# Patient Record
Sex: MALE | Race: WHITE | NOT HISPANIC OR LATINO | Employment: FULL TIME | URBAN - METROPOLITAN AREA
[De-identification: names, ages, dates, MRNs, and addresses within clinical notes are randomized per-mention and may not be internally consistent; named-entity substitution may affect disease eponyms.]

---

## 2018-07-09 ENCOUNTER — HOSPITAL ENCOUNTER (INPATIENT)
Facility: MEDICAL CENTER | Age: 28
LOS: 10 days | DRG: 871 | End: 2018-07-20
Attending: EMERGENCY MEDICINE | Admitting: HOSPITALIST
Payer: MEDICAID

## 2018-07-09 ENCOUNTER — APPOINTMENT (OUTPATIENT)
Dept: RADIOLOGY | Facility: MEDICAL CENTER | Age: 28
DRG: 871 | End: 2018-07-09
Attending: EMERGENCY MEDICINE
Payer: MEDICAID

## 2018-07-09 DIAGNOSIS — I33.0 ACUTE BACTERIAL ENDOCARDITIS: ICD-10-CM

## 2018-07-09 DIAGNOSIS — G06.2 EPIDURAL ABSCESS: ICD-10-CM

## 2018-07-09 LAB
BASOPHILS # BLD AUTO: 0.1 % (ref 0–1.8)
BASOPHILS # BLD: 0.01 K/UL (ref 0–0.12)
EOSINOPHIL # BLD AUTO: 0.08 K/UL (ref 0–0.51)
EOSINOPHIL NFR BLD: 1.1 % (ref 0–6.9)
ERYTHROCYTE [DISTWIDTH] IN BLOOD BY AUTOMATED COUNT: 41.4 FL (ref 35.9–50)
HCT VFR BLD AUTO: 34.5 % (ref 42–52)
HGB BLD-MCNC: 11.2 G/DL (ref 14–18)
IMM GRANULOCYTES # BLD AUTO: 0.02 K/UL (ref 0–0.11)
IMM GRANULOCYTES NFR BLD AUTO: 0.3 % (ref 0–0.9)
LYMPHOCYTES # BLD AUTO: 1.5 K/UL (ref 1–4.8)
LYMPHOCYTES NFR BLD: 19.7 % (ref 22–41)
MCH RBC QN AUTO: 26.5 PG (ref 27–33)
MCHC RBC AUTO-ENTMCNC: 32.5 G/DL (ref 33.7–35.3)
MCV RBC AUTO: 81.6 FL (ref 81.4–97.8)
MONOCYTES # BLD AUTO: 0.42 K/UL (ref 0–0.85)
MONOCYTES NFR BLD AUTO: 5.5 % (ref 0–13.4)
NEUTROPHILS # BLD AUTO: 5.57 K/UL (ref 1.82–7.42)
NEUTROPHILS NFR BLD: 73.3 % (ref 44–72)
NRBC # BLD AUTO: 0 K/UL
NRBC BLD-RTO: 0 /100 WBC
PLATELET # BLD AUTO: 283 K/UL (ref 164–446)
PMV BLD AUTO: 10.7 FL (ref 9–12.9)
RBC # BLD AUTO: 4.23 M/UL (ref 4.7–6.1)
TROPONIN I SERPL-MCNC: 0.01 NG/ML (ref 0–0.04)
WBC # BLD AUTO: 7.6 K/UL (ref 4.8–10.8)

## 2018-07-09 PROCEDURE — 700105 HCHG RX REV CODE 258: Performed by: EMERGENCY MEDICINE

## 2018-07-09 PROCEDURE — 87077 CULTURE AEROBIC IDENTIFY: CPT

## 2018-07-09 PROCEDURE — 83540 ASSAY OF IRON: CPT

## 2018-07-09 PROCEDURE — 87040 BLOOD CULTURE FOR BACTERIA: CPT

## 2018-07-09 PROCEDURE — 83036 HEMOGLOBIN GLYCOSYLATED A1C: CPT

## 2018-07-09 PROCEDURE — 84443 ASSAY THYROID STIM HORMONE: CPT

## 2018-07-09 PROCEDURE — 93005 ELECTROCARDIOGRAM TRACING: CPT | Performed by: EMERGENCY MEDICINE

## 2018-07-09 PROCEDURE — 80053 COMPREHEN METABOLIC PANEL: CPT

## 2018-07-09 PROCEDURE — 83550 IRON BINDING TEST: CPT

## 2018-07-09 PROCEDURE — 71046 X-RAY EXAM CHEST 2 VIEWS: CPT

## 2018-07-09 PROCEDURE — 83690 ASSAY OF LIPASE: CPT

## 2018-07-09 PROCEDURE — 99285 EMERGENCY DEPT VISIT HI MDM: CPT

## 2018-07-09 PROCEDURE — 87181 SC STD AGAR DILUTION PER AGT: CPT

## 2018-07-09 PROCEDURE — 85025 COMPLETE CBC W/AUTO DIFF WBC: CPT

## 2018-07-09 PROCEDURE — 84484 ASSAY OF TROPONIN QUANT: CPT

## 2018-07-09 RX ORDER — SODIUM CHLORIDE 9 MG/ML
1000 INJECTION, SOLUTION INTRAVENOUS ONCE
Status: COMPLETED | OUTPATIENT
Start: 2018-07-09 | End: 2018-07-09

## 2018-07-09 RX ADMIN — SODIUM CHLORIDE 1000 ML: 9 INJECTION, SOLUTION INTRAVENOUS at 22:16

## 2018-07-09 ASSESSMENT — LIFESTYLE VARIABLES: DO YOU DRINK ALCOHOL: NO

## 2018-07-10 ENCOUNTER — APPOINTMENT (OUTPATIENT)
Dept: RADIOLOGY | Facility: MEDICAL CENTER | Age: 28
DRG: 871 | End: 2018-07-10
Attending: INTERNAL MEDICINE
Payer: MEDICAID

## 2018-07-10 ENCOUNTER — APPOINTMENT (OUTPATIENT)
Dept: RADIOLOGY | Facility: MEDICAL CENTER | Age: 28
DRG: 871 | End: 2018-07-10
Attending: HOSPITALIST
Payer: MEDICAID

## 2018-07-10 PROBLEM — R11.0 NAUSEA: Status: ACTIVE | Noted: 2018-07-10

## 2018-07-10 PROBLEM — G03.9 MENINGITIS: Status: RESOLVED | Noted: 2018-07-10 | Resolved: 2018-07-10

## 2018-07-10 PROBLEM — R11.0 NAUSEA: Status: RESOLVED | Noted: 2018-07-10 | Resolved: 2018-07-10

## 2018-07-10 PROBLEM — R01.1 MURMUR: Status: ACTIVE | Noted: 2018-07-10

## 2018-07-10 PROBLEM — G03.9 MENINGITIS: Status: ACTIVE | Noted: 2018-07-10

## 2018-07-10 PROBLEM — E87.6 HYPOKALEMIA: Status: ACTIVE | Noted: 2018-07-10

## 2018-07-10 PROBLEM — G45.9 TIA (TRANSIENT ISCHEMIC ATTACK): Status: ACTIVE | Noted: 2018-07-10

## 2018-07-10 PROBLEM — G00.2 STREPTOCOCCAL MENINGITIS: Status: ACTIVE | Noted: 2018-07-10

## 2018-07-10 PROBLEM — B95.5 STREPTOCOCCAL BACTEREMIA: Status: ACTIVE | Noted: 2018-07-10

## 2018-07-10 PROBLEM — R78.81 BACTEREMIA: Status: ACTIVE | Noted: 2018-07-10

## 2018-07-10 PROBLEM — D64.9 ANEMIA: Status: ACTIVE | Noted: 2018-07-10

## 2018-07-10 LAB
ALBUMIN SERPL BCP-MCNC: 3.4 G/DL (ref 3.2–4.9)
ALBUMIN/GLOB SERPL: 1.1 G/DL
ALP SERPL-CCNC: 65 U/L (ref 30–99)
ALT SERPL-CCNC: 11 U/L (ref 2–50)
ANION GAP SERPL CALC-SCNC: 8 MMOL/L (ref 0–11.9)
AST SERPL-CCNC: 13 U/L (ref 12–45)
BILIRUB SERPL-MCNC: 0.4 MG/DL (ref 0.1–1.5)
BUN SERPL-MCNC: 17 MG/DL (ref 8–22)
BURR CELLS/RBC NFR CSF MANUAL: 0 %
CALCIUM SERPL-MCNC: 8.6 MG/DL (ref 8.5–10.5)
CHLORIDE SERPL-SCNC: 108 MMOL/L (ref 96–112)
CLARITY CSF: CLEAR
CO2 SERPL-SCNC: 24 MMOL/L (ref 20–33)
COLOR CSF: COLORLESS
COLOR SPUN CSF: COLORLESS
CREAT SERPL-MCNC: 0.78 MG/DL (ref 0.5–1.4)
EST. AVERAGE GLUCOSE BLD GHB EST-MCNC: 105 MG/DL
FERRITIN SERPL-MCNC: 407.7 NG/ML (ref 22–322)
FOLATE SERPL-MCNC: 8.5 NG/ML
GLOBULIN SER CALC-MCNC: 3.1 G/DL (ref 1.9–3.5)
GLUCOSE CSF-MCNC: 53 MG/DL (ref 40–80)
GLUCOSE SERPL-MCNC: 108 MG/DL (ref 65–99)
GRAM STN SPEC: NORMAL
HBA1C MFR BLD: 5.3 % (ref 0–5.6)
HGB RETIC QN AUTO: 30 PG/CELL (ref 29–35)
IMM RETICS NFR: 24.2 % (ref 9.3–17.4)
IRON SATN MFR SERPL: 10 % (ref 15–55)
IRON SERPL-MCNC: 21 UG/DL (ref 50–180)
LIPASE SERPL-CCNC: 52 U/L (ref 11–82)
LYMPHOCYTES NFR CSF: 77 %
MONONUC CELLS NFR CSF: 9 %
NEUTROPHILS NFR CSF: 14 %
POTASSIUM SERPL-SCNC: 3.3 MMOL/L (ref 3.6–5.5)
PROT CSF-MCNC: 32 MG/DL (ref 15–45)
PROT SERPL-MCNC: 6.5 G/DL (ref 6–8.2)
RBC # CSF: 14 CELLS/UL
RETICS # AUTO: 0.07 M/UL (ref 0.04–0.06)
RETICS/RBC NFR: 1.6 % (ref 0.8–2.1)
SIGNIFICANT IND 70042: NORMAL
SITE SITE: NORMAL
SODIUM SERPL-SCNC: 140 MMOL/L (ref 135–145)
SOURCE SOURCE: NORMAL
SPECIMEN VOL CSF: 8 ML
TIBC SERPL-MCNC: 209 UG/DL (ref 250–450)
TROPONIN I SERPL-MCNC: 0.02 NG/ML (ref 0–0.04)
TSH SERPL DL<=0.005 MIU/L-ACNC: 2.13 UIU/ML (ref 0.38–5.33)
TUBE # CSF: 2
TUBE # CSF: 3
VIT B12 SERPL-MCNC: 699 PG/ML (ref 211–911)
WBC # CSF: 54 CELLS/UL (ref 0–10)

## 2018-07-10 PROCEDURE — 70498 CT ANGIOGRAPHY NECK: CPT

## 2018-07-10 PROCEDURE — 700101 HCHG RX REV CODE 250: Performed by: INTERNAL MEDICINE

## 2018-07-10 PROCEDURE — 86592 SYPHILIS TEST NON-TREP QUAL: CPT

## 2018-07-10 PROCEDURE — 87070 CULTURE OTHR SPECIMN AEROBIC: CPT

## 2018-07-10 PROCEDURE — 70482 CT ORBIT/EAR/FOSSA W/O&W/DYE: CPT

## 2018-07-10 PROCEDURE — G8997 SWALLOW GOAL STATUS: HCPCS | Mod: CH

## 2018-07-10 PROCEDURE — 700105 HCHG RX REV CODE 258: Performed by: INTERNAL MEDICINE

## 2018-07-10 PROCEDURE — 82728 ASSAY OF FERRITIN: CPT

## 2018-07-10 PROCEDURE — 97535 SELF CARE MNGMENT TRAINING: CPT

## 2018-07-10 PROCEDURE — 36415 COLL VENOUS BLD VENIPUNCTURE: CPT

## 2018-07-10 PROCEDURE — 82945 GLUCOSE OTHER FLUID: CPT

## 2018-07-10 PROCEDURE — A9270 NON-COVERED ITEM OR SERVICE: HCPCS | Performed by: INTERNAL MEDICINE

## 2018-07-10 PROCEDURE — A9270 NON-COVERED ITEM OR SERVICE: HCPCS | Performed by: HOSPITALIST

## 2018-07-10 PROCEDURE — 700102 HCHG RX REV CODE 250 W/ 637 OVERRIDE(OP): Performed by: HOSPITALIST

## 2018-07-10 PROCEDURE — 84157 ASSAY OF PROTEIN OTHER: CPT

## 2018-07-10 PROCEDURE — G8998 SWALLOW D/C STATUS: HCPCS | Mod: CH

## 2018-07-10 PROCEDURE — 700117 HCHG RX CONTRAST REV CODE 255: Performed by: INTERNAL MEDICINE

## 2018-07-10 PROCEDURE — 700102 HCHG RX REV CODE 250 W/ 637 OVERRIDE(OP): Performed by: INTERNAL MEDICINE

## 2018-07-10 PROCEDURE — 70496 CT ANGIOGRAPHY HEAD: CPT

## 2018-07-10 PROCEDURE — 770020 HCHG ROOM/CARE - TELE (206)

## 2018-07-10 PROCEDURE — 82607 VITAMIN B-12: CPT

## 2018-07-10 PROCEDURE — G8996 SWALLOW CURRENT STATUS: HCPCS | Mod: CH

## 2018-07-10 PROCEDURE — 70488 CT MAXILLOFACIAL W/O & W/DYE: CPT

## 2018-07-10 PROCEDURE — 87205 SMEAR GRAM STAIN: CPT

## 2018-07-10 PROCEDURE — 82746 ASSAY OF FOLIC ACID SERUM: CPT

## 2018-07-10 PROCEDURE — 700117 HCHG RX CONTRAST REV CODE 255: Performed by: EMERGENCY MEDICINE

## 2018-07-10 PROCEDURE — 86618 LYME DISEASE ANTIBODY: CPT

## 2018-07-10 PROCEDURE — 700111 HCHG RX REV CODE 636 W/ 250 OVERRIDE (IP): Performed by: INTERNAL MEDICINE

## 2018-07-10 PROCEDURE — 85046 RETICYTE/HGB CONCENTRATE: CPT

## 2018-07-10 PROCEDURE — 62270 DX LMBR SPI PNXR: CPT | Performed by: INTERNAL MEDICINE

## 2018-07-10 PROCEDURE — 89051 BODY FLUID CELL COUNT: CPT

## 2018-07-10 PROCEDURE — 87040 BLOOD CULTURE FOR BACTERIA: CPT

## 2018-07-10 PROCEDURE — 009U3ZX DRAINAGE OF SPINAL CANAL, PERCUTANEOUS APPROACH, DIAGNOSTIC: ICD-10-PCS | Performed by: INTERNAL MEDICINE

## 2018-07-10 PROCEDURE — 92610 EVALUATE SWALLOWING FUNCTION: CPT

## 2018-07-10 PROCEDURE — 99220 PR INITIAL OBSERVATION CARE,LEVL III: CPT | Performed by: HOSPITALIST

## 2018-07-10 RX ORDER — ASPIRIN 81 MG/1
324 TABLET, CHEWABLE ORAL DAILY
Status: DISCONTINUED | OUTPATIENT
Start: 2018-07-10 | End: 2018-07-10

## 2018-07-10 RX ORDER — TRAMADOL HYDROCHLORIDE 50 MG/1
50 TABLET ORAL EVERY 6 HOURS PRN
Status: DISCONTINUED | OUTPATIENT
Start: 2018-07-10 | End: 2018-07-12

## 2018-07-10 RX ORDER — ASPIRIN 325 MG
325 TABLET ORAL DAILY
Status: DISCONTINUED | OUTPATIENT
Start: 2018-07-10 | End: 2018-07-10

## 2018-07-10 RX ORDER — SODIUM CHLORIDE 9 MG/ML
INJECTION, SOLUTION INTRAVENOUS CONTINUOUS
Status: DISCONTINUED | OUTPATIENT
Start: 2018-07-10 | End: 2018-07-14

## 2018-07-10 RX ORDER — PROMETHAZINE HYDROCHLORIDE 25 MG/1
12.5-25 TABLET ORAL EVERY 4 HOURS PRN
Status: DISCONTINUED | OUTPATIENT
Start: 2018-07-10 | End: 2018-07-10

## 2018-07-10 RX ORDER — PROMETHAZINE HYDROCHLORIDE 25 MG/1
12.5-25 SUPPOSITORY RECTAL EVERY 4 HOURS PRN
Status: DISCONTINUED | OUTPATIENT
Start: 2018-07-10 | End: 2018-07-10

## 2018-07-10 RX ORDER — POTASSIUM CHLORIDE 20 MEQ/1
40 TABLET, EXTENDED RELEASE ORAL ONCE
Status: COMPLETED | OUTPATIENT
Start: 2018-07-10 | End: 2018-07-10

## 2018-07-10 RX ORDER — IBUPROFEN 200 MG
800 TABLET ORAL EVERY 6 HOURS PRN
Status: ON HOLD | COMMUNITY
End: 2018-07-19

## 2018-07-10 RX ORDER — DEXAMETHASONE SODIUM PHOSPHATE 4 MG/ML
4 INJECTION, SOLUTION INTRA-ARTICULAR; INTRALESIONAL; INTRAMUSCULAR; INTRAVENOUS; SOFT TISSUE EVERY 6 HOURS
Status: DISCONTINUED | OUTPATIENT
Start: 2018-07-10 | End: 2018-07-11

## 2018-07-10 RX ORDER — AMOXICILLIN 250 MG
2 CAPSULE ORAL 2 TIMES DAILY
Status: DISCONTINUED | OUTPATIENT
Start: 2018-07-10 | End: 2018-07-10

## 2018-07-10 RX ORDER — POTASSIUM CHLORIDE 20 MEQ/1
40 TABLET, EXTENDED RELEASE ORAL EVERY 4 HOURS
Status: COMPLETED | OUTPATIENT
Start: 2018-07-10 | End: 2018-07-11

## 2018-07-10 RX ORDER — LABETALOL HYDROCHLORIDE 5 MG/ML
10 INJECTION, SOLUTION INTRAVENOUS EVERY 4 HOURS PRN
Status: DISCONTINUED | OUTPATIENT
Start: 2018-07-10 | End: 2018-07-20 | Stop reason: HOSPADM

## 2018-07-10 RX ORDER — ASPIRIN 300 MG/1
300 SUPPOSITORY RECTAL DAILY
Status: DISCONTINUED | OUTPATIENT
Start: 2018-07-10 | End: 2018-07-10

## 2018-07-10 RX ORDER — ONDANSETRON 2 MG/ML
4 INJECTION INTRAMUSCULAR; INTRAVENOUS EVERY 4 HOURS PRN
Status: DISCONTINUED | OUTPATIENT
Start: 2018-07-10 | End: 2018-07-20 | Stop reason: HOSPADM

## 2018-07-10 RX ORDER — SODIUM CHLORIDE 9 MG/ML
INJECTION, SOLUTION INTRAVENOUS CONTINUOUS
Status: DISCONTINUED | OUTPATIENT
Start: 2018-07-10 | End: 2018-07-10

## 2018-07-10 RX ORDER — HYDRALAZINE HYDROCHLORIDE 20 MG/ML
10 INJECTION INTRAMUSCULAR; INTRAVENOUS
Status: DISCONTINUED | OUTPATIENT
Start: 2018-07-10 | End: 2018-07-10

## 2018-07-10 RX ORDER — BISACODYL 10 MG
10 SUPPOSITORY, RECTAL RECTAL
Status: DISCONTINUED | OUTPATIENT
Start: 2018-07-10 | End: 2018-07-10

## 2018-07-10 RX ORDER — ATORVASTATIN CALCIUM 80 MG/1
80 TABLET, FILM COATED ORAL EVERY EVENING
Status: DISCONTINUED | OUTPATIENT
Start: 2018-07-10 | End: 2018-07-10

## 2018-07-10 RX ORDER — ONDANSETRON 4 MG/1
4 TABLET, ORALLY DISINTEGRATING ORAL EVERY 4 HOURS PRN
Status: DISCONTINUED | OUTPATIENT
Start: 2018-07-10 | End: 2018-07-20 | Stop reason: HOSPADM

## 2018-07-10 RX ORDER — FERROUS SULFATE 325(65) MG
325 TABLET ORAL
Status: DISCONTINUED | OUTPATIENT
Start: 2018-07-11 | End: 2018-07-10

## 2018-07-10 RX ORDER — LIDOCAINE HYDROCHLORIDE 10 MG/ML
20 INJECTION, SOLUTION INFILTRATION; PERINEURAL ONCE
Status: COMPLETED | OUTPATIENT
Start: 2018-07-10 | End: 2018-07-10

## 2018-07-10 RX ORDER — POLYETHYLENE GLYCOL 3350 17 G/17G
1 POWDER, FOR SOLUTION ORAL
Status: DISCONTINUED | OUTPATIENT
Start: 2018-07-10 | End: 2018-07-10

## 2018-07-10 RX ORDER — IBUPROFEN 600 MG/1
600 TABLET ORAL EVERY 6 HOURS PRN
Status: ON HOLD | COMMUNITY
End: 2018-07-10

## 2018-07-10 RX ORDER — DEXAMETHASONE SODIUM PHOSPHATE 4 MG/ML
10 INJECTION, SOLUTION INTRA-ARTICULAR; INTRALESIONAL; INTRAMUSCULAR; INTRAVENOUS; SOFT TISSUE ONCE
Status: COMPLETED | OUTPATIENT
Start: 2018-07-10 | End: 2018-07-10

## 2018-07-10 RX ADMIN — CEFTRIAXONE 2 G: 2 INJECTION, POWDER, FOR SOLUTION INTRAMUSCULAR; INTRAVENOUS at 15:25

## 2018-07-10 RX ADMIN — TRAMADOL HYDROCHLORIDE 50 MG: 50 TABLET, COATED ORAL at 17:18

## 2018-07-10 RX ADMIN — DEXAMETHASONE SODIUM PHOSPHATE 4 MG: 4 INJECTION, SOLUTION INTRAMUSCULAR; INTRAVENOUS at 20:46

## 2018-07-10 RX ADMIN — ASPIRIN 325 MG: 325 TABLET ORAL at 05:30

## 2018-07-10 RX ADMIN — POTASSIUM CHLORIDE 40 MEQ: 1500 TABLET, EXTENDED RELEASE ORAL at 05:30

## 2018-07-10 RX ADMIN — IOHEXOL 100 ML: 350 INJECTION, SOLUTION INTRAVENOUS at 00:12

## 2018-07-10 RX ADMIN — TRAMADOL HYDROCHLORIDE 50 MG: 50 TABLET, COATED ORAL at 22:33

## 2018-07-10 RX ADMIN — VANCOMYCIN HYDROCHLORIDE 2600 MG: 100 INJECTION, POWDER, LYOPHILIZED, FOR SOLUTION INTRAVENOUS at 20:43

## 2018-07-10 RX ADMIN — LIDOCAINE HYDROCHLORIDE 20 ML: 10 INJECTION, SOLUTION INFILTRATION; PERINEURAL at 14:30

## 2018-07-10 RX ADMIN — SODIUM CHLORIDE: 9 INJECTION, SOLUTION INTRAVENOUS at 20:43

## 2018-07-10 RX ADMIN — IOHEXOL 100 ML: 350 INJECTION, SOLUTION INTRAVENOUS at 16:44

## 2018-07-10 RX ADMIN — DEXAMETHASONE SODIUM PHOSPHATE 10 MG: 4 INJECTION, SOLUTION INTRAMUSCULAR; INTRAVENOUS at 15:15

## 2018-07-10 RX ADMIN — POTASSIUM CHLORIDE 40 MEQ: 1500 TABLET, EXTENDED RELEASE ORAL at 22:00

## 2018-07-10 ASSESSMENT — ENCOUNTER SYMPTOMS
ORTHOPNEA: 0
NECK PAIN: 1
INSOMNIA: 0
EYE DISCHARGE: 0
VOMITING: 0
WEAKNESS: 0
BRUISES/BLEEDS EASILY: 0
HEMOPTYSIS: 0
EYE PAIN: 0
NERVOUS/ANXIOUS: 0
STRIDOR: 0
SORE THROAT: 0
DIARRHEA: 0
DIZZINESS: 0
PALPITATIONS: 0
SINUS PAIN: 0
MYALGIAS: 1
DOUBLE VISION: 0
MYALGIAS: 0
EYE REDNESS: 0
LOSS OF CONSCIOUSNESS: 0
CHILLS: 0
SHORTNESS OF BREATH: 0
NAUSEA: 1
WHEEZING: 0
SEIZURES: 0
TREMORS: 0
SPEECH CHANGE: 1
ABDOMINAL PAIN: 0
CLAUDICATION: 0
COUGH: 0
FOCAL WEAKNESS: 0
SENSORY CHANGE: 1
SENSORY CHANGE: 0
FLANK PAIN: 0
SPEECH CHANGE: 0
SPUTUM PRODUCTION: 0
PND: 0
FEVER: 1
BACK PAIN: 1
FALLS: 0
BLOOD IN STOOL: 0
HEADACHES: 1
MEMORY LOSS: 0
TINGLING: 0
DEPRESSION: 0
WEAKNESS: 1
HALLUCINATIONS: 0
CHILLS: 1
PHOTOPHOBIA: 1
CONSTIPATION: 0
FOCAL WEAKNESS: 1
HEARTBURN: 0
BLURRED VISION: 0

## 2018-07-10 ASSESSMENT — PATIENT HEALTH QUESTIONNAIRE - PHQ9
1. LITTLE INTEREST OR PLEASURE IN DOING THINGS: NOT AT ALL
2. FEELING DOWN, DEPRESSED, IRRITABLE, OR HOPELESS: NOT AT ALL
SUM OF ALL RESPONSES TO PHQ9 QUESTIONS 1 AND 2: 0

## 2018-07-10 ASSESSMENT — COGNITIVE AND FUNCTIONAL STATUS - GENERAL
SUGGESTED CMS G CODE MODIFIER DAILY ACTIVITY: CH
DAILY ACTIVITIY SCORE: 24
SUGGESTED CMS G CODE MODIFIER MOBILITY: CH
MOBILITY SCORE: 24

## 2018-07-10 ASSESSMENT — PAIN SCALES - GENERAL
PAINLEVEL_OUTOF10: 0
PAINLEVEL_OUTOF10: 0
PAINLEVEL_OUTOF10: 6
PAINLEVEL_OUTOF10: 0

## 2018-07-10 ASSESSMENT — LIFESTYLE VARIABLES
SUBSTANCE_ABUSE: 0
EVER_SMOKED: YES

## 2018-07-10 NOTE — ED TRIAGE NOTES
See triage notes  Pt has multiple complaints, high fevers for 1 month placed on antibiotics never followed up with pcp, pt also states losing 32 lbs in less than 2months.  Pt is negative stroke exam

## 2018-07-10 NOTE — H&P
Hospital Medicine History and Physical    Date of Service  7/10/2018    Chief Complaint  Chief Complaint   Patient presents with   • Numbness       History of Presenting Illness  27 y.o. male who presented 7/9/2018 with numbness.  Patient has a history of Bell's palsy came for evaluation of left-sided facial numbness and difficulty speaking.  This started around 8 PM.  He states he was standing there with his daughter, then went to bed for a nap.  When he woke up he had left-sided facial numbness and drooping.  He also had difficulty with speak and tongue numbness.  This was new for him.  He has very slurred speech and slowed speech.  Is also had nausea which has been going on for several months now.  Is also been complaining of fevers.  No alleviating or exacerbating factors to his symptoms.  He has never had symptoms like this previously    Primary Care Physician  YENY Velez.    Consultants  none    Code Status  full    Review of Systems  Review of Systems   Constitutional: Positive for fever. Negative for chills.   HENT: Negative for congestion, hearing loss and tinnitus.    Eyes: Negative for blurred vision, double vision and discharge.   Respiratory: Negative for cough, hemoptysis and shortness of breath.    Cardiovascular: Negative for chest pain, palpitations and leg swelling.   Gastrointestinal: Positive for nausea. Negative for abdominal pain, heartburn and vomiting.   Genitourinary: Negative for dysuria and flank pain.   Musculoskeletal: Negative for joint pain and myalgias.   Skin: Negative for rash.   Neurological: Positive for sensory change, speech change and focal weakness. Negative for dizziness and weakness.   Endo/Heme/Allergies: Negative for environmental allergies. Does not bruise/bleed easily.   Psychiatric/Behavioral: Negative for depression, hallucinations and substance abuse.        Past Medical History  Past Medical History:   Diagnosis Date   • Pain        Surgical  History  Past Surgical History:   Procedure Laterality Date   • KNEE ARTHROSCOPY  10/2/08    Performed by JOY NOLEN at [facility] ORS   • KNEE ARTHROSCOPY  08    Performed by JOY NOLEN at [facility] ORS   • TIBIAL OSTEOTOMY  08    Performed by JOY NOLEN at [facility] ORS   • LOOSE BODY REMOVAL  08    Performed by JOY NOLEN at [facility] ORS   • LIGAMENT RECONSTRUCTION  08    Performed by JOY NOLEN at [facility] ORS   • KNEE ARTHROSCOPY  08    Performed by JOY NOLEN at [facility]   • KNEE ARTHROSCOPY      Left   • TONSILLECTOMY         Medications  No current facility-administered medications on file prior to encounter.      No current outpatient prescriptions on file prior to encounter.       Family History  Family History   Problem Relation Age of Onset   • Hyperlipidemia Paternal Grandmother    • Diabetes Maternal Grandfather    • Heart Disease Maternal Grandmother    • Heart Disease Father    • Hypertension Father    • Heart Disease Sister        Social History  Social History   Substance Use Topics   • Smoking status: Current Every Day Smoker     Packs/day: 0.50   • Smokeless tobacco: Never Used   • Alcohol use No       Allergies  Allergies   Allergen Reactions   • Nkda [No Known Drug Allergy]         Physical Exam  Laboratory   Hemodynamics  Temp (24hrs), Av.3 °C (99.2 °F), Min:37.3 °C (99.2 °F), Max:37.3 °C (99.2 °F)   Temperature: 37.3 °C (99.2 °F)  Pulse  Av  Min: 100  Max: 114    Blood Pressure: 135/75      Respiratory      Respiration: 16, Pulse Oximetry: 95 %             Physical Exam   Constitutional: He is oriented to person, place, and time. He appears well-developed and well-nourished.   HENT:   Head: Normocephalic and atraumatic.   Eyes: Conjunctivae and EOM are normal. Pupils are equal, round, and reactive to light.   Neck: Normal range of motion. Neck  supple. No JVD present.   Cardiovascular: Normal rate, regular rhythm and intact distal pulses.    Murmur heard.  Pulmonary/Chest: Effort normal and breath sounds normal. No respiratory distress. He exhibits no tenderness.   Abdominal: Soft. Bowel sounds are normal. He exhibits no distension. There is no tenderness.   Musculoskeletal: Normal range of motion. He exhibits no edema.   Neurological: He is alert and oriented to person, place, and time. No cranial nerve deficit. He exhibits normal muscle tone.   Skin: Skin is warm and dry. No erythema.   Psychiatric: He has a normal mood and affect. His behavior is normal. Judgment and thought content normal.   Nursing note and vitals reviewed.      Recent Labs      07/09/18 2059   WBC  7.6   RBC  4.23*   HEMOGLOBIN  11.2*   HEMATOCRIT  34.5*   MCV  81.6   MCH  26.5*   MCHC  32.5*   RDW  41.4   PLATELETCT  283   MPV  10.7     Recent Labs      07/09/18   2333   SODIUM  140   POTASSIUM  3.3*   CHLORIDE  108   CO2  24   GLUCOSE  108*   BUN  17   CREATININE  0.78   CALCIUM  8.6     Recent Labs      07/09/18   2333   ALTSGPT  11   ASTSGOT  13   ALKPHOSPHAT  65   TBILIRUBIN  0.4   LIPASE  52   GLUCOSE  108*                 Lab Results   Component Value Date    TROPONINI 0.02 07/09/2018     Urinalysis:  No results found for: SPECGRAVITY, GLUCOSEUR, KETONES, NITRITE, WBCURINE, RBCURINE, BACTERIA, EPITHELCELL     Imaging  CT-CTA NECK WITH & W/O-POST PROCESSING [[id number]] Resulted: 07/10/18 0030   Order Status: Completed Updated: 07/10/18 0033   Narrative:       7/9/2018 11:49 PM    HISTORY/REASON FOR EXAM: Left-sided facial numbness.    TECHNIQUE/EXAM DESCRIPTION:  CT angiogram of the neck with contrast.    Postcontrast images were obtained of the neck from the great vessels through the skull base following the power injection of nonionic contrast at 5.0 mL/sec. Thin-section helical images were obtained with overlapping reconstruction interval. Coronal and   oblique multiplanar  volume reformats were generated.    Cervical internal carotid artery percent stenosis is calculated using the standard method according to the NASCET criteria wherein a segment of uniform caliber mid or distal cervical internal carotid is used as the reference denominator.    3D angiographic images were reviewed on PACS.  Maximum intensity projection (MIP) images were generated and reviewed    100 mL of Omnipaque 350 nonionic contrast was injected intravenously.    Low dose optimization technique was utilized for this CT exam including automated exposure control and adjustment of the mA and/or kV according to patient size.    COMPARISON:  None.    FINDINGS:  The arch origins of the great vessels appear intact. The aortic arch shows no abnormality.    The right common carotid artery, cervical carotid bifurcation, and cervical internal carotid artery are within normal limits. There is no evidence of significant stenosis, thrombus, or other filling defect or ulceration. There is no evidence of   dissection or aneurysm.    The left common carotid artery, cervical carotid bifurcation, and cervical internal carotid artery are within normal limits. There is no evidence of significant stenosis, thrombus, or other filling defect or ulceration. There is no evidence of dissection   or aneurysm.    The cervical vertebral arteries are normal bilaterally.    The neck soft tissues and lung apices in the field of view are unremarkable.       Impression:       CT angiogram of the neck within normal limits.   CT-CTA HEAD WITH & W/O-POST PROCESS [[id number]] Resulted: 07/10/18 0028   Order Status: Completed Updated: 07/10/18 0030   Narrative:       7/9/2018 11:49 PM    HISTORY/REASON FOR EXAM:  Neurological Deficit    TECHNIQUE/EXAM DESCRIPTION:  CT angiogram of the [place] without and with contrast.  Initial precontrast images were obtained of the head from the skull base through the vertex.  Postcontrast images were obtained  of the [place] following the power injection of   nonionic contrast at 5.0 mL/sec. Thin-section helical images were obtained with overlapping reconstruction interval. Coronal and sagittal multiplanar volume reformats were generated.  3D angiographic images were reviewed on PACS.  Maximum intensity   projection (MIP) images were generated and reviewed.    100 mL of Omnipaque 350 nonionic contrast was injected intravenously.    Low dose optimization technique was utilized for this CT exam including automated exposure control and adjustment of the mA and/or kV according to patient size.    COMPARISON:  None.    FINDINGS:    The posterior circulation shows the distal vertebral arteries to be patent. The vertebrobasilar confluence is intact. The basilar artery is patent. No aneurysm or occlusive lesion is evident.    The anterior circulation shows no stenotic or occlusive lesion. No aneurysm is evident about the [place].    The brain is unremarkable.   Impression:         1.  CT angiogram of the [place] within normal limits.   DX-CHEST-2 VIEWS [[id number]] Resulted: 07/10/18 0008   Order Status: Completed Updated: 07/10/18 0010   Narrative:       7/9/2018 10:30 PM    HISTORY/REASON FOR EXAM: Shortness of Breath    TECHNIQUE/EXAM DESCRIPTION:  PA and lateral views of the chest.    COMPARISON: None    FINDINGS:    The cardiac silhouette appears within normal limits.    The mediastinal contour appears within normal limits.  The central pulmonary vasculature appears normal.    The lungs appear well expanded bilaterally.  Bilateral lungs are clear.    No significant pleural effusions are identified.    The bony structures appear age-appropriate.   Impression:         1.  No acute cardiopulmonary disease.        Assessment/Plan     I anticipate this patient is appropriate for observation status at this time.    * TIA (transient ischemic attack)   Assessment & Plan    Patient presents with signs and  symptoms very concerning for transient ischemic attack  Has had a negative CT head, negative CT angiogram  We will need MRI echocardiogram  Started on aspirin and statin  Allow for permissive hypertension  Lipid panel A1c  PT OT          Murmur   Assessment & Plan    Patient's sister also has pacemaker placement father with cardiac disease both of which are unknown by the patient  Will check echocardiogram        Nausea   Assessment & Plan    Antiemetics        Anemia   Assessment & Plan    Lab workup        Hypokalemia   Assessment & Plan    Replace and recheck            VTE prophylaxis: scd

## 2018-07-10 NOTE — ED NOTES
Pt has no complaints at present.  Pt laying on bed, in low fowlers position.  Call bell within reach.

## 2018-07-10 NOTE — PROGRESS NOTES
"Admission skin assessment performed with two RN's. Pt had numerous scarring on left knee and \"mosquito bites\" per patient on left arm and left ankle. All bony prominences observed, no other skin issues found.  "

## 2018-07-10 NOTE — THERAPY
"Speech Language Therapy Clinical Swallow Evaluation completed.  Functional Status: Clinical swallow evaluation completed on this date.  Patient pleasant and agreeable.  No dysarthria or slurred speech noted.  He followed directives to the oral [facility] exam with no gross deficits noted.  Presentation of PO included ice chips, purees, mixed consistencies, dry solids, and thin liquids.  The patient presented with functional mastication and bolus manipulation, timely initiation of swallow trigger and adequate laryngeal elevation upon palpation.  The patient maintained clear vocal quality throughout the session and had no overt s/sx of aspiration with any consistency consumed.  At this time, recommend initiation of a Regular/thins diet as tolerated.  No further acute SLP needs at this time.  Please reconsult with a change in status.  Thank you for this referral.   Recommendations - Diet: Diet / Liquid Recommendation: Thin Liquid, Regular                          Strategies: Head of Bed at 90 Degrees                          Medication Administration: Medication Administration : Whole with Liquid Wash  Plan of Care: Patient with no further skilled SLP needs in the acute care setting at this time  Post-Acute Therapy: Currently anticipate no further skilled therapy needs once patient is discharged from the inpatient setting.    See \"Rehab Therapy-Acute\" Patient Summary Report for complete documentation.   "

## 2018-07-10 NOTE — PROGRESS NOTES
"Patient has been sleeping/resting all shift in room. Alert and oriented and is able to make needs known, no fall precautions needed, patient has steady gait as witnessed by RN and CNA. NIH score was 1 this morning, will continue to monitor. Only complaint at this time is the slurred speech and numb tongue, all other symptoms resolved. \"I tend to mumble and speak softly anyway's.\" Called MRI but do not have a time of when the MRI may happen today, awaiting for echocardiogram. Denies pain or discomfort. PIV still in place at this time. Did not have appetite for breakfast, will continue to encourage other options and fluids at bedside. Oral temp of 100.9 F, patient states he has been running fevers recently. Given ice water and ice pack to help cool down, no PRN Tylenol available. Covering NP notified and will continue to monitor.  "

## 2018-07-10 NOTE — PROGRESS NOTES
Med rec completed per pt at bedside  Allergies reviewed - NKDA  No ABX in last 30 days   Pt denies taking any prescription medications

## 2018-07-10 NOTE — PROGRESS NOTES
Lab called with notification of two positive blood cultures for strep, Nurse Practitioner provider has been notified at 1220.

## 2018-07-10 NOTE — ED TRIAGE NOTES
"Patient to ED triage with complaints of onset of left side facial and numbness and difficulty speaking at approximately 2000 tonight. He states he felt like he \"blacked out\" and then he could not feel his left face and neck and he could not speak. After a new minutes symptoms resolved but he continues to have left tongue numbness. No slurred speech or facial droop, no arm drift.     Pt educated on ED process and asked to wait in lobby. Patient educated on importance of alerting staff to new or worsening symptoms or concerns.  "

## 2018-07-10 NOTE — PROGRESS NOTES
Patient had lumbar puncture at bedside at 1420, RN to assist at bedside, tolerated well, sent samples down to lab by RN. Patient lay flat for 30 minutes after LP, complaint of slight headache but declined intervention. Placed new IV to left AC, IV antibiotics started to IV in right AC, IV decadron administered prior. Patient sent to CT scan at 1558, consent signed at bedside. Fever has decreased, will continue to monitor.

## 2018-07-10 NOTE — DIETARY
Nutrition Services: Day 0 of admit.  Nicholas Neumann is a 27 y.o. male with admitting DX of TIA (transient ischemic attack)  Consult received for Wt Loss on Nutrition Admit Screen     Pt states he hasn't had an appetite for about 2 months. Pt reports 25 lb wt loss in 2 1/2 months and his UBW was 250 lbs (113.6 kg). Pt states he is happy about the wt loss, however not thrilled about how it happened. Added snacks BID per pt preference.     Assessment:  Ht: 177.8 cm, Wt 7/10: 103.9 kg via bed scale, BMI: 32.87  Diet/Intake: Regular - No PO documented in chart yet     Evaluation:   1. Wt loss percent is 8.5% in 2 1/2 months, which is severe.   2. Labs: K 3.3, Glucose 108  3. Meds: lipitor, pericolace  4. Last BM: 7/9    Recommendations/Plan:  1. Encourage intake of meals  2. Document intake of all meals as % taken in ADL's to provide interdisciplinary communication across all shifts.   3. Monitor weight.  4. Nutrition rep will continue to see patient for ongoing meal and snack preferences.  5. Obtain supplement order per RD as needed.    RD following

## 2018-07-10 NOTE — PROGRESS NOTES
Received report from ED RN and assumed patient care at 0415. Admission and medication reconciliation completed. Pt is alert and oriented x 4. Pt denies pain at this time. NIH assessment performed, pt received a score of 1 for slurred speech. Telemetry monitor in place. Bed locked in lowest position, hourly rounding in place.

## 2018-07-10 NOTE — ED PROVIDER NOTES
"ED Provider Note    Scribed for Kamla Richards M.D. by Travis Umanzor. 7/9/2018  9:53 PM    Means of arrival: Walk in  History obtained from: Patient  History limited by: None      CHIEF COMPLAINT  Chief Complaint   Patient presents with   • Numbness       HPI  Nicholas CLIFFORD Thien is a 27 y.o. male with chronic neck pain and back pain and history of Bell's Palsy who presents to the Emergency Department for evaluation of left sided facial numbness with associated difficulty speaking that occurred around 8 PM tonight. He states he felt like he \"blacked out\" at the time, and the patient's wife reports she was awaken from her nap at that time to find the patient's left side of his face drooping. Symptoms shortly resolved after a few minutes. The patient is concerned because he currently has left tongue numbness following this episode tonight. He denies associated extremity numbness or weakness.    The patient additionally complains of fevers and nausea that have been ongoing for over a month. He was evaluated in Urgent Care upon initial onset and prescribed course of cefdinir at that time. The patient states this helped resolve his fevers for a short amount of time. He reports he was set to follow up with the nurse practitioner at the Urgent care secondary to a heart murmur, but never attended the appointment. The patient became concern due to his fever returning this week, with a  t-max of 102 °F measured 5 days ago. The patient additionally reports his nausea has persisted with associated weight loss. He additionally endorses associated chest and abdominal pain that is alleviated with Pepcid.     The patient has significant family cardiovascular history on both maternal and paternal sides. He currently denies associated cough, rhinorrhea, nasal congestion, vomiting, diarrhea, dysuria, or hematuria.     REVIEW OF SYSTEMS  Pertinent positive include left sided facial numbness, difficulty speaking, left tongue numbness, " "fever, nausea, and chest pain. Pertinent negative include cough, rhinorrhea, nasal congestion, dysuria, or hematuria. . All other systems reviewed and are negative.  C.    PAST MEDICAL HISTORY   has a past medical history of Pain.    SOCIAL HISTORY  Social History     Social History Main Topics   • Smoking status: Current Every Day Smoker     Packs/day: 0.50   • Smokeless tobacco: Never Used   • Alcohol use No   • Drug use: No   • Sexual activity: None noted       SURGICAL HISTORY   has a past surgical history that includes tonsillectomy (1998); knee arthroscopy (2004); knee arthroscopy (5/21/08); tibial osteotomy (5/21/08); loose body removal (5/21/08); ligament reconstruction (5/21/08); knee arthroscopy (5/21/08); and knee arthroscopy (10/2/08).    CURRENT MEDICATIONS  Home Medications     Reviewed by Sammie Mendoza R.N. (Registered Nurse) on 07/09/18 at 2043  Med List Status: Complete   Medication Last Dose Status        Patient Prince Taking any Medications                       ALLERGIES  Allergies   Allergen Reactions   • Nkda [No Known Drug Allergy]        PHYSICAL EXAM   VITAL SIGNS: /79   Pulse (!) 114   Temp 37.3 °C (99.2 °F)   Resp 18   Ht 1.778 m (5' 10\")   Wt 101.5 kg (223 lb 12.3 oz)   SpO2 95%   BMI 32.11 kg/m²    Constitutional: Non toxic appearance. Alert in no apparent distress.  HENT: Normocephalic, Atraumatic. Bilateral external ears normal. Nose normal.  Moist mucous membranes.  Oropharynx clear.  Eyes: Pupils are equal and reactive. Conjunctiva normal. Extra occular movements intact.   Neck: Supple, full range of motion  Heart: Tachycardic rate and regular rhythm. 4/6 systolic murmur.    Lungs: No respiratory distress, normal work of breathing. Lungs clear to auscultation bilaterally.  Abdomen Soft, no distention.  No tenderness to palpation.  Musculoskeletal: Atraumatic. No obvious deformities noted.  No lower extremity edema.  Skin: Warm, Dry.  No erythema, No rash. " "  Neurologic: Alert and oriented x3. Moving all extremities spontaneously without focal deficits. Sensation and strength intact throughout all 4 extremities. Cranial nerves 2 through 12 intact. No dysmetria.  Psychiatric: Affect normal, Mood normal, Appears appropriate and not intoxicated.    DIAGNOSTIC STUDIES    EKG  EKG personally reviewed by myself in the absence of a cardiologist showed:  Sinus Tachycardia with rate of 102  Normal axis and intervals  No ectopy or hypertrophy  No ST or T wave change  Impression: Sinus tach, no ischemia      LABS  Personally reviewed by me  Labs Reviewed   CBC WITH DIFFERENTIAL - Abnormal; Notable for the following:        Result Value    RBC 4.23 (*)     Hemoglobin 11.2 (*)     Hematocrit 34.5 (*)     MCH 26.5 (*)     MCHC 32.5 (*)     Neutrophils-Polys 73.30 (*)     Lymphocytes 19.70 (*)     All other components within normal limits   COMP METABOLIC PANEL - Abnormal; Notable for the following:     Potassium 3.3 (*)     Glucose 108 (*)     All other components within normal limits   TROPONIN   BLOOD CULTURE    Narrative:     Per Hospital Policy: Only change Specimen Src: to \"Line\" if  specified by physician order.   BLOOD CULTURE    Narrative:     Per Hospital Policy: Only change Specimen Src: to \"Line\" if  specified by physician order.   TROPONIN   LIPASE   ESTIMATED GFR   URINALYSIS,CULTURE IF INDICATED           RADIOLOGY  Personally reviewed by me  CT-CTA NECK WITH & W/O-POST PROCESSING   Final Result      CT angiogram of the neck within normal limits.      CT-CTA HEAD WITH & W/O-POST PROCESS   Final Result         1.  CT angiogram of the [place] within normal limits.      DX-CHEST-2 VIEWS   Final Result         1.  No acute cardiopulmonary disease.          ED COURSE  Vitals:    07/09/18 2033 07/09/18 2039   BP: 139/79    Pulse: (!) 114    Resp: 18    Temp: 37.3 °C (99.2 °F)    SpO2: 95%    Weight:  101.5 kg (223 lb 12.3 oz)   Height: 1.778 m (5' 10\")          9:53 " PM Patient seen and examined at bedside. The patient presents with left facial numbness. Ordered for Blood Culture, Urinalysis, Troponin, CBC, Estimated GFR, Lipase, Troponin, CMP, DX-Chest, CT-CTA Head, CT-CTA Neck, and EKG to evaluate. Patient will be treated with 1 L NS secondary to tachycardia.    MEDICAL DECISION MAKING  Otherwise healthy patient presents with episode of a facial and left-sided numbness which occurred a few hours prior to arrival in addition to ongoing fevers and possible new onset of heart murmur over the last month. He is afebrile, tachycardic on arrival with otherwise normal vital signs. Neurologic assessment was completed without evidence of focal neurologic deficits concerning for CVA. CT head was negative for intracranial hemorrhage. Vascular imaging was unremarkable. EKG without evidence of ischemia or arrhythmia, troponin is negative, doubt ACS. Labs are otherwise unremarkable other than mild anemia and hypokalemia. Unclear etiology of patient's reported fevers. He does not have evidence of fever here in the department. Chest x-ray and urine are negative for evidence of infection. Blood cultures are pending at this time. Patient does have evidence of murmur on exam without known history of the same. He has no history of IV drug use or other risk factors for endocarditis.    2:07 AM - Patient was reevaluated at bedside. He is resting comfortably in bed and tachycardia has resolved after IV fluids. Discussed lab and radiology results with the patient and informed him an MRI and echocardiogram are further required for evaluation of possible TIA. Patient made aware he will be admitted and is agreeable.     2:11 AM - Consult with Dr. Mary, Hospitalist, who agrees to admit the patient.     2:59 AM - Patient was reevaluated at bedside. He remains resting comfortably in bed.         DISPOSITION:  Patient will be admitted to Dr. [facility], in guarded condition.    IMPRESSION  Facial  numbness  Heart murmur  Hypokalemia    Results, diagnoses, and treatment options were discussed with the patient and/or family. Patient verbalized understanding of plan of care.    New Prescriptions    No medications on file            Travis GOFF (Scribgisela), am scribing for, and in the presence of, Kamla Richards M.D..    Electronically signed by: Travis Umanzor (Scribe), 7/9/2018    IKamla M.D. personally performed the services described in this documentation, as scribed by Travis Umanzor in my presence, and it is both accurate and complete.    The note accurately reflects work and decisions made by me.  Kamla Richards  7/10/2018  6:19 AM

## 2018-07-10 NOTE — ASSESSMENT & PLAN NOTE
With bacteremia and concerns for infective endocarditis    -Echocardiogram performed with EF 65%, Mildly dilated left atrium with Moderate mitral regurgitation  -Echo negative  -Cards consulted (Dr. Rielly) and CHAR in am

## 2018-07-10 NOTE — PROGRESS NOTES
"Pharmacy Kinetics 27 y.o. male on vancomycin day # 1   7/10/2018    Currently on Vancomycin 2600 mg IV x1 followed by 2000 mg IV q8h    Indication for Treatment: rule out CNS infection    Pertinent history per medical record: Admitted on 2018 for left sided numbness. Patient had sudden onset left facial numbness and difficulty speaking at about 2000 on . He said he felt like he \"blacked out\" then could not feel his face or speak. Symptoms quickly resolved. Patient brought to ED for TIA workup. Next day both blood cx came back positive for possible strep. LP done and cx sent..    Other antibiotics: Ceftriaxone 2 g IV 12h    Allergies: Nkda [no known drug allergy]     List concerns for renal function: BMI      Pertinent cultures to date:   18: Blood, peripheral x2 = possible strep  7/10/18: Fluid, CSF = in process (no organism seen on gram stain)    Recent Labs      18   2059   WBC  7.6   NEUTSPOLYS  73.30*     Recent Labs      18   2333   BUN  17   CREATININE  0.78   ALBUMIN  3.4     Blood pressure 106/72, pulse 98, temperature (!) 38.1 °C (100.5 °F), resp. rate 20, height 1.778 m (5' 10\"), weight 103.9 kg (229 lb 0.9 oz), SpO2 96 %. Temp (24hrs), Av.6 °C (99.6 °F), Min:37.3 °C (99.1 °F), Max:38.1 °C (100.5 °F)      A/P   1. Vancomycin dose change: new start  2. Next vancomycin level: tomorrow at 0930 (prior to 3rd total dose)  3. Goal trough: 18-22 mcg/mL  4. Comments: LP done, not impressive for bacterial meningitis. ECHO ordered. MRI spine ordered. ? ID consult. Low grade fever. No c/o accumulation will start aggressive dosing and follow up level tomorrow.    Perfecto Kenny, PharmD, BCPS      "

## 2018-07-10 NOTE — THERAPY
OT consult received. Per RN, pt has been up self and has no OT needs. Please re-order should functional status change.

## 2018-07-11 LAB
ALBUMIN SERPL BCP-MCNC: 3.6 G/DL (ref 3.2–4.9)
ALBUMIN/GLOB SERPL: 1 G/DL
ALP SERPL-CCNC: 69 U/L (ref 30–99)
ALT SERPL-CCNC: 11 U/L (ref 2–50)
ANION GAP SERPL CALC-SCNC: 7 MMOL/L (ref 0–11.9)
APTT PPP: 33.7 SEC (ref 24.7–36)
AST SERPL-CCNC: 10 U/L (ref 12–45)
BASOPHILS # BLD AUTO: 0 % (ref 0–1.8)
BASOPHILS # BLD: 0 K/UL (ref 0–0.12)
BILIRUB SERPL-MCNC: 0.3 MG/DL (ref 0.1–1.5)
BUN SERPL-MCNC: 8 MG/DL (ref 8–22)
CALCIUM SERPL-MCNC: 9.4 MG/DL (ref 8.5–10.5)
CHLORIDE SERPL-SCNC: 105 MMOL/L (ref 96–112)
CO2 SERPL-SCNC: 24 MMOL/L (ref 20–33)
CREAT SERPL-MCNC: 0.62 MG/DL (ref 0.5–1.4)
EOSINOPHIL # BLD AUTO: 0 K/UL (ref 0–0.51)
EOSINOPHIL NFR BLD: 0 % (ref 0–6.9)
ERYTHROCYTE [DISTWIDTH] IN BLOOD BY AUTOMATED COUNT: 41.8 FL (ref 35.9–50)
GLOBULIN SER CALC-MCNC: 3.5 G/DL (ref 1.9–3.5)
GLUCOSE SERPL-MCNC: 193 MG/DL (ref 65–99)
HAV IGM SERPL QL IA: NEGATIVE
HBV CORE IGM SER QL: NEGATIVE
HBV SURFACE AG SER QL: NEGATIVE
HCT VFR BLD AUTO: 35 % (ref 42–52)
HCV AB SER QL: NEGATIVE
HGB BLD-MCNC: 11.1 G/DL (ref 14–18)
HIV 1+2 AB+HIV1 P24 AG SERPL QL IA: NON REACTIVE
IMM GRANULOCYTES # BLD AUTO: 0.03 K/UL (ref 0–0.11)
IMM GRANULOCYTES NFR BLD AUTO: 0.5 % (ref 0–0.9)
INR PPP: 1.16 (ref 0.87–1.13)
LV EJECT FRACT  99904: 65
LV EJECT FRACT MOD 2C 99903: 64.1
LV EJECT FRACT MOD 4C 99902: 61.74
LV EJECT FRACT MOD BP 99901: 62.93
LYMPHOCYTES # BLD AUTO: 0.64 K/UL (ref 1–4.8)
LYMPHOCYTES NFR BLD: 11.4 % (ref 22–41)
MAGNESIUM SERPL-MCNC: 2.3 MG/DL (ref 1.5–2.5)
MCH RBC QN AUTO: 26.4 PG (ref 27–33)
MCHC RBC AUTO-ENTMCNC: 31.7 G/DL (ref 33.7–35.3)
MCV RBC AUTO: 83.1 FL (ref 81.4–97.8)
MONOCYTES # BLD AUTO: 0.1 K/UL (ref 0–0.85)
MONOCYTES NFR BLD AUTO: 1.8 % (ref 0–13.4)
NEUTROPHILS # BLD AUTO: 4.86 K/UL (ref 1.82–7.42)
NEUTROPHILS NFR BLD: 86.3 % (ref 44–72)
NRBC # BLD AUTO: 0 K/UL
NRBC BLD-RTO: 0 /100 WBC
PHOSPHATE SERPL-MCNC: 2.8 MG/DL (ref 2.5–4.5)
PLATELET # BLD AUTO: 270 K/UL (ref 164–446)
PMV BLD AUTO: 10.6 FL (ref 9–12.9)
POTASSIUM SERPL-SCNC: 4.6 MMOL/L (ref 3.6–5.5)
PROT SERPL-MCNC: 7.1 G/DL (ref 6–8.2)
PROTHROMBIN TIME: 14.5 SEC (ref 12–14.6)
RBC # BLD AUTO: 4.21 M/UL (ref 4.7–6.1)
SODIUM SERPL-SCNC: 136 MMOL/L (ref 135–145)
TREPONEMA PALLIDUM IGG+IGM AB [PRESENCE] IN SERUM OR PLASMA BY IMMUNOASSAY: NON REACTIVE
VANCOMYCIN TROUGH SERPL-MCNC: 22.5 UG/ML (ref 10–20)
VANCOMYCIN TROUGH SERPL-MCNC: 27 UG/ML (ref 10–20)
WBC # BLD AUTO: 5.6 K/UL (ref 4.8–10.8)

## 2018-07-11 PROCEDURE — 700117 HCHG RX CONTRAST REV CODE 255: Performed by: INTERNAL MEDICINE

## 2018-07-11 PROCEDURE — 86618 LYME DISEASE ANTIBODY: CPT

## 2018-07-11 PROCEDURE — 93306 TTE W/DOPPLER COMPLETE: CPT

## 2018-07-11 PROCEDURE — 87389 HIV-1 AG W/HIV-1&-2 AB AG IA: CPT

## 2018-07-11 PROCEDURE — 770020 HCHG ROOM/CARE - TELE (206)

## 2018-07-11 PROCEDURE — 86225 DNA ANTIBODY NATIVE: CPT

## 2018-07-11 PROCEDURE — 85730 THROMBOPLASTIN TIME PARTIAL: CPT

## 2018-07-11 PROCEDURE — 72157 MRI CHEST SPINE W/O & W/DYE: CPT

## 2018-07-11 PROCEDURE — 80202 ASSAY OF VANCOMYCIN: CPT

## 2018-07-11 PROCEDURE — 80053 COMPREHEN METABOLIC PANEL: CPT

## 2018-07-11 PROCEDURE — 85025 COMPLETE CBC W/AUTO DIFF WBC: CPT

## 2018-07-11 PROCEDURE — 70553 MRI BRAIN STEM W/O & W/DYE: CPT

## 2018-07-11 PROCEDURE — 85610 PROTHROMBIN TIME: CPT

## 2018-07-11 PROCEDURE — 72158 MRI LUMBAR SPINE W/O & W/DYE: CPT

## 2018-07-11 PROCEDURE — 84100 ASSAY OF PHOSPHORUS: CPT

## 2018-07-11 PROCEDURE — 700102 HCHG RX REV CODE 250 W/ 637 OVERRIDE(OP): Performed by: INTERNAL MEDICINE

## 2018-07-11 PROCEDURE — 700105 HCHG RX REV CODE 258: Performed by: INTERNAL MEDICINE

## 2018-07-11 PROCEDURE — 93306 TTE W/DOPPLER COMPLETE: CPT | Mod: 26 | Performed by: INTERNAL MEDICINE

## 2018-07-11 PROCEDURE — A9579 GAD-BASE MR CONTRAST NOS,1ML: HCPCS | Performed by: INTERNAL MEDICINE

## 2018-07-11 PROCEDURE — 86235 NUCLEAR ANTIGEN ANTIBODY: CPT | Mod: 91

## 2018-07-11 PROCEDURE — 86780 TREPONEMA PALLIDUM: CPT

## 2018-07-11 PROCEDURE — 36415 COLL VENOUS BLD VENIPUNCTURE: CPT

## 2018-07-11 PROCEDURE — 87040 BLOOD CULTURE FOR BACTERIA: CPT | Mod: 91

## 2018-07-11 PROCEDURE — 700111 HCHG RX REV CODE 636 W/ 250 OVERRIDE (IP)

## 2018-07-11 PROCEDURE — A9270 NON-COVERED ITEM OR SERVICE: HCPCS | Performed by: INTERNAL MEDICINE

## 2018-07-11 PROCEDURE — 86038 ANTINUCLEAR ANTIBODIES: CPT

## 2018-07-11 PROCEDURE — 83735 ASSAY OF MAGNESIUM: CPT

## 2018-07-11 PROCEDURE — 700111 HCHG RX REV CODE 636 W/ 250 OVERRIDE (IP): Performed by: INTERNAL MEDICINE

## 2018-07-11 PROCEDURE — 80074 ACUTE HEPATITIS PANEL: CPT

## 2018-07-11 PROCEDURE — 99255 IP/OBS CONSLTJ NEW/EST HI 80: CPT | Performed by: INTERNAL MEDICINE

## 2018-07-11 PROCEDURE — 72156 MRI NECK SPINE W/O & W/DYE: CPT

## 2018-07-11 PROCEDURE — 87476 LYME DIS DNA AMP PROBE: CPT

## 2018-07-11 PROCEDURE — 99233 SBSQ HOSP IP/OBS HIGH 50: CPT | Performed by: INTERNAL MEDICINE

## 2018-07-11 RX ORDER — DEXAMETHASONE SODIUM PHOSPHATE 4 MG/ML
INJECTION, SOLUTION INTRA-ARTICULAR; INTRALESIONAL; INTRAMUSCULAR; INTRAVENOUS; SOFT TISSUE
Status: COMPLETED
Start: 2018-07-11 | End: 2018-07-11

## 2018-07-11 RX ORDER — BUTALBITAL, ACETAMINOPHEN AND CAFFEINE 50; 325; 40 MG/1; MG/1; MG/1
1 TABLET ORAL EVERY 6 HOURS PRN
Status: DISCONTINUED | OUTPATIENT
Start: 2018-07-11 | End: 2018-07-20 | Stop reason: HOSPADM

## 2018-07-11 RX ORDER — LEVETIRACETAM 250 MG/1
750 TABLET ORAL 2 TIMES DAILY
Status: DISCONTINUED | OUTPATIENT
Start: 2018-07-11 | End: 2018-07-15

## 2018-07-11 RX ORDER — BUTALBITAL, ACETAMINOPHEN AND CAFFEINE 50; 325; 40 MG/1; MG/1; MG/1
1 TABLET ORAL ONCE
Status: COMPLETED | OUTPATIENT
Start: 2018-07-11 | End: 2018-07-11

## 2018-07-11 RX ADMIN — VANCOMYCIN HYDROCHLORIDE 2000 MG: 100 INJECTION, POWDER, LYOPHILIZED, FOR SOLUTION INTRAVENOUS at 18:53

## 2018-07-11 RX ADMIN — GADODIAMIDE 20 ML: 287 INJECTION INTRAVENOUS at 00:07

## 2018-07-11 RX ADMIN — VANCOMYCIN HYDROCHLORIDE 2000 MG: 100 INJECTION, POWDER, LYOPHILIZED, FOR SOLUTION INTRAVENOUS at 10:00

## 2018-07-11 RX ADMIN — TRAMADOL HYDROCHLORIDE 50 MG: 50 TABLET, COATED ORAL at 09:33

## 2018-07-11 RX ADMIN — CEFTRIAXONE 2 G: 2 INJECTION, POWDER, FOR SOLUTION INTRAMUSCULAR; INTRAVENOUS at 06:19

## 2018-07-11 RX ADMIN — SODIUM CHLORIDE: 9 INJECTION, SOLUTION INTRAVENOUS at 18:46

## 2018-07-11 RX ADMIN — POTASSIUM CHLORIDE 40 MEQ: 1500 TABLET, EXTENDED RELEASE ORAL at 02:32

## 2018-07-11 RX ADMIN — DEXAMETHASONE SODIUM PHOSPHATE 4 MG: 4 INJECTION, SOLUTION INTRAMUSCULAR; INTRAVENOUS at 02:30

## 2018-07-11 RX ADMIN — DEXAMETHASONE SODIUM PHOSPHATE 4 MG: 4 INJECTION, SOLUTION INTRAMUSCULAR; INTRAVENOUS at 14:56

## 2018-07-11 RX ADMIN — VANCOMYCIN HYDROCHLORIDE 2000 MG: 100 INJECTION, POWDER, LYOPHILIZED, FOR SOLUTION INTRAVENOUS at 02:37

## 2018-07-11 RX ADMIN — TRAMADOL HYDROCHLORIDE 50 MG: 50 TABLET, COATED ORAL at 18:42

## 2018-07-11 RX ADMIN — DEXAMETHASONE SODIUM PHOSPHATE 4 MG: 4 INJECTION, SOLUTION INTRAMUSCULAR; INTRAVENOUS at 09:00

## 2018-07-11 RX ADMIN — BUTALBITAL, ACETAMINOPHEN, AND CAFFEINE 1 TABLET: 50; 325; 40 TABLET ORAL at 13:23

## 2018-07-11 RX ADMIN — CEFTRIAXONE 2 G: 2 INJECTION, POWDER, FOR SOLUTION INTRAMUSCULAR; INTRAVENOUS at 18:15

## 2018-07-11 RX ADMIN — BUTALBITAL, ACETAMINOPHEN, AND CAFFEINE 1 TABLET: 50; 325; 40 TABLET ORAL at 22:57

## 2018-07-11 RX ADMIN — LEVETIRACETAM 750 MG: 500 TABLET, FILM COATED ORAL at 21:24

## 2018-07-11 ASSESSMENT — ENCOUNTER SYMPTOMS
FEVER: 1
MYALGIAS: 1
COUGH: 1
INSOMNIA: 0
TINGLING: 1
SENSORY CHANGE: 0
PALPITATIONS: 0
FALLS: 0
FLANK PAIN: 0
SPUTUM PRODUCTION: 0
NAUSEA: 1
NECK PAIN: 1
TREMORS: 0
CHILLS: 0
MEMORY LOSS: 0
SPEECH CHANGE: 0
COUGH: 0
FEVER: 0
PND: 0
EYE REDNESS: 0
BLOOD IN STOOL: 0
DIARRHEA: 0
NAUSEA: 0
SORE THROAT: 0
TINGLING: 0
ORTHOPNEA: 0
WEAKNESS: 0
SHORTNESS OF BREATH: 0
HALLUCINATIONS: 0
SEIZURES: 0
VOMITING: 1
CHILLS: 1
BRUISES/BLEEDS EASILY: 0
DEPRESSION: 0
EYE PAIN: 0
VOMITING: 0
CONSTIPATION: 0
WEAKNESS: 1
BLURRED VISION: 1
BACK PAIN: 1
HEADACHES: 1
WEIGHT LOSS: 1
SENSORY CHANGE: 1
ABDOMINAL PAIN: 0
DIAPHORESIS: 1
NERVOUS/ANXIOUS: 0
FOCAL WEAKNESS: 0
HEARTBURN: 0
WHEEZING: 0
EYE DISCHARGE: 0
LOSS OF CONSCIOUSNESS: 0
BLURRED VISION: 0
CLAUDICATION: 0
PHOTOPHOBIA: 1
DIZZINESS: 0
HEMOPTYSIS: 0
SPEECH CHANGE: 1
SINUS PAIN: 0
DOUBLE VISION: 0
STRIDOR: 0

## 2018-07-11 ASSESSMENT — PATIENT HEALTH QUESTIONNAIRE - PHQ9
1. LITTLE INTEREST OR PLEASURE IN DOING THINGS: NOT AT ALL
SUM OF ALL RESPONSES TO PHQ9 QUESTIONS 1 AND 2: 0

## 2018-07-11 ASSESSMENT — LIFESTYLE VARIABLES: SUBSTANCE_ABUSE: 0

## 2018-07-11 ASSESSMENT — PAIN SCALES - GENERAL
PAINLEVEL_OUTOF10: 0
PAINLEVEL_OUTOF10: 8
PAINLEVEL_OUTOF10: 3
PAINLEVEL_OUTOF10: 3

## 2018-07-11 NOTE — PROGRESS NOTES
Provider told RN to hang the antibiotic STAT at 1630 but medication not made yet, called pharmacist who told RN that medication was being mixed and verified. Still have not received vancomycin at this time at 1753, awaiting for medication to be tubed or walked to nurses station. Gave PRN tramadol for headache pain and lumbar puncture site pain. Sitting up in bed and eating at this time.

## 2018-07-11 NOTE — ASSESSMENT & PLAN NOTE
Complicated by infective endocarditis / epidural abscess. 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 Symptoms have improved.  - management as above

## 2018-07-11 NOTE — CONSULTS
Neurology consult    Note Author: Bryant Blackwell M.D.       Name Nicholas Neumann     1990   Age/Sex 27 y.o. male   MRN [id number]   Code Status Full code     After 5PM or if no immediate response to page, please call for cross-coverage  Attending/Team: Dr. Brown/ neurology See Patient List for primary contact information  Call [phone] to page            Chief Complaint:  Numbness at left side of face      HPI: Mr. Neumann is 27 Y old gentleman presented with hx of fever for about 2 months associated with chills, night sweating and fatigue. The patient reports that he had sick contact with his children who were having streptococcus pharyngitis. He visited urgent care in [facility] and received oral antibiotics one month ago, but he did not respond and continued to have low grade fever. He had some nausea and vomiting, blurring of vision and some light flashes seen, but no typical photophobia. He had also headache, which is diffuse and was continuous through last few weeks, associated with neck pain and some back pain that is radiated to right leg, but no alarming signs.    On last , the patient developed sudden attack of left sided facial numbness and  facial drooping for less than one minute, and he had slurred speech and could not even move his body. All these symptoms resolved   He denies any seizure, any syncope or loss of consciousness. Admitted here, his LP showed high wbc, 54. His MRI imaging showed  prominent posterior epidural fluid collection extending from C6-7 into the thoracic spine.  Blood culture showed positive gram-positive cocci, possible Streptococcus species.  Managed as case of bacterial meningitis with ceftriaxone and vancomycin dexamethasone.    He has history of chronic sinusitis or sinus congestion, but did not receive any antibiotics for that.  No history of tooth abscess.,  No history of ear infection . the patient denies any history of IV drug  use.    Review of Systems   Constitutional: Positive for chills, diaphoresis, fever, malaise/fatigue and weight loss.   Eyes: Positive for blurred vision.   Respiratory: Positive for cough. Negative for hemoptysis, sputum production, shortness of breath and wheezing.    Cardiovascular: Negative for chest pain, palpitations, orthopnea and PND.   Gastrointestinal: Positive for nausea and vomiting. Negative for abdominal pain, blood in stool, constipation, diarrhea, heartburn and melena.   Genitourinary: Negative for dysuria and urgency.   Neurological: Positive for tingling, sensory change, speech change, weakness and headaches. Negative for tremors and loss of consciousness.   Endo/Heme/Allergies: Does not bruise/bleed easily.             Past Medical History (Chronic medical problem, known complications and current treatment)           Past Surgical History:  Past Surgical History:   Procedure Laterality Date   • KNEE ARTHROSCOPY  10/2/08    Performed by JOY NOLEN at [facility]   • KNEE ARTHROSCOPY  5/21/08    Performed by JOY NOLEN at [facility]   • TIBIAL OSTEOTOMY  5/21/08    Performed by JOY NOLEN at [facility]   • LOOSE BODY REMOVAL  5/21/08    Performed by JOY NOLEN at [facility]   • LIGAMENT RECONSTRUCTION  5/21/08    Performed by JOY NOLEN at [facility]   • KNEE ARTHROSCOPY  5/21/08    Performed by JOY NOLEN at [facility]   • KNEE ARTHROSCOPY  2004    Left   • TONSILLECTOMY  1998       Current Outpatient Medications:  Home Medications     Reviewed by Rosita Cunningham (Pharmacy Tech) on 07/10/18 at 1124  Med List Status: Complete   Medication Last Dose Status   ibuprofen (MOTRIN) 200 MG Tab 7/9/2018 Active                Medication Allergy/Sensitivities:  Allergies   Allergen Reactions   • Nkda [No Known Drug Allergy]          Family History (mandatory)   Family History   Problem  "Relation Age of Onset   • Hyperlipidemia Paternal Grandmother    • Diabetes Maternal Grandfather    • Heart Disease Maternal Grandmother    • Heart Disease Father    • Hypertension Father    • Heart Disease Sister        Social History (mandatory)   Social History     Social History   • Marital status: Single     Spouse name: N/A   • Number of children: N/A   • Years of education: N/A     Occupational History   • Not on file.     Social History Main Topics   • Smoking status: Current Every Day Smoker     Packs/day: 0.50   • Smokeless tobacco: Never Used   • Alcohol use No   • Drug use: No   • Sexual activity: Not on file     Other Topics Concern   • Not on file     Social History Narrative   • No narrative on file     He smokes cigarettes about 10 cigarettes per day for the past 10 years, no  no alcohol drinking, no illicit drug.    Physical Exam     Vitals:    07/11/18 0000 07/11/18 0400 07/11/18 0800 07/11/18 1200   BP: 117/62 107/58 109/59 120/73   Pulse: 96 75 60 76   Resp: 18 18 15 17   Temp: 37.7 °C (99.8 °F) 36.4 °C (97.6 °F) 36.1 °C (96.9 °F) 36.1 °C (96.9 °F)   SpO2: 93% 100% 93% 92%   Weight:       Height:         Body mass index is 32.87 kg/m².  /73   Pulse 76   Temp 36.1 °C (96.9 °F)   Resp 17   Ht 1.778 m (5' 10\")   Wt 103.9 kg (229 lb 0.9 oz)   SpO2 92%   BMI 32.87 kg/m²   O2 therapy: Pulse Oximetry: 92 %, O2 (LPM): 0, O2 Delivery: None (Room Air)    Physical Exam   Constitutional: He is oriented to person, place, and time and well-developed, well-nourished, and in no distress. No distress.   HENT:   Head: Normocephalic and atraumatic.   Eyes: Conjunctivae are normal. Pupils are equal, round, and reactive to light.   Neck: Neck supple. No JVD present. No thyromegaly present.   Cardiovascular: Normal rate, regular rhythm and intact distal pulses.    Murmur heard.  Ejection systolic murmur with maximal density at tricuspid area   Pulmonary/Chest: Effort normal and breath sounds normal. No " respiratory distress. He has no wheezes. He has no rales.   Abdominal: Soft. Bowel sounds are normal. He exhibits no distension. There is no tenderness.   Musculoskeletal: Normal range of motion. He exhibits no edema.   Neurological: He is alert and oriented to person, place, and time. He has normal sensation, normal reflexes and intact cranial nerves. He is not agitated and not disoriented. He displays no weakness, no tremor, facial symmetry, normal speech and normal reflexes. No cranial nerve deficit or sensory deficit. He exhibits normal muscle tone. He has a normal Cerebellar Exam. He shows no pronator drift. GCS score is 15. He displays no Babinski's sign on the right side. He displays no Babinski's sign on the left side.   Reflex Scores:       Tricep reflexes are 2+ on the right side and 2+ on the left side.       Bicep reflexes are 2+ on the right side and 2+ on the left side.       Brachioradialis reflexes are 2+ on the right side and 2+ on the left side.       Patellar reflexes are 2+ on the right side and 2+ on the left side.       Achilles reflexes are 2+ on the right side and 2+ on the left side.  Meningeal signs are negative         Data Review       Old Records Request:   Completed  Current Records review/summary: Completed    Lab Data Review:  Recent Results (from the past 24 hour(s))   CBC WITH DIFFERENTIAL    Collection Time: 07/11/18  5:03 AM   Result Value Ref Range    WBC 5.6 4.8 - 10.8 K/uL    RBC 4.21 (L) 4.70 - 6.10 M/uL    Hemoglobin 11.1 (L) 14.0 - 18.0 g/dL    Hematocrit 35.0 (L) 42.0 - 52.0 %    MCV 83.1 81.4 - 97.8 fL    MCH 26.4 (L) 27.0 - 33.0 pg    MCHC 31.7 (L) 33.7 - 35.3 g/dL    RDW 41.8 35.9 - 50.0 fL    Platelet Count 270 164 - 446 K/uL    MPV 10.6 9.0 - 12.9 fL    Neutrophils-Polys 86.30 (H) 44.00 - 72.00 %    Lymphocytes 11.40 (L) 22.00 - 41.00 %    Monocytes 1.80 0.00 - 13.40 %    Eosinophils 0.00 0.00 - 6.90 %    Basophils 0.00 0.00 - 1.80 %    Immature Granulocytes 0.50 0.00  - 0.90 %    Nucleated RBC 0.00 /100 WBC    Neutrophils (Absolute) 4.86 1.82 - 7.42 K/uL    Lymphs (Absolute) 0.64 (L) 1.00 - 4.80 K/uL    Monos (Absolute) 0.10 0.00 - 0.85 K/uL    Eos (Absolute) 0.00 0.00 - 0.51 K/uL    Baso (Absolute) 0.00 0.00 - 0.12 K/uL    Immature Granulocytes (abs) 0.03 0.00 - 0.11 K/uL    NRBC (Absolute) 0.00 K/uL   COMP METABOLIC PANEL    Collection Time: 07/11/18  5:03 AM   Result Value Ref Range    Sodium 136 135 - 145 mmol/L    Potassium 4.6 3.6 - 5.5 mmol/L    Chloride 105 96 - 112 mmol/L    Co2 24 20 - 33 mmol/L    Anion Gap 7.0 0.0 - 11.9    Glucose 193 (H) 65 - 99 mg/dL    Bun 8 8 - 22 mg/dL    Creatinine 0.62 0.50 - 1.40 mg/dL    Calcium 9.4 8.5 - 10.5 mg/dL    AST(SGOT) 10 (L) 12 - 45 U/L    ALT(SGPT) 11 2 - 50 U/L    Alkaline Phosphatase 69 30 - 99 U/L    Total Bilirubin 0.3 0.1 - 1.5 mg/dL    Albumin 3.6 3.2 - 4.9 g/dL    Total Protein 7.1 6.0 - 8.2 g/dL    Globulin 3.5 1.9 - 3.5 g/dL    A-G Ratio 1.0 g/dL   MAGNESIUM    Collection Time: 07/11/18  5:03 AM   Result Value Ref Range    Magnesium 2.3 1.5 - 2.5 mg/dL   PHOSPHORUS    Collection Time: 07/11/18  5:03 AM   Result Value Ref Range    Phosphorus 2.8 2.5 - 4.5 mg/dL   PROTHROMBIN TIME    Collection Time: 07/11/18  5:03 AM   Result Value Ref Range    PT 14.5 12.0 - 14.6 sec    INR 1.16 (H) 0.87 - 1.13   APTT    Collection Time: 07/11/18  5:03 AM   Result Value Ref Range    APTT 33.7 24.7 - 36.0 sec   ESTIMATED GFR    Collection Time: 07/11/18  5:03 AM   Result Value Ref Range    GFR If African American >60 >60 mL/min/1.73 m 2    GFR If Non African American >60 >60 mL/min/1.73 m 2   Echocardiogram Comp W/O cont    Collection Time: 07/11/18  9:47 AM   Result Value Ref Range    Eject.Frac. MOD BP 62.93     Eject.Frac. MOD 4C 61.74     Eject.Frac. MOD 2C 64.1     Left Ventrical Ejection Fraction 65    VANCOMYCIN TROUGH    Collection Time: 07/11/18 12:19 PM   Result Value Ref Range    Vancomycin Trough 27.0 (H) 10.0 - 20.0 ug/mL        Imaging/Procedures Review:    Independant Imaging Review: Completed  Echocardiogram Comp W/O cont   Final Result      MR-LUMBAR SPINE-WITH & W/O   Final Result      1.  There is abnormal posterior epidural fluid collection seen extending from the lower cervical spine to the level of L3-4. Multifocal abnormal epidural contrast enhancement is seen. These findings are concerning for thoracic and lumbar epidural    abscess.   2.  There is no evidence of lumbar osteomyelitis.   3.  Findings were discussed with DAVON FERNANDO on 7/11/2018 1:08 PM.      MR-THORACIC SPINE-WITH & W/O   Final Result      There is abnormal posterior epidural fluid collection extending from C6-7 to the lumbar spine. Abnormal peripheral contrast enhancement noted surrounding the epidural fluid collection. The thoracic spinal cord is anteriorly displaced from the levels of    T3-4 to  T9-10. Moderate central canal stenosis is seen. These findings are highly concerning for posterior epidural abscess with moderate canal compromise.      MR-CERVICAL SPINE-WITH & W/O   Final Result      1.  There is prominent posterior epidural fluid collection extending from C6-7 into the thoracic spine. Mild posterior epidural contrast enhancement is noted at this level. Please see thoracic spine report for further details.   2.  There is no evidence of osteomyelitis in the cervical spine.      MR-BRAIN-WITH & W/O   Final Result      1.  There is tiny area of restricted diffusion in the right frontal gray matter. Minimal contrast enhancement is seen. The coronal FLAIR images demonstrates abnormal T2 hyperintensity in the adjacent right frontal subarachnoid spaces. The axial gradient    echo images demonstrates linear hypointensity within the sulci. These findings likely represents a small cortical venous thrombosis with adjacent tiny infarct. Cortical venous thrombosis may cause focal subarachnoid hemorrhage. Please note that cortical    venous thrombosis can be  complication of meningitis.   2.  Low-lying cerebellar tonsils. This is concerning for Chiari I malformation. In view of history of lumbar puncture, this finding can be caused by the post lumbar puncture intracranial hypotension.      CT-MAXILLOFACIAL WITH & W/O PLUS RECONS   Final Result      1.  Rounded mucosal thickening or polyp formation in the inferior aspect of the right maxillary sinus which could reflect chronic right maxillary sinusitis.      2.  No evidence of acute sinusitis.      3.  Otherwise clear paranasal sinuses.      CT-POST-FOSSA-EAR WITH & W/O   Final Result      CT OF THE TEMPORAL BONES WITHOUT CONTRAST WITHIN NORMAL LIMITS.      CT-CTA NECK WITH & W/O-POST PROCESSING   Final Result      CT angiogram of the neck within normal limits.      CT-CTA HEAD WITH & W/O-POST PROCESS   Final Result         1.  CT angiogram of the [place] within normal limits.      DX-CHEST-2 VIEWS   Final Result         1.  No acute cardiopulmonary disease.                    Assessment/Plan     Meningitis  Epidural abscess  small cortical venous thrombosis     The patient presented with history of fever for 2 months.  his imaging showed  Epidural abscess. There is abnormal posterior epidural fluid collection extending from C6-7 to the lumbar spine and showed also small venous cortical thrombosis. Blood cultures showed possible Streptococcus species bacteremia. LP showed mildly elevated wbc, 56. And lymphocyte 77 %  With the given picture of CSF, less likely acute bacterial meningitis. Epidural abscess is questionable   The cortical venous thrombosis is questionable also      Plan:    - continue antibiotics with ceftriaxone and vancomycin.  Infectious disease is on board  - start keppra 750 mg every 12 hours PO for epilepsy prophylaxis  - continue steroid at current time  - will need further imaging in the next 4 weeks, MRV or MRI brain   - neurosurgery opinion  - with the given picture of CSF and chronic  symptoms, we think it is logical to send for lyme disease, VDRL and exclude autoimmune disease also, send or BRIAN, and HIV also.      Heart murmur  The patient has ejection systolic murmur at tricuspid area and mitral areas. Old or new ?   TTE showed mitral regurgitation and tricuspid regurgitation. No vegetations  Suspicion for endocarditis  Infective endocarditis needs to be excluded            Quality Measures  Quality-Core Measures  PCP: MIYA Velez        Patient seen and examined, agree with plan above.    nonfocal exam, no sig nuchal rigid, no papilledema.    Long discussion with Neurorad/FRANCIS[facility] chief Kita MORROW on imaging; uncertain if this lesion in right convexity is cerebritis/abscess or CVT/venous infarct(no CVT on CTA images good venous opacification) or arterial thrombosis/occlusion/ischemic stroke; either way we agree that anticoagulation could be catastrophic with risk of ICH high in setting of septic/bacterial menigoencephalitis. Also no way to be certain radiographically that epidural collection is pus/blood or both. NSG opined no surgical intervention. Continue ABX.  Repeat neuroimaging MRB/MRV/MRA in coming weeks to assure improvement. Start AED keppra for seizure prophylaxis with cortical injury and spell transient could have been seizure.      No further neuro workup as inpatient if aforementioned studies WNL & maintains neuro baseline.  Neuro sign off, reconsult PRN.  F/u otpt Neuro ASAP.

## 2018-07-11 NOTE — PROCEDURES
BEDSIDE LUMBAR PUNCTURE NOTE    PROCEDURE DATE: 07/10/18   PROCEDURE START TIME: 2 pm    PRIMARY PROCEDURALIST: Carrington Rascon  ASSISTANT(S): None     INFORMED CONSENT: Informed Consent obtained and on the chart    UNIVERSAL PROTOCOL / SAFETY CHECKLIST  Sign in Communication: Completed    Time Out:  Team Confirms the Correct Patient, Correct Procedure, Correct Site and Site Marking, Correct Position (if applicable), Prep and Dry Time (if applicable).  Time:  1.57 pm    Affirmation of Time Out: YES      Sign Out Discussion: Completed    PROCEDURE: LUMBAR PUNCTURE  Indication: Bacteremia, high suspicion for bacterial meningitis   Anesthesia: Anesthesia was obtained with local infiltration of 1% lidocaine.   Patient Position: Lateral Decubitus  Site: L4-5 space   Site Prep: Povidone iodine, allowed to dry for 30 seconds    The lumbar space was located using the iliac crests as landmarks. A 22 gauge spinal needle was introduced into the arachnoid space. The stylet was removed with 9mL fluid return. The needle was removed after adequate fluid was collected.    Opening Pressure: 15    Closing Pressure: 11    Fluid Appearance: Clear    Patient tolerated procedure well.    Complications: None    Specimens: Cell Count, Culture, Gram Stain, Glucose, Protein and Cytology    Estimated Blood Loss if > Minimal Noted Here    Carrington Rascon  07/10/18  7:48 PM

## 2018-07-11 NOTE — CONSULTS
DATE OF SERVICE:  07/11/2018    INPATIENT CONSULTATION    CHIEF COMPLAINT:  Spinal epidural abscess, enhancing epidural lesion.    HISTORY OF PRESENT ILLNESS:  This is a 27-year-old  with no IV   drug use, no recent infection exposure or environmental exposure who reports   weight loss and intermittent pain in the back, in the body in general, fatigue   for about 2 months.  He has no history of cancer.  He denied any significant   fevers.  He came with a normal white count.  A lumbar puncture was concerning   for meningitis as he had a left facial numbness episode.  An MRI of the   neuraxis shows a small focus in the right frontal region, diffusion weighted   change and then a dorsal moderately compressive C7 down to L3 enhancing area   consistent with spinal epidural abscess versus epidural tumor.  He was started   antibiotics less than 24 hours ago.  He feels well.  He denies any weakness   or numbness.    PAST MEDICAL, SURGICAL AND SOCIAL HISTORY:  As mentioned in the HPI.  His wife   is at his bedside.    PHYSICAL EXAMINATION:  NEUROLOGIC:  He is awake.  He is alert.  He is oriented x3.  Pupils are   symmetric.  Extraocular motion intact.  Face is full.  His tongue is midline.    He has no pronator drift.  He moves his arms and legs symmetrically with good   sensation in the face, arms and legs.  He has no clonus.  His strength is   full in the iliopsoas, adductors, abductors, quadriceps, dorsiflexion, EHL and   plantar flexion, deltoid, biceps, triceps,  and interossei.    ASSESSMENT AND PLAN:  The patient has enhancing epidural lesion and   bacteremia, streptococcal, just started antibiotics.  It is consistent with a   spinal epidural abscess, very long, neurologically intact.  Other options   would be an epidural tumor not as likely, but the constitutional symptoms   warrant this at least getting a repeat MRI of the neuraxis in 3-4 weeks.  He   is admitted to the hospitalist.  He will  continue antibiotics per ID.  Call me   with any neurologic changes that would prompt us to do a laminectomy to   decompress.    Thank you for allowing me to participate in his care.       ____________________________________     MD BONI Em III / KIMBERLYN    DD:  07/11/2018 13:33:34  DT:  07/11/2018 16:31:12    D#:  [id number]  Job#:  [id number]

## 2018-07-11 NOTE — PROGRESS NOTES
Notified regarding concerns of Epidural abscess per Dr Strickland.   Emergent consult to neurosurgery  Case discussed with Dr Torres, he will evaluate the patient  Case discussed with ID, continue current abx    Carrington Rascon M.D.  07/11/18  1:13 PM    Addendum to above:     No surgical intervention per Dr Torres.   Continue Neuro checks.   Repeat MRI in 3-4 weeks.   Continue medical management.       MRI brain reviewed.     Consult to Dr Brown from neurology  ? Need for AC / MRV ?       Carrington Rascon M.D.  07/11/18  1:21 PM

## 2018-07-11 NOTE — PROGRESS NOTES
[facility]ist Progress Note    Date of Service: 7/10/2018    Chief Complaint  27 y.o. male admitted 7/9/2018 with numbness, left-sided.  Findings consistent with bacteremia and meningitis.    Interval Problem Update  Notified regarding positive blood cultures  Emergency at bedside, history obtained  High suspicion for meningitis  Consent for lumbar puncture obtained by me  Lumbar puncture performed by me  Antibiotic started  Steroid started  Imaging requested  Still having headaches, nausea, photophobia intermittently and weakness  Still with left-sided facial numbness  Consult ID tomorrow  Obtain echo  Repeat cultures tomorrow    Consultants/Specialty  None    Disposition  Anticipate home once acute medical issues stable        Review of Systems   Constitutional: Positive for chills, fever and malaise/fatigue.   HENT: Negative for congestion, ear discharge, ear pain, hearing loss, nosebleeds, sinus pain, sore throat and tinnitus.    Eyes: Positive for photophobia. Negative for blurred vision, double vision, pain, discharge and redness.   Respiratory: Negative for cough, hemoptysis, sputum production, shortness of breath, wheezing and stridor.    Cardiovascular: Negative for chest pain, palpitations, orthopnea, claudication, leg swelling and PND.   Gastrointestinal: Positive for nausea. Negative for abdominal pain, blood in stool, constipation, diarrhea, heartburn, melena and vomiting.   Genitourinary: Negative for dysuria, flank pain, frequency, hematuria and urgency.   Musculoskeletal: Positive for back pain, joint pain, myalgias and neck pain. Negative for falls.   Skin: Negative for itching and rash.   Neurological: Positive for weakness and headaches. Negative for dizziness, tingling, tremors, sensory change, speech change, focal weakness, seizures and loss of consciousness.   Psychiatric/Behavioral: Negative for depression, hallucinations, memory loss, substance abuse and suicidal ideas. The patient is  not nervous/anxious and does not have insomnia.       Physical Exam  Laboratory/Imaging   Hemodynamics  Temp (24hrs), Av.5 °C (99.5 °F), Min:37.3 °C (99.1 °F), Max:38.1 °C (100.5 °F)   Temperature: 37.3 °C (99.2 °F)  Pulse  Av.3  Min: 85  Max: 114    Blood Pressure: 110/64      Respiratory      Respiration: 18, Pulse Oximetry: 97 %             Fluids  No intake or output data in the 24 hours ending 07/10/18 1956    Nutrition  Orders Placed This Encounter   Procedures   • Diet Order Regular     Standing Status:   Standing     Number of Occurrences:   1     Order Specific Question:   Diet:     Answer:   Regular [1]     Physical Exam   Constitutional: He is oriented to person, place, and time. He appears well-developed and well-nourished. No distress.   HENT:   Head: Normocephalic.   Mouth/Throat: Oropharynx is clear and moist. No oropharyngeal exudate.   Eyes: Conjunctivae and EOM are normal. Pupils are equal, round, and reactive to light. No scleral icterus.   Neck: Normal range of motion. No JVD present.   Cardiovascular: Normal rate and regular rhythm.  Exam reveals no gallop and no friction rub.    Murmur heard.  Pulses:       Posterior tibial pulses are 2+ on the right side, and 2+ on the left side.   Cap refill < 3s   Pulmonary/Chest: Effort normal and breath sounds normal. No stridor. No respiratory distress. He has no wheezes. He has no rales.   Abdominal: Soft. Bowel sounds are normal. He exhibits no distension. There is no tenderness. There is no rebound and no guarding.   Musculoskeletal: He exhibits no edema, tenderness or deformity.   Neurological: He is alert and oriented to person, place, and time. He has normal reflexes. No cranial nerve deficit.   No neck stiffness, negative Kernig's and Brudzinski sign.  No temporal/mastoid tenderness.   Skin: Skin is warm and dry. He is not diaphoretic.   Psychiatric: He has a normal mood and affect. His behavior is normal. Judgment and thought content  normal.       Recent Labs      07/09/18   2059   WBC  7.6   RBC  4.23*   HEMOGLOBIN  11.2*   HEMATOCRIT  34.5*   MCV  81.6   MCH  26.5*   MCHC  32.5*   RDW  41.4   PLATELETCT  283   MPV  10.7     Recent Labs      07/09/18   2333   SODIUM  140   POTASSIUM  3.3*   CHLORIDE  108   CO2  24   GLUCOSE  108*   BUN  17   CREATININE  0.78   CALCIUM  8.6                      Assessment/Plan     Streptococcal meningitis- (present on admission)   Assessment & Plan    Emergent LP obtained once blood cultures confirmed positive, performed bedside by me  Increased white count on LP, recently on antibiotics in the outpatient setting, could be recurrent meningitis  Neck pain, photophobia, headaches in the outpatient setting for weeks now    Request infectious disease consultation tomorrow    IV Decadron 10 mg, followed by 4 mg every 6 hours  IV ceftriaxone 2 g every 12 hourly  Start IV vancomycin, monitor for vancomycin toxicity and therapeutics  Obtain MRI brain        Streptococcal bacteremia- (present on admission)   Assessment & Plan    Pattern highly concerning for meningitis, streptococcal  Underlying murmur, infective endocarditis cannot be ruled out  Back pain, osteomyelitis cannot be ruled out -clinically no suspicion for epidural abscess    Check CT maxillofacial/posterior ear fossa to rule out ENT infection/mastoiditis    Start antibiotics as discussed above  Obtain MRI cervical, thoracic and lumbar spine, stat orders placed  Request infectious disease consultation tomorrow  Repeat blood cultures tomorrow  Obtain echocardiogram, if negative will need CHAR and cardiology consultation        Murmur- (present on admission)   Assessment & Plan    With bacteremia  High concern for infective endocarditis  Obtain TTE  If negative will need cardiology consultation and CHAR        Normocytic anemia- (present on admission)   Assessment & Plan    Suspect consumptive from bacteremia  Monitor Hb / Restrictive transfusion strategy         Hypokalemia- (present on admission)   Assessment & Plan    Replaced  Check BMP in am tomorrow          Quality-Core Measures   Reviewed items::  Labs reviewed, Medications reviewed and Radiology images reviewed  Cruz catheter::  No Cruz  DVT: Post LP hold x 24 hours.  DVT prophylaxis - mechanical:  SCDs  Ulcer Prophylaxis::  Not indicated  Antibiotics:  Treating active infection/contamination beyond 24 hours perioperative coverage  Assessed for rehabilitation services:  Patient returned to prior level of function, rehabilitation not indicated at this time

## 2018-07-11 NOTE — PROGRESS NOTES
"Pharmacy Kinetics 27 y.o. male on vancomycin day # 2   2018    Currently on Vancomycin 2600 mg IV x1 followed by 2000 mg IV q8h    Indication for Treatment: rule out CNS infection    Pertinent history per medical record: Admitted on 2018 for left sided numbness. Patient had sudden onset left facial numbness and difficulty speaking at about 2000 on . He said he felt like he \"blacked out\" then could not feel his face or speak. Symptoms quickly resolved. Patient brought to ED for TIA workup. Next day both blood cx came back positive for possible strep. LP done and cx sent. ID consulted. NSGY consulted.    Other antibiotics: Ceftriaxone 2 g IV 12h    Allergies: Nkda [no known drug allergy]     List concerns for renal function: BMI    Pertinent cultures to date:   18: Blood, peripheral x2 = in process  7/10/18: Blood, peripheral x2 = possible strep  7/10/18: Fluid, CSF = in process (no organism seen on gram stain)  18: Blood, peripheral x2 = possible strep    Recent Labs      18   2059  18   0503   WBC  7.6  5.6   NEUTSPOLYS  73.30*  86.30*     Recent Labs      18   2333  18   0503   BUN  17  8   CREATININE  0.78  0.62   ALBUMIN  3.4  3.6     Recent Labs      18   1219   VANCOTROUGH  27.0*   No intake or output data in the 24 hours ending 18 1549   Blood pressure 120/73, pulse 76, temperature 36.1 °C (96.9 °F), resp. rate 17, height 1.778 m (5' 10\"), weight 103.9 kg (229 lb 0.9 oz), SpO2 92 %. Temp (24hrs), Av.9 °C (98.4 °F), Min:36.1 °C (96.9 °F), Max:37.7 °C (99.8 °F)    A/P   1. Vancomycin dose change: continue same  2. Next vancomycin level: tonight at 1730 (prior to 4th total dose)  3. Goal trough: 18-22 mcg/mL  4. Comments: ID consulted. ECHO with thickened mitral valve, no vegetations seen. MR spine with possible abscess from lower cervical to L3-4, NSGY consulted, no intervention per Dr. Torres. Waiting for CSF to finalize. Trough drawn this morning " after vanco running for a couple of hours. Will redraw trough tonight.    Perfecto Kenny, PharmD, BCPS

## 2018-07-11 NOTE — PROGRESS NOTES
Pharmacy called again about missing vanco due at 1730; pharm states vanco was sent to the floor, however, there is no vanco on the floor. Pharmacy states that the vanco will be to the floor in approx 15 min.

## 2018-07-11 NOTE — CONSULTS
ADULT INFECTIOUS DISEASE CONSULT     Date of Service: 7/11/2018    Consult Requested By: Carrington Rascon M.D.    Reason for Consultation: Sepsis    History of Present Illness:   Nicholas Neumann is a 27 y.o. male with history of chronic neck pain and back pain and history of Bell's palsy who presented to the emergency room on 7/9/2018 for evaluation of left-sided facial numbness with difficulty speaking.  Patient apparently had fevers and nausea for about a month.  He was evaluated at the urgent care initially and prescribed a course of cefdinir.  His fevers resolved for a short period of time.  But he again got fevers up to 102.  Apparently there was also concern for heart murmur.  In the ER his WBC count was 7.6.  His blood cultures have come back positive for strep species.  His CSF showed 54 WBCs with 77% lymphs.  CT, CTA of the head has been negative.  In view of all these issues infectious disease consult has been called.    Review Of Systems:  Gen.-Complains of fevers for about 2 months.  Complains of malaise and fatigue and weight loss of about 30 pounds  HEENT- denies any sore throat, headache or vision changes  Pulmonary-complains of slight cough  Cardiovascular- denies any chest pain, leg swelling.    GI-complains of nausea  Musculoskeletal- denies any joint pains or swelling.  Complains of back pain  Neuro- denies any weakness or sensory change  Psych- denies any depression or suicidal ideation  Genitourinary- denies any frequency or dysuria  Skin-had rash about 2 weeks ago      PMH:   Past Medical History:   Diagnosis Date   • Pain          PSH:  Past Surgical History:   Procedure Laterality Date   • KNEE ARTHROSCOPY  10/2/08    Performed by JOY NOLEN at [facility]   • KNEE ARTHROSCOPY  5/21/08    Performed by JOY NOLEN at [facility] ORS   • TIBIAL OSTEOTOMY  5/21/08    Performed by JOY NOLEN at [facility] OR[facility]   • LOOSE BODY REMOVAL  5/21/08    Performed  by JOY NOLEN at SURGERY [facility] ORS   • LIGAMENT RECONSTRUCTION  5/21/08    Performed by JOY NOLEN at SURGERY [facility] ORS   • KNEE ARTHROSCOPY  5/21/08    Performed by JOY NOLEN at [facility]   • KNEE ARTHROSCOPY  2004    Left   • TONSILLECTOMY  1998       FAMILY HX:  Family History   Problem Relation Age of Onset   • Hyperlipidemia Paternal Grandmother    • Diabetes Maternal Grandfather    • Heart Disease Maternal Grandmother    • Heart Disease Father    • Hypertension Father    • Heart Disease Sister        SOCIAL HX:  Social History     Social History   • Marital status: Single     Spouse name: N/A   • Number of children: N/A   • Years of education: N/A     Occupational History   • Not on file.     Social History Main Topics   • Smoking status: Current Every Day Smoker     Packs/day: 0.50   • Smokeless tobacco: Never Used   • Alcohol use No   • Drug use: No   • Sexual activity: Not on file     Other Topics Concern   • Not on file     Social History Narrative   • No narrative on file     History   Smoking Status   • Current Every Day Smoker   • Packs/day: 0.50   Smokeless Tobacco   • Never Used     History   Alcohol Use No       Allergies/Intolerances:  Allergies   Allergen Reactions   • Nkda [No Known Drug Allergy]        History reviewed with the patient    Other Current Medications:    Current Facility-Administered Medications:   •  labetalol (NORMODYNE,TRANDATE) injection 10 mg, 10 mg, Intravenous, Q4HRS PRN **OR** [DISCONTINUED] hydrALAZINE (APRESOLINE) injection 10 mg, 10 mg, Intravenous, Q2HRS PRN, Elana Mary M.D.  •  ondansetron (ZOFRAN) syringe/vial injection 4 mg, 4 mg, Intravenous, Q4HRS PRN, Elana Mary M.D.  •  ondansetron (ZOFRAN ODT) dispertab 4 mg, 4 mg, Oral, Q4HRS PRN, Elana Mary M.D.  •  cefTRIAXone (ROCEPHIN) 2 g in  mL IVPB, 2 g, Intravenous, Q12HRS, Carrington Rascon M.D., Stopped at 07/11/18 0649  •  MD ALERT... vancomycin per  "pharmacy protocol, , Other, pharmacy to dose, Carrington Rascon M.D.  •  tramadol (ULTRAM) 50 MG tablet 50 mg, 50 mg, Oral, Q6HRS PRN, Carrington Rascon M.D., 50 mg at 07/10/18 2233  •  vancomycin 2,000 mg in  mL IVPB, 2,000 mg, Intravenous, Q8HR, Carrington Rascon M.D., Stopped at 18 0437  •  dexamethasone (DECADRON) injection 4 mg, 4 mg, Intravenous, Q6HRS, Carrington Rascon M.D., 4 mg at 18 0230  •  NS infusion, , Intravenous, Continuous, Carrington Rascon M.D., Last Rate: 100 mL/hr at 07/10/18 2043  [unfilled]    Most Recent Vital Signs:  /59   Pulse 60   Temp 36.1 °C (96.9 °F)   Resp 15   Ht 1.778 m (5' 10\")   Wt 103.9 kg (229 lb 0.9 oz)   SpO2 93%   BMI 32.87 kg/m²   Temp  Av.3 °C (99.1 °F)  Min: 36.1 °C (96.9 °F)  Max: 38.1 °C (100.5 °F)    Physical Exam:  General: Nontoxic, no acute distress.  Looks tired  HEENT: sclera anicteric, PERRL, EOMI, MMM, no oral lesions  Neck: supple, no lymphadenopathy  Chest: CTAB, no r/r/w, normal work of breathing.  Cardiac: Regular, no murmurs no gallops heard  Abdomen: + bowel sounds, soft, non-tender, non-distended, no HSM  Extremities: No edema. No joint swelling.  Skin: no rashes or erythema  Neuro: Alert and oriented times 3, non-focal exam    Pertinent Lab Results:  Recent Labs      18   0503   WBC  7.6  5.6      Recent Labs      18   0503   HEMOGLOBIN  11.2*  11.1*   HEMATOCRIT  34.5*  35.0*   MCV  81.6  83.1   MCH  26.5*  26.4*   PLATELETCT  283  270         Recent Labs      07/093  18   0503   SODIUM  140  136   POTASSIUM  3.3*  4.6   CHLORIDE  108  105   CO2  24  24   CREATININE  0.78  0.62        Recent Labs      183  18   0503   ALBUMIN  3.4  3.6        Pertinent Micro:  Results     Procedure Component Value Units Date/Time    BLOOD CULTURE [[id number]]  (Abnormal) Collected:  07/10/18 1419    Order Status:  Completed Specimen:  Blood from Peripheral Updated:  " "07/11/18 0213     Significant Indicator POS (POS)     Source BLD     Site PERIPHERAL     Blood Culture Growth detected by Bactec instrument. 07/11/2018  02:12  Gram Stain: Gram positive cocci: Possible Streptococcus sp.   (A)    Narrative:       CALL  Feng  TERRY tel. [phone],  CALLED  TERRY tel. [phone] 07/11/2018, 02:12, RB PERF. RESULTS CALLED TO: RN  [phone]  Per Hospital Policy: Only change Specimen Src: to \"Line\" if  specified by physician order.    BLOOD CULTURE [[id number]]  (Abnormal) Collected:  07/10/18 1419    Order Status:  Completed Specimen:  Blood from Peripheral Updated:  07/11/18 0150     Significant Indicator POS (POS)     Source BLD     Site PERIPHERAL     Blood Culture Growth detected by Bactec instrument. 07/11/2018  01:49  Gram Stain: Gram positive cocci: Possible Streptococcus sp.   (A)    Narrative:       CALL  Feng  TERRY tel. [phone],  CALLED  TERRY tel. [phone] 07/11/2018, 01:50, RB PERF. RESULTS CALLED TO: RN  [phone]  Per Hospital Policy: Only change Specimen Src: to \"Line\" if  specified by physician order.    BLOOD CULTURE [[id number]]     Order Status:  Sent Specimen:  Blood from Peripheral     BLOOD CULTURE [[id number]]     Order Status:  Sent Specimen:  Blood from Peripheral     GRAM STAIN [[id number]] Collected:  07/10/18 1440    Order Status:  Completed Specimen:  CSF Updated:  07/10/18 1620     Significant Indicator .     Source CSF     Site TAP     Gram Stain Result No organisms seen.    CSF CULTURE [[id number]] Collected:  07/10/18 1440    Order Status:  Completed Updated:  07/10/18 1517    CSF CULTURE [[id number]]     Order Status:  No result Specimen:  CSF from Tap     BLOOD CULTURE [[id number]]  (Abnormal) Collected:  07/09/18 2333    Order Status:  Completed Specimen:  Blood from Peripheral Updated:  07/10/18 1218     Significant Indicator POS (POS)     Source BLD     Site PERIPHERAL     Blood Culture Growth detected by Bactec instrument. 07/10/2018  12:18  Gram Stain: Gram " "positive cocci: Possible Streptococcus sp.   (A)    Narrative:       Per Hospital Policy: Only change Specimen Src: to \"Line\" if  specified by physician order.    BLOOD CULTURE [[id number]]  (Abnormal) Collected:  07/09/18 2333    Order Status:  Completed Specimen:  Blood from Peripheral Updated:  07/10/18 1218     Significant Indicator POS (POS)     Source BLD     Site PERIPHERAL     Blood Culture Growth detected by Bactec instrument. 07/10/2018  12:17  Gram Stain: Gram positive cocci: Possible Streptococcus sp.   (A)    Narrative:       Per Hospital Policy: Only change Specimen Src: to \"Line\" if  specified by physician order.    URINALYSIS CULTURE, IF INDICATED [[id number]]     Order Status:  Canceled Specimen:  Urine         Blood Culture   Date Value Ref Range Status   07/10/2018 (A)  Preliminary    Growth detected by Bactec instrument. 07/11/2018  01:49  Gram Stain: Gram positive cocci: Possible Streptococcus sp.     07/10/2018 (A)  Preliminary    Growth detected by Bactec instrument. 07/11/2018  02:12  Gram Stain: Gram positive cocci: Possible Streptococcus sp.          Studies:  Ct-cta Head With & W/o-post Process    Result Date: 7/10/2018  7/9/2018 11:49 PM HISTORY/REASON FOR EXAM:  Neurological Deficit TECHNIQUE/EXAM DESCRIPTION: CT angiogram of the [place] without and with contrast.  Initial precontrast images were obtained of the head from the skull base through the vertex.  Postcontrast images were obtained of the [place] following the power injection of nonionic contrast at 5.0 mL/sec. Thin-section helical images were obtained with overlapping reconstruction interval. Coronal and sagittal multiplanar volume reformats were generated.  3D angiographic images were reviewed on PACS.  Maximum intensity projection (MIP) images were generated and reviewed. 100 mL of Omnipaque 350 nonionic contrast was injected intravenously. Low dose optimization technique was utilized for this CT exam " including automated exposure control and adjustment of the mA and/or kV according to patient size. COMPARISON:  None. FINDINGS: The posterior circulation shows the distal vertebral arteries to be patent. The vertebrobasilar confluence is intact. The basilar artery is patent. No aneurysm or occlusive lesion is evident. The anterior circulation shows no stenotic or occlusive lesion. No aneurysm is evident about the [place]. The brain is unremarkable.     1.  CT angiogram of the [place] within normal limits.    Ct-cta Neck With & W/o-post Processing    Result Date: 7/10/2018  7/9/2018 11:49 PM HISTORY/REASON FOR EXAM: Left-sided facial numbness. TECHNIQUE/EXAM DESCRIPTION: CT angiogram of the neck with contrast. Postcontrast images were obtained of the neck from the great vessels through the skull base following the power injection of nonionic contrast at 5.0 mL/sec. Thin-section helical images were obtained with overlapping reconstruction interval. Coronal and oblique multiplanar volume reformats were generated. Cervical internal carotid artery percent stenosis is calculated using the standard method according to the NASCET criteria wherein a segment of uniform caliber mid or distal cervical internal carotid is used as the reference denominator. 3D angiographic images were reviewed on PACS.  Maximum intensity projection (MIP) images were generated and reviewed 100 mL of Omnipaque 350 nonionic contrast was injected intravenously. Low dose optimization technique was utilized for this CT exam including automated exposure control and adjustment of the mA and/or kV according to patient size. COMPARISON:  None. FINDINGS: The arch origins of the great vessels appear intact. The aortic arch shows no abnormality. The right common carotid artery, cervical carotid bifurcation, and cervical internal carotid artery are within normal limits. There is no evidence of significant stenosis, thrombus, or other filling  defect or ulceration. There is no evidence of dissection or aneurysm. The left common carotid artery, cervical carotid bifurcation, and cervical internal carotid artery are within normal limits. There is no evidence of significant stenosis, thrombus, or other filling defect or ulceration. There is no evidence of dissection  or aneurysm. The cervical vertebral arteries are normal bilaterally. The neck soft tissues and lung apices in the field of view are unremarkable.     CT angiogram of the neck within normal limits.    Ct-maxillofacial With & W/o Plus Recons    Result Date: 7/10/2018  7/10/2018 4:00 PM HISTORY/REASON FOR EXAM:  Streptococcal bacteremia. Evaluate for sinusitis or abscess. TECHNIQUE/EXAM DESCRIPTION AND NUMBER OF VIEWS:   CT scan maxillofacial with and without contrast, with reconstructions. Thin-section helical imaging was obtained of the maxillofacial structures from the orbital roofs through the mandible before and after injection of nonionic contrast. Coronal and sagittal multiplanar volume reformat images were generated from the axial data.. 100 mL of Omnipaque 350 nonionic contrast was injected intravenously. Low dose optimization technique was utilized for this CT exam including automated exposure control and adjustment of the mA and/or kV according to patient size. COMPARISON:  None. FINDINGS: There is rounded mucosal thickening or polyp formation in the inferior medial aspect of the right maxillary sinus. The remainder of the visualized paranasal sinuses are clear. No air-fluid level is present to suggest acute sinusitis. No bony expansion is identified. No lytic or blastic bony change is identified. The soft tissues of the neck are within normal limits. No soft tissue abscess or inflammatory change is identified. Cervical lymph nodes are normal in size. No submandibular or parotid gland abnormality is noted. No posterior pharyngeal or laryngeal mucosal abnormality or mass is identified.      1.  Rounded mucosal thickening or polyp formation in the inferior aspect of the right maxillary sinus which could reflect chronic right maxillary sinusitis. 2.  No evidence of acute sinusitis. 3.  Otherwise clear paranasal sinuses.    Ct-post-fossa-ear With & W/o    Result Date: 7/10/2018  7/10/2018 4:00 PM HISTORY/REASON FOR EXAM:  Streptococcal bacteremia, facial numbness rule out facial nerve compression / mastoiditis. TECHNIQUE/EXAM DESCRIPTION AND NUMBER OF VIEWS: CT scan of the temporal bones without and with contrast. The study was performed on a [employer] CT scanner. Contiguous thin section 1.0 mm or 1.25 mm axial and direct coronal images were obtained of the temporal bones. Images are reviewed at magnified bone and soft tissue windows and non-magnified axial images are also displayed at soft tissue windows. Scans are obtained pre and post contrast. 100 mL of Omnipaque 300 nonionic contrast was injected intravenously. Low dose optimization technique was utilized for this CT exam including automated exposure control and adjustment of the mA and/or kV according to patient size. COMPARISON: CTA of the neck without and with contrast 7/9/2018 FINDINGS: On the right, the external auditory canal is normal except for some debris most consistent with cerumen.  The mastoid is normally pneumatized and aerated.  The middle ear cavity and mastoid antrum are clear.  The ossicles are normal in position  and configuration.  The scutum is sharp.  The bony labyrinth is normal, and the internal auditory canal shows normal caliber and is symmetric with the left side.  The facial nerve canal is unremarkable. On the left, the external auditory canal is normal except for some debris consistent with cerumen.  The mastoid is normally pneumatized and aerated.  The middle ear cavity and mastoid antrum are clear.  The ossicles are normal in position and configuration.  The scutum is sharp.  The bony labyrinth is normal, and the internal  auditory canal shows normal caliber and is symmetric with the right side.  The facial nerve canal is unremarkable. The right transverse -- sigmoid sinus and right internal jugular vein are dominant. The paranasal sinuses show clustered retention cyst or polypoid mucosal thickening in the alveolar recess of the right maxillary sinus. There are no air-fluid levels. The posterior fossa structures are unremarkable.  The fourth ventricle is in the midline. There is no abnormal parenchymal or extra-axial/meningeal enhancement in the posterior fossa or supratentorial compartment within the field of view.     CT OF THE TEMPORAL BONES WITHOUT CONTRAST WITHIN NORMAL LIMITS.    Dx-chest-2 Views    Result Date: 7/10/2018    7/9/2018 10:30 PM HISTORY/REASON FOR EXAM: Shortness of Breath TECHNIQUE/EXAM DESCRIPTION:  PA and lateral views of the chest. COMPARISON: None FINDINGS: The cardiac silhouette appears within normal limits. The mediastinal contour appears within normal limits.  The central pulmonary vasculature appears normal. The lungs appear well expanded bilaterally.  Bilateral lungs are clear. No significant pleural effusions are identified. The bony structures appear age-appropriate.     1.  No acute cardiopulmonary disease.  IMPRESSION:     Strep sepsis  Possibility of endocarditis  Tobacco use  Abnormal CSF    PLAN:   Nicholas Neumann is a 27 y.o. male who is complaining of fevers and weight loss for about 2 months.  Even though he has abnormal CSF clinically and there is no evidence of meningitis.  Currently on Vanco and Rocephin.  I would recommend to taper and discontinue the dexamethasone.  Check echocardiogram and if needed CHAR.  Check a sed rate and CRP.  Follow the MRI results.  I have reviewed all the records  Discussed with IM. Will continue to follow    Chantal Tellez M.D.

## 2018-07-12 ENCOUNTER — APPOINTMENT (OUTPATIENT)
Dept: RADIOLOGY | Facility: MEDICAL CENTER | Age: 28
DRG: 871 | End: 2018-07-12
Attending: INTERNAL MEDICINE
Payer: MEDICAID

## 2018-07-12 LAB
ALBUMIN SERPL BCP-MCNC: 3.8 G/DL (ref 3.2–4.9)
ALBUMIN/GLOB SERPL: 1.4 G/DL
ALP SERPL-CCNC: 61 U/L (ref 30–99)
ALT SERPL-CCNC: 15 U/L (ref 2–50)
ANION GAP SERPL CALC-SCNC: 8 MMOL/L (ref 0–11.9)
AST SERPL-CCNC: 14 U/L (ref 12–45)
BASOPHILS # BLD AUTO: 0.1 % (ref 0–1.8)
BASOPHILS # BLD: 0.01 K/UL (ref 0–0.12)
BILIRUB SERPL-MCNC: 0.2 MG/DL (ref 0.1–1.5)
BUN SERPL-MCNC: 12 MG/DL (ref 8–22)
CALCIUM SERPL-MCNC: 9.1 MG/DL (ref 8.5–10.5)
CHLORIDE SERPL-SCNC: 107 MMOL/L (ref 96–112)
CO2 SERPL-SCNC: 24 MMOL/L (ref 20–33)
CREAT SERPL-MCNC: 0.63 MG/DL (ref 0.5–1.4)
CRP SERPL HS-MCNC: 2.99 MG/DL (ref 0–0.75)
EOSINOPHIL # BLD AUTO: 0 K/UL (ref 0–0.51)
EOSINOPHIL NFR BLD: 0 % (ref 0–6.9)
ERYTHROCYTE [DISTWIDTH] IN BLOOD BY AUTOMATED COUNT: 40.9 FL (ref 35.9–50)
ERYTHROCYTE [SEDIMENTATION RATE] IN BLOOD BY WESTERGREN METHOD: 49 MM/HOUR (ref 0–15)
GLOBULIN SER CALC-MCNC: 2.8 G/DL (ref 1.9–3.5)
GLUCOSE SERPL-MCNC: 131 MG/DL (ref 65–99)
HCT VFR BLD AUTO: 34.4 % (ref 42–52)
HGB BLD-MCNC: 11.2 G/DL (ref 14–18)
IMM GRANULOCYTES # BLD AUTO: 0.12 K/UL (ref 0–0.11)
IMM GRANULOCYTES NFR BLD AUTO: 0.9 % (ref 0–0.9)
LYMPHOCYTES # BLD AUTO: 1.41 K/UL (ref 1–4.8)
LYMPHOCYTES NFR BLD: 10.7 % (ref 22–41)
MAGNESIUM SERPL-MCNC: 2.3 MG/DL (ref 1.5–2.5)
MCH RBC QN AUTO: 26.5 PG (ref 27–33)
MCHC RBC AUTO-ENTMCNC: 32.6 G/DL (ref 33.7–35.3)
MCV RBC AUTO: 81.5 FL (ref 81.4–97.8)
MONOCYTES # BLD AUTO: 0.54 K/UL (ref 0–0.85)
MONOCYTES NFR BLD AUTO: 4.1 % (ref 0–13.4)
NEUTROPHILS # BLD AUTO: 11.04 K/UL (ref 1.82–7.42)
NEUTROPHILS NFR BLD: 84.2 % (ref 44–72)
NRBC # BLD AUTO: 0 K/UL
NRBC BLD-RTO: 0 /100 WBC
PHOSPHATE SERPL-MCNC: 3.7 MG/DL (ref 2.5–4.5)
PLATELET # BLD AUTO: 301 K/UL (ref 164–446)
PMV BLD AUTO: 10.5 FL (ref 9–12.9)
POTASSIUM SERPL-SCNC: 4.5 MMOL/L (ref 3.6–5.5)
PROT SERPL-MCNC: 6.6 G/DL (ref 6–8.2)
RBC # BLD AUTO: 4.22 M/UL (ref 4.7–6.1)
SODIUM SERPL-SCNC: 139 MMOL/L (ref 135–145)
WBC # BLD AUTO: 13.1 K/UL (ref 4.8–10.8)

## 2018-07-12 PROCEDURE — 70544 MR ANGIOGRAPHY HEAD W/O DYE: CPT

## 2018-07-12 PROCEDURE — 99233 SBSQ HOSP IP/OBS HIGH 50: CPT | Performed by: INTERNAL MEDICINE

## 2018-07-12 PROCEDURE — 700102 HCHG RX REV CODE 250 W/ 637 OVERRIDE(OP): Performed by: NURSE PRACTITIONER

## 2018-07-12 PROCEDURE — 770020 HCHG ROOM/CARE - TELE (206)

## 2018-07-12 PROCEDURE — 85025 COMPLETE CBC W/AUTO DIFF WBC: CPT

## 2018-07-12 PROCEDURE — 83735 ASSAY OF MAGNESIUM: CPT

## 2018-07-12 PROCEDURE — A9270 NON-COVERED ITEM OR SERVICE: HCPCS | Performed by: NURSE PRACTITIONER

## 2018-07-12 PROCEDURE — 36415 COLL VENOUS BLD VENIPUNCTURE: CPT

## 2018-07-12 PROCEDURE — 85652 RBC SED RATE AUTOMATED: CPT

## 2018-07-12 PROCEDURE — 80053 COMPREHEN METABOLIC PANEL: CPT

## 2018-07-12 PROCEDURE — A9270 NON-COVERED ITEM OR SERVICE: HCPCS | Performed by: INTERNAL MEDICINE

## 2018-07-12 PROCEDURE — 86140 C-REACTIVE PROTEIN: CPT

## 2018-07-12 PROCEDURE — 700111 HCHG RX REV CODE 636 W/ 250 OVERRIDE (IP): Performed by: INTERNAL MEDICINE

## 2018-07-12 PROCEDURE — 700105 HCHG RX REV CODE 258: Performed by: INTERNAL MEDICINE

## 2018-07-12 PROCEDURE — 84100 ASSAY OF PHOSPHORUS: CPT

## 2018-07-12 PROCEDURE — 700102 HCHG RX REV CODE 250 W/ 637 OVERRIDE(OP): Performed by: INTERNAL MEDICINE

## 2018-07-12 RX ORDER — NICOTINE 21 MG/24HR
1 PATCH, TRANSDERMAL 24 HOURS TRANSDERMAL
Status: DISCONTINUED | OUTPATIENT
Start: 2018-07-12 | End: 2018-07-13

## 2018-07-12 RX ORDER — OXYCODONE HYDROCHLORIDE 5 MG/1
5-10 TABLET ORAL EVERY 6 HOURS PRN
Status: DISCONTINUED | OUTPATIENT
Start: 2018-07-12 | End: 2018-07-15

## 2018-07-12 RX ADMIN — CEFTRIAXONE 2 G: 2 INJECTION, POWDER, FOR SOLUTION INTRAMUSCULAR; INTRAVENOUS at 05:40

## 2018-07-12 RX ADMIN — BUTALBITAL, ACETAMINOPHEN, AND CAFFEINE 1 TABLET: 50; 325; 40 TABLET ORAL at 17:31

## 2018-07-12 RX ADMIN — TRAMADOL HYDROCHLORIDE 50 MG: 50 TABLET, COATED ORAL at 08:19

## 2018-07-12 RX ADMIN — VANCOMYCIN HYDROCHLORIDE 1800 MG: 100 INJECTION, POWDER, LYOPHILIZED, FOR SOLUTION INTRAVENOUS at 12:11

## 2018-07-12 RX ADMIN — VANCOMYCIN HYDROCHLORIDE 1800 MG: 100 INJECTION, POWDER, LYOPHILIZED, FOR SOLUTION INTRAVENOUS at 18:53

## 2018-07-12 RX ADMIN — BUTALBITAL, ACETAMINOPHEN, AND CAFFEINE 1 TABLET: 50; 325; 40 TABLET ORAL at 09:10

## 2018-07-12 RX ADMIN — LEVETIRACETAM 750 MG: 500 TABLET, FILM COATED ORAL at 17:31

## 2018-07-12 RX ADMIN — OXYCODONE HYDROCHLORIDE 10 MG: 5 TABLET ORAL at 13:20

## 2018-07-12 RX ADMIN — LEVETIRACETAM 750 MG: 500 TABLET, FILM COATED ORAL at 05:39

## 2018-07-12 RX ADMIN — NICOTINE 1 PATCH: 21 PATCH, EXTENDED RELEASE TRANSDERMAL at 17:31

## 2018-07-12 RX ADMIN — VANCOMYCIN HYDROCHLORIDE 1800 MG: 100 INJECTION, POWDER, LYOPHILIZED, FOR SOLUTION INTRAVENOUS at 03:25

## 2018-07-12 RX ADMIN — CEFTRIAXONE 2 G: 2 INJECTION, POWDER, FOR SOLUTION INTRAMUSCULAR; INTRAVENOUS at 17:31

## 2018-07-12 RX ADMIN — OXYCODONE HYDROCHLORIDE 10 MG: 5 TABLET ORAL at 22:59

## 2018-07-12 ASSESSMENT — PATIENT HEALTH QUESTIONNAIRE - PHQ9
SUM OF ALL RESPONSES TO PHQ9 QUESTIONS 1 AND 2: 0
1. LITTLE INTEREST OR PLEASURE IN DOING THINGS: NOT AT ALL

## 2018-07-12 ASSESSMENT — ENCOUNTER SYMPTOMS
TINGLING: 0
FOCAL WEAKNESS: 0
SPUTUM PRODUCTION: 0
HALLUCINATIONS: 0
BACK PAIN: 1
DEPRESSION: 0
CONSTIPATION: 0
SORE THROAT: 0
CLAUDICATION: 0
NAUSEA: 0
WEAKNESS: 0
HEARTBURN: 0
FEVER: 0
NECK PAIN: 1
DIARRHEA: 0
VOMITING: 0
DIZZINESS: 0
SINUS PAIN: 0
HEMOPTYSIS: 0
NERVOUS/ANXIOUS: 0
WHEEZING: 0
FEVER: 1
ORTHOPNEA: 0
LOSS OF CONSCIOUSNESS: 0
COUGH: 0
DOUBLE VISION: 0
SHORTNESS OF BREATH: 0
MEMORY LOSS: 0
SENSORY CHANGE: 0
INSOMNIA: 0
SPEECH CHANGE: 0
CHILLS: 1
FLANK PAIN: 0
STRIDOR: 0
HEADACHES: 1
PHOTOPHOBIA: 1
BLURRED VISION: 0
FALLS: 0
PALPITATIONS: 0
CHILLS: 0
TREMORS: 0
ABDOMINAL PAIN: 0
MYALGIAS: 1
SEIZURES: 0

## 2018-07-12 ASSESSMENT — PAIN SCALES - GENERAL
PAINLEVEL_OUTOF10: 4
PAINLEVEL_OUTOF10: 2

## 2018-07-12 ASSESSMENT — LIFESTYLE VARIABLES: SUBSTANCE_ABUSE: 0

## 2018-07-12 NOTE — PROGRESS NOTES
[facility] Hospitalist Progress Note    Date of Service: 7/11/2018    Chief Complaint  27 y.o. male admitted 7/9/2018 with numbness, left-sided, fevers on/off for 2 months with associated neck pain, photophobia, headaches in the outpatient setting. Findings consistent with bacteremia and meningitis.    Interval Problem Update  - No fevers/chills and only complaints overnight was headache  - Blood cultures remain + for Strep  - ID consulted with recommendations to continue antibiotics and taper off steroids   - Echo done and WNL will need CHAR and cards  - CT maxillofacial/posterior ear fossa negative for ENT infection/mastoiditis  - MRI revealing epidural abscess, neurology and neurosurgery consulted and will follow recommendations as stated below       Consultants/Specialty  Neurology   Neurosurgery  ID    Disposition  Anticipate home once acute medical issues stable    Review of Systems   Constitutional: Negative for chills, fever and malaise/fatigue.   HENT: Negative for congestion, ear discharge, ear pain, hearing loss, nosebleeds, sinus pain, sore throat and tinnitus.    Eyes: Positive for photophobia. Negative for blurred vision, double vision, pain, discharge and redness.   Respiratory: Negative for cough, hemoptysis, sputum production, shortness of breath, wheezing and stridor.    Cardiovascular: Negative for chest pain, palpitations, orthopnea, claudication, leg swelling and PND.   Gastrointestinal: Negative for abdominal pain, blood in stool, constipation, diarrhea, heartburn, melena, nausea and vomiting.   Genitourinary: Negative for dysuria, flank pain, frequency, hematuria and urgency.   Musculoskeletal: Positive for back pain, joint pain, myalgias and neck pain. Negative for falls.   Skin: Negative for itching and rash.   Neurological: Positive for headaches. Negative for dizziness, tingling, tremors, sensory change, speech change, focal weakness, seizures, loss of consciousness and weakness.    Psychiatric/Behavioral: Negative for depression, hallucinations, memory loss, substance abuse and suicidal ideas. The patient is not nervous/anxious and does not have insomnia.       Physical Exam  Laboratory/Imaging   Hemodynamics  Temp (24hrs), Av.7 °C (98 °F), Min:36.1 °C (96.9 °F), Max:37.7 °C (99.8 °F)   Temperature: 36.7 °C (98 °F)  Pulse  Av.5  Min: 60  Max: 114    Blood Pressure: 129/68      Respiratory      Respiration: 17, Pulse Oximetry: 92 %             Fluids  No intake or output data in the 24 hours ending 18 1821    Nutrition  Orders Placed This Encounter   Procedures   • Diet Order Regular     Standing Status:   Standing     Number of Occurrences:   1     Order Specific Question:   Diet:     Answer:   Regular [1]     Physical Exam   Constitutional: He is oriented to person, place, and time. He appears well-developed and well-nourished. No distress.   HENT:   Head: Normocephalic.   Mouth/Throat: Oropharynx is clear and moist. No oropharyngeal exudate.   Eyes: Conjunctivae and EOM are normal. Pupils are equal, round, and reactive to light. No scleral icterus.   Neck: Normal range of motion. No JVD present.   Cardiovascular: Normal rate and regular rhythm.  Exam reveals no gallop and no friction rub.    Murmur heard.  Pulmonary/Chest: Effort normal and breath sounds normal. No stridor. No respiratory distress. He has no wheezes. He has no rales. He exhibits no tenderness.   Abdominal: Soft. Bowel sounds are normal. He exhibits no distension. There is no tenderness. There is no rebound.   Musculoskeletal: Normal range of motion. He exhibits no edema or tenderness.   Neurological: He is alert and oriented to person, place, and time. No cranial nerve deficit. Coordination normal.   No neck stiffness, negative Kernig's and Brudzinski sign.  No temporal/mastoid tenderness.   Skin: Skin is warm and dry. He is not diaphoretic.   Psychiatric: He has a normal mood and affect. His behavior is  normal. Judgment and thought content normal.       Recent Labs      07/09/18 2059 07/11/18   0503   WBC  7.6  5.6   RBC  4.23*  4.21*   HEMOGLOBIN  11.2*  11.1*   HEMATOCRIT  34.5*  35.0*   MCV  81.6  83.1   MCH  26.5*  26.4*   MCHC  32.5*  31.7*   RDW  41.4  41.8   PLATELETCT  283  270   MPV  10.7  10.6     Recent Labs      07/09/18   2333  07/11/18   0503   SODIUM  140  136   POTASSIUM  3.3*  4.6   CHLORIDE  108  105   CO2  24  24   GLUCOSE  108*  193*   BUN  17  8   CREATININE  0.78  0.62   CALCIUM  8.6  9.4     Recent Labs      07/11/18   0503   APTT  33.7   INR  1.16*                  Assessment/Plan     Streptococcal meningitis- (present on admission)   Assessment & Plan    * Fevers on/off for 2 months with associated neck pain, photophobia, headaches in the outpatient setting     LP obtained once blood cultures confirmed positive for strep  CSF with increased WBC recently on antibiotics in the outpatient setting, could be recurrent meningitis  Plan as stated above           Streptococcal bacteremia- (present on admission)   Assessment & Plan    Results concerning for step meningitis given the patients clinical s/s and elevated WBC in CSF    -Patient does have underlying murmur, which is new and being w/u for infective endocarditis   -ID was consulted and recommendations to continue on Vanco and Rocephin, and taper then discontinue the dexamethasone.  -  -Echocardiogram performed with EF 65%, Mildly dilated left atrium with Moderate mitral regurgitation - Will consider CHAR and cards consult  -CT maxillofacial/posterior ear fossa negative for ENT infection/mastoiditis    -Neurology consulted 2/2 MRI and imaging showing Epidural abscess, per neurology given the picture of CSF, less likely acute bacterial meningitis and recommendations to continue antibiotics, start keppra 750 mg every 12 hours PO for epilepsy prophylaxis, continue steroids, check for lyme disease, VDRL, autoimmune/ BRIAN, and HIV.  Also will  need to be f/u in 4 weeks, MRV or MRI brain     -Neurosurgery also consulted and concerns for spinal epidural abscess are highly likely 2/2 MRI, clinical, and lab findings.  Recommendations to continue with IV antibiotics per ID and repeat MRI of the neuraxis in 3-4 weeks to r/o epidural tumor       -Will follow BC  -CBC in am                       Murmur- (present on admission)   Assessment & Plan    With bacteremia and concerns for infective endocarditis    -Echocardiogram performed with EF 65%, Mildly dilated left atrium with Moderate mitral regurgitation  -Echo negative will need CHAR and cards consult             Normocytic anemia- (present on admission)   Assessment & Plan    Suspect consumptive from bacteremia  Monitor Hb / Restrictive transfusion strategy        Hypokalemia- (present on admission)   Assessment & Plan    Potassium WNL    - Will cont to monitor daily BMP          Quality-Core Measures   Reviewed items::  Labs reviewed, Medications reviewed, Radiology images reviewed and EKG reviewed  Cruz catheter::  No Cruz  DVT: Post LP hold x 24 hours.  DVT prophylaxis - mechanical:  SCDs  Ulcer Prophylaxis::  Not indicated  Antibiotics:  Treating active infection/contamination beyond 24 hours perioperative coverage  Assessed for rehabilitation services:  Patient returned to prior level of function, rehabilitation not indicated at this time        CRUZ Sutton.

## 2018-07-12 NOTE — PROGRESS NOTES
Monitor Summary: SB-SR 55-91, VA .16, QRS .10, QT .40 with HR as low as 40 per strip from monitor room

## 2018-07-12 NOTE — PROGRESS NOTES
Pharmacy Kinetics Follow-up Note  7/11/2018  Current Vancomycin dose: 2,000 mg IV q8 hours (03:00, 11:00, 19:00)  Indication: r/o CNS infection    Goal trough: 18-22 mcg/mL    Recent Labs      07/09/18   2333  07/11/18   0503   SODIUM  140  136   POTASSIUM  3.3*  4.6   CHLORIDE  108  105   CO2  24  24   GLUCOSE  108*  193*   BUN  17  8   CREATININE  0.78  0.62       Renal concerns: BMI 32.87  32.87BBMI:     Recent Labs      07/11/18   1219  07/11/18   1750   VANCOTROUGH  27.0*  22.5*         A/P:  1. Level: Trough drawn at 17:50 PM (last vancomycin 2,000 mg IV maintenance dose administered at 10:00 AM; next 2,000 mg dose given at 18:53 PM). Level is slightly supratherapeutic and was drawn ~7h 50 min post dose.       2. Plan: Change vancomycin maintenance dose to 1,800 mg IV Q8h. Will obtain trough level at steady state. Day time clinical pharmacist to follow.     April Gustafson, PharmD, BCPS

## 2018-07-12 NOTE — PROGRESS NOTES
Neurosurgery Progress Note    Subjective:  *Hospital day #1 Cervical6 to L3 Spinal,Epidural abscess  Pt sick for about 6 weeks with fevers and malaise.    Seen at urgent care but didn't follow up.    Presented to ER with TIA sx and Bacteremia   MRI Spine shows Abscess extending from C7 to L3with Anterior displacement of cored but Pt currently neurologically intact   Sore in interscapular region but MAEx4   Infectious dx on board and Pt on Vanco and Rocephin.  Dr. Torres recommending conservartive watching of progress with advace to Decompression only if deficit occurs or ABX fail to improve Pt.      Exam:  Currently PERRLA, EOMI, Awake alert and conversive.  A&O x 3  Speech and memory clear and intact.  Mild Residual Left facial weakness but Tongue Mid line.  No drift.  Muscle strength 5/5 in upper and lower extremities with good symmetry  DTRs =2 witout Clonus or Hoffmans.  Snesory intact.    BP  Min: 100/62  Max: 130/78  Pulse  Av.2  Min: 60  Max: 87  Resp  Av.3  Min: 16  Max: 18  Temp  Av.6 °C (97.9 °F)  Min: 36.1 °C (96.9 °F)  Max: 37.2 °C (98.9 °F)  SpO2  Av.3 %  Min: 90 %  Max: 96 %    No Data Recorded    Recent Labs      18   0503  18   0342   WBC  7.6  5.6  13.1*   RBC  4.23*  4.21*  4.22*   HEMOGLOBIN  11.2*  11.1*  11.2*   HEMATOCRIT  34.5*  35.0*  34.4*   MCV  81.6  83.1  81.5   MCH  26.5*  26.4*  26.5*   MCHC  32.5*  31.7*  32.6*   RDW  41.4  41.8  40.9   PLATELETCT  283  270  301   MPV  10.7  10.6  10.5     Recent Labs      18   2333  18   0503  18   0342   SODIUM  140  136  139   POTASSIUM  3.3*  4.6  4.5   CHLORIDE  108  105  107   CO2  24  24  24   GLUCOSE  108*  193*  131*   BUN  17  8  12   CREATININE  0.78  0.62  0.63   CALCIUM  8.6  9.4  9.1     Recent Labs      18   0503   APTT  33.7   INR  1.16*           Intake/Output     None          No intake or output data in the 24 hours ending 18 0821         • vancomycin   1,800 mg Q8HR   • acetaminophen/caffeine/butalbital 325-40-50 mg  1 Tab Q6HRS PRN   • levETIRAcetam  750 mg BID   • labetalol  10 mg Q4HRS PRN   • ondansetron  4 mg Q4HRS PRN   • ondansetron  4 mg Q4HRS PRN   • cefTRIAXone (ROCEPHIN) IV  2 g Q12HRS   • MD ALERT... vancomycin   pharmacy to dose   • tramadol  50 mg Q6HRS PRN   • NS   Continuous       Assessment and Plan:  Hospital day #1  Menigitis wit Epidural Abscess  Will follow closely.  Advised pt to keep us informed if he feels incresed numbness, pain or weakness in lower extremities.  Would consider decompression if so.  Would prefer to watch conservatively and await response to ABx.    ID managing ABX.

## 2018-07-12 NOTE — PROGRESS NOTES
Infectious Disease Progress Note    Author: Chantal Tellez M.D. Date & Time of service: 2018  9:01 AM    Chief Complaint:  Fevers     Interval History:  2018 T-max 98.9 WBC 13.1 platelets 301 creatinine 0.63 feels better but still has headache and back pain  Labs Reviewed, Medications Reviewed and Radiology Reviewed.    Review of Systems:  Review of Systems   Constitutional: Positive for chills and fever.   Respiratory: Negative for sputum production.    Cardiovascular: Negative for chest pain and leg swelling.   Gastrointestinal: Negative for abdominal pain, nausea and vomiting.   Genitourinary: Negative for dysuria.   Musculoskeletal: Positive for back pain and myalgias.   Neurological: Positive for headaches. Negative for sensory change and speech change.       Hemodynamics:  Temp (24hrs), Av.6 °C (97.9 °F), Min:36.1 °C (96.9 °F), Max:37.2 °C (98.9 °F)  Temperature: 36.9 °C (98.4 °F)  Pulse  Av.1  Min: 60  Max: 114   Blood Pressure: 110/71       Physical Exam:  Physical Exam   Constitutional: He is oriented to person, place, and time. No distress.   Ambulating the halls   HENT:   Mouth/Throat: No oropharyngeal exudate.   Eyes: No scleral icterus.   Neck: Neck supple.   Cardiovascular: Regular rhythm.    No murmur heard.  Pulmonary/Chest: He has no wheezes. He has no rales.   Abdominal: Soft. There is no tenderness. There is no rebound.   Musculoskeletal: He exhibits no edema.   Neurological: He is alert and oriented to person, place, and time. He displays normal reflexes. No cranial nerve deficit. Coordination normal.   Skin: No rash noted. No erythema.   Vitals reviewed.      Meds:    Current Facility-Administered Medications:   •  vancomycin  •  acetaminophen/caffeine/butalbital 325-40-50 mg  •  levETIRAcetam  •  labetalol **OR** [DISCONTINUED] hydrALAZINE  •  ondansetron  •  ondansetron  •  cefTRIAXone (ROCEPHIN) IV  •  MD ALERT... vancomycin  •  tramadol  •  NS    Labs:  Recent Labs       07/09/18 2059 07/11/18   0503  07/12/18   0342   WBC  7.6  5.6  13.1*   RBC  4.23*  4.21*  4.22*   HEMOGLOBIN  11.2*  11.1*  11.2*   HEMATOCRIT  34.5*  35.0*  34.4*   MCV  81.6  83.1  81.5   MCH  26.5*  26.4*  26.5*   RDW  41.4  41.8  40.9   PLATELETCT  283  270  301   MPV  10.7  10.6  10.5   NEUTSPOLYS  73.30*  86.30*  84.20*   LYMPHOCYTES  19.70*  11.40*  10.70*   MONOCYTES  5.50  1.80  4.10   EOSINOPHILS  1.10  0.00  0.00   BASOPHILS  0.10  0.00  0.10     Recent Labs      07/09/18 2333 07/11/18   0503  07/12/18   0342   SODIUM  140  136  139   POTASSIUM  3.3*  4.6  4.5   CHLORIDE  108  105  107   CO2  24  24  24   GLUCOSE  108*  193*  131*   BUN  17  8  12     Recent Labs      07/09/18 2333 07/11/18   0503  07/12/18   0342   ALBUMIN  3.4  3.6  3.8   TBILIRUBIN  0.4  0.3  0.2   ALKPHOSPHAT  65  69  61   TOTPROTEIN  6.5  7.1  6.6   ALTSGPT  11  11  15   ASTSGOT  13  10*  14   CREATININE  0.78  0.62  0.63       Imaging:  Ct-cta Head With & W/o-post Process    Result Date: 7/10/2018  7/9/2018 11:49 PM HISTORY/REASON FOR EXAM:  Neurological Deficit TECHNIQUE/EXAM DESCRIPTION: CT angiogram of the [place] without and with contrast.  Initial precontrast images were obtained of the head from the skull base through the vertex.  Postcontrast images were obtained of the [place] following the power injection of nonionic contrast at 5.0 mL/sec. Thin-section helical images were obtained with overlapping reconstruction interval. Coronal and sagittal multiplanar volume reformats were generated.  3D angiographic images were reviewed on PACS.  Maximum intensity projection (MIP) images were generated and reviewed. 100 mL of Omnipaque 350 nonionic contrast was injected intravenously. Low dose optimization technique was utilized for this CT exam including automated exposure control and adjustment of the mA and/or kV according to patient size. COMPARISON:  None. FINDINGS: The posterior circulation shows the  distal vertebral arteries to be patent. The vertebrobasilar confluence is intact. The basilar artery is patent. No aneurysm or occlusive lesion is evident. The anterior circulation shows no stenotic or occlusive lesion. No aneurysm is evident about the [place]. The brain is unremarkable.     1.  CT angiogram of the [place] within normal limits.    Ct-cta Neck With & W/o-post Processing    Result Date: 7/10/2018  7/9/2018 11:49 PM HISTORY/REASON FOR EXAM: Left-sided facial numbness. TECHNIQUE/EXAM DESCRIPTION: CT angiogram of the neck with contrast. Postcontrast images were obtained of the neck from the great vessels through the skull base following the power injection of nonionic contrast at 5.0 mL/sec. Thin-section helical images were obtained with overlapping reconstruction interval. Coronal and oblique multiplanar volume reformats were generated. Cervical internal carotid artery percent stenosis is calculated using the standard method according to the NASCET criteria wherein a segment of uniform caliber mid or distal cervical internal carotid is used as the reference denominator. 3D angiographic images were reviewed on PACS.  Maximum intensity projection (MIP) images were generated and reviewed 100 mL of Omnipaque 350 nonionic contrast was injected intravenously. Low dose optimization technique was utilized for this CT exam including automated exposure control and adjustment of the mA and/or kV according to patient size. COMPARISON:  None. FINDINGS: The arch origins of the great vessels appear intact. The aortic arch shows no abnormality. The right common carotid artery, cervical carotid bifurcation, and cervical internal carotid artery are within normal limits. There is no evidence of significant stenosis, thrombus, or other filling defect or ulceration. There is no evidence of dissection or aneurysm. The left common carotid artery, cervical carotid bifurcation, and cervical internal carotid  08-Mar-2017 19:46 artery are within normal limits. There is no evidence of significant stenosis, thrombus, or other filling defect or ulceration. There is no evidence of dissection  or aneurysm. The cervical vertebral arteries are normal bilaterally. The neck soft tissues and lung apices in the field of view are unremarkable.     CT angiogram of the neck within normal limits.    Ct-maxillofacial With & W/o Plus Recons    Result Date: 7/10/2018  7/10/2018 4:00 PM HISTORY/REASON FOR EXAM:  Streptococcal bacteremia. Evaluate for sinusitis or abscess. TECHNIQUE/EXAM DESCRIPTION AND NUMBER OF VIEWS:   CT scan maxillofacial with and without contrast, with reconstructions. Thin-section helical imaging was obtained of the maxillofacial structures from the orbital roofs through the mandible before and after injection of nonionic contrast. Coronal and sagittal multiplanar volume reformat images were generated from the axial data.. 100 mL of Omnipaque 350 nonionic contrast was injected intravenously. Low dose optimization technique was utilized for this CT exam including automated exposure control and adjustment of the mA and/or kV according to patient size. COMPARISON:  None. FINDINGS: There is rounded mucosal thickening or polyp formation in the inferior medial aspect of the right maxillary sinus. The remainder of the visualized paranasal sinuses are clear. No air-fluid level is present to suggest acute sinusitis. No bony expansion is identified. No lytic or blastic bony change is identified. The soft tissues of the neck are within normal limits. No soft tissue abscess or inflammatory change is identified. Cervical lymph nodes are normal in size. No submandibular or parotid gland abnormality is noted. No posterior pharyngeal or laryngeal mucosal abnormality or mass is identified.     1.  Rounded mucosal thickening or polyp formation in the inferior aspect of the right maxillary sinus which could reflect chronic right maxillary sinusitis. 2.   No evidence of acute sinusitis. 3.  Otherwise clear paranasal sinuses.    Ct-post-fossa-ear With & W/o    Result Date: 7/10/2018  7/10/2018 4:00 PM HISTORY/REASON FOR EXAM:  Streptococcal bacteremia, facial numbness rule out facial nerve compression / mastoiditis. TECHNIQUE/EXAM DESCRIPTION AND NUMBER OF VIEWS: CT scan of the temporal bones without and with contrast. The study was performed on a GE CT scanner. Contiguous thin section 1.0 mm or 1.25 mm axial and direct coronal images were obtained of the temporal bones. Images are reviewed at magnified bone and soft tissue windows and non-magnified axial images are also displayed at soft tissue windows. Scans are obtained pre and post contrast. 100 mL of Omnipaque 300 nonionic contrast was injected intravenously. Low dose optimization technique was utilized for this CT exam including automated exposure control and adjustment of the mA and/or kV according to patient size. COMPARISON: CTA of the neck without and with contrast 7/9/2018 FINDINGS: On the right, the external auditory canal is normal except for some debris most consistent with cerumen.  The mastoid is normally pneumatized and aerated.  The middle ear cavity and mastoid antrum are clear.  The ossicles are normal in position  and configuration.  The scutum is sharp.  The bony labyrinth is normal, and the internal auditory canal shows normal caliber and is symmetric with the left side.  The facial nerve canal is unremarkable. On the left, the external auditory canal is normal except for some debris consistent with cerumen.  The mastoid is normally pneumatized and aerated.  The middle ear cavity and mastoid antrum are clear.  The ossicles are normal in position and configuration.  The scutum is sharp.  The bony labyrinth is normal, and the internal auditory canal shows normal caliber and is symmetric with the right side.  The facial nerve canal is unremarkable. The right transverse -- sigmoid sinus and right  internal jugular vein are dominant. The paranasal sinuses show clustered retention cyst or polypoid mucosal thickening in the alveolar recess of the right maxillary sinus. There are no air-fluid levels. The posterior fossa structures are unremarkable.  The fourth ventricle is in the midline. There is no abnormal parenchymal or extra-axial/meningeal enhancement in the posterior fossa or supratentorial compartment within the field of view.     CT OF THE TEMPORAL BONES WITHOUT CONTRAST WITHIN NORMAL LIMITS.    Dx-chest-2 Views    Result Date: 7/10/2018    7/9/2018 10:30 PM HISTORY/REASON FOR EXAM: Shortness of Breath TECHNIQUE/EXAM DESCRIPTION:  PA and lateral views of the chest. COMPARISON: None FINDINGS: The cardiac silhouette appears within normal limits. The mediastinal contour appears within normal limits.  The central pulmonary vasculature appears normal. The lungs appear well expanded bilaterally.  Bilateral lungs are clear. No significant pleural effusions are identified. The bony structures appear age-appropriate.     1.  No acute cardiopulmonary disease.    Mr-brain-with & W/o    Result Date: 7/11/2018  7/10/2018 10:21 PM HISTORY/REASON FOR EXAM:  Possible meningitis. TECHNIQUE/EXAM DESCRIPTION:   MRI of the brain without and with contrast. T1 sagittal, T2 fast spin-echo axial, T1 coronal, FLAIR coronal, diffusion-weighted and apparent diffusion coefficient (ADC map) axial images were obtained of the whole brain. T1 postcontrast axial and T1 postcontrast coronal images were obtained. The study was performed on a [employer] 1.5 Kerri MRI scanner. 20 mL Gadavist contrast was administered intravenously. COMPARISON:  None. FINDINGS:  There is tiny area of restricted diffusion in the right frontal gray matter. Minimal contrast enhancement is seen. The coronal FLAIR images demonstrates abnormal T2 hyperintensity in the adjacent right frontal subarachnoid spaces. The axial gradient echo images demonstrates linear  hypointensity within the sulci. There is low-lying cerebellar tonsils. There is no hydrocephalus.  There is no large lesion identified in the expected course of the intracranial portions of the cranial nerves. The visualized flow voids of cerebral vasculature appear normal within limits.  The skull bones appear normal. The paranasal sinuses are clear. The extracranial soft tissue including orbits appear grossly normal.     1.  There is tiny area of restricted diffusion in the right frontal gray matter. Minimal contrast enhancement is seen. The coronal FLAIR images demonstrates abnormal T2 hyperintensity in the adjacent right frontal subarachnoid spaces. The axial gradient echo images demonstrates linear hypointensity within the sulci. These findings likely represents a small cortical venous thrombosis with adjacent tiny infarct. Cortical venous thrombosis may cause focal subarachnoid hemorrhage. Please note that cortical venous thrombosis can be complication of meningitis. 2.  Low-lying cerebellar tonsils. This is concerning for Chiari I malformation. In view of history of lumbar puncture, this finding can be caused by the post lumbar puncture intracranial hypotension.    Mr-cervical Spine-with & W/o    Result Date: 7/11/2018  7/10/2018 10:21 PM HISTORY/REASON FOR EXAM:  Possible bacterial meningitis. TECHNIQUE/EXAM DESCRIPTION: MRI of the cervical spine without and with contrast. The study was performed on a [employer] Signa 1.5 Kerri MRI scanner. T1 sagittal, T2 fast spin-echo sagittal, and gradient echo axial images were obtained of the cervical spine. T1 post-contrast fat suppressed sagittal images were obtained of the cervical spine. Optional T1 post-contrast axial images may be obtained. 20 mL Omniscan contrast was administered intravenously. COMPARISON: None. FINDINGS: There is prominent posterior epidural fluid collection extending from C6-7 into the thoracic spine. Mild posterior epidural contrast enhancement is  seen. Please see thoracic spine report for further details. The cervical spine maintains normal height and alignment. There is no fracture or dislocation. There is no pathologic marrow infiltration. There is no abnormal perivertebral fluid collection. There is no intradural extramedullary fluid collection. There is no abnormal intramedullary T2 signal intensity in the cervical spinal cord. At the level of C2-3, there is no spinal or neural foraminal stenosis. At the level of C3-4, there is no spinal or neural foraminal stenosis. At the level of C4-5, there is no spinal or neural foraminal stenosis. At the level of C5-6, there is no spinal or neural foraminal stenosis. At the level of C6-7, there is no spinal or neural foraminal stenosis. At the level of C7-T1, there is no spinal or neural foraminal stenosis. The visualized posterior fossa structures appear normal within limits.  The cervical spinal cord does not demonstrate any mass or abnormal T2 signal intensity. The visualized pre-and paraspinal soft tissues appear normal within limits.     1.  There is prominent posterior epidural fluid collection extending from C6-7 into the thoracic spine. Mild posterior epidural contrast enhancement is noted at this level. Please see thoracic spine report for further details. 2.  There is no evidence of osteomyelitis in the cervical spine.    Mr-lumbar Spine-with & W/o    Result Date: 7/11/2018  7/10/2018 10:21 PM HISTORY/REASON FOR EXAM:  Possible bacterial meningitis. TECHNIQUE/EXAM DESCRIPTION: MRI of the lumbar spine without and with contrast. The study was performed on a [employer] Signa 1.5 Kerri MRI scanner. T1 sagittal, T2 fast spin-echo sagittal, and T2 axial images were obtained of the lumbar spine. T1 post-contrast fat-suppressed sagittal images were obtained. 20 mL Omniscan contrast was administered intravenously. COMPARISON:  None. FINDINGS: There is abnormal posterior epidural fluid collection seen extending from the  lower cervical spine to the level of L3-4. Multifocal abnormal epidural contrast enhancement is seen. The lumbar spine maintains normal height and alignment. There is no evidence of fracture or dislocation. There is no pathologic marrow infiltration. There is no abnormal disc fluid. At the level of L5-S1, there is diffuse disc bulge. Mild facet joint arthropathy is seen. There is no spinal or neural foraminal stenosis. At the level of L4-5, there is no spinal or neural foraminal stenosis. At the level of L3-4, there is no spinal or neural foraminal stenosis. At the level of L2-3, there is no spinal or neural foraminal stenosis. At the level of L1-2, there is no spinal or neural foraminal stenosis. The conus terminates at the level of L1.     1.  There is abnormal posterior epidural fluid collection seen extending from the lower cervical spine to the level of L3-4. Multifocal abnormal epidural contrast enhancement is seen. These findings are concerning for thoracic and lumbar epidural abscess. 2.  There is no evidence of lumbar osteomyelitis. 3.  Findings were discussed with DAVON FERNANDO on 7/11/2018 1:08 PM.    Mr-thoracic Spine-with & W/o    Result Date: 7/11/2018  7/10/2018 10:21 PM HISTORY/REASON FOR EXAM:  Possible bacterial meningitis TECHNIQUE/EXAM DESCRIPTION: MRI of the thoracic spine without and with contrast. The study was performed on a [employer] Signa 1.5 Kerri MRI scanner. T1 sagittal, T2 fast spin-echo sagittal, and T2 axial images were obtained of the thoracic spine. T1 post-contrast fat suppressed sagittal images were obtained. Optional T1 post-contrast axial images may be obtained. 20 mL Gadavist contrast was administered intravenously. COMPARISON:  None. FINDINGS: There is abnormal posterior epidural fluid collection extending from C6-7 to the lumbar spine. Abnormal peripheral contrast enhancement noted surrounding the epidural fluid collection. The thoracic spinal cord is anteriorly displaced from the  levels of T3-4 to  T9-10. Moderate central canal stenosis is seen. There is a hemangioma in the body of T11. There is no abnormal intramedullary T2 signal intensity in the thoracic spinal cord.     There is abnormal posterior epidural fluid collection extending from C6-7 to the lumbar spine. Abnormal peripheral contrast enhancement noted surrounding the epidural fluid collection. The thoracic spinal cord is anteriorly displaced from the levels of T3-4 to  T9-10. Moderate central canal stenosis is seen. These findings are highly concerning for posterior epidural abscess with moderate canal compromise.    Echocardiogram Comp W/o Cont    Result Date: 2018  Transthoracic Echo Report Echocardiography Laboratory CONCLUSIONS No prior study is available for comparison. Normal left ventricular systolic function. Left ventricular ejection fraction is visually estimated to be 65%. Normal diastolic function. Normal inferior vena cava size and inspiratory collapse. Mildly dilated left atrium. Moderate mitral regurgitation. JAYDON POLLARD Exam Date:         2018                    09:47 Exam Location:     Inpatient Priority:          Routine Ordering Physician:        DAVON FERNANDO Referring Physician:       KASSIE Lockett Sonographer:               Sunshine Huang Age:    27     Gender:    M MRN:    [id number] :    1990 BSA:    2.21   Ht (in):    70     Wt (lb):    229 Exam Type:     Complete Indications:     Murmur ICD Codes:       R01.1 CPT Codes:       26947 BP:   120    /   73     HR: Technical Quality:       Fair MEASUREMENTS  (Male / Female) Normal Values 2D ECHO LV Diastolic Diameter PLAX        5.1 cm                4.2 - 5.9 / 3.9 - 5.3 cm LV Systolic Diameter PLAX         3.9 cm                2.1 - 4.0 cm IVS Diastolic Thickness           0.87 cm               LVPW Diastolic Thickness          1.3 cm                LVOT Diameter                     2 cm                  RA Diameter                        3.6 cm                Estimated LV Ejection Fraction    65 %                  LV Ejection Fraction MOD BP       62.9 %                >= 55  % LV Ejection Fraction MOD 4C       61.7 %                LV Ejection Fraction MOD 2C       64.1 %                LA Volume Index                   32.2 cm³/m²           16 - 28 cm³/m² DOPPLER AV Peak Velocity                  1.4 m/s               AV Peak Gradient                  8 mmHg                AV Mean Gradient                  4.7 mmHg              LVOT Peak Velocity                0.99 m/s              AV Area Cont Eq vti               2.3 cm²               Mitral E Point Velocity           0.93 m/s              Mitral E to A Ratio               1.8                   Mitral A Duration                 114 ms                MV Pressure Half Time             67.4 ms               MV Area PHT                       3.3 cm²               MV Deceleration Time              232 ms                MR Flow Convergence Radius        0.94 cm               MR ERO PISA                       0.33 cm²              MR Regurgitant Volume PISA        48.7 cm³              PV Peak Velocity                  0.97 m/s              PV Peak Gradient                  3.8 mmHg              RVOT Peak Velocity                0.94 m/s              * Indicates values subject to auto-interpretation LV EF:  65    % FINDINGS Left Ventricle Normal left ventricular chamber size. Normal left ventricular wall thickness. Normal left ventricular systolic function. Left ventricular ejection fraction is visually estimated to be 65%. Normal regional wall motion. Normal diastolic function. Right Ventricle The right ventricle was normal in size and function. Right Atrium The right atrium is normal in size.  Normal inferior vena cava size and inspiratory collapse. Left Atrium Mildly dilated left atrium. Mitral Valve Thickened mitral valve leaflets. No mitral stenosis. Moderate mitral regurgitation. ERO by PISA  "method is  0.33 sq cm. Aortic Valve Structurally normal aortic valve without significant stenosis or regurgitation. Tricuspid Valve Unable to estimate pulmonary artery pressure due to an inadequate tricuspid regurgitant jet. No tricuspid stenosis. Trace tricuspid regurgitation. Unable to estimate pulmonary artery pressure due to an inadequate tricuspid regurgitant jet. Pulmonic Valve The pulmonic valve is not well visualized. No stenosis or regurgitation seen. Pericardium Normal pericardium without effusion. Aorta The aortic root is normal. Hui Modi MD (Electronically Signed) Final Date:     11 July 2018                 13:51      Micro:  Results     Procedure Component Value Units Date/Time    BLOOD CULTURE [[id number]] Collected:  07/11/18 1219    Order Status:  Completed Specimen:  Blood from Peripheral Updated:  07/12/18 0751     Significant Indicator NEG     Source BLD     Site PERIPHERAL     Blood Culture No Growth    Note: Blood cultures are incubated for 5 days and  are monitored continuously.Positive blood cultures  are called to the RN and reported as soon as  they are identified.      Narrative:       Per Hospital Policy: Only change Specimen Src: to \"Line\" if  specified by physician order.    BLOOD CULTURE [[id number]] Collected:  07/11/18 1219    Order Status:  Completed Specimen:  Blood from Peripheral Updated:  07/12/18 0751     Significant Indicator NEG     Source BLD     Site PERIPHERAL     Blood Culture No Growth    Note: Blood cultures are incubated for 5 days and  are monitored continuously.Positive blood cultures  are called to the RN and reported as soon as  they are identified.      Narrative:       Per Hospital Policy: Only change Specimen Src: to \"Line\" if  specified by physician order.    CSF CULTURE [[id number]] Collected:  07/10/18 1440    Order Status:  Completed Specimen:  CSF Updated:  07/11/18 1505     Significant Indicator NEG     Source CSF     Site TAP     CSF Culture No growth at " "24 hours.     Gram Stain Result No organisms seen.    BLOOD CULTURE [[id number]]  (Abnormal) Collected:  07/10/18 1419    Order Status:  Completed Specimen:  Blood from Peripheral Updated:  07/11/18 0213     Significant Indicator POS (POS)     Source BLD     Site PERIPHERAL     Blood Culture Growth detected by Bactec instrument. 07/11/2018  02:12  Gram Stain: Gram positive cocci: Possible Streptococcus sp.   (A)    Narrative:       CALL  Feng  TERRY tel. [phone],  CALLED  TERRY tel. [phone] 07/11/2018, 02:12, RB PERF. RESULTS CALLED TO: RN  [id number]  Per Hospital Policy: Only change Specimen Src: to \"Line\" if  specified by physician order.    BLOOD CULTURE [[id number]]  (Abnormal) Collected:  07/10/18 1419    Order Status:  Completed Specimen:  Blood from Peripheral Updated:  07/11/18 0150     Significant Indicator POS (POS)     Source BLD     Site PERIPHERAL     Blood Culture Growth detected by Bactec instrument. 07/11/2018  01:49  Gram Stain: Gram positive cocci: Possible Streptococcus sp.   (A)    Narrative:       CALL  Feng  TERRY tel. [phone],  CALLED  TERRY tel. [phone] 07/11/2018, 01:50, RB PERF. RESULTS CALLED TO: RN  [phone]  Per Hospital Policy: Only change Specimen Src: to \"Line\" if  specified by physician order.    GRAM STAIN [[id number]] Collected:  07/10/18 1440    Order Status:  Completed Specimen:  CSF Updated:  07/10/18 1620     Significant Indicator .     Source CSF     Site TAP     Gram Stain Result No organisms seen.    CSF CULTURE [[id number]]     Order Status:  Canceled Specimen:  CSF from Tap     BLOOD CULTURE [[id number]]  (Abnormal) Collected:  07/09/18 2333    Order Status:  Completed Specimen:  Blood from Peripheral Updated:  07/10/18 1218     Significant Indicator POS (POS)     Source BLD     Site PERIPHERAL     Blood Culture Growth detected by Bactec instrument. 07/10/2018  12:18  Gram Stain: Gram positive cocci: Possible Streptococcus sp.   (A)    Narrative:       Per Hospital Policy: Only " "change Specimen Src: to \"Line\" if  specified by physician order.    BLOOD CULTURE [[id number]]  (Abnormal) Collected:  07/09/18 2333    Order Status:  Completed Specimen:  Blood from Peripheral Updated:  07/10/18 1218     Significant Indicator POS (POS)     Source BLD     Site PERIPHERAL     Blood Culture Growth detected by Bactec instrument. 07/10/2018  12:17  Gram Stain: Gram positive cocci: Possible Streptococcus sp.   (A)    Narrative:       Per Hospital Policy: Only change Specimen Src: to \"Line\" if  specified by physician order.    URINALYSIS CULTURE, IF INDICATED [[id number]]     Order Status:  Canceled Specimen:  Urine           Assessment:  Active Hospital Problems    Diagnosis   •    • Streptococcal bacteremia [R78.81, B95.5]   • Hypokalemia [E87.6]   • Normocytic anemia [D64.9]   • Murmur [R01.1]       Plan:  Streptococcal bacteremia-ongoing workup  Spoke to micro lab and it is likely strep viridans  For now continue with the Rocephin and vancomycin  Blood cultures are positive on 7/9/2018 and 7/10/2018  The are negative from 7/11/2018  Sed rate is 49 and CRP is 2.99    Multifocal epidural abscess-unstable  Epidural abscesses extending from his cervical spine to L3-4  Evaluated by neurosurgery  Currently recommended only medical treatment    Possible endocarditis-ongoing workup  TTE on 7/11/2018 showed mitral regurgitation  CHAR is pending    Discussed with internal medicine.  "

## 2018-07-13 PROBLEM — E87.6 HYPOKALEMIA: Status: RESOLVED | Noted: 2018-07-10 | Resolved: 2018-07-13

## 2018-07-13 PROBLEM — I34.0 MITRAL REGURGITATION: Status: ACTIVE | Noted: 2018-07-13

## 2018-07-13 PROBLEM — I33.0 INFECTIVE ENDOCARDITIS: Status: ACTIVE | Noted: 2018-07-13

## 2018-07-13 PROBLEM — R01.1 MURMUR: Status: RESOLVED | Noted: 2018-07-10 | Resolved: 2018-07-13

## 2018-07-13 PROBLEM — G06.2 EPIDURAL ABSCESS: Status: ACTIVE | Noted: 2018-07-13

## 2018-07-13 PROBLEM — I63.6: Status: ACTIVE | Noted: 2018-07-13

## 2018-07-13 LAB
ANION GAP SERPL CALC-SCNC: 6 MMOL/L (ref 0–11.9)
B BURGDOR AB SER IA-ACNC: 1.07 LIV (ref 0–1.2)
BACTERIA BLD CULT: ABNORMAL
BACTERIA CSF CULT: NORMAL
BASOPHILS # BLD AUTO: 0.3 % (ref 0–1.8)
BASOPHILS # BLD: 0.03 K/UL (ref 0–0.12)
BUN SERPL-MCNC: 19 MG/DL (ref 8–22)
CALCIUM SERPL-MCNC: 8.4 MG/DL (ref 8.5–10.5)
CHLORIDE SERPL-SCNC: 109 MMOL/L (ref 96–112)
CO2 SERPL-SCNC: 25 MMOL/L (ref 20–33)
CREAT SERPL-MCNC: 0.72 MG/DL (ref 0.5–1.4)
EOSINOPHIL # BLD AUTO: 0.07 K/UL (ref 0–0.51)
EOSINOPHIL NFR BLD: 0.8 % (ref 0–6.9)
ERYTHROCYTE [DISTWIDTH] IN BLOOD BY AUTOMATED COUNT: 42.1 FL (ref 35.9–50)
ETEST SENSITIVITY ETEST: NORMAL
GLUCOSE SERPL-MCNC: 100 MG/DL (ref 65–99)
GRAM STN SPEC: NORMAL
HCT VFR BLD AUTO: 34.5 % (ref 42–52)
HGB BLD-MCNC: 11 G/DL (ref 14–18)
IMM GRANULOCYTES # BLD AUTO: 0.11 K/UL (ref 0–0.11)
IMM GRANULOCYTES NFR BLD AUTO: 1.2 % (ref 0–0.9)
LV EJECT FRACT  99904: 65
LYMPHOCYTES # BLD AUTO: 2.65 K/UL (ref 1–4.8)
LYMPHOCYTES NFR BLD: 28.6 % (ref 22–41)
MCH RBC QN AUTO: 26.4 PG (ref 27–33)
MCHC RBC AUTO-ENTMCNC: 31.9 G/DL (ref 33.7–35.3)
MCV RBC AUTO: 82.9 FL (ref 81.4–97.8)
MONOCYTES # BLD AUTO: 0.5 K/UL (ref 0–0.85)
MONOCYTES NFR BLD AUTO: 5.4 % (ref 0–13.4)
NEUTROPHILS # BLD AUTO: 5.89 K/UL (ref 1.82–7.42)
NEUTROPHILS NFR BLD: 63.7 % (ref 44–72)
NRBC # BLD AUTO: 0 K/UL
NRBC BLD-RTO: 0 /100 WBC
NUCLEAR IGG SER QL IA: NORMAL
PLATELET # BLD AUTO: 288 K/UL (ref 164–446)
PMV BLD AUTO: 10.1 FL (ref 9–12.9)
POTASSIUM SERPL-SCNC: 4 MMOL/L (ref 3.6–5.5)
RBC # BLD AUTO: 4.16 M/UL (ref 4.7–6.1)
SIGNIFICANT IND 70042: ABNORMAL
SIGNIFICANT IND 70042: NORMAL
SITE SITE: ABNORMAL
SITE SITE: NORMAL
SODIUM SERPL-SCNC: 140 MMOL/L (ref 135–145)
SOURCE SOURCE: ABNORMAL
SOURCE SOURCE: NORMAL
VANCOMYCIN TROUGH SERPL-MCNC: 27.5 UG/ML (ref 10–20)
WBC # BLD AUTO: 9.3 K/UL (ref 4.8–10.8)

## 2018-07-13 PROCEDURE — 80048 BASIC METABOLIC PNL TOTAL CA: CPT

## 2018-07-13 PROCEDURE — 700102 HCHG RX REV CODE 250 W/ 637 OVERRIDE(OP): Performed by: INTERNAL MEDICINE

## 2018-07-13 PROCEDURE — 93312 ECHO TRANSESOPHAGEAL: CPT

## 2018-07-13 PROCEDURE — 85025 COMPLETE CBC W/AUTO DIFF WBC: CPT

## 2018-07-13 PROCEDURE — 700102 HCHG RX REV CODE 250 W/ 637 OVERRIDE(OP): Performed by: NURSE PRACTITIONER

## 2018-07-13 PROCEDURE — 770006 HCHG ROOM/CARE - MED/SURG/GYN SEMI*

## 2018-07-13 PROCEDURE — 93325 DOPPLER ECHO COLOR FLOW MAPG: CPT

## 2018-07-13 PROCEDURE — 99233 SBSQ HOSP IP/OBS HIGH 50: CPT | Performed by: INTERNAL MEDICINE

## 2018-07-13 PROCEDURE — A9270 NON-COVERED ITEM OR SERVICE: HCPCS | Performed by: INTERNAL MEDICINE

## 2018-07-13 PROCEDURE — 700105 HCHG RX REV CODE 258: Performed by: INTERNAL MEDICINE

## 2018-07-13 PROCEDURE — 700111 HCHG RX REV CODE 636 W/ 250 OVERRIDE (IP)

## 2018-07-13 PROCEDURE — 99152 MOD SED SAME PHYS/QHP 5/>YRS: CPT

## 2018-07-13 PROCEDURE — 700111 HCHG RX REV CODE 636 W/ 250 OVERRIDE (IP): Performed by: HOSPITALIST

## 2018-07-13 PROCEDURE — 700111 HCHG RX REV CODE 636 W/ 250 OVERRIDE (IP): Performed by: INTERNAL MEDICINE

## 2018-07-13 PROCEDURE — B24BZZ4 ULTRASONOGRAPHY OF HEART WITH AORTA, TRANSESOPHAGEAL: ICD-10-PCS | Performed by: INTERNAL MEDICINE

## 2018-07-13 PROCEDURE — 99153 MOD SED SAME PHYS/QHP EA: CPT

## 2018-07-13 PROCEDURE — 93321 DOPPLER ECHO F-UP/LMTD STD: CPT

## 2018-07-13 PROCEDURE — 36415 COLL VENOUS BLD VENIPUNCTURE: CPT

## 2018-07-13 PROCEDURE — 80202 ASSAY OF VANCOMYCIN: CPT

## 2018-07-13 PROCEDURE — A9270 NON-COVERED ITEM OR SERVICE: HCPCS | Performed by: NURSE PRACTITIONER

## 2018-07-13 PROCEDURE — 160002 HCHG RECOVERY MINUTES (STAT)

## 2018-07-13 RX ORDER — MIDAZOLAM HYDROCHLORIDE 1 MG/ML
INJECTION INTRAMUSCULAR; INTRAVENOUS
Status: COMPLETED
Start: 2018-07-13 | End: 2018-07-13

## 2018-07-13 RX ADMIN — NICOTINE 1 PATCH: 21 PATCH, EXTENDED RELEASE TRANSDERMAL at 06:51

## 2018-07-13 RX ADMIN — SODIUM CHLORIDE 3 MILLION UNITS: 9 INJECTION, SOLUTION INTRAVENOUS at 21:53

## 2018-07-13 RX ADMIN — MIDAZOLAM 4 MG: 1 INJECTION INTRAMUSCULAR; INTRAVENOUS at 09:12

## 2018-07-13 RX ADMIN — FENTANYL CITRATE 75 MCG: 50 INJECTION, SOLUTION INTRAMUSCULAR; INTRAVENOUS at 09:12

## 2018-07-13 RX ADMIN — OXYCODONE HYDROCHLORIDE 10 MG: 5 TABLET ORAL at 07:58

## 2018-07-13 RX ADMIN — VANCOMYCIN HYDROCHLORIDE 1800 MG: 100 INJECTION, POWDER, LYOPHILIZED, FOR SOLUTION INTRAVENOUS at 03:24

## 2018-07-13 RX ADMIN — LEVETIRACETAM 750 MG: 500 TABLET, FILM COATED ORAL at 17:10

## 2018-07-13 RX ADMIN — CEFTRIAXONE 2 G: 2 INJECTION, POWDER, FOR SOLUTION INTRAMUSCULAR; INTRAVENOUS at 06:48

## 2018-07-13 RX ADMIN — LEVETIRACETAM 750 MG: 500 TABLET, FILM COATED ORAL at 06:50

## 2018-07-13 RX ADMIN — BUTALBITAL, ACETAMINOPHEN, AND CAFFEINE 1 TABLET: 50; 325; 40 TABLET ORAL at 13:10

## 2018-07-13 RX ADMIN — OXYCODONE HYDROCHLORIDE 5 MG: 5 TABLET ORAL at 17:11

## 2018-07-13 RX ADMIN — SODIUM CHLORIDE 3 MILLION UNITS: 9 INJECTION, SOLUTION INTRAVENOUS at 18:01

## 2018-07-13 ASSESSMENT — PAIN SCALES - GENERAL
PAINLEVEL_OUTOF10: 0
PAINLEVEL_OUTOF10: 8
PAINLEVEL_OUTOF10: 0

## 2018-07-13 ASSESSMENT — ENCOUNTER SYMPTOMS
BACK PAIN: 1
CLAUDICATION: 0
SINUS PAIN: 0
SHORTNESS OF BREATH: 0
COUGH: 0
MYALGIAS: 0
INSOMNIA: 0
WEIGHT LOSS: 0
STRIDOR: 0
ORTHOPNEA: 0
ABDOMINAL PAIN: 0
SEIZURES: 0
WHEEZING: 0
NERVOUS/ANXIOUS: 0
SENSORY CHANGE: 0
DEPRESSION: 0
DIARRHEA: 0
SORE THROAT: 0
BLURRED VISION: 0
MEMORY LOSS: 0
TINGLING: 0
HEADACHES: 1
PND: 0
HEMOPTYSIS: 0
SPEECH CHANGE: 0
FOCAL WEAKNESS: 0
FALLS: 0
NAUSEA: 0
SPUTUM PRODUCTION: 0
CHILLS: 0
MYALGIAS: 1
HEARTBURN: 0
NECK PAIN: 1
FLANK PAIN: 0
HALLUCINATIONS: 0
FEVER: 0
VOMITING: 0
LOSS OF CONSCIOUSNESS: 0
TREMORS: 0
DIAPHORESIS: 0
WEAKNESS: 0
PHOTOPHOBIA: 0
DIZZINESS: 0
CONSTIPATION: 0
PALPITATIONS: 0
DOUBLE VISION: 0

## 2018-07-13 ASSESSMENT — LIFESTYLE VARIABLES: SUBSTANCE_ABUSE: 0

## 2018-07-13 NOTE — ASSESSMENT & PLAN NOTE
Streptococcal (Viridans) involving mitral valve with severe MR. Confirmed with CHAR 07/13/18. Evaluated by Dr Alvarado from CTS 07/14/2018, no surgical interventions planned during this hospitalization.  - interval CHAR in 6 weeks, outpatient cardiology / CTS follow up  - repeat MRI spine in 3 weeks per neurosurg  - continue with PCN, added gentamycin on 7/17  - monitor clinically

## 2018-07-13 NOTE — PROGRESS NOTES
[facility] Hospitalist Progress Note    Date of Service: 7/13/2018    Chief Complaint  27 y.o. male admitted 7/9/2018 with numbness, left-sided, fevers on/off for 2 months with associated neck pain, photophobia, headaches in the outpatient setting. Diagnosed with streptococcal viridans bacteremia c/b infective endocarditis involving the mitral valve with severe MR, seeding of dura c/b extensive epidural abscess and likely embolic stroke c/b meningitis, ? Venous thrombosis.     Interval Problem Update  Patient seen and evaluated on rounds  Completed CHAR today, this is concerning for severe MR / IE of MV  Case discussed with Dr Pereyra from cardiology  Dr Pereyra will have CTS see this patient   Case discussed with Dr Tellez  Abx per ID  Per Dr Brown no SC Lovenox / Heparin at this time   Continue SCD, ambulating well  Keppra per Neurology team   Still intermittent headaches  Ongoing neck / Back pain   No other complaints    POC / Diagnosis discussed in detail with the patient / family. All questions / concerns answered. Counseled and educated.     Consultants/Specialty  Neurology   Neurosurgery  Infectious disease  Cardiology   Cardiothoracic surgery     Disposition  Ongoing hospitalization for ongoing acute issues  Anticipate home with outpatient infusions for abx once cleared by ID / CTS / Neurosurgery      Review of Systems   Constitutional: Negative for chills, diaphoresis, fever, malaise/fatigue and weight loss.   HENT: Negative for congestion, ear discharge, ear pain, hearing loss, nosebleeds, sinus pain, sore throat and tinnitus.    Eyes: Negative for blurred vision, double vision and photophobia.   Respiratory: Negative for cough, hemoptysis, sputum production, shortness of breath, wheezing and stridor.    Cardiovascular: Negative for chest pain, palpitations, orthopnea, claudication, leg swelling and PND.   Gastrointestinal: Negative for abdominal pain, constipation, diarrhea, heartburn, nausea and vomiting.    Genitourinary: Negative for dysuria, flank pain, frequency, hematuria and urgency.   Musculoskeletal: Positive for back pain and neck pain. Negative for falls, joint pain and myalgias.   Skin: Negative for itching and rash.   Neurological: Positive for headaches (Still intermittently). Negative for dizziness, tingling, tremors, sensory change, speech change, focal weakness, seizures, loss of consciousness and weakness.   Psychiatric/Behavioral: Negative for depression, hallucinations, memory loss, substance abuse and suicidal ideas. The patient is not nervous/anxious and does not have insomnia.       Physical Exam  Laboratory/Imaging   Hemodynamics  Temp (24hrs), Av.6 °C (97.8 °F), Min:36.2 °C (97.1 °F), Max:37 °C (98.6 °F)   Temperature: 36.2 °C (97.1 °F)  Pulse  Av.5  Min: 54  Max: 114 Heart Rate (Monitored): 74  Blood Pressure: 112/74, NIBP: 105/71      Respiratory      Respiration: 16, Pulse Oximetry: 94 %             Fluids  No intake or output data in the 24 hours ending 18 1255    Nutrition  Orders Placed This Encounter   Procedures   • Diet Order Regular     Standing Status:   Standing     Number of Occurrences:   1     Order Specific Question:   Diet:     Answer:   Regular [1]     Physical Exam   Constitutional: He is oriented to person, place, and time. He appears well-developed and well-nourished. No distress.   HENT:   Head: Normocephalic.   Mouth/Throat: Oropharynx is clear and moist. No oropharyngeal exudate.   Eyes: Conjunctivae and EOM are normal. Pupils are equal, round, and reactive to light. No scleral icterus.   Neck: Normal range of motion. No JVD present.   Cardiovascular: Normal rate and regular rhythm.  Exam reveals no gallop and no friction rub.    Murmur heard.  Pulmonary/Chest: Effort normal and breath sounds normal. No stridor. No respiratory distress. He has no wheezes. He has no rales. He exhibits no tenderness.   Abdominal: Soft. Bowel sounds are normal. He exhibits no  distension. There is no tenderness. There is no rebound and no guarding.   Musculoskeletal: Normal range of motion. He exhibits no edema, tenderness or deformity.   Neurological: He is alert and oriented to person, place, and time. No cranial nerve deficit. Coordination normal.   No neck stiffness, negative Kernig's and Brudzinski sign.  No temporal/mastoid tenderness. No saddle anesthesia. No urinary or bowel incontinence. Motor strength is 5/5 in b/l LE and UE. No sensory deficits. DTR are normal in all extremities.    Skin: Skin is warm, dry and intact. He is not diaphoretic.   Psychiatric: He has a normal mood and affect. His behavior is normal. Judgment and thought content normal.       Recent Labs      07/11/18   0503  07/12/18   0342  07/13/18   0235   WBC  5.6  13.1*  9.3   RBC  4.21*  4.22*  4.16*   HEMOGLOBIN  11.1*  11.2*  11.0*   HEMATOCRIT  35.0*  34.4*  34.5*   MCV  83.1  81.5  82.9   MCH  26.4*  26.5*  26.4*   MCHC  31.7*  32.6*  31.9*   RDW  41.8  40.9  42.1   PLATELETCT  270  301  288   MPV  10.6  10.5  10.1     Recent Labs      07/11/18   0503  07/12/18   0342  07/13/18   0235   SODIUM  136  139  140   POTASSIUM  4.6  4.5  4.0   CHLORIDE  105  107  109   CO2  24  24  25   GLUCOSE  193*  131*  100*   BUN  8  12  19   CREATININE  0.62  0.63  0.72   CALCIUM  9.4  9.1  8.4*     Recent Labs      07/11/18   0503   APTT  33.7   INR  1.16*                  Assessment/Plan     Acute bacterial endocarditis- (present on admission)   Assessment & Plan    Streptococcal  Involving mitral valve with severe MR  CHAR 07/13/18 confirms this  Discussed with Dr Annie Pereyra will be facilitating cardiothoracic surgery consultation  No further role for cardiology at this point  ID team on board  Defer abx therapy to ID team         Epidural abscess- (present on admission)   Assessment & Plan    Extensive from cervical to lumbar spine without compromise   No neurological deficits  Seen by Dr Torres from  neurosurgery   No surgical interventions at this time per Dr Torres  ID team on board   Abx per ID team   Will need interval MRI in 3 weeks from prior MRI per neurosurgery team   Continue close clinical monitoring of neurological status  If any worsening notify neurosurgical team         Streptococcal meningitis- (present on admission)   Assessment & Plan    Likely embolic from bacteremia, Infective endocarditis  Continue Abx per ID recommendations        Streptococcal bacteremia- (present on admission)   Assessment & Plan    Complicated by infective endocarditis / epidural abscess  Embolic phenomenon likely leading to CNS infarct, ? 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)- (present on admission)   Assessment & Plan    On MRI brain negative MRV  Suspect related to embolic phenomenon from infective endocarditis  Evaluated by neurology  Neurology team did not recommend anticoagulation  On Keppra 750 mg BID per neurology team recommendations   Will need close outpatient follow up with neurology team         Non-rheumatic mitral regurgitation- (present on admission)   Assessment & Plan    Related to infective endocarditis  Severe  Dr Pereyra to facilitate cardiothoracic surgery consultation         Normocytic anemia- (present on admission)   Assessment & Plan    Suspect consumptive from bacteremia  Monitor Hb / Restrictive transfusion strategy          Quality-Core Measures   Reviewed items::  Labs reviewed, Medications reviewed and Radiology images reviewed  Cruz catheter::  No Cruz  DVT: Holding until okay to use per neurology   DVT prophylaxis - mechanical:  SCDs  Ulcer Prophylaxis::  Not indicated  Antibiotics:  Treating active  infection/contamination beyond 24 hours perioperative coverage  Assessed for rehabilitation services:  Patient returned to prior level of function, rehabilitation not indicated at this time

## 2018-07-13 NOTE — CARE PLAN
Problem: Venous Thromboembolism (VTW)/Deep Vein Thrombosis (DVT) Prevention:  Goal: Patient will participate in Venous Thrombosis (VTE)/Deep Vein Thrombosis (DVT)Prevention Measures  Outcome: PROGRESSING AS EXPECTED  Pt ambulated in butler a couple times. Doesn't want to wear SCD's    Problem: Pain Management  Goal: Pain level will decrease to patient's comfort goal  Outcome: PROGRESSING AS EXPECTED  Medicated with oxycodone per mar with relief noted.

## 2018-07-13 NOTE — PROGRESS NOTES
Monitor summary: SR 71-95, FL .14, QRS .08, QT .34 with rare BPAC per strip from monitor room.

## 2018-07-13 NOTE — PROGRESS NOTES
Neurosurgery Progress Note    Subjective:  Hospital day #2  C6 to L3 Spinal,Epidural abscess  Pt still doing well and moving all extremities with full strength.  On Rocephin and Vanco per hospitalists.  Scheduled for Transesophageal ECHO today  WBC 9.3 (down from 13.1)  Chemistries stable.  Initial Blood Cx with Strep+ Cocci  MRI Spine shows Abscess extending from C7 to L3with Anterior displacement of cord   Pt currently neurologically intact   Soreness in interscapular region but MAEx4   Infectious dx on board and Pt on Vanco and Rocephin.  Feeling better    Exam:  Currently PERRLA, EOMI, Awake alert and conversive.  A&O x 3  Speech and memory clear and intact.  Mild Residual Left facial weakness but Tongue Mid line.  No drift.  Muscle strength 5/5 in upper and lower extremities with good symmetry  DTRs =2 witout Clonus or Hoffmans.  Snesory intact.    BP  Min: 96/59  Max: 111/74  Pulse  Av.3  Min: 54  Max: 90  Resp  Av.8  Min: 16  Max: 18  Temp  Av.7 °C (98.1 °F)  Min: 36.2 °C (97.1 °F)  Max: 37 °C (98.6 °F)  SpO2  Av.3 %  Min: 95 %  Max: 98 %    No Data Recorded    Recent Labs      18   0503  18   0342  18   0235   WBC  5.6  13.1*  9.3   RBC  4.21*  4.22*  4.16*   HEMOGLOBIN  11.1*  11.2*  11.0*   HEMATOCRIT  35.0*  34.4*  34.5*   MCV  83.1  81.5  82.9   MCH  26.4*  26.5*  26.4*   MCHC  31.7*  32.6*  31.9*   RDW  41.8  40.9  42.1   PLATELETCT  270  301  288   MPV  10.6  10.5  10.1     Recent Labs      18   0503  18   0342  18   0235   SODIUM  136  139  140   POTASSIUM  4.6  4.5  4.0   CHLORIDE  105  107  109   CO2  24  24  25   GLUCOSE  193*  131*  100*   BUN  8  12  19   CREATININE  0.62  0.63  0.72   CALCIUM  9.4  9.1  8.4*     Recent Labs      18   0503   APTT  33.7   INR  1.16*           Intake/Output     None          No intake or output data in the 24 hours ending 18 0748         • vancomycin  1,800 mg Q8HR   • oxyCODONE immediate-release   5-10 mg Q6HRS PRN   • nicotine  1 Patch Daily-0600   • acetaminophen/caffeine/butalbital 325-40-50 mg  1 Tab Q6HRS PRN   • levETIRAcetam  750 mg BID   • labetalol  10 mg Q4HRS PRN   • ondansetron  4 mg Q4HRS PRN   • ondansetron  4 mg Q4HRS PRN   • cefTRIAXone (ROCEPHIN) IV  2 g Q12HRS   • MD ALERT... vancomycin   pharmacy to dose   • NS   Continuous       Assessment and Plan:  Hospital day #2  Menigitis with Extensive C6-L3 Epidural Abscess  Improving and feeling better on Vanco/Rocephin.  WBC-Better.  Advised pt to keep us informed if he feels incresed numbness, pain or weakness in lower extremities.    Dr. Torres recommending conservartive watching of progress with advace to Decompression only if deficit occurs or ABX fail to improve Pt.  Hospitalists and ID managing ABX.    Pain control adequate.

## 2018-07-13 NOTE — PROGRESS NOTES
AOX4 and easy respirations on RA; Saturations in 90s. Call bell reinforced and within reach. Complains of back pain

## 2018-07-13 NOTE — OR NURSING
0943 Patient arrived to unit, arouses to voice.  Patient updated on plan of care, call light in reach.  1008 Patient more awake, answering questions appropriately.  1018 Report to Rogelio TURNER.  1025 Patient transferred to S 199-1.  1040 Handoff report to Rogelio TURNER.

## 2018-07-13 NOTE — PROGRESS NOTES
Infectious Disease Progress Note    Author: Chantal Tellez M.D. Date & Time of service: 2018  3:19 PM    Chief Complaint:  Fevers     Interval History:  2018 T-max 98.9 WBC 13.1 platelets 301 creatinine 0.63 feels better but still has headache and back pain  - Tmax 98.6 wbc 9.3 ongoing back pain. WBC 9  Labs Reviewed, Medications Reviewed and Radiology Reviewed.    Review of Systems:  Review of Systems   Constitutional: Negative for chills and fever.   Respiratory: Negative for sputum production.    Cardiovascular: Negative for chest pain and leg swelling.   Gastrointestinal: Negative for abdominal pain, nausea and vomiting.   Genitourinary: Negative for dysuria.   Musculoskeletal: Positive for back pain and myalgias.   Neurological: Positive for headaches. Negative for sensory change and speech change.       Hemodynamics:  Temp (24hrs), Av.6 °C (97.8 °F), Min:36.2 °C (97.1 °F), Max:37 °C (98.6 °F)  Temperature: 36.2 °C (97.1 °F)  Pulse  Av.5  Min: 54  Max: 114Heart Rate (Monitored): 74  Blood Pressure: 112/74, NIBP: 105/71       Physical Exam:  Physical Exam   Constitutional: He is oriented to person, place, and time. No distress.   Ambulating the halls   HENT:   Mouth/Throat: No oropharyngeal exudate.   Eyes: No scleral icterus.   Neck: Neck supple.   Cardiovascular: Regular rhythm.    Murmur heard.  Pulmonary/Chest: He has no wheezes. He has no rales.   Abdominal: Soft. There is no tenderness. There is no rebound.   Musculoskeletal: He exhibits no edema.   Neurological: He is alert and oriented to person, place, and time. He displays normal reflexes. No cranial nerve deficit. Coordination normal.   Skin: No rash noted. No erythema.   Vitals reviewed.      Meds:    Current Facility-Administered Medications:   •  nicotine  •  oxyCODONE immediate-release  •  acetaminophen/caffeine/butalbital 325-40-50 mg  •  levETIRAcetam  •  labetalol **OR** [DISCONTINUED] hydrALAZINE  •  ondansetron  •   ondansetron  •  cefTRIAXone (ROCEPHIN) IV  •  NS    Labs:  Recent Labs      07/11/18   0503  07/12/18   0342  07/13/18   0235   WBC  5.6  13.1*  9.3   RBC  4.21*  4.22*  4.16*   HEMOGLOBIN  11.1*  11.2*  11.0*   HEMATOCRIT  35.0*  34.4*  34.5*   MCV  83.1  81.5  82.9   MCH  26.4*  26.5*  26.4*   RDW  41.8  40.9  42.1   PLATELETCT  270  301  288   MPV  10.6  10.5  10.1   NEUTSPOLYS  86.30*  84.20*  63.70   LYMPHOCYTES  11.40*  10.70*  28.60   MONOCYTES  1.80  4.10  5.40   EOSINOPHILS  0.00  0.00  0.80   BASOPHILS  0.00  0.10  0.30     Recent Labs      07/11/18   0503  07/12/18   0342  07/13/18   0235   SODIUM  136  139  140   POTASSIUM  4.6  4.5  4.0   CHLORIDE  105  107  109   CO2  24  24  25   GLUCOSE  193*  131*  100*   BUN  8  12  19     Recent Labs      07/11/18   0503  07/12/18   0342  07/13/18   0235   ALBUMIN  3.6  3.8   --    TBILIRUBIN  0.3  0.2   --    ALKPHOSPHAT  69  61   --    TOTPROTEIN  7.1  6.6   --    ALTSGPT  11  15   --    ASTSGOT  10*  14   --    CREATININE  0.62  0.63  0.72       Imaging:  Ct-cta Head With & W/o-post Process    Result Date: 7/10/2018  7/9/2018 11:49 PM HISTORY/REASON FOR EXAM:  Neurological Deficit TECHNIQUE/EXAM DESCRIPTION: CT angiogram of the [place] without and with contrast.  Initial precontrast images were obtained of the head from the skull base through the vertex.  Postcontrast images were obtained of the [place] following the power injection of nonionic contrast at 5.0 mL/sec. Thin-section helical images were obtained with overlapping reconstruction interval. Coronal and sagittal multiplanar volume reformats were generated.  3D angiographic images were reviewed on PACS.  Maximum intensity projection (MIP) images were generated and reviewed. 100 mL of Omnipaque 350 nonionic contrast was injected intravenously. Low dose optimization technique was utilized for this CT exam including automated exposure control and adjustment of the mA and/or kV according  to patient size. COMPARISON:  None. FINDINGS: The posterior circulation shows the distal vertebral arteries to be patent. The vertebrobasilar confluence is intact. The basilar artery is patent. No aneurysm or occlusive lesion is evident. The anterior circulation shows no stenotic or occlusive lesion. No aneurysm is evident about the [place]. The brain is unremarkable.     1.  CT angiogram of the [place] within normal limits.    Ct-cta Neck With & W/o-post Processing    Result Date: 7/10/2018  7/9/2018 11:49 PM HISTORY/REASON FOR EXAM: Left-sided facial numbness. TECHNIQUE/EXAM DESCRIPTION: CT angiogram of the neck with contrast. Postcontrast images were obtained of the neck from the great vessels through the skull base following the power injection of nonionic contrast at 5.0 mL/sec. Thin-section helical images were obtained with overlapping reconstruction interval. Coronal and oblique multiplanar volume reformats were generated. Cervical internal carotid artery percent stenosis is calculated using the standard method according to the NASCET criteria wherein a segment of uniform caliber mid or distal cervical internal carotid is used as the reference denominator. 3D angiographic images were reviewed on PACS.  Maximum intensity projection (MIP) images were generated and reviewed 100 mL of Omnipaque 350 nonionic contrast was injected intravenously. Low dose optimization technique was utilized for this CT exam including automated exposure control and adjustment of the mA and/or kV according to patient size. COMPARISON:  None. FINDINGS: The arch origins of the great vessels appear intact. The aortic arch shows no abnormality. The right common carotid artery, cervical carotid bifurcation, and cervical internal carotid artery are within normal limits. There is no evidence of significant stenosis, thrombus, or other filling defect or ulceration. There is no evidence of dissection or aneurysm. The left  common carotid artery, cervical carotid bifurcation, and cervical internal carotid artery are within normal limits. There is no evidence of significant stenosis, thrombus, or other filling defect or ulceration. There is no evidence of dissection  or aneurysm. The cervical vertebral arteries are normal bilaterally. The neck soft tissues and lung apices in the field of view are unremarkable.     CT angiogram of the neck within normal limits.    Ct-maxillofacial With & W/o Plus Recons    Result Date: 7/10/2018  7/10/2018 4:00 PM HISTORY/REASON FOR EXAM:  Streptococcal bacteremia. Evaluate for sinusitis or abscess. TECHNIQUE/EXAM DESCRIPTION AND NUMBER OF VIEWS:   CT scan maxillofacial with and without contrast, with reconstructions. Thin-section helical imaging was obtained of the maxillofacial structures from the orbital roofs through the mandible before and after injection of nonionic contrast. Coronal and sagittal multiplanar volume reformat images were generated from the axial data.. 100 mL of Omnipaque 350 nonionic contrast was injected intravenously. Low dose optimization technique was utilized for this CT exam including automated exposure control and adjustment of the mA and/or kV according to patient size. COMPARISON:  None. FINDINGS: There is rounded mucosal thickening or polyp formation in the inferior medial aspect of the right maxillary sinus. The remainder of the visualized paranasal sinuses are clear. No air-fluid level is present to suggest acute sinusitis. No bony expansion is identified. No lytic or blastic bony change is identified. The soft tissues of the neck are within normal limits. No soft tissue abscess or inflammatory change is identified. Cervical lymph nodes are normal in size. No submandibular or parotid gland abnormality is noted. No posterior pharyngeal or laryngeal mucosal abnormality or mass is identified.     1.  Rounded mucosal thickening or polyp formation in the inferior aspect of  the right maxillary sinus which could reflect chronic right maxillary sinusitis. 2.  No evidence of acute sinusitis. 3.  Otherwise clear paranasal sinuses.    Ct-post-fossa-ear With & W/o    Result Date: 7/10/2018  7/10/2018 4:00 PM HISTORY/REASON FOR EXAM:  Streptococcal bacteremia, facial numbness rule out facial nerve compression / mastoiditis. TECHNIQUE/EXAM DESCRIPTION AND NUMBER OF VIEWS: CT scan of the temporal bones without and with contrast. The study was performed on a Photo Rankr CT scanner. Contiguous thin section 1.0 mm or 1.25 mm axial and direct coronal images were obtained of the temporal bones. Images are reviewed at magnified bone and soft tissue windows and non-magnified axial images are also displayed at soft tissue windows. Scans are obtained pre and post contrast. 100 mL of Omnipaque 300 nonionic contrast was injected intravenously. Low dose optimization technique was utilized for this CT exam including automated exposure control and adjustment of the mA and/or kV according to patient size. COMPARISON: CTA of the neck without and with contrast 7/9/2018 FINDINGS: On the right, the external auditory canal is normal except for some debris most consistent with cerumen.  The mastoid is normally pneumatized and aerated.  The middle ear cavity and mastoid antrum are clear.  The ossicles are normal in position  and configuration.  The scutum is sharp.  The bony labyrinth is normal, and the internal auditory canal shows normal caliber and is symmetric with the left side.  The facial nerve canal is unremarkable. On the left, the external auditory canal is normal except for some debris consistent with cerumen.  The mastoid is normally pneumatized and aerated.  The middle ear cavity and mastoid antrum are clear.  The ossicles are normal in position and configuration.  The scutum is sharp.  The bony labyrinth is normal, and the internal auditory canal shows normal caliber and is symmetric with the right side.  The  facial nerve canal is unremarkable. The right transverse -- sigmoid sinus and right internal jugular vein are dominant. The paranasal sinuses show clustered retention cyst or polypoid mucosal thickening in the alveolar recess of the right maxillary sinus. There are no air-fluid levels. The posterior fossa structures are unremarkable.  The fourth ventricle is in the midline. There is no abnormal parenchymal or extra-axial/meningeal enhancement in the posterior fossa or supratentorial compartment within the field of view.     CT OF THE TEMPORAL BONES WITHOUT CONTRAST WITHIN NORMAL LIMITS.    Dx-chest-2 Views    Result Date: 7/10/2018    7/9/2018 10:30 PM HISTORY/REASON FOR EXAM: Shortness of Breath TECHNIQUE/EXAM DESCRIPTION:  PA and lateral views of the chest. COMPARISON: None FINDINGS: The cardiac silhouette appears within normal limits. The mediastinal contour appears within normal limits.  The central pulmonary vasculature appears normal. The lungs appear well expanded bilaterally.  Bilateral lungs are clear. No significant pleural effusions are identified. The bony structures appear age-appropriate.     1.  No acute cardiopulmonary disease.    Mr-brain-with & W/o    Result Date: 7/11/2018  7/10/2018 10:21 PM HISTORY/REASON FOR EXAM:  Possible meningitis. TECHNIQUE/EXAM DESCRIPTION:   MRI of the brain without and with contrast. T1 sagittal, T2 fast spin-echo axial, T1 coronal, FLAIR coronal, diffusion-weighted and apparent diffusion coefficient (ADC map) axial images were obtained of the whole brain. T1 postcontrast axial and T1 postcontrast coronal images were obtained. The study was performed on a [employer] 1.5 Kerri MRI scanner. 20 mL Gadavist contrast was administered intravenously. COMPARISON:  None. FINDINGS:  There is tiny area of restricted diffusion in the right frontal gray matter. Minimal contrast enhancement is seen. The coronal FLAIR images demonstrates abnormal T2 hyperintensity in the adjacent  right frontal subarachnoid spaces. The axial gradient echo images demonstrates linear hypointensity within the sulci. There is low-lying cerebellar tonsils. There is no hydrocephalus.  There is no large lesion identified in the expected course of the intracranial portions of the cranial nerves. The visualized flow voids of cerebral vasculature appear normal within limits.  The skull bones appear normal. The paranasal sinuses are clear. The extracranial soft tissue including orbits appear grossly normal.     1.  There is tiny area of restricted diffusion in the right frontal gray matter. Minimal contrast enhancement is seen. The coronal FLAIR images demonstrates abnormal T2 hyperintensity in the adjacent right frontal subarachnoid spaces. The axial gradient echo images demonstrates linear hypointensity within the sulci. These findings likely represents a small cortical venous thrombosis with adjacent tiny infarct. Cortical venous thrombosis may cause focal subarachnoid hemorrhage. Please note that cortical venous thrombosis can be complication of meningitis. 2.  Low-lying cerebellar tonsils. This is concerning for Chiari I malformation. In view of history of lumbar puncture, this finding can be caused by the post lumbar puncture intracranial hypotension.    Mr-cervical Spine-with & W/o    Result Date: 7/11/2018  7/10/2018 10:21 PM HISTORY/REASON FOR EXAM:  Possible bacterial meningitis. TECHNIQUE/EXAM DESCRIPTION: MRI of the cervical spine without and with contrast. The study was performed on a [employer] Signa 1.5 Kerri MRI scanner. T1 sagittal, T2 fast spin-echo sagittal, and gradient echo axial images were obtained of the cervical spine. T1 post-contrast fat suppressed sagittal images were obtained of the cervical spine. Optional T1 post-contrast axial images may be obtained. 20 mL Omniscan contrast was administered intravenously. COMPARISON: None. FINDINGS: There is prominent posterior epidural fluid collection  extending from C6-7 into the thoracic spine. Mild posterior epidural contrast enhancement is seen. Please see thoracic spine report for further details. The cervical spine maintains normal height and alignment. There is no fracture or dislocation. There is no pathologic marrow infiltration. There is no abnormal perivertebral fluid collection. There is no intradural extramedullary fluid collection. There is no abnormal intramedullary T2 signal intensity in the cervical spinal cord. At the level of C2-3, there is no spinal or neural foraminal stenosis. At the level of C3-4, there is no spinal or neural foraminal stenosis. At the level of C4-5, there is no spinal or neural foraminal stenosis. At the level of C5-6, there is no spinal or neural foraminal stenosis. At the level of C6-7, there is no spinal or neural foraminal stenosis. At the level of C7-T1, there is no spinal or neural foraminal stenosis. The visualized posterior fossa structures appear normal within limits.  The cervical spinal cord does not demonstrate any mass or abnormal T2 signal intensity. The visualized pre-and paraspinal soft tissues appear normal within limits.     1.  There is prominent posterior epidural fluid collection extending from C6-7 into the thoracic spine. Mild posterior epidural contrast enhancement is noted at this level. Please see thoracic spine report for further details. 2.  There is no evidence of osteomyelitis in the cervical spine.    Mr-lumbar Spine-with & W/o    Result Date: 7/11/2018  7/10/2018 10:21 PM HISTORY/REASON FOR EXAM:  Possible bacterial meningitis. TECHNIQUE/EXAM DESCRIPTION: MRI of the lumbar spine without and with contrast. The study was performed on a [employer] 1.5 Kerri MRI scanner. T1 sagittal, T2 fast spin-echo sagittal, and T2 axial images were obtained of the lumbar spine. T1 post-contrast fat-suppressed sagittal images were obtained. 20 mL Omniscan contrast was administered intravenously. COMPARISON:   None. FINDINGS: There is abnormal posterior epidural fluid collection seen extending from the lower cervical spine to the level of L3-4. Multifocal abnormal epidural contrast enhancement is seen. The lumbar spine maintains normal height and alignment. There is no evidence of fracture or dislocation. There is no pathologic marrow infiltration. There is no abnormal disc fluid. At the level of L5-S1, there is diffuse disc bulge. Mild facet joint arthropathy is seen. There is no spinal or neural foraminal stenosis. At the level of L4-5, there is no spinal or neural foraminal stenosis. At the level of L3-4, there is no spinal or neural foraminal stenosis. At the level of L2-3, there is no spinal or neural foraminal stenosis. At the level of L1-2, there is no spinal or neural foraminal stenosis. The conus terminates at the level of L1.     1.  There is abnormal posterior epidural fluid collection seen extending from the lower cervical spine to the level of L3-4. Multifocal abnormal epidural contrast enhancement is seen. These findings are concerning for thoracic and lumbar epidural abscess. 2.  There is no evidence of lumbar osteomyelitis. 3.  Findings were discussed with DAVON FERNANDO on 7/11/2018 1:08 PM.    Mr-thoracic Spine-with & W/o    Result Date: 7/11/2018  7/10/2018 10:21 PM HISTORY/REASON FOR EXAM:  Possible bacterial meningitis TECHNIQUE/EXAM DESCRIPTION: MRI of the thoracic spine without and with contrast. The study was performed on a [employer] Signa 1.5 Kerri MRI scanner. T1 sagittal, T2 fast spin-echo sagittal, and T2 axial images were obtained of the thoracic spine. T1 post-contrast fat suppressed sagittal images were obtained. Optional T1 post-contrast axial images may be obtained. 20 mL Gadavist contrast was administered intravenously. COMPARISON:  None. FINDINGS: There is abnormal posterior epidural fluid collection extending from C6-7 to the lumbar spine. Abnormal peripheral contrast enhancement noted  surrounding the epidural fluid collection. The thoracic spinal cord is anteriorly displaced from the levels of T3-4 to  T9-10. Moderate central canal stenosis is seen. There is a hemangioma in the body of T11. There is no abnormal intramedullary T2 signal intensity in the thoracic spinal cord.     There is abnormal posterior epidural fluid collection extending from C6-7 to the lumbar spine. Abnormal peripheral contrast enhancement noted surrounding the epidural fluid collection. The thoracic spinal cord is anteriorly displaced from the levels of T3-4 to  T9-10. Moderate central canal stenosis is seen. These findings are highly concerning for posterior epidural abscess with moderate canal compromise.    Echocardiogram Comp W/o Cont    Result Date: 2018  Transthoracic Echo Report Echocardiography Laboratory CONCLUSIONS No prior study is available for comparison. Normal left ventricular systolic function. Left ventricular ejection fraction is visually estimated to be 65%. Normal diastolic function. Normal inferior vena cava size and inspiratory collapse. Mildly dilated left atrium. Moderate mitral regurgitation. JAYDON POLLARD Exam Date:         2018                    09:47 Exam Location:     Inpatient Priority:          Routine Ordering Physician:        DAVON FERNANDO Referring Physician:       [id number] Pagan Sonographer:               Sunshine Huang Age:    27     Gender:    M MRN:    [id number] :    1990 BSA:    2.21   Ht (in):    70     Wt (lb):    229 Exam Type:     Complete Indications:     Murmur ICD Codes:       R01.1 CPT Codes:       69257 BP:   120    /   73     HR: Technical Quality:       Fair MEASUREMENTS  (Male / Female) Normal Values 2D ECHO LV Diastolic Diameter PLAX        5.1 cm                4.2 - 5.9 / 3.9 - 5.3 cm LV Systolic Diameter PLAX         3.9 cm                2.1 - 4.0 cm IVS Diastolic Thickness           0.87 cm               LVPW Diastolic Thickness          1.3 cm                 LVOT Diameter                     2 cm                  RA Diameter                       3.6 cm                Estimated LV Ejection Fraction    65 %                  LV Ejection Fraction MOD BP       62.9 %                >= 55  % LV Ejection Fraction MOD 4C       61.7 %                LV Ejection Fraction MOD 2C       64.1 %                LA Volume Index                   32.2 cm³/m²           16 - 28 cm³/m² DOPPLER AV Peak Velocity                  1.4 m/s               AV Peak Gradient                  8 mmHg                AV Mean Gradient                  4.7 mmHg              LVOT Peak Velocity                0.99 m/s              AV Area Cont Eq vti               2.3 cm²               Mitral E Point Velocity           0.93 m/s              Mitral E to A Ratio               1.8                   Mitral A Duration                 114 ms                MV Pressure Half Time             67.4 ms               MV Area PHT                       3.3 cm²               MV Deceleration Time              232 ms                MR Flow Convergence Radius        0.94 cm               MR ERO PISA                       0.33 cm²              MR Regurgitant Volume PISA        48.7 cm³              PV Peak Velocity                  0.97 m/s              PV Peak Gradient                  3.8 mmHg              RVOT Peak Velocity                0.94 m/s              * Indicates values subject to auto-interpretation LV EF:  65    % FINDINGS Left Ventricle Normal left ventricular chamber size. Normal left ventricular wall thickness. Normal left ventricular systolic function. Left ventricular ejection fraction is visually estimated to be 65%. Normal regional wall motion. Normal diastolic function. Right Ventricle The right ventricle was normal in size and function. Right Atrium The right atrium is normal in size.  Normal inferior vena cava size and inspiratory collapse. Left Atrium Mildly dilated left atrium. Mitral  "Valve Thickened mitral valve leaflets. No mitral stenosis. Moderate mitral regurgitation. ERO by PISA method is  0.33 sq cm. Aortic Valve Structurally normal aortic valve without significant stenosis or regurgitation. Tricuspid Valve Unable to estimate pulmonary artery pressure due to an inadequate tricuspid regurgitant jet. No tricuspid stenosis. Trace tricuspid regurgitation. Unable to estimate pulmonary artery pressure due to an inadequate tricuspid regurgitant jet. Pulmonic Valve The pulmonic valve is not well visualized. No stenosis or regurgitation seen. Pericardium Normal pericardium without effusion. Aorta The aortic root is normal. Hui Modi MD (Electronically Signed) Final Date:     11 July 2018                 13:51      Micro:  Results     Procedure Component Value Units Date/Time    CSF CULTURE [[id number]] Collected:  07/10/18 1440    Order Status:  Completed Specimen:  CSF Updated:  07/13/18 0837     Gram Stain Result No organisms seen.     Significant Indicator NEG     Source CSF     Site TAP     CSF Culture No growth at 72 hours.    BLOOD CULTURE [[id number]]  (Abnormal) Collected:  07/10/18 1419    Order Status:  Completed Specimen:  Blood from Peripheral Updated:  07/13/18 0810     Significant Indicator POS (POS)     Source BLD     Site PERIPHERAL     Blood Culture Growth detected by Bactec instrument. 07/11/2018  02:12 (A)      Viridans Streptococcus  See previous culture for sensitivity report.   (A)    Narrative:       CALL  Feng  TERRY tel. [phone],  CALLED  TERRY tel. [phone] 07/11/2018, 02:12, RB PERF. RESULTS CALLED TO: RN  [id number]  Per Hospital Policy: Only change Specimen Src: to \"Line\" if  specified by physician order.    BLOOD CULTURE [[id number]]  (Abnormal) Collected:  07/10/18 1419    Order Status:  Completed Specimen:  Blood from Peripheral Updated:  07/13/18 0809     Significant Indicator POS (POS)     Source BLD     Site PERIPHERAL     Blood Culture Growth detected by Bactec " "instrument. 07/11/2018  01:49 (A)      Viridans Streptococcus  See previous culture for sensitivity report.   (A)    Narrative:       CALL  Feng  TERRY tel. [phone],  CALLED  TERRY tel. [phone] 07/11/2018, 01:50, RB PERF. RESULTS CALLED TO: RN  [phone]  Per Hospital Policy: Only change Specimen Src: to \"Line\" if  specified by physician order.    BLOOD CULTURE [[id number]]  (Abnormal) Collected:  07/09/18 2333    Order Status:  Completed Specimen:  Blood from Peripheral Updated:  07/13/18 0807     Significant Indicator POS (POS)     Source BLD     Site PERIPHERAL     Blood Culture Growth detected by Bactec instrument. 07/10/2018  12:18 (A)      Viridans Streptococcus  See previous culture for sensitivity report.   (A)    Narrative:       CALL  Feng  TERRY tel. [phone],  CALLED  TERRY tel. [phone] 07/10/2018, 12:22, RB PERF. RESULTS CALLED  TO:[phone] RN  Per Hospital Policy: Only change Specimen Src: to \"Line\" if  specified by physician order.    SENSITIVITY, E-TEST [[id number]] Collected:  07/09/18 2333    Order Status:  Completed Specimen:  Blood Updated:  07/13/18 0807     ETEST Sensitivity FINAL    Narrative:       TERRY tel. [phone] 07/10/2018, 12:22, RB PERF. RESULTS CALLED TO:[id number] RN  Per Hospital Policy: Only change Specimen Src: to \"Line\" if  specified by physician order.    BLOOD CULTURE [[id number]]  (Abnormal)  (Susceptibility) Collected:  07/09/18 2333    Order Status:  Completed Specimen:  Blood from Peripheral Updated:  07/13/18 0807     Significant Indicator POS (POS)     Source BLD     Site PERIPHERAL     Blood Culture Growth detected by Bactec instrument. 07/10/2018  12:17 (A)      Viridans Streptococcus (A)    Narrative:       CALL  Feng  TERRY tel. [phone],  CALLED  TERRY tel. [phone] 07/10/2018, 12:22, RB PERF. RESULTS CALLED  TO:[phone] RN  Per Hospital Policy: Only change Specimen Src: to \"Line\" if  specified by physician order.    Culture & Susceptibility     VIRIDANS STREPTOCOCCUS     " "Antibiotic Sensitivity Microscan Unit Status    Cefotaxime Sensitive <=1 mcg/mL Final    Method: SENSITIVITY, E-TEST    Penicillin Sensitive <=0.12 mcg/mL Final    Method: SENSITIVITY, E-TEST                       BLOOD CULTURE [[id number]] Collected:  07/11/18 1219    Order Status:  Completed Specimen:  Blood from Peripheral Updated:  07/12/18 0751     Significant Indicator NEG     Source BLD     Site PERIPHERAL     Blood Culture No Growth    Note: Blood cultures are incubated for 5 days and  are monitored continuously.Positive blood cultures  are called to the RN and reported as soon as  they are identified.      Narrative:       Per Hospital Policy: Only change Specimen Src: to \"Line\" if  specified by physician order.    BLOOD CULTURE [[id number]] Collected:  07/11/18 1219    Order Status:  Completed Specimen:  Blood from Peripheral Updated:  07/12/18 0751     Significant Indicator NEG     Source BLD     Site PERIPHERAL     Blood Culture No Growth    Note: Blood cultures are incubated for 5 days and  are monitored continuously.Positive blood cultures  are called to the RN and reported as soon as  they are identified.      Narrative:       Per Hospital Policy: Only change Specimen Src: to \"Line\" if  specified by physician order.    GRAM STAIN [[id number]] Collected:  07/10/18 1440    Order Status:  Completed Specimen:  CSF Updated:  07/10/18 1620     Significant Indicator .     Source CSF     Site TAP     Gram Stain Result No organisms seen.    CSF CULTURE [[id number]]     Order Status:  Canceled Specimen:  CSF from Tap     URINALYSIS CULTURE, IF INDICATED [[id number]]     Order Status:  Canceled Specimen:  Urine           Assessment:  Active Hospital Problems    Diagnosis   •    • Streptococcal bacteremia [R78.81, B95.5]   • Hypokalemia [E87.6]   • Normocytic anemia [D64.9]   • Murmur [R01.1]       Plan:  Streptococcus viridans bacteremia  Discontinue the Rocephin and vancomycin  Start high-dose penicillin  Blood " cultures are positive on 7/9/2018 and 7/10/2018  The are negative from 7/11/2018  Sed rate is 49 and CRP is 2.99    Multifocal epidural abscess-unstable  Epidural abscesses extending from his cervical spine to L3-4  Evaluated by neurosurgery  Currently recommended only medical treatment    Mitral valve endocarditis  TTE on 7/11/2018 showed mitral regurgitation  CHAR is showing small pedunculated mobile masses to tips of the valve causing severe regurgitation  MRI of the brain had 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  Consider MRI MRA to rule out mycotic aneurysm    Discussed with internal medicine.

## 2018-07-13 NOTE — ASSESSMENT & PLAN NOTE
Related to infective endocarditis. Severe. No surgical interventions planned at this time  - f/u with CTS in 6 weeks to discuss MV repair vs replacement  - Interval echo / CHAR in 6 weeks of hospital discharge

## 2018-07-13 NOTE — PROGRESS NOTES
"Pharmacy Kinetics 27 y.o. male on vancomycin day # 4  2018    Currently on Vancomycin 1800 mg iv q8hr    Indication for Treatment: epidural abscess, possible endocarditis     Pertinent history per medical record: Admitted on 2018 for left sided numbness. Patient had sudden onset left facial numbness and difficulty speaking at about 2000 on . He said he felt like he \"blacked out\" then could not feel his face or speak. Symptoms quickly resolved. Patient brought to ED for TIA workup. Next day both blood cx came back positive for possible strep. LP done and cx sent. ID consulted. NSGY consulted.     Other antibiotics: Ceftriaxone 2 g IV 12h     Allergies: Nkda [no known drug allergy]      List concerns for renal function: BMI     Pertinent cultures to date:   18: Blood, peripheral x2 = NGTD  7/10/18: Blood, peripheral x2 = Viridans strep  7/10/18: Fluid, CSF = NGTD  18: Blood, peripheral x2 = Viridans strep    Recent Labs      18   0503  18   0342  18   0235   WBC  5.6  13.1*  9.3   NEUTSPOLYS  86.30*  84.20*  63.70     Recent Labs      18   0503  18   0342  18   0235   BUN  8  12  19   CREATININE  0.62  0.63  0.72   ALBUMIN  3.6  3.8   --      Recent Labs      18   1219  18   1750  18   0235   VANCOTROUGH  27.0*  22.5*  27.5*   No intake or output data in the 24 hours ending 18 0923   Blood pressure 112/74, pulse 73, temperature 36.6 °C (97.8 °F), resp. rate 16, height 1.778 m (5' 10\"), weight 103.9 kg (229 lb 0.9 oz), SpO2 96 %. Temp (24hrs), Av.7 °C (98 °F), Min:36.2 °C (97.1 °F), Max:37 °C (98.6 °F)      A/P   1. Vancomycin dose change: decrease to 1300 mg q8hrs  2. Next vancomycin level:  @1530  3. Goal trough: 18-22 mcg/mL  4. Comments: Blood cultures from  and 7/10 growing Viridans strep, cultures from  are negative so far. Slight increase in creatinine today, vanco trough resulted above goal range. Allow time for " level to decrease, then restart q8hr dosing with reduced dose. Repeat trough scheduled prior to 4th dose of new regimen. Pharmacy will follow.    Mayur LoD

## 2018-07-13 NOTE — CARE PLAN
Problem: Nutritional:  Goal: Achieve adequate nutritional intake  Patient will consume >50% of meals   Outcome: MET Date Met: 07/13/18  PO >50% for the last three meals.  Pt is currently NPO for CHAR today.

## 2018-07-13 NOTE — ASSESSMENT & PLAN NOTE
Extensive from cervical to lumbar spine without compromise. No new neurological deficits and prior deficits resolved.  Worsening imaging findings now on 07/15/2018  Seen by Dr Torres from neurosurgery, no surgical interventions at this time   - management as above  - Outpatient follow up with Dr Torres   - interval MRI in 3 weeks from prior C spine MRI of 7/15, per neurosurgery  - Continue close clinical monitoring of neurological status, has stabilized.

## 2018-07-13 NOTE — PROGRESS NOTES
[facility] Hospitalist Progress Note    Date of Service: 7/12/2018    Chief Complaint  27 y.o. male admitted 7/9/2018 with numbness, left-sided, fevers on/off for 2 months with associated neck pain, photophobia, headaches in the outpatient setting. Findings consistent with bacteremia and meningitis.    Interval Problem Update  -Patient with no complaints of fevers/chills and only complaints overnight was back pain and headache and requesting increase in pain medication due to continued back and neck pain, continue Ultram and add oxycodone  -Echo done and WNL and cardiology consulted for CHAR in a.m.  -Patient n.p.o. after midnight for CHAR  -MRV of the head with no acute findings  -Will continue with IV antibiotics per ID     Consultants/Specialty  Neurology   Neurosurgery  ID  Cardiology     Disposition  Anticipate home once acute medical issues stable      Review of Systems   Constitutional: Negative for chills, fever and malaise/fatigue.   HENT: Negative for congestion, ear discharge, ear pain, sinus pain, sore throat and tinnitus.    Eyes: Positive for photophobia. Negative for blurred vision and double vision.   Respiratory: Negative for cough, hemoptysis, sputum production, shortness of breath, wheezing and stridor.    Cardiovascular: Negative for chest pain, palpitations, orthopnea, claudication and leg swelling.   Gastrointestinal: Negative for abdominal pain, constipation, diarrhea, heartburn, nausea and vomiting.   Genitourinary: Negative for dysuria, flank pain, frequency, hematuria and urgency.   Musculoskeletal: Positive for back pain, joint pain, myalgias and neck pain. Negative for falls.   Skin: Negative for itching and rash.   Neurological: Positive for headaches. Negative for dizziness, tingling, tremors, sensory change, speech change, focal weakness, seizures, loss of consciousness and weakness.   Psychiatric/Behavioral: Negative for depression, hallucinations, memory loss and substance abuse. The  patient is not nervous/anxious and does not have insomnia.       Physical Exam  Laboratory/Imaging   Hemodynamics  Temp (24hrs), Av.8 °C (98.2 °F), Min:36.2 °C (97.1 °F), Max:37.2 °C (98.9 °F)   Temperature: 36.8 °C (98.3 °F)  Pulse  Av.1  Min: 54  Max: 114    Blood Pressure: 111/74      Respiratory      Respiration: 18, Pulse Oximetry: 98 %             Fluids  No intake or output data in the 24 hours ending 18    Nutrition  Orders Placed This Encounter   Procedures   • DIET NPO     Standing Status:   Standing     Number of Occurrences:   1     Order Specific Question:   Restrict to:     Answer:   Sips with Medications [3]     Physical Exam   Constitutional: He is oriented to person, place, and time. He appears well-developed and well-nourished. No distress.   HENT:   Head: Normocephalic.   Mouth/Throat: Oropharynx is clear and moist. No oropharyngeal exudate.   Eyes: EOM are normal. Pupils are equal, round, and reactive to light. No scleral icterus.   Neck: Normal range of motion. No JVD present.   Cardiovascular: Normal rate and regular rhythm.  Exam reveals no gallop and no friction rub.    Murmur heard.  Pulmonary/Chest: Effort normal and breath sounds normal. No stridor. No respiratory distress. He has no wheezes. He has no rales. He exhibits no tenderness.   Abdominal: Soft. Bowel sounds are normal. He exhibits no distension. There is no tenderness. There is no rebound.   Musculoskeletal: Normal range of motion. He exhibits no edema or tenderness.   Neurological: He is alert and oriented to person, place, and time. No cranial nerve deficit. Coordination normal.   No neck stiffness, negative Kernig's and Brudzinski sign.  No temporal/mastoid tenderness.   Skin: Skin is warm, dry and intact. He is not diaphoretic.   Psychiatric: He has a normal mood and affect. His behavior is normal. Judgment and thought content normal.       Recent Labs      18   0503  18   3762    WBC  7.6  5.6  13.1*   RBC  4.23*  4.21*  4.22*   HEMOGLOBIN  11.2*  11.1*  11.2*   HEMATOCRIT  34.5*  35.0*  34.4*   MCV  81.6  83.1  81.5   MCH  26.5*  26.4*  26.5*   MCHC  32.5*  31.7*  32.6*   RDW  41.4  41.8  40.9   PLATELETCT  283  270  301   MPV  10.7  10.6  10.5     Recent Labs      07/09/18   2333  07/11/18   0503  07/12/18   0342   SODIUM  140  136  139   POTASSIUM  3.3*  4.6  4.5   CHLORIDE  108  105  107   CO2  24  24  24   GLUCOSE  108*  193*  131*   BUN  17  8  12   CREATININE  0.78  0.62  0.63   CALCIUM  8.6  9.4  9.1     Recent Labs      07/11/18   0503   APTT  33.7   INR  1.16*                  Assessment/Plan     Streptococcal meningitis- (present on admission)   Assessment & Plan    * Fevers on/off for 2 months with associated neck pain, photophobia, headaches in the outpatient setting     LP obtained once blood cultures confirmed positive for strep  CSF with increased WBC recently on antibiotics in the outpatient setting, could be recurrent meningitis  Pain control with Ultram/Oxycodone  Plan as stated above           Streptococcal bacteremia- (present on admission)   Assessment & Plan    Results concerning for step meningitis given the patients clinical s/s and elevated WBC in CSF    Patient does have underlying murmur, which is new and being w/u for infective endocarditis   ID was consulted and recommendations to continue on Vanco and Rocephin, steroids discontinued   Echocardiogram performed with EF 65%, Mildly dilated left atrium with Moderate mitral regurgitation   Cardiology consulted and CHAR in a.m. (NPO after MN)  Cont IVF  CT maxillofacial/posterior ear fossa negative for ENT infection/mastoiditis    Neurology consulted 2/2 MRI and imaging showing Epidural abscess, per neurology given the picture of CSF, less likely acute bacterial meningitis with recommendations:  -Continue antibiotics  -Continue Keppra 750 mg every 12 hours PO for epilepsy prophylaxis  -Steroids discontinued   -Lyme  disease negative  -VDRL negative  -Autoimmune/ BRIAN pending  -HIV negative  -MRV of head without any acute findings  -Will need to be f/u in 4 weeks, MRV or MRI brain     Neurosurgery consulted and concerns for spinal epidural abscess are highly likely 2/2 MRI, clinical, and lab findings.  Recommendations to continue with IV antibiotics per ID and repeat MRI of the neuraxis in 3-4 weeks to r/o epidural tumor       -Will follow BC  -CBC in am                       Murmur- (present on admission)   Assessment & Plan    With bacteremia and concerns for infective endocarditis    -Echocardiogram performed with EF 65%, Mildly dilated left atrium with Moderate mitral regurgitation  -Echo negative  -Cards consulted (Dr. Reilly) and CHAR in am            Normocytic anemia- (present on admission)   Assessment & Plan    Suspect consumptive from bacteremia  Monitor Hb / Restrictive transfusion strategy        Hypokalemia- (present on admission)   Assessment & Plan    Potassium WNL    - Will cont to monitor daily BMP          Quality-Core Measures   Reviewed items::  Labs reviewed, Medications reviewed and Radiology images reviewed  Cruz catheter::  No Cruz  DVT: Post LP hold x 24 hours.  DVT prophylaxis - mechanical:  SCDs  Ulcer Prophylaxis::  Not indicated  Antibiotics:  Treating active infection/contamination beyond 24 hours perioperative coverage  Assessed for rehabilitation services:  Patient returned to prior level of function, rehabilitation not indicated at this time        CRUZ Sutton.

## 2018-07-13 NOTE — ASSESSMENT & PLAN NOTE
On MRI brain negative MRV. Suspect related to embolic phenomenon from infective endocarditis. Worsening on interval MRI now  - Neurology team did not recommend anticoagulation, no antiplatelet therapy  - continue Keppra 1500 mg BID per neurology, can be weaned once infectious process has been controlled

## 2018-07-14 ENCOUNTER — APPOINTMENT (OUTPATIENT)
Dept: RADIOLOGY | Facility: MEDICAL CENTER | Age: 28
DRG: 871 | End: 2018-07-14
Attending: INTERNAL MEDICINE
Payer: MEDICAID

## 2018-07-14 LAB
ANION GAP SERPL CALC-SCNC: 8 MMOL/L (ref 0–11.9)
B BURGDOR AB CSF IA-ACNC: 0.4 LIV
B BURGDOR DNA SPEC QL NAA+PROBE: NOT DETECTED
BUN SERPL-MCNC: 14 MG/DL (ref 8–22)
CALCIUM SERPL-MCNC: 9.1 MG/DL (ref 8.5–10.5)
CHLORIDE SERPL-SCNC: 104 MMOL/L (ref 96–112)
CO2 SERPL-SCNC: 26 MMOL/L (ref 20–33)
CREAT SERPL-MCNC: 0.76 MG/DL (ref 0.5–1.4)
ERYTHROCYTE [DISTWIDTH] IN BLOOD BY AUTOMATED COUNT: 41.3 FL (ref 35.9–50)
GLUCOSE SERPL-MCNC: 92 MG/DL (ref 65–99)
HCT VFR BLD AUTO: 37.3 % (ref 42–52)
HGB BLD-MCNC: 12.4 G/DL (ref 14–18)
LYME SOURCE Q4169: NORMAL
MCH RBC QN AUTO: 27.1 PG (ref 27–33)
MCHC RBC AUTO-ENTMCNC: 33.2 G/DL (ref 33.7–35.3)
MCV RBC AUTO: 81.4 FL (ref 81.4–97.8)
PLATELET # BLD AUTO: 292 K/UL (ref 164–446)
PMV BLD AUTO: 9.9 FL (ref 9–12.9)
POTASSIUM SERPL-SCNC: 4 MMOL/L (ref 3.6–5.5)
RBC # BLD AUTO: 4.58 M/UL (ref 4.7–6.1)
SODIUM SERPL-SCNC: 138 MMOL/L (ref 135–145)
VDRL CSF QL: NON REACTIVE
WBC # BLD AUTO: 7.5 K/UL (ref 4.8–10.8)

## 2018-07-14 PROCEDURE — 02HV33Z INSERTION OF INFUSION DEVICE INTO SUPERIOR VENA CAVA, PERCUTANEOUS APPROACH: ICD-10-PCS | Performed by: HOSPITALIST

## 2018-07-14 PROCEDURE — 36415 COLL VENOUS BLD VENIPUNCTURE: CPT

## 2018-07-14 PROCEDURE — 700111 HCHG RX REV CODE 636 W/ 250 OVERRIDE (IP): Performed by: INTERNAL MEDICINE

## 2018-07-14 PROCEDURE — A9270 NON-COVERED ITEM OR SERVICE: HCPCS | Performed by: INTERNAL MEDICINE

## 2018-07-14 PROCEDURE — B548ZZA ULTRASONOGRAPHY OF SUPERIOR VENA CAVA, GUIDANCE: ICD-10-PCS | Performed by: HOSPITALIST

## 2018-07-14 PROCEDURE — 700105 HCHG RX REV CODE 258: Performed by: INTERNAL MEDICINE

## 2018-07-14 PROCEDURE — 99233 SBSQ HOSP IP/OBS HIGH 50: CPT | Performed by: INTERNAL MEDICINE

## 2018-07-14 PROCEDURE — 700102 HCHG RX REV CODE 250 W/ 637 OVERRIDE(OP): Performed by: NURSE PRACTITIONER

## 2018-07-14 PROCEDURE — 85027 COMPLETE CBC AUTOMATED: CPT

## 2018-07-14 PROCEDURE — 700102 HCHG RX REV CODE 250 W/ 637 OVERRIDE(OP): Performed by: INTERNAL MEDICINE

## 2018-07-14 PROCEDURE — 99232 SBSQ HOSP IP/OBS MODERATE 35: CPT | Performed by: INTERNAL MEDICINE

## 2018-07-14 PROCEDURE — 80048 BASIC METABOLIC PNL TOTAL CA: CPT

## 2018-07-14 PROCEDURE — A9270 NON-COVERED ITEM OR SERVICE: HCPCS | Performed by: NURSE PRACTITIONER

## 2018-07-14 PROCEDURE — 36569 INSJ PICC 5 YR+ W/O IMAGING: CPT

## 2018-07-14 PROCEDURE — 770006 HCHG ROOM/CARE - MED/SURG/GYN SEMI*

## 2018-07-14 RX ADMIN — SODIUM CHLORIDE 3 MILLION UNITS: 9 INJECTION, SOLUTION INTRAVENOUS at 19:38

## 2018-07-14 RX ADMIN — SODIUM CHLORIDE 3 MILLION UNITS: 9 INJECTION, SOLUTION INTRAVENOUS at 09:07

## 2018-07-14 RX ADMIN — SODIUM CHLORIDE 3 MILLION UNITS: 9 INJECTION, SOLUTION INTRAVENOUS at 22:33

## 2018-07-14 RX ADMIN — OXYCODONE HYDROCHLORIDE 10 MG: 5 TABLET ORAL at 02:45

## 2018-07-14 RX ADMIN — OXYCODONE HYDROCHLORIDE 5 MG: 5 TABLET ORAL at 20:58

## 2018-07-14 RX ADMIN — OXYCODONE HYDROCHLORIDE 5 MG: 5 TABLET ORAL at 09:50

## 2018-07-14 RX ADMIN — LEVETIRACETAM 750 MG: 500 TABLET, FILM COATED ORAL at 19:39

## 2018-07-14 RX ADMIN — SODIUM CHLORIDE 3 MILLION UNITS: 9 INJECTION, SOLUTION INTRAVENOUS at 15:01

## 2018-07-14 RX ADMIN — SODIUM CHLORIDE 3 MILLION UNITS: 9 INJECTION, SOLUTION INTRAVENOUS at 02:03

## 2018-07-14 RX ADMIN — OXYCODONE HYDROCHLORIDE 5 MG: 5 TABLET ORAL at 15:01

## 2018-07-14 RX ADMIN — BUTALBITAL, ACETAMINOPHEN, AND CAFFEINE 1 TABLET: 50; 325; 40 TABLET ORAL at 21:53

## 2018-07-14 RX ADMIN — NICOTINE 7 MG: 7 PATCH, EXTENDED RELEASE TRANSDERMAL at 06:32

## 2018-07-14 RX ADMIN — LEVETIRACETAM 750 MG: 500 TABLET, FILM COATED ORAL at 06:32

## 2018-07-14 RX ADMIN — SODIUM CHLORIDE 3 MILLION UNITS: 9 INJECTION, SOLUTION INTRAVENOUS at 05:40

## 2018-07-14 RX ADMIN — BUTALBITAL, ACETAMINOPHEN, AND CAFFEINE 1 TABLET: 50; 325; 40 TABLET ORAL at 09:11

## 2018-07-14 RX ADMIN — OXYCODONE HYDROCHLORIDE 5 MG: 5 TABLET ORAL at 09:11

## 2018-07-14 RX ADMIN — BUTALBITAL, ACETAMINOPHEN, AND CAFFEINE 1 TABLET: 50; 325; 40 TABLET ORAL at 15:01

## 2018-07-14 ASSESSMENT — ENCOUNTER SYMPTOMS
NAUSEA: 0
FOCAL WEAKNESS: 0
ABDOMINAL PAIN: 0
TINGLING: 0
SPEECH CHANGE: 0
BACK PAIN: 1
WEIGHT LOSS: 0
DIARRHEA: 0
NERVOUS/ANXIOUS: 0
LOSS OF CONSCIOUSNESS: 0
PHOTOPHOBIA: 0
NECK PAIN: 1
SHORTNESS OF BREATH: 0
SEIZURES: 0
HEARTBURN: 0
FLANK PAIN: 0
SENSORY CHANGE: 0
DIAPHORESIS: 0
DIZZINESS: 0
HEADACHES: 1
DEPRESSION: 0
SORE THROAT: 0
VOMITING: 0
HEADACHES: 0
SPUTUM PRODUCTION: 0
MEMORY LOSS: 0
TREMORS: 0
CONSTIPATION: 0
MYALGIAS: 1
PND: 0
HALLUCINATIONS: 0
ORTHOPNEA: 0
STRIDOR: 0
WHEEZING: 0
INSOMNIA: 0
PALPITATIONS: 0
WEAKNESS: 0
FALLS: 0
HEMOPTYSIS: 0
FEVER: 0
COUGH: 0
DOUBLE VISION: 0
BLURRED VISION: 0
CHILLS: 0
MYALGIAS: 0
SINUS PAIN: 0
CLAUDICATION: 0

## 2018-07-14 ASSESSMENT — PAIN SCALES - GENERAL
PAINLEVEL_OUTOF10: 6
PAINLEVEL_OUTOF10: 8
PAINLEVEL_OUTOF10: 3
PAINLEVEL_OUTOF10: 6
PAINLEVEL_OUTOF10: 0
PAINLEVEL_OUTOF10: 0

## 2018-07-14 ASSESSMENT — LIFESTYLE VARIABLES: SUBSTANCE_ABUSE: 0

## 2018-07-14 NOTE — PROGRESS NOTES
Vascular Access Team     Date of Insertion: 7/14/18  Arm Circumference: 42  Internal length: 37cm  External Length: 7cm  Vein Occupancy %: 42%  Reason for PICC: Antibiotic Therapy  Labs: WBC 7.5, , INR 1.16 on 7/11/18, BUN 14, Cr 0.76, GFR >60    Consents confirmed, vessel patency confirmed with ultrasound. Risks and benefits of procedure explained to patient and education regarding central line associated bloodstream infections provided. Questions answered.     PICC placed in RUE per MD order with ultrasound guidance, initial arm circumference 42cm. 4 Fr, single lumen PICC placed in brachial vein after 1 attempt(s). 1 cc's of 1% lidocaine injected intradermally, 21 gauge microintroducer needle and modified Seldinger technique used. 44 cm catheter inserted with good blood return. Secured at 7 cm marker. Each lumen flushed without resistance with 10 mL 0.9% normal saline. PICC line secured with Biopatch and Tegaderm.     PICC placement is confirmed by nurse using 3CG technology. PICC line is appropriate for use at this time. Pt tolerated procedure well.  Patient condition relayed to unit RN or ordering physician via this post procedure note in the EMR.      BARD Power PICC ref # [id number], Lot # [id number]

## 2018-07-14 NOTE — PROGRESS NOTES
Neurosurgery Progress Note    Subjective:  Patient with C6 to L3 epidural abscess  Mild intermittent headache  Tolerable and improving spine pain   Eating, voiding, has BM  No saddles anesthesia  Feels strong   Denies fascial symptoms   + culture, on ABX    Exam:  GCS 15, EOMI  A&O x 3  Appropriate   CN 2-12 grossly intact   Upper and lower motor grossly strong, sensory grossly intact   DTRs =2 without Clonus or Hoffmans.      BP  Min: 96/53  Max: 113/71  Pulse  Av.1  Min: 68  Max: 93  Resp  Avg: 15.8  Min: 14  Max: 17  Temp  Av.6 °C (97.8 °F)  Min: 36.1 °C (97 °F)  Max: 37.1 °C (98.7 °F)  SpO2  Av.6 %  Min: 90 %  Max: 96 %    No Data Recorded    Recent Labs      18   0235  18   0647   WBC  13.1*  9.3  7.5   RBC  4.22*  4.16*  4.58*   HEMOGLOBIN  11.2*  11.0*  12.4*   HEMATOCRIT  34.4*  34.5*  37.3*   MCV  81.5  82.9  81.4   MCH  26.5*  26.4*  27.1   MCHC  32.6*  31.9*  33.2*   RDW  40.9  42.1  41.3   PLATELETCT  301  288  292   MPV  10.5  10.1  9.9     Recent Labs      18   0235  18   0647   SODIUM  139  140  138   POTASSIUM  4.5  4.0  4.0   CHLORIDE  107  109  104   CO2  24  25  26   GLUCOSE  131*  100*  92   BUN  12  19  14   CREATININE  0.63  0.72  0.76   CALCIUM  9.1  8.4*  9.1               Intake/Output     None          No intake or output data in the 24 hours ending 18 0905         • nicotine  7 mg Daily-0600   • penicillin g  3 Million Units Q4HRS   • oxyCODONE immediate-release  5-10 mg Q6HRS PRN   • acetaminophen/caffeine/butalbital 325-40-50 mg  1 Tab Q6HRS PRN   • levETIRAcetam  750 mg BID   • labetalol  10 mg Q4HRS PRN   • ondansetron  4 mg Q4HRS PRN   • ondansetron  4 mg Q4HRS PRN   • NS   Continuous       Assessment and Plan:  Hospital day #3   Improving  Continue ID & IM care  No neurosurgery intervention planned unless he has neuro changes   Dr. Torres recommending repeat spine MRI in 3 weeks   We will sign off, have a  low threshold to call with question or concerns

## 2018-07-14 NOTE — PROGRESS NOTES
Infectious Disease Progress Note    Author: Genie Rousseau M.D. Date & Time of service: 2018  8:45 AM    Chief Complaint:  FU viridans strep sepsis/IE    Interval History:  2018 T-max 98.9 WBC 13.1 platelets 301 creatinine 0.63 feels better but still has headache and back pain  - Tmax 98.6 wbc 9.3 ongoing back pain. WBC 9   AF WBC 7.5 back pain slightly improved, did not take any pain meds all day yesterday, tolerating abx without issues, plan of care d/w pt in detail  Labs Reviewed, Medications Reviewed and Radiology Reviewed.    Review of Systems:  Review of Systems   Constitutional: Negative for chills and fever.   Respiratory: Negative for sputum production.    Cardiovascular: Negative for chest pain and leg swelling.   Gastrointestinal: Negative for abdominal pain, nausea and vomiting.   Genitourinary: Negative for dysuria.   Musculoskeletal: Positive for back pain and myalgias.   Neurological: Negative for sensory change, speech change and headaches.       Hemodynamics:  Temp (24hrs), Av.6 °C (97.8 °F), Min:36.1 °C (97 °F), Max:37.1 °C (98.7 °F)  Temperature: 37.1 °C (98.7 °F)  Pulse  Av.3  Min: 54  Max: 114Heart Rate (Monitored): 74  Blood Pressure: 100/57, NIBP: 105/71       Physical Exam:  Physical Exam   Constitutional: He is oriented to person, place, and time. He appears well-developed and well-nourished. No distress.   HENT:   Head: Normocephalic.   Mouth/Throat: No oropharyngeal exudate.   Eyes: EOM are normal. Pupils are equal, round, and reactive to light. No scleral icterus.   Neck: Neck supple.   Cardiovascular: Regular rhythm.    Murmur heard.  Pulmonary/Chest: Effort normal. He has no wheezes. He has no rales.   Abdominal: Soft. There is no tenderness. There is no rebound.   Musculoskeletal: He exhibits no edema.   Neurological: He is alert and oriented to person, place, and time. He displays normal reflexes. No cranial nerve deficit. Coordination normal.   Skin: No  rash noted. No erythema.   Vitals reviewed.      Meds:    Current Facility-Administered Medications:   •  nicotine  •  penicillin g  •  oxyCODONE immediate-release  •  acetaminophen/caffeine/butalbital 325-40-50 mg  •  levETIRAcetam  •  labetalol **OR** [DISCONTINUED] hydrALAZINE  •  ondansetron  •  ondansetron  •  NS    Labs:  Recent Labs      07/12/18 0342 07/13/18 0235 07/14/18   0647   WBC  13.1*  9.3  7.5   RBC  4.22*  4.16*  4.58*   HEMOGLOBIN  11.2*  11.0*  12.4*   HEMATOCRIT  34.4*  34.5*  37.3*   MCV  81.5  82.9  81.4   MCH  26.5*  26.4*  27.1   RDW  40.9  42.1  41.3   PLATELETCT  301  288  292   MPV  10.5  10.1  9.9   NEUTSPOLYS  84.20*  63.70   --    LYMPHOCYTES  10.70*  28.60   --    MONOCYTES  4.10  5.40   --    EOSINOPHILS  0.00  0.80   --    BASOPHILS  0.10  0.30   --      Recent Labs      07/12/18 0342 07/13/18 0235 07/14/18   0647   SODIUM  139  140  138   POTASSIUM  4.5  4.0  4.0   CHLORIDE  107  109  104   CO2  24  25  26   GLUCOSE  131*  100*  92   BUN  12  19  14     Recent Labs      07/12/18 0342 07/13/18 0235 07/14/18   0647   ALBUMIN  3.8   --    --    TBILIRUBIN  0.2   --    --    ALKPHOSPHAT  61   --    --    TOTPROTEIN  6.6   --    --    ALTSGPT  15   --    --    ASTSGOT  14   --    --    CREATININE  0.63  0.72  0.76       Imaging:  Ct-cta Head With & W/o-post Process    Result Date: 7/10/2018  7/9/2018 11:49 PM HISTORY/REASON FOR EXAM:  Neurological Deficit TECHNIQUE/EXAM DESCRIPTION: CT angiogram of the [place] without and with contrast.  Initial precontrast images were obtained of the head from the skull base through the vertex.  Postcontrast images were obtained of the [place] following the power injection of nonionic contrast at 5.0 mL/sec. Thin-section helical images were obtained with overlapping reconstruction interval. Coronal and sagittal multiplanar volume reformats were generated.  3D angiographic images were reviewed on PACS.  Maximum  intensity projection (MIP) images were generated and reviewed. 100 mL of Omnipaque 350 nonionic contrast was injected intravenously. Low dose optimization technique was utilized for this CT exam including automated exposure control and adjustment of the mA and/or kV according to patient size. COMPARISON:  None. FINDINGS: The posterior circulation shows the distal vertebral arteries to be patent. The vertebrobasilar confluence is intact. The basilar artery is patent. No aneurysm or occlusive lesion is evident. The anterior circulation shows no stenotic or occlusive lesion. No aneurysm is evident about the [place]. The brain is unremarkable.     1.  CT angiogram of the [place] within normal limits.    Ct-cta Neck With & W/o-post Processing    Result Date: 7/10/2018  7/9/2018 11:49 PM HISTORY/REASON FOR EXAM: Left-sided facial numbness. TECHNIQUE/EXAM DESCRIPTION: CT angiogram of the neck with contrast. Postcontrast images were obtained of the neck from the great vessels through the skull base following the power injection of nonionic contrast at 5.0 mL/sec. Thin-section helical images were obtained with overlapping reconstruction interval. Coronal and oblique multiplanar volume reformats were generated. Cervical internal carotid artery percent stenosis is calculated using the standard method according to the NASCET criteria wherein a segment of uniform caliber mid or distal cervical internal carotid is used as the reference denominator. 3D angiographic images were reviewed on PACS.  Maximum intensity projection (MIP) images were generated and reviewed 100 mL of Omnipaque 350 nonionic contrast was injected intravenously. Low dose optimization technique was utilized for this CT exam including automated exposure control and adjustment of the mA and/or kV according to patient size. COMPARISON:  None. FINDINGS: The arch origins of the great vessels appear intact. The aortic arch shows no abnormality. The  right common carotid artery, cervical carotid bifurcation, and cervical internal carotid artery are within normal limits. There is no evidence of significant stenosis, thrombus, or other filling defect or ulceration. There is no evidence of dissection or aneurysm. The left common carotid artery, cervical carotid bifurcation, and cervical internal carotid artery are within normal limits. There is no evidence of significant stenosis, thrombus, or other filling defect or ulceration. There is no evidence of dissection  or aneurysm. The cervical vertebral arteries are normal bilaterally. The neck soft tissues and lung apices in the field of view are unremarkable.     CT angiogram of the neck within normal limits.    Ct-maxillofacial With & W/o Plus Recons    Result Date: 7/10/2018  7/10/2018 4:00 PM HISTORY/REASON FOR EXAM:  Streptococcal bacteremia. Evaluate for sinusitis or abscess. TECHNIQUE/EXAM DESCRIPTION AND NUMBER OF VIEWS:   CT scan maxillofacial with and without contrast, with reconstructions. Thin-section helical imaging was obtained of the maxillofacial structures from the orbital roofs through the mandible before and after injection of nonionic contrast. Coronal and sagittal multiplanar volume reformat images were generated from the axial data.. 100 mL of Omnipaque 350 nonionic contrast was injected intravenously. Low dose optimization technique was utilized for this CT exam including automated exposure control and adjustment of the mA and/or kV according to patient size. COMPARISON:  None. FINDINGS: There is rounded mucosal thickening or polyp formation in the inferior medial aspect of the right maxillary sinus. The remainder of the visualized paranasal sinuses are clear. No air-fluid level is present to suggest acute sinusitis. No bony expansion is identified. No lytic or blastic bony change is identified. The soft tissues of the neck are within normal limits. No soft tissue abscess or inflammatory change  is identified. Cervical lymph nodes are normal in size. No submandibular or parotid gland abnormality is noted. No posterior pharyngeal or laryngeal mucosal abnormality or mass is identified.     1.  Rounded mucosal thickening or polyp formation in the inferior aspect of the right maxillary sinus which could reflect chronic right maxillary sinusitis. 2.  No evidence of acute sinusitis. 3.  Otherwise clear paranasal sinuses.    Ct-post-fossa-ear With & W/o    Result Date: 7/10/2018  7/10/2018 4:00 PM HISTORY/REASON FOR EXAM:  Streptococcal bacteremia, facial numbness rule out facial nerve compression / mastoiditis. TECHNIQUE/EXAM DESCRIPTION AND NUMBER OF VIEWS: CT scan of the temporal bones without and with contrast. The study was performed on a [employer] CT scanner. Contiguous thin section 1.0 mm or 1.25 mm axial and direct coronal images were obtained of the temporal bones. Images are reviewed at magnified bone and soft tissue windows and non-magnified axial images are also displayed at soft tissue windows. Scans are obtained pre and post contrast. 100 mL of Omnipaque 300 nonionic contrast was injected intravenously. Low dose optimization technique was utilized for this CT exam including automated exposure control and adjustment of the mA and/or kV according to patient size. COMPARISON: CTA of the neck without and with contrast 7/9/2018 FINDINGS: On the right, the external auditory canal is normal except for some debris most consistent with cerumen.  The mastoid is normally pneumatized and aerated.  The middle ear cavity and mastoid antrum are clear.  The ossicles are normal in position  and configuration.  The scutum is sharp.  The bony labyrinth is normal, and the internal auditory canal shows normal caliber and is symmetric with the left side.  The facial nerve canal is unremarkable. On the left, the external auditory canal is normal except for some debris consistent with cerumen.  The mastoid is normally pneumatized  and aerated.  The middle ear cavity and mastoid antrum are clear.  The ossicles are normal in position and configuration.  The scutum is sharp.  The bony labyrinth is normal, and the internal auditory canal shows normal caliber and is symmetric with the right side.  The facial nerve canal is unremarkable. The right transverse -- sigmoid sinus and right internal jugular vein are dominant. The paranasal sinuses show clustered retention cyst or polypoid mucosal thickening in the alveolar recess of the right maxillary sinus. There are no air-fluid levels. The posterior fossa structures are unremarkable.  The fourth ventricle is in the midline. There is no abnormal parenchymal or extra-axial/meningeal enhancement in the posterior fossa or supratentorial compartment within the field of view.     CT OF THE TEMPORAL BONES WITHOUT CONTRAST WITHIN NORMAL LIMITS.    Dx-chest-2 Views    Result Date: 7/10/2018    7/9/2018 10:30 PM HISTORY/REASON FOR EXAM: Shortness of Breath TECHNIQUE/EXAM DESCRIPTION:  PA and lateral views of the chest. COMPARISON: None FINDINGS: The cardiac silhouette appears within normal limits. The mediastinal contour appears within normal limits.  The central pulmonary vasculature appears normal. The lungs appear well expanded bilaterally.  Bilateral lungs are clear. No significant pleural effusions are identified. The bony structures appear age-appropriate.     1.  No acute cardiopulmonary disease.    Mr-brain-with & W/o    Result Date: 7/11/2018  7/10/2018 10:21 PM HISTORY/REASON FOR EXAM:  Possible meningitis. TECHNIQUE/EXAM DESCRIPTION:   MRI of the brain without and with contrast. T1 sagittal, T2 fast spin-echo axial, T1 coronal, FLAIR coronal, diffusion-weighted and apparent diffusion coefficient (ADC map) axial images were obtained of the whole brain. T1 postcontrast axial and T1 postcontrast coronal images were obtained. The study was performed on a [employer] Sign[employer] 1.5 Kerri MRI scanner. 20 mL Gadavist  contrast was administered intravenously. COMPARISON:  None. FINDINGS:  There is tiny area of restricted diffusion in the right frontal gray matter. Minimal contrast enhancement is seen. The coronal FLAIR images demonstrates abnormal T2 hyperintensity in the adjacent right frontal subarachnoid spaces. The axial gradient echo images demonstrates linear hypointensity within the sulci. There is low-lying cerebellar tonsils. There is no hydrocephalus.  There is no large lesion identified in the expected course of the intracranial portions of the cranial nerves. The visualized flow voids of cerebral vasculature appear normal within limits.  The skull bones appear normal. The paranasal sinuses are clear. The extracranial soft tissue including orbits appear grossly normal.     1.  There is tiny area of restricted diffusion in the right frontal gray matter. Minimal contrast enhancement is seen. The coronal FLAIR images demonstrates abnormal T2 hyperintensity in the adjacent right frontal subarachnoid spaces. The axial gradient echo images demonstrates linear hypointensity within the sulci. These findings likely represents a small cortical venous thrombosis with adjacent tiny infarct. Cortical venous thrombosis may cause focal subarachnoid hemorrhage. Please note that cortical venous thrombosis can be complication of meningitis. 2.  Low-lying cerebellar tonsils. This is concerning for Chiari I malformation. In view of history of lumbar puncture, this finding can be caused by the post lumbar puncture intracranial hypotension.    Mr-cervical Spine-with & W/o    Result Date: 7/11/2018  7/10/2018 10:21 PM HISTORY/REASON FOR EXAM:  Possible bacterial meningitis. TECHNIQUE/EXAM DESCRIPTION: MRI of the cervical spine without and with contrast. The study was performed on a [employer] Signa 1.5 Kerri MRI scanner. T1 sagittal, T2 fast spin-echo sagittal, and gradient echo axial images were obtained of the cervical spine. T1 post-contrast fat  suppressed sagittal images were obtained of the cervical spine. Optional T1 post-contrast axial images may be obtained. 20 mL Omniscan contrast was administered intravenously. COMPARISON: None. FINDINGS: There is prominent posterior epidural fluid collection extending from C6-7 into the thoracic spine. Mild posterior epidural contrast enhancement is seen. Please see thoracic spine report for further details. The cervical spine maintains normal height and alignment. There is no fracture or dislocation. There is no pathologic marrow infiltration. There is no abnormal perivertebral fluid collection. There is no intradural extramedullary fluid collection. There is no abnormal intramedullary T2 signal intensity in the cervical spinal cord. At the level of C2-3, there is no spinal or neural foraminal stenosis. At the level of C3-4, there is no spinal or neural foraminal stenosis. At the level of C4-5, there is no spinal or neural foraminal stenosis. At the level of C5-6, there is no spinal or neural foraminal stenosis. At the level of C6-7, there is no spinal or neural foraminal stenosis. At the level of C7-T1, there is no spinal or neural foraminal stenosis. The visualized posterior fossa structures appear normal within limits.  The cervical spinal cord does not demonstrate any mass or abnormal T2 signal intensity. The visualized pre-and paraspinal soft tissues appear normal within limits.     1.  There is prominent posterior epidural fluid collection extending from C6-7 into the thoracic spine. Mild posterior epidural contrast enhancement is noted at this level. Please see thoracic spine report for further details. 2.  There is no evidence of osteomyelitis in the cervical spine.    Mr-lumbar Spine-with & W/o    Result Date: 7/11/2018  7/10/2018 10:21 PM HISTORY/REASON FOR EXAM:  Possible bacterial meningitis. TECHNIQUE/EXAM DESCRIPTION: MRI of the lumbar spine without and with contrast. The study was performed on a [employer]  Signa 1.5 Kerri MRI scanner. T1 sagittal, T2 fast spin-echo sagittal, and T2 axial images were obtained of the lumbar spine. T1 post-contrast fat-suppressed sagittal images were obtained. 20 mL Omniscan contrast was administered intravenously. COMPARISON:  None. FINDINGS: There is abnormal posterior epidural fluid collection seen extending from the lower cervical spine to the level of L3-4. Multifocal abnormal epidural contrast enhancement is seen. The lumbar spine maintains normal height and alignment. There is no evidence of fracture or dislocation. There is no pathologic marrow infiltration. There is no abnormal disc fluid. At the level of L5-S1, there is diffuse disc bulge. Mild facet joint arthropathy is seen. There is no spinal or neural foraminal stenosis. At the level of L4-5, there is no spinal or neural foraminal stenosis. At the level of L3-4, there is no spinal or neural foraminal stenosis. At the level of L2-3, there is no spinal or neural foraminal stenosis. At the level of L1-2, there is no spinal or neural foraminal stenosis. The conus terminates at the level of L1.     1.  There is abnormal posterior epidural fluid collection seen extending from the lower cervical spine to the level of L3-4. Multifocal abnormal epidural contrast enhancement is seen. These findings are concerning for thoracic and lumbar epidural abscess. 2.  There is no evidence of lumbar osteomyelitis. 3.  Findings were discussed with DAVON FERNANDO on 7/11/2018 1:08 PM.    Mr-thoracic Spine-with & W/o    Result Date: 7/11/2018  7/10/2018 10:21 PM HISTORY/REASON FOR EXAM:  Possible bacterial meningitis TECHNIQUE/EXAM DESCRIPTION: MRI of the thoracic spine without and with contrast. The study was performed on a [employer] 1.5 Kerri MRI scanner. T1 sagittal, T2 fast spin-echo sagittal, and T2 axial images were obtained of the thoracic spine. T1 post-contrast fat suppressed sagittal images were obtained. Optional T1 post-contrast axial  images may be obtained. 20 mL Gadavist contrast was administered intravenously. COMPARISON:  None. FINDINGS: There is abnormal posterior epidural fluid collection extending from C6-7 to the lumbar spine. Abnormal peripheral contrast enhancement noted surrounding the epidural fluid collection. The thoracic spinal cord is anteriorly displaced from the levels of T3-4 to  T9-10. Moderate central canal stenosis is seen. There is a hemangioma in the body of T11. There is no abnormal intramedullary T2 signal intensity in the thoracic spinal cord.     There is abnormal posterior epidural fluid collection extending from C6-7 to the lumbar spine. Abnormal peripheral contrast enhancement noted surrounding the epidural fluid collection. The thoracic spinal cord is anteriorly displaced from the levels of T3-4 to  T9-10. Moderate central canal stenosis is seen. These findings are highly concerning for posterior epidural abscess with moderate canal compromise.    Echocardiogram Comp W/o Cont    Result Date: 2018  Transthoracic Echo Report Echocardiography Laboratory CONCLUSIONS No prior study is available for comparison. Normal left ventricular systolic function. Left ventricular ejection fraction is visually estimated to be 65%. Normal diastolic function. Normal inferior vena cava size and inspiratory collapse. Mildly dilated left atrium. Moderate mitral regurgitation. JAYDON POLLARD Exam Date:         2018                    09:47 Exam Location:     Inpatient Priority:          Routine Ordering Physician:        DAVON FERNANDO Referring Physician:       KASSIE Lockett Sonographer:               Sunshine Huang Age:    27     Gender:    M MRN:    [id number] :    1990 BSA:    2.21   Ht (in):    70     Wt (lb):    229 Exam Type:     Complete Indications:     Murmur ICD Codes:       R01.1 CPT Codes:       47653 BP:   120    /   73     HR: Technical Quality:       Fair MEASUREMENTS  (Male / Female) Normal Values 2D  ECHO LV Diastolic Diameter PLAX        5.1 cm                4.2 - 5.9 / 3.9 - 5.3 cm LV Systolic Diameter PLAX         3.9 cm                2.1 - 4.0 cm IVS Diastolic Thickness           0.87 cm               LVPW Diastolic Thickness          1.3 cm                LVOT Diameter                     2 cm                  RA Diameter                       3.6 cm                Estimated LV Ejection Fraction    65 %                  LV Ejection Fraction MOD BP       62.9 %                >= 55  % LV Ejection Fraction MOD 4C       61.7 %                LV Ejection Fraction MOD 2C       64.1 %                LA Volume Index                   32.2 cm³/m²           16 - 28 cm³/m² DOPPLER AV Peak Velocity                  1.4 m/s               AV Peak Gradient                  8 mmHg                AV Mean Gradient                  4.7 mmHg              LVOT Peak Velocity                0.99 m/s              AV Area Cont Eq vti               2.3 cm²               Mitral E Point Velocity           0.93 m/s              Mitral E to A Ratio               1.8                   Mitral A Duration                 114 ms                MV Pressure Half Time             67.4 ms               MV Area PHT                       3.3 cm²               MV Deceleration Time              232 ms                MR Flow Convergence Radius        0.94 cm               MR ERO PISA                       0.33 cm²              MR Regurgitant Volume PISA        48.7 cm³              PV Peak Velocity                  0.97 m/s              PV Peak Gradient                  3.8 mmHg              RVOT Peak Velocity                0.94 m/s              * Indicates values subject to auto-interpretation LV EF:  65    % FINDINGS Left Ventricle Normal left ventricular chamber size. Normal left ventricular wall thickness. Normal left ventricular systolic function. Left ventricular ejection fraction is visually estimated to be 65%. Normal regional wall  "motion. Normal diastolic function. Right Ventricle The right ventricle was normal in size and function. Right Atrium The right atrium is normal in size.  Normal inferior vena cava size and inspiratory collapse. Left Atrium Mildly dilated left atrium. Mitral Valve Thickened mitral valve leaflets. No mitral stenosis. Moderate mitral regurgitation. ERO by PISA method is  0.33 sq cm. Aortic Valve Structurally normal aortic valve without significant stenosis or regurgitation. Tricuspid Valve Unable to estimate pulmonary artery pressure due to an inadequate tricuspid regurgitant jet. No tricuspid stenosis. Trace tricuspid regurgitation. Unable to estimate pulmonary artery pressure due to an inadequate tricuspid regurgitant jet. Pulmonic Valve The pulmonic valve is not well visualized. No stenosis or regurgitation seen. Pericardium Normal pericardium without effusion. Aorta The aortic root is normal. Hui Modi MD (Electronically Signed) Final Date:     11 July 2018                 13:51      Micro:  Results     Procedure Component Value Units Date/Time    CSF CULTURE [[id number]] Collected:  07/10/18 1440    Order Status:  Completed Specimen:  CSF Updated:  07/13/18 0837     Gram Stain Result No organisms seen.     Significant Indicator NEG     Source CSF     Site TAP     CSF Culture No growth at 72 hours.    BLOOD CULTURE [[id number]]  (Abnormal) Collected:  07/10/18 1419    Order Status:  Completed Specimen:  Blood from Peripheral Updated:  07/13/18 0810     Significant Indicator POS (POS)     Source BLD     Site PERIPHERAL     Blood Culture Growth detected by Bactec instrument. 07/11/2018  02:12 (A)      Viridans Streptococcus  See previous culture for sensitivity report.   (A)    Narrative:       CALL  Feng  [facility] tel. [phone],  CALLED  TERRY tel. [phone] 07/11/2018, 02:12, RB PERF. RESULTS CALLED TO: RN  [id number]  Per Hospital Policy: Only change Specimen Src: to \"Line\" if  specified by physician order.    BLOOD " "CULTURE [[id number]]  (Abnormal) Collected:  07/10/18 1419    Order Status:  Completed Specimen:  Blood from Peripheral Updated:  07/13/18 0809     Significant Indicator POS (POS)     Source BLD     Site PERIPHERAL     Blood Culture Growth detected by Bactec instrument. 07/11/2018  01:49 (A)      Viridans Streptococcus  See previous culture for sensitivity report.   (A)    Narrative:       CALL  Feng  [facility] tel. [phone],  CALLED  TERRY tel. [phone] 07/11/2018, 01:50, RB PERF. RESULTS CALLED TO: RN  [phone]  Per Hospital Policy: Only change Specimen Src: to \"Line\" if  specified by physician order.    BLOOD CULTURE [[id number]]  (Abnormal) Collected:  07/09/18 2333    Order Status:  Completed Specimen:  Blood from Peripheral Updated:  07/13/18 0807     Significant Indicator POS (POS)     Source BLD     Site PERIPHERAL     Blood Culture Growth detected by Bactec instrument. 07/10/2018  12:18 (A)      Viridans Streptococcus  See previous culture for sensitivity report.   (A)    Narrative:       CALL  Feng  [facility] tel. [phone],  CALLED  [facility] tel. [phone] 07/10/2018, 12:22, RB PERF. RESULTS CALLED  TO:[phone] RN  Per Hospital Policy: Only change Specimen Src: to \"Line\" if  specified by physician order.    SENSITIVITY, E-TEST [[id number]] Collected:  07/09/18 2333    Order Status:  Completed Specimen:  Blood Updated:  07/13/18 0807     ETEST Sensitivity FINAL    Narrative:       [facility] tel. [phone] 07/10/2018, 12:22, RB PERF. RESULTS CALLED TO:[id number] RN  Per Hospital Policy: Only change Specimen Src: to \"Line\" if  specified by physician order.    BLOOD CULTURE [[id number]]  (Abnormal)  (Susceptibility) Collected:  07/09/18 2333    Order Status:  Completed Specimen:  Blood from Peripheral Updated:  07/13/18 0807     Significant Indicator POS (POS)     Source BLD     Site PERIPHERAL     Blood Culture Growth detected by Bactec instrument. 07/10/2018  12:17 (A)      Viridans Streptococcus (A)    Narrative:       CALL  Feng  [facility] " "tel. [phone],  CALLED  [facility] tel. [phone] 07/10/2018, 12:22, RB PERF. RESULTS CALLED  TO:[phone] RN  Per Hospital Policy: Only change Specimen Src: to \"Line\" if  specified by physician order.    Culture & Susceptibility     VIRIDANS STREPTOCOCCUS     Antibiotic Sensitivity Microscan Unit Status    Cefotaxime Sensitive <=1 mcg/mL Final    Method: SENSITIVITY, E-TEST    Penicillin Sensitive <=0.12 mcg/mL Final    Method: SENSITIVITY, E-TEST                       BLOOD CULTURE [[id number]] Collected:  07/11/18 1219    Order Status:  Completed Specimen:  Blood from Peripheral Updated:  07/12/18 0751     Significant Indicator NEG     Source BLD     Site PERIPHERAL     Blood Culture No Growth    Note: Blood cultures are incubated for 5 days and  are monitored continuously.Positive blood cultures  are called to the RN and reported as soon as  they are identified.      Narrative:       Per Hospital Policy: Only change Specimen Src: to \"Line\" if  specified by physician order.    BLOOD CULTURE [[id number]] Collected:  07/11/18 1219    Order Status:  Completed Specimen:  Blood from Peripheral Updated:  07/12/18 0751     Significant Indicator NEG     Source BLD     Site PERIPHERAL     Blood Culture No Growth    Note: Blood cultures are incubated for 5 days and  are monitored continuously.Positive blood cultures  are called to the RN and reported as soon as  they are identified.      Narrative:       Per Hospital Policy: Only change Specimen Src: to \"Line\" if  specified by physician order.    GRAM STAIN [[id number]] Collected:  07/10/18 1440    Order Status:  Completed Specimen:  CSF Updated:  07/10/18 1620     Significant Indicator .     Source CSF     Site TAP     Gram Stain Result No organisms seen.    CSF CULTURE [[id number]]     Order Status:  Canceled Specimen:  CSF from Tap     URINALYSIS CULTURE, IF INDICATED [[id number]]     Order Status:  Canceled Specimen:  Urine           Assessment:  Active Hospital Problems    " Diagnosis   •    • Streptococcal bacteremia [R78.81, B95.5]   • Hypokalemia [E87.6]   • Normocytic anemia [D64.9]   • Murmur [R01.1]       Plan:  Streptococcus viridans bacteremia  Blood cultures are positive on 7/9/2018, 7/10/2018  Bcx negative from 7/11/2018  Sed rate is 49 and CRP is 2.99  Continue high-dose penicillin  PICC ordered    Multifocal epidural abscess  Epidural abscesses extending from his cervical spine to L3-4  Evaluated by neurosurgery  Currently recommended only medical treatment  On abx above    MV Endocarditis   TTE on 7/11/2018 showed mitral regurgitation  CHAR + small pedunculated mobile masses to tips of the valve causing severe regurgitation  MRI of the brain had 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  Consider MRI MRA to rule out mycotic aneurysm  On abx above  Anticipate 6-8 weeks of IV abx  Can do high dose continuous PCN    Discussed with internal medicine/Dr Rascon.

## 2018-07-14 NOTE — CONSULTS
DATE OF CONSULTATION: 7/14/2018     REFERRING PHYSICIAN: BLESSING Pereyra MD.     CONSULTING PHYSICIAN: Avila Alvarado MD     CHIEF COMPLAINT: Stroke like symptoms.     HISTORY OF PRESENT ILLNESS:   A 27 year old who presented with stroke symptoms and found to have possible brain abscess and epidural space abcess.  Positive blood cultures indicated probable endocarditis as source.  CHAR indicated severe mitral regurgitation and small vegetations on mitral valve leaflet tips.  Cardiology request input regarding timing of mitral valve surgery.       PAST MEDICAL HISTORY:  has a past medical history of Pain.     PAST SURGICAL HISTORY:  has a past surgical history that includes tonsillectomy (1998); knee arthroscopy (2004); knee arthroscopy (5/21/08); tibial osteotomy (5/21/08); loose body removal (5/21/08); ligament reconstruction (5/21/08); knee arthroscopy (5/21/08); and knee arthroscopy (10/2/08).     ALLERGIES:   Allergies   Allergen Reactions   • Nkda [No Known Drug Allergy]         CURRENT MEDICATIONS:   Home Medications     Reviewed by Rosita Cunningham (Pharmacy Tech) on 07/10/18 at 1124  Med List Status: Complete   Medication Last Dose Status   ibuprofen (MOTRIN) 200 MG Tab 7/9/2018 Active                FAMILY HISTORY:   Family History   Problem Relation Age of Onset   • Hyperlipidemia Paternal Grandmother    • Diabetes Maternal Grandfather    • Heart Disease Maternal Grandmother    • Heart Disease Father    • Hypertension Father    • Heart Disease Sister         SOCIAL HISTORY:   Social History     Social History Main Topics   • Smoking status: Current Every Day Smoker     Packs/day: 0.50   • Smokeless tobacco: Never Used   • Alcohol use No   • Drug use: No   • Sexual activity: Not on file       REVIEW OF SYSTEMS:  Constitutional:  negative for chills, fever and malaise/fatigue now.  Respiratory:  Negative for cough.  Cardiovascular:  Negative for chest pain.  Negative for palpitations, orthopnea,  claudication, and PND.  Gastrointestinal:  negative for abdominal pain.  Musculoskeletal:  Negative for myalgias.  Neurological  Negative for dizziness.     All other systems were reviewed with the patient and are negative except what is mentioned in the aforementioned HPI, PMH and PSH.    PHYSICAL EXAMINATION:     CONSTITUTIONAL:   General appearance: No apparent distress, normal development  HEENT:  Anicteric sclerae, moist conjunctivae, moist mucous membranes, good dentition   NECK:  Supple without jugular venous distention, without lymphadenopathy    SKIN:   Normal skin turgor, no stasis dermatitis or ulcers noted.  RESPIRATORY:  Clear to auscultation bilaterally, respirations are regular, symmetrical and unlabored.   CARDIAC:  Regular rate and rhythm,  Murmur at the apex.  No carotid bruits. Peripheral pulses palpable.   GASTROINTESTINAL:  Soft, non-distended, non-tender with bowel sounds present, no hepatosplenomegaly  EXTREMITIES:  No clubbing, cyanosis, or changes consistent with peripheral vascular disease. No peripheral edema or varicosities   NEUROLOGIC/ PSYCHIATRIC: AAO x 3.  No neck stiffness or dizziness.    MUSCULOSKELETAL:  Normal gait and station    LABORATORY VALUES:   Recent Labs      07/12/18 0342 07/13/18 0235 07/14/18   0647   WBC  13.1*  9.3  7.5   RBC  4.22*  4.16*  4.58*   HEMOGLOBIN  11.2*  11.0*  12.4*   HEMATOCRIT  34.4*  34.5*  37.3*   MCV  81.5  82.9  81.4   MCH  26.5*  26.4*  27.1   MCHC  32.6*  31.9*  33.2*   RDW  40.9  42.1  41.3   PLATELETCT  301  288  292   MPV  10.5  10.1  9.9     Recent Labs      07/12/18 0342 07/13/18   0235  07/14/18   0647   SODIUM  139  140  138   POTASSIUM  4.5  4.0  4.0   CHLORIDE  107  109  104   CO2  24  25  26   GLUCOSE  131*  100*  92   BUN  12  19  14   CREATININE  0.63  0.72  0.76   CALCIUM  9.1  8.4*  9.1     Recent Labs      07/12/18   0342   ASTSGOT  14   ALTSGPT  15   TBILIRUBIN  0.2   ALKPHOSPHAT  61   GLOBULIN  2.8          IMPRESSION:  Bacterial endocarditis involving the mitral valve.  Currently on appropriate IV antibiotic therapy.     PLAN: Do not recommend mitral valve surgery at this time.  Patient is tolerating mitral regurgitaion and operative risk would be extremely high for extending cerebral infarct and or spinal cord infection.  Therefor recommend continued IV antibiotics with repeat echo in 6 weeks.          ____________________________________   Avila Alvarado MD

## 2018-07-14 NOTE — PROGRESS NOTES
Pt A+Ox4. CRAMER. Denies n/t or n/v. Has pain but not requiring pain meds at this time. Pt has very minimal facial droop to right lower. Pt is up stand by with a steady gait. Showered this evening. All questions answered regarding POC.

## 2018-07-14 NOTE — CARE PLAN
Problem: Communication  Goal: The ability to communicate needs accurately and effectively will improve  Pt calls appropriately when assistance is needed     Problem: Pain Management  Goal: Pain level will decrease to patient's comfort goal  Pt given pain medication with + results

## 2018-07-14 NOTE — PROGRESS NOTES
[facility] Hospitalist Progress Note    Date of Service: 7/14/2018    Chief Complaint  27 y.o. male admitted 7/9/2018 with numbness, left-sided, fevers on/off for 2 months with associated neck pain, photophobia, headaches in the outpatient setting. Diagnosed with streptococcal viridans bacteremia c/b infective endocarditis involving the mitral valve with severe MR, seeding of dura c/b extensive epidural abscess and likely embolic stroke c/b meningitis, ? Venous thrombosis.     Interval Problem Update  Patient seen and evaluated on rounds  Discussed with ID, place PICC - orders placed  Dr Anderson called later yesterday, CTS will not do surgery at this time  NS has signed off at this time   Ongoing intermittent headaches / back pain and neck pain      Consultants/Specialty  Neurology   Neurosurgery  Infectious disease  Cardiology   Cardiothoracic surgery     Disposition  Ongoing hospitalization for ongoing acute issues  Needs arrangement of outpatient infusion / abx therapy      Review of Systems   Constitutional: Negative for chills, diaphoresis, fever, malaise/fatigue and weight loss.   HENT: Negative for congestion, ear discharge, ear pain, hearing loss, nosebleeds, sinus pain, sore throat and tinnitus.    Eyes: Negative for blurred vision, double vision and photophobia.   Respiratory: Negative for cough, hemoptysis, sputum production, shortness of breath, wheezing and stridor.    Cardiovascular: Negative for chest pain, palpitations, orthopnea, claudication, leg swelling and PND.   Gastrointestinal: Negative for abdominal pain, constipation, diarrhea, heartburn, nausea and vomiting.   Genitourinary: Negative for dysuria, flank pain, frequency, hematuria and urgency.   Musculoskeletal: Positive for back pain and neck pain. Negative for falls, joint pain and myalgias.   Skin: Negative for itching and rash.   Neurological: Positive for headaches (Still intermittently). Negative for dizziness, tingling, tremors, sensory  change, speech change, focal weakness, seizures, loss of consciousness and weakness.   Psychiatric/Behavioral: Negative for depression, hallucinations, memory loss, substance abuse and suicidal ideas. The patient is not nervous/anxious and does not have insomnia.       Physical Exam  Laboratory/Imaging   Hemodynamics  Temp (24hrs), Av.7 °C (98 °F), Min:36.1 °C (97 °F), Max:37.1 °C (98.7 °F)   Temperature: 37.1 °C (98.7 °F)  Pulse  Av.3  Min: 54  Max: 114    Blood Pressure: 100/57      Respiratory      Respiration: 17, Pulse Oximetry: 94 %             Fluids  No intake or output data in the 24 hours ending 18 1018    Nutrition  Orders Placed This Encounter   Procedures   • Diet Order Regular     Standing Status:   Standing     Number of Occurrences:   1     Order Specific Question:   Diet:     Answer:   Regular [1]     Physical Exam   Constitutional: He is oriented to person, place, and time. He appears well-developed and well-nourished. No distress.   HENT:   Head: Normocephalic.   Mouth/Throat: Oropharynx is clear and moist. No oropharyngeal exudate.   Eyes: Conjunctivae and EOM are normal. Pupils are equal, round, and reactive to light. No scleral icterus.   Neck: Normal range of motion. No JVD present.   Cardiovascular: Normal rate and regular rhythm.  Exam reveals no gallop and no friction rub.    Murmur heard.  Pulmonary/Chest: Effort normal and breath sounds normal. No stridor. No respiratory distress. He has no wheezes. He has no rales. He exhibits no tenderness.   Abdominal: Soft. Bowel sounds are normal. He exhibits no distension. There is no tenderness. There is no rebound and no guarding.   Musculoskeletal: Normal range of motion. He exhibits no edema, tenderness or deformity.   Neurological: He is alert and oriented to person, place, and time. No cranial nerve deficit. Coordination normal.   No neck stiffness, negative Kernig's and Brudzinski sign.  No temporal/mastoid tenderness. No  saddle anesthesia. No urinary or bowel incontinence. Motor strength is 5/5 in b/l LE and UE. No sensory deficits. DTR are normal in all extremities.    Skin: Skin is warm, dry and intact. He is not diaphoretic.   Psychiatric: He has a normal mood and affect. His behavior is normal. Judgment and thought content normal.       Recent Labs      07/12/18 0342  07/13/18   0235  07/14/18   0647   WBC  13.1*  9.3  7.5   RBC  4.22*  4.16*  4.58*   HEMOGLOBIN  11.2*  11.0*  12.4*   HEMATOCRIT  34.4*  34.5*  37.3*   MCV  81.5  82.9  81.4   MCH  26.5*  26.4*  27.1   MCHC  32.6*  31.9*  33.2*   RDW  40.9  42.1  41.3   PLATELETCT  301  288  292   MPV  10.5  10.1  9.9     Recent Labs      07/12/18 0342 07/13/18   0235  07/14/18   0647   SODIUM  139  140  138   POTASSIUM  4.5  4.0  4.0   CHLORIDE  107  109  104   CO2  24  25  26   GLUCOSE  131*  100*  92   BUN  12  19  14   CREATININE  0.63  0.72  0.76   CALCIUM  9.1  8.4*  9.1                      Assessment/Plan     Acute bacterial endocarditis- (present on admission)   Assessment & Plan    Streptococcal  Involving mitral valve with severe MR  CHAR 07/13/18 confirms this  Discussed with Dr Annie Pereyra will be facilitating cardiothoracic surgery consultation (No surgical interventions, I was informed by Dr Pereyra)  No further role for cardiology at this point  ID team on board  Defer abx therapy to ID team         Epidural abscess- (present on admission)   Assessment & Plan    Extensive from cervical to lumbar spine without compromise   No neurological deficits  Seen by Dr Torres from neurosurgery   No surgical interventions at this time per Dr Torres  ID team on board   Abx per ID team   Will need interval MRI in 3 weeks from prior MRI per neurosurgery team   Continue close clinical monitoring of neurological status  If any worsening notify neurosurgical team         Streptococcal meningitis- (present on admission)   Assessment & Plan    Likely embolic from  bacteremia, Infective endocarditis  Continue Abx per ID recommendations        Streptococcal bacteremia- (present on admission)   Assessment & Plan    Complicated by infective endocarditis / epidural abscess  Embolic phenomenon likely leading to CNS infarct, ? 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)- (present on admission)   Assessment & Plan    On MRI brain negative MRV  Suspect related to embolic phenomenon from infective endocarditis  Evaluated by neurology  Neurology team did not recommend anticoagulation  On Keppra 750 mg BID per neurology team recommendations   Will need close outpatient follow up with neurology team         Non-rheumatic mitral regurgitation- (present on admission)   Assessment & Plan    Related to infective endocarditis  Severe  No surgical interventions planned at this time  Will need outpatient cardiology / CTS follow up        Normocytic anemia- (present on admission)   Assessment & Plan    Suspect consumptive from bacteremia  Monitor Hb / Restrictive transfusion strategy          Quality-Core Measures   Reviewed items::  Labs reviewed, Medications reviewed and Radiology images reviewed  Cruz catheter::  No Cruz  DVT prophylaxis pharmacological::  Not indicated at this time, ambulatory  DVT prophylaxis - mechanical:  SCDs  Antibiotics:  Treating active infection/contamination beyond 24 hours perioperative coverage  Assessed for rehabilitation services:  Patient returned to prior level of function, rehabilitation not indicated at this time

## 2018-07-15 ENCOUNTER — APPOINTMENT (OUTPATIENT)
Dept: RADIOLOGY | Facility: MEDICAL CENTER | Age: 28
DRG: 871 | End: 2018-07-15
Attending: INTERNAL MEDICINE
Payer: MEDICAID

## 2018-07-15 LAB
ANION GAP SERPL CALC-SCNC: 7 MMOL/L (ref 0–11.9)
BUN SERPL-MCNC: 17 MG/DL (ref 8–22)
CALCIUM SERPL-MCNC: 9.2 MG/DL (ref 8.5–10.5)
CHLORIDE SERPL-SCNC: 103 MMOL/L (ref 96–112)
CO2 SERPL-SCNC: 29 MMOL/L (ref 20–33)
CREAT SERPL-MCNC: 0.8 MG/DL (ref 0.5–1.4)
ERYTHROCYTE [DISTWIDTH] IN BLOOD BY AUTOMATED COUNT: 40.8 FL (ref 35.9–50)
GLUCOSE SERPL-MCNC: 89 MG/DL (ref 65–99)
HCT VFR BLD AUTO: 37.8 % (ref 42–52)
HGB BLD-MCNC: 12.4 G/DL (ref 14–18)
MCH RBC QN AUTO: 26.7 PG (ref 27–33)
MCHC RBC AUTO-ENTMCNC: 32.8 G/DL (ref 33.7–35.3)
MCV RBC AUTO: 81.3 FL (ref 81.4–97.8)
PLATELET # BLD AUTO: 303 K/UL (ref 164–446)
PMV BLD AUTO: 10.3 FL (ref 9–12.9)
POTASSIUM SERPL-SCNC: 4.3 MMOL/L (ref 3.6–5.5)
RBC # BLD AUTO: 4.65 M/UL (ref 4.7–6.1)
SODIUM SERPL-SCNC: 139 MMOL/L (ref 135–145)
WBC # BLD AUTO: 7.2 K/UL (ref 4.8–10.8)

## 2018-07-15 PROCEDURE — 85027 COMPLETE CBC AUTOMATED: CPT

## 2018-07-15 PROCEDURE — 700102 HCHG RX REV CODE 250 W/ 637 OVERRIDE(OP): Performed by: INTERNAL MEDICINE

## 2018-07-15 PROCEDURE — A9579 GAD-BASE MR CONTRAST NOS,1ML: HCPCS | Performed by: INTERNAL MEDICINE

## 2018-07-15 PROCEDURE — 36415 COLL VENOUS BLD VENIPUNCTURE: CPT

## 2018-07-15 PROCEDURE — A9270 NON-COVERED ITEM OR SERVICE: HCPCS | Performed by: INTERNAL MEDICINE

## 2018-07-15 PROCEDURE — 99232 SBSQ HOSP IP/OBS MODERATE 35: CPT | Performed by: INTERNAL MEDICINE

## 2018-07-15 PROCEDURE — 70470 CT HEAD/BRAIN W/O & W/DYE: CPT

## 2018-07-15 PROCEDURE — 700111 HCHG RX REV CODE 636 W/ 250 OVERRIDE (IP): Performed by: INTERNAL MEDICINE

## 2018-07-15 PROCEDURE — 80048 BASIC METABOLIC PNL TOTAL CA: CPT

## 2018-07-15 PROCEDURE — 70553 MRI BRAIN STEM W/O & W/DYE: CPT

## 2018-07-15 PROCEDURE — 700117 HCHG RX CONTRAST REV CODE 255: Performed by: INTERNAL MEDICINE

## 2018-07-15 PROCEDURE — 770006 HCHG ROOM/CARE - MED/SURG/GYN SEMI*

## 2018-07-15 PROCEDURE — 87040 BLOOD CULTURE FOR BACTERIA: CPT | Mod: 91

## 2018-07-15 PROCEDURE — 700102 HCHG RX REV CODE 250 W/ 637 OVERRIDE(OP): Performed by: NURSE PRACTITIONER

## 2018-07-15 PROCEDURE — 700105 HCHG RX REV CODE 258: Performed by: INTERNAL MEDICINE

## 2018-07-15 PROCEDURE — A9270 NON-COVERED ITEM OR SERVICE: HCPCS | Performed by: NURSE PRACTITIONER

## 2018-07-15 PROCEDURE — 72156 MRI NECK SPINE W/O & W/DYE: CPT

## 2018-07-15 PROCEDURE — 72126 CT NECK SPINE W/DYE: CPT

## 2018-07-15 RX ORDER — TRAMADOL HYDROCHLORIDE 50 MG/1
50 TABLET ORAL EVERY 6 HOURS PRN
Status: DISCONTINUED | OUTPATIENT
Start: 2018-07-15 | End: 2018-07-15

## 2018-07-15 RX ORDER — LEVETIRACETAM 500 MG/1
1500 TABLET ORAL 2 TIMES DAILY
Status: DISCONTINUED | OUTPATIENT
Start: 2018-07-15 | End: 2018-07-20 | Stop reason: HOSPADM

## 2018-07-15 RX ORDER — LORAZEPAM 2 MG/ML
1 INJECTION INTRAMUSCULAR ONCE
Status: COMPLETED | OUTPATIENT
Start: 2018-07-15 | End: 2018-07-15

## 2018-07-15 RX ORDER — LEVETIRACETAM 250 MG/1
1250 TABLET ORAL 2 TIMES DAILY
Status: DISCONTINUED | OUTPATIENT
Start: 2018-07-15 | End: 2018-07-15

## 2018-07-15 RX ORDER — OXYCODONE HYDROCHLORIDE 5 MG/1
5 TABLET ORAL EVERY 4 HOURS PRN
Status: DISCONTINUED | OUTPATIENT
Start: 2018-07-15 | End: 2018-07-17

## 2018-07-15 RX ADMIN — OXYCODONE HYDROCHLORIDE 5 MG: 5 TABLET ORAL at 13:51

## 2018-07-15 RX ADMIN — OXYCODONE HYDROCHLORIDE 5 MG: 5 TABLET ORAL at 21:54

## 2018-07-15 RX ADMIN — GADODIAMIDE 20 ML: 287 INJECTION INTRAVENOUS at 16:08

## 2018-07-15 RX ADMIN — SODIUM CHLORIDE 3 MILLION UNITS: 9 INJECTION, SOLUTION INTRAVENOUS at 02:09

## 2018-07-15 RX ADMIN — LEVETIRACETAM 1500 MG: 500 TABLET, FILM COATED ORAL at 16:44

## 2018-07-15 RX ADMIN — TRAMADOL HYDROCHLORIDE 50 MG: 50 TABLET, COATED ORAL at 10:49

## 2018-07-15 RX ADMIN — NICOTINE 7 MG: 7 PATCH, EXTENDED RELEASE TRANSDERMAL at 06:03

## 2018-07-15 RX ADMIN — SODIUM CHLORIDE 3 MILLION UNITS: 9 INJECTION, SOLUTION INTRAVENOUS at 09:01

## 2018-07-15 RX ADMIN — BUTALBITAL, ACETAMINOPHEN, AND CAFFEINE 1 TABLET: 50; 325; 40 TABLET ORAL at 10:49

## 2018-07-15 RX ADMIN — SODIUM CHLORIDE 3 MILLION UNITS: 9 INJECTION, SOLUTION INTRAVENOUS at 21:50

## 2018-07-15 RX ADMIN — IOHEXOL 100 ML: 350 INJECTION, SOLUTION INTRAVENOUS at 17:10

## 2018-07-15 RX ADMIN — SODIUM CHLORIDE 3 MILLION UNITS: 9 INJECTION, SOLUTION INTRAVENOUS at 16:44

## 2018-07-15 RX ADMIN — OXYCODONE HYDROCHLORIDE 10 MG: 5 TABLET ORAL at 06:03

## 2018-07-15 RX ADMIN — SODIUM CHLORIDE 3 MILLION UNITS: 9 INJECTION, SOLUTION INTRAVENOUS at 12:28

## 2018-07-15 RX ADMIN — LORAZEPAM 1 MG: 2 INJECTION INTRAMUSCULAR; INTRAVENOUS at 14:02

## 2018-07-15 RX ADMIN — LEVETIRACETAM 750 MG: 500 TABLET, FILM COATED ORAL at 06:03

## 2018-07-15 ASSESSMENT — ENCOUNTER SYMPTOMS
HEADACHES: 0
FEVER: 0
NAUSEA: 0
SORE THROAT: 0
SINUS PAIN: 0
PALPITATIONS: 0
PHOTOPHOBIA: 0
DIARRHEA: 0
BACK PAIN: 1
SENSORY CHANGE: 0
SPUTUM PRODUCTION: 0
WEAKNESS: 0
DIAPHORESIS: 0
TINGLING: 0
NECK PAIN: 1
CLAUDICATION: 0
ABDOMINAL PAIN: 0
SPEECH CHANGE: 0
NERVOUS/ANXIOUS: 0
COUGH: 0
LOSS OF CONSCIOUSNESS: 0
ORTHOPNEA: 0
HALLUCINATIONS: 0
WHEEZING: 0
DEPRESSION: 0
DOUBLE VISION: 0
VOMITING: 0
FLANK PAIN: 0
SEIZURES: 0
MYALGIAS: 1
MEMORY LOSS: 0
BLURRED VISION: 0
CHILLS: 0
FOCAL WEAKNESS: 0
SHORTNESS OF BREATH: 0
INSOMNIA: 0
HEADACHES: 1
DIZZINESS: 0
FALLS: 0
TREMORS: 0
HEARTBURN: 0
STRIDOR: 0
PND: 0
CONSTIPATION: 0
MYALGIAS: 0
WEIGHT LOSS: 0
HEMOPTYSIS: 0

## 2018-07-15 ASSESSMENT — PAIN SCALES - GENERAL
PAINLEVEL_OUTOF10: 0
PAINLEVEL_OUTOF10: 6
PAINLEVEL_OUTOF10: 4
PAINLEVEL_OUTOF10: 7

## 2018-07-15 ASSESSMENT — LIFESTYLE VARIABLES: SUBSTANCE_ABUSE: 0

## 2018-07-15 NOTE — PROGRESS NOTES
[facility] Hospitalist Progress Note    Date of Service: 7/15/2018    Chief Complaint  27 y.o. male admitted 7/9/2018 with numbness, left-sided, fevers on/off for 2 months with associated neck pain, photophobia, headaches in the outpatient setting. Diagnosed with streptococcal viridans bacteremia c/b infective endocarditis involving the mitral valve with severe MR, seeding of dura c/b extensive epidural abscess and likely embolic stroke c/b meningitis, ? Venous thrombosis.     Interval Problem Update  Patient seen and evaluated on rounds  PICC placed yesterday  HA are improving  Back pain has improved  Appreciate CTS input   No other complaints at this time  Start working on arrangements for outpatient antibiotics    Consultants/Specialty  Neurology   Neurosurgery  Infectious disease  Cardiology   Cardiothoracic surgery     Disposition  Ongoing hospitalization for ongoing acute issues  Needs arrangement of outpatient infusion / abx therapy      Review of Systems   Constitutional: Negative for chills, diaphoresis, fever, malaise/fatigue and weight loss.   HENT: Negative for congestion, ear discharge, ear pain, hearing loss, nosebleeds, sinus pain, sore throat and tinnitus.    Eyes: Negative for blurred vision, double vision and photophobia.   Respiratory: Negative for cough, hemoptysis, sputum production, shortness of breath, wheezing and stridor.    Cardiovascular: Negative for chest pain, palpitations, orthopnea, claudication, leg swelling and PND.   Gastrointestinal: Negative for abdominal pain, constipation, diarrhea, heartburn, nausea and vomiting.   Genitourinary: Negative for dysuria, flank pain, frequency, hematuria and urgency.   Musculoskeletal: Positive for back pain and neck pain. Negative for falls, joint pain and myalgias.   Skin: Negative for itching and rash.   Neurological: Positive for headaches (Still intermittently). Negative for dizziness, tingling, tremors, sensory change, speech change, focal  weakness, seizures, loss of consciousness and weakness.   Psychiatric/Behavioral: Negative for depression, hallucinations, memory loss, substance abuse and suicidal ideas. The patient is not nervous/anxious and does not have insomnia.       Physical Exam  Laboratory/Imaging   Hemodynamics  Temp (24hrs), Av.7 °C (98.1 °F), Min:36.7 °C (98 °F), Max:36.8 °C (98.3 °F)   Temperature: 36.7 °C (98 °F)  Pulse  Av.3  Min: 54  Max: 114    Blood Pressure: 108/50      Respiratory      Respiration: 16, Pulse Oximetry: 90 %             Fluids    Intake/Output Summary (Last 24 hours) at 07/15/18 0827  Last data filed at 07/15/18 0300   Gross per 24 hour   Intake             1340 ml   Output                0 ml   Net             1340 ml       Nutrition  Orders Placed This Encounter   Procedures   • Diet Order Regular     Standing Status:   Standing     Number of Occurrences:   1     Order Specific Question:   Diet:     Answer:   Regular [1]     Physical Exam   Constitutional: He is oriented to person, place, and time. He appears well-developed and well-nourished. No distress.   HENT:   Head: Normocephalic.   Mouth/Throat: Oropharynx is clear and moist. No oropharyngeal exudate.   Eyes: Conjunctivae and EOM are normal. Pupils are equal, round, and reactive to light. No scleral icterus.   Neck: Normal range of motion. No JVD present.   Cardiovascular: Normal rate and regular rhythm.  Exam reveals no gallop and no friction rub.    Murmur heard.  Pulmonary/Chest: Effort normal and breath sounds normal. No stridor. No respiratory distress. He has no wheezes. He has no rales. He exhibits no tenderness.   Abdominal: Soft. Bowel sounds are normal. He exhibits no distension. There is no tenderness. There is no rebound and no guarding.   Musculoskeletal: Normal range of motion. He exhibits no edema, tenderness or deformity.   Neurological: He is alert and oriented to person, place, and time. No cranial nerve deficit. Coordination  normal.   No neck stiffness, negative Kernig's and Brudzinski sign.  No temporal/mastoid tenderness. No saddle anesthesia. No urinary or bowel incontinence. Motor strength is 5/5 in b/l LE and UE. No sensory deficits. DTR are normal in all extremities.    Skin: Skin is warm, dry and intact. He is not diaphoretic.   Psychiatric: He has a normal mood and affect. His behavior is normal. Judgment and thought content normal.       Recent Labs      07/13/18   0235  07/14/18   0647   WBC  9.3  7.5   RBC  4.16*  4.58*   HEMOGLOBIN  11.0*  12.4*   HEMATOCRIT  34.5*  37.3*   MCV  82.9  81.4   MCH  26.4*  27.1   MCHC  31.9*  33.2*   RDW  42.1  41.3   PLATELETCT  288  292   MPV  10.1  9.9     Recent Labs      07/13/18   0235  07/14/18   0647   SODIUM  140  138   POTASSIUM  4.0  4.0   CHLORIDE  109  104   CO2  25  26   GLUCOSE  100*  92   BUN  19  14   CREATININE  0.72  0.76   CALCIUM  8.4*  9.1                      Assessment/Plan     Acute bacterial endocarditis- (present on admission)   Assessment & Plan    Streptococcal (Viridans)  Involving mitral valve with severe MR  CHAR 07/13/18 confirms this  Discussed with Dr Annie Pereyra  Evaluated by Dr Alvarado from CTS this stay, no surgical interventions planned at this time   Outpatient cardiology / CTS follow up with interval Echo / CHAR in 6 months   ID team on board  Defer abx therapy to ID team         Epidural abscess- (present on admission)   Assessment & Plan    Extensive from cervical to lumbar spine without compromise   No neurological deficits  Seen by Dr Torres from neurosurgery   No surgical interventions at this time per Dr Torres  ID team on board   Abx per ID team   Will need interval MRI in 3 weeks from prior MRI per neurosurgery team   Continue close clinical monitoring of neurological status  If any worsening notify neurosurgical team   Outpatient follow up with Dr Torres         Streptococcal meningitis- (present on admission)   Assessment & Plan    Likely  embolic from bacteremia, Infective endocarditis  Continue Abx per ID recommendations        Streptococcal bacteremia- (present on admission)   Assessment & Plan    Complicated by infective endocarditis / epidural abscess  Embolic phenomenon likely leading to CNS infarct, ? Meningitis and Venous thrombosis     Continue Abx per ID team   PICC placed  Duration of abx therapy per ID team   Start arrangements for outpatient Abx management. ID team informed by me.         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)- (present on admission)   Assessment & Plan    On MRI brain negative MRV  Suspect related to embolic phenomenon from infective endocarditis  Evaluated by neurology  Neurology team did not recommend anticoagulation  On Keppra 750 mg BID per neurology team recommendations, wean off coming days as infectious issues are managed  No anticoagulation recommended by neurology team   Will need close outpatient follow up with neurology team         Non-rheumatic mitral regurgitation- (present on admission)   Assessment & Plan    Related to infective endocarditis  Severe  No surgical interventions planned at this time  Will need outpatient cardiology / CTS follow up  Interval echo / CHAR in 6 weeks of hospital discharge         Normocytic anemia- (present on admission)   Assessment & Plan    Suspect consumptive from bacteremia  Monitor Hb / Restrictive transfusion strategy  Improved            Time spend: 39 minutes. > 50 % time spend providing counseling / co ordination of care. Please review other note from today.     Quality-Core Measures   Reviewed items::  Labs reviewed, Medications reviewed and Radiology images reviewed  Cruz catheter::  No Cruz  DVT prophylaxis pharmacological::  Not indicated at this time, ambulatory  DVT prophylaxis - mechanical:  SCDs  Antibiotics:  Treating active infection/contamination beyond 24 hours perioperative coverage  Assessed for rehabilitation services:   Patient returned to prior level of function, rehabilitation not indicated at this time

## 2018-07-15 NOTE — PROGRESS NOTES
"1330 Per pt family, pt was conversing then had an episode of staring off. VS taken and charted. Per pt, difficulty in articulating words. This seemed to improve within a few minutes    1335 [facility] performed and scored a \"0\"    1340: Pt c/o L face and L arm numbness w/out sensation. Pt admitted to feeling sensation upon assessment, and then complained he could not feel his L thumb. When pain was elicit to thumb nail, pt retracted extremity and admitted to having sensation in his thumb.     Bed alarm and frame alarm activated. Education provided  Notified Dr. Rascon of incident. New order placed.   "

## 2018-07-15 NOTE — PROGRESS NOTES
Infectious Disease Progress Note    Author: Genie Rousseau M.D. Date & Time of service: 7/15/2018  8:30 AM    Chief Complaint:  FU viridans strep sepsis/IE    Interval History:  2018 T-max 98.9 WBC 13.1 platelets 301 creatinine 0.63 feels better but still has headache and back pain  - Tmax 98.6 wbc 9.3 ongoing back pain. WBC 9   AF WBC 7.5 back pain slightly improved, did not take any pain meds all day yesterday, tolerating abx without issues, plan of care d/w pt in detail  7/15 AF no CBC ongoing neck and back pain, patient received PICC without any issues, also seen by CT surgery with no plans for surgical intervention at this  Labs Reviewed, Medications Reviewed and Radiology Reviewed.    Review of Systems:  Review of Systems   Constitutional: Negative for chills and fever.   Respiratory: Negative for sputum production.    Cardiovascular: Negative for chest pain and leg swelling.   Gastrointestinal: Negative for abdominal pain, nausea and vomiting.   Genitourinary: Negative for dysuria.   Musculoskeletal: Positive for back pain, myalgias and neck pain.   Neurological: Negative for sensory change, speech change and headaches.       Hemodynamics:  Temp (24hrs), Av.7 °C (98.1 °F), Min:36.7 °C (98 °F), Max:36.8 °C (98.3 °F)  Temperature: 36.7 °C (98 °F)  Pulse  Av.3  Min: 54  Max: 114   Blood Pressure: 108/50       Physical Exam:  Physical Exam   Constitutional: He is oriented to person, place, and time. He appears well-developed and well-nourished. No distress.   HENT:   Head: Normocephalic.   Mouth/Throat: No oropharyngeal exudate.   Eyes: EOM are normal. Pupils are equal, round, and reactive to light. No scleral icterus.   Neck: Neck supple.   Cardiovascular: Regular rhythm.    Murmur heard.  Pulmonary/Chest: Effort normal. He has no wheezes. He has no rales.   Abdominal: Soft. There is no tenderness. There is no rebound.   Musculoskeletal: He exhibits no edema.   Right upper extremity  PICC line nontender   Neurological: He is alert and oriented to person, place, and time. He displays normal reflexes. No cranial nerve deficit. Coordination normal.   Skin: No rash noted. No erythema.   Vitals reviewed.      Meds:    Current Facility-Administered Medications:   •  tramadol  •  nicotine  •  penicillin g  •  acetaminophen/caffeine/butalbital 325-40-50 mg  •  levETIRAcetam  •  labetalol **OR** [DISCONTINUED] hydrALAZINE  •  ondansetron  •  ondansetron    Labs:  Recent Labs      07/13/18 0235  07/14/18   0647   WBC  9.3  7.5   RBC  4.16*  4.58*   HEMOGLOBIN  11.0*  12.4*   HEMATOCRIT  34.5*  37.3*   MCV  82.9  81.4   MCH  26.4*  27.1   RDW  42.1  41.3   PLATELETCT  288  292   MPV  10.1  9.9   NEUTSPOLYS  63.70   --    LYMPHOCYTES  28.60   --    MONOCYTES  5.40   --    EOSINOPHILS  0.80   --    BASOPHILS  0.30   --      Recent Labs      07/13/18 0235  07/14/18   0647   SODIUM  140  138   POTASSIUM  4.0  4.0   CHLORIDE  109  104   CO2  25  26   GLUCOSE  100*  92   BUN  19  14     Recent Labs      07/13/18 0235  07/14/18   0647   CREATININE  0.72  0.76       Imaging:  Ct-cta Head With & W/o-post Process    Result Date: 7/10/2018  7/9/2018 11:49 PM HISTORY/REASON FOR EXAM:  Neurological Deficit TECHNIQUE/EXAM DESCRIPTION: CT angiogram of the [place] without and with contrast.  Initial precontrast images were obtained of the head from the skull base through the vertex.  Postcontrast images were obtained of the [place] following the power injection of nonionic contrast at 5.0 mL/sec. Thin-section helical images were obtained with overlapping reconstruction interval. Coronal and sagittal multiplanar volume reformats were generated.  3D angiographic images were reviewed on PACS.  Maximum intensity projection (MIP) images were generated and reviewed. 100 mL of Omnipaque 350 nonionic contrast was injected intravenously. Low dose optimization technique was utilized for this CT exam  including automated exposure control and adjustment of the mA and/or kV according to patient size. COMPARISON:  None. FINDINGS: The posterior circulation shows the distal vertebral arteries to be patent. The vertebrobasilar confluence is intact. The basilar artery is patent. No aneurysm or occlusive lesion is evident. The anterior circulation shows no stenotic or occlusive lesion. No aneurysm is evident about the [place]. The brain is unremarkable.     1.  CT angiogram of the [place] within normal limits.    Ct-cta Neck With & W/o-post Processing    Result Date: 7/10/2018  7/9/2018 11:49 PM HISTORY/REASON FOR EXAM: Left-sided facial numbness. TECHNIQUE/EXAM DESCRIPTION: CT angiogram of the neck with contrast. Postcontrast images were obtained of the neck from the great vessels through the skull base following the power injection of nonionic contrast at 5.0 mL/sec. Thin-section helical images were obtained with overlapping reconstruction interval. Coronal and oblique multiplanar volume reformats were generated. Cervical internal carotid artery percent stenosis is calculated using the standard method according to the NASCET criteria wherein a segment of uniform caliber mid or distal cervical internal carotid is used as the reference denominator. 3D angiographic images were reviewed on PACS.  Maximum intensity projection (MIP) images were generated and reviewed 100 mL of Omnipaque 350 nonionic contrast was injected intravenously. Low dose optimization technique was utilized for this CT exam including automated exposure control and adjustment of the mA and/or kV according to patient size. COMPARISON:  None. FINDINGS: The arch origins of the great vessels appear intact. The aortic arch shows no abnormality. The right common carotid artery, cervical carotid bifurcation, and cervical internal carotid artery are within normal limits. There is no evidence of significant stenosis, thrombus, or other filling  defect or ulceration. There is no evidence of dissection or aneurysm. The left common carotid artery, cervical carotid bifurcation, and cervical internal carotid artery are within normal limits. There is no evidence of significant stenosis, thrombus, or other filling defect or ulceration. There is no evidence of dissection  or aneurysm. The cervical vertebral arteries are normal bilaterally. The neck soft tissues and lung apices in the field of view are unremarkable.     CT angiogram of the neck within normal limits.    Ct-maxillofacial With & W/o Plus Recons    Result Date: 7/10/2018  7/10/2018 4:00 PM HISTORY/REASON FOR EXAM:  Streptococcal bacteremia. Evaluate for sinusitis or abscess. TECHNIQUE/EXAM DESCRIPTION AND NUMBER OF VIEWS:   CT scan maxillofacial with and without contrast, with reconstructions. Thin-section helical imaging was obtained of the maxillofacial structures from the orbital roofs through the mandible before and after injection of nonionic contrast. Coronal and sagittal multiplanar volume reformat images were generated from the axial data.. 100 mL of Omnipaque 350 nonionic contrast was injected intravenously. Low dose optimization technique was utilized for this CT exam including automated exposure control and adjustment of the mA and/or kV according to patient size. COMPARISON:  None. FINDINGS: There is rounded mucosal thickening or polyp formation in the inferior medial aspect of the right maxillary sinus. The remainder of the visualized paranasal sinuses are clear. No air-fluid level is present to suggest acute sinusitis. No bony expansion is identified. No lytic or blastic bony change is identified. The soft tissues of the neck are within normal limits. No soft tissue abscess or inflammatory change is identified. Cervical lymph nodes are normal in size. No submandibular or parotid gland abnormality is noted. No posterior pharyngeal or laryngeal mucosal abnormality or mass is identified.      1.  Rounded mucosal thickening or polyp formation in the inferior aspect of the right maxillary sinus which could reflect chronic right maxillary sinusitis. 2.  No evidence of acute sinusitis. 3.  Otherwise clear paranasal sinuses.    Ct-post-fossa-ear With & W/o    Result Date: 7/10/2018  7/10/2018 4:00 PM HISTORY/REASON FOR EXAM:  Streptococcal bacteremia, facial numbness rule out facial nerve compression / mastoiditis. TECHNIQUE/EXAM DESCRIPTION AND NUMBER OF VIEWS: CT scan of the temporal bones without and with contrast. The study was performed on a [employer] CT scanner. Contiguous thin section 1.0 mm or 1.25 mm axial and direct coronal images were obtained of the temporal bones. Images are reviewed at magnified bone and soft tissue windows and non-magnified axial images are also displayed at soft tissue windows. Scans are obtained pre and post contrast. 100 mL of Omnipaque 300 nonionic contrast was injected intravenously. Low dose optimization technique was utilized for this CT exam including automated exposure control and adjustment of the mA and/or kV according to patient size. COMPARISON: CTA of the neck without and with contrast 7/9/2018 FINDINGS: On the right, the external auditory canal is normal except for some debris most consistent with cerumen.  The mastoid is normally pneumatized and aerated.  The middle ear cavity and mastoid antrum are clear.  The ossicles are normal in position  and configuration.  The scutum is sharp.  The bony labyrinth is normal, and the internal auditory canal shows normal caliber and is symmetric with the left side.  The facial nerve canal is unremarkable. On the left, the external auditory canal is normal except for some debris consistent with cerumen.  The mastoid is normally pneumatized and aerated.  The middle ear cavity and mastoid antrum are clear.  The ossicles are normal in position and configuration.  The scutum is sharp.  The bony labyrinth is normal, and the internal  auditory canal shows normal caliber and is symmetric with the right side.  The facial nerve canal is unremarkable. The right transverse -- sigmoid sinus and right internal jugular vein are dominant. The paranasal sinuses show clustered retention cyst or polypoid mucosal thickening in the alveolar recess of the right maxillary sinus. There are no air-fluid levels. The posterior fossa structures are unremarkable.  The fourth ventricle is in the midline. There is no abnormal parenchymal or extra-axial/meningeal enhancement in the posterior fossa or supratentorial compartment within the field of view.     CT OF THE TEMPORAL BONES WITHOUT CONTRAST WITHIN NORMAL LIMITS.    Dx-chest-2 Views    Result Date: 7/10/2018    7/9/2018 10:30 PM HISTORY/REASON FOR EXAM: Shortness of Breath TECHNIQUE/EXAM DESCRIPTION:  PA and lateral views of the chest. COMPARISON: None FINDINGS: The cardiac silhouette appears within normal limits. The mediastinal contour appears within normal limits.  The central pulmonary vasculature appears normal. The lungs appear well expanded bilaterally.  Bilateral lungs are clear. No significant pleural effusions are identified. The bony structures appear age-appropriate.     1.  No acute cardiopulmonary disease.    Mr-brain-with & W/o    Result Date: 7/11/2018  7/10/2018 10:21 PM HISTORY/REASON FOR EXAM:  Possible meningitis. TECHNIQUE/EXAM DESCRIPTION:   MRI of the brain without and with contrast. T1 sagittal, T2 fast spin-echo axial, T1 coronal, FLAIR coronal, diffusion-weighted and apparent diffusion coefficient (ADC map) axial images were obtained of the whole brain. T1 postcontrast axial and T1 postcontrast coronal images were obtained. The study was performed on a [employer] 1.5 Kerri MRI scanner. 20 mL Gadavist contrast was administered intravenously. COMPARISON:  None. FINDINGS:  There is tiny area of restricted diffusion in the right frontal gray matter. Minimal contrast enhancement is seen. The  coronal FLAIR images demonstrates abnormal T2 hyperintensity in the adjacent right frontal subarachnoid spaces. The axial gradient echo images demonstrates linear hypointensity within the sulci. There is low-lying cerebellar tonsils. There is no hydrocephalus.  There is no large lesion identified in the expected course of the intracranial portions of the cranial nerves. The visualized flow voids of cerebral vasculature appear normal within limits.  The skull bones appear normal. The paranasal sinuses are clear. The extracranial soft tissue including orbits appear grossly normal.     1.  There is tiny area of restricted diffusion in the right frontal gray matter. Minimal contrast enhancement is seen. The coronal FLAIR images demonstrates abnormal T2 hyperintensity in the adjacent right frontal subarachnoid spaces. The axial gradient echo images demonstrates linear hypointensity within the sulci. These findings likely represents a small cortical venous thrombosis with adjacent tiny infarct. Cortical venous thrombosis may cause focal subarachnoid hemorrhage. Please note that cortical venous thrombosis can be complication of meningitis. 2.  Low-lying cerebellar tonsils. This is concerning for Chiari I malformation. In view of history of lumbar puncture, this finding can be caused by the post lumbar puncture intracranial hypotension.    Mr-cervical Spine-with & W/o    Result Date: 7/11/2018  7/10/2018 10:21 PM HISTORY/REASON FOR EXAM:  Possible bacterial meningitis. TECHNIQUE/EXAM DESCRIPTION: MRI of the cervical spine without and with contrast. The study was performed on a [employer] 1.5 Kerri MRI scanner. T1 sagittal, T2 fast spin-echo sagittal, and gradient echo axial images were obtained of the cervical spine. T1 post-contrast fat suppressed sagittal images were obtained of the cervical spine. Optional T1 post-contrast axial images may be obtained. 20 mL Omniscan contrast was administered intravenously. COMPARISON:  None. FINDINGS: There is prominent posterior epidural fluid collection extending from C6-7 into the thoracic spine. Mild posterior epidural contrast enhancement is seen. Please see thoracic spine report for further details. The cervical spine maintains normal height and alignment. There is no fracture or dislocation. There is no pathologic marrow infiltration. There is no abnormal perivertebral fluid collection. There is no intradural extramedullary fluid collection. There is no abnormal intramedullary T2 signal intensity in the cervical spinal cord. At the level of C2-3, there is no spinal or neural foraminal stenosis. At the level of C3-4, there is no spinal or neural foraminal stenosis. At the level of C4-5, there is no spinal or neural foraminal stenosis. At the level of C5-6, there is no spinal or neural foraminal stenosis. At the level of C6-7, there is no spinal or neural foraminal stenosis. At the level of C7-T1, there is no spinal or neural foraminal stenosis. The visualized posterior fossa structures appear normal within limits.  The cervical spinal cord does not demonstrate any mass or abnormal T2 signal intensity. The visualized pre-and paraspinal soft tissues appear normal within limits.     1.  There is prominent posterior epidural fluid collection extending from C6-7 into the thoracic spine. Mild posterior epidural contrast enhancement is noted at this level. Please see thoracic spine report for further details. 2.  There is no evidence of osteomyelitis in the cervical spine.    Mr-lumbar Spine-with & W/o    Result Date: 7/11/2018  7/10/2018 10:21 PM HISTORY/REASON FOR EXAM:  Possible bacterial meningitis. TECHNIQUE/EXAM DESCRIPTION: MRI of the lumbar spine without and with contrast. The study was performed on a [employer] 1.5 Kerri MRI scanner. T1 sagittal, T2 fast spin-echo sagittal, and T2 axial images were obtained of the lumbar spine. T1 post-contrast fat-suppressed sagittal images were obtained.  20 mL Omniscan contrast was administered intravenously. COMPARISON:  None. FINDINGS: There is abnormal posterior epidural fluid collection seen extending from the lower cervical spine to the level of L3-4. Multifocal abnormal epidural contrast enhancement is seen. The lumbar spine maintains normal height and alignment. There is no evidence of fracture or dislocation. There is no pathologic marrow infiltration. There is no abnormal disc fluid. At the level of L5-S1, there is diffuse disc bulge. Mild facet joint arthropathy is seen. There is no spinal or neural foraminal stenosis. At the level of L4-5, there is no spinal or neural foraminal stenosis. At the level of L3-4, there is no spinal or neural foraminal stenosis. At the level of L2-3, there is no spinal or neural foraminal stenosis. At the level of L1-2, there is no spinal or neural foraminal stenosis. The conus terminates at the level of L1.     1.  There is abnormal posterior epidural fluid collection seen extending from the lower cervical spine to the level of L3-4. Multifocal abnormal epidural contrast enhancement is seen. These findings are concerning for thoracic and lumbar epidural abscess. 2.  There is no evidence of lumbar osteomyelitis. 3.  Findings were discussed with DAVON FERNANDO on 7/11/2018 1:08 PM.    Mr-thoracic Spine-with & W/o    Result Date: 7/11/2018  7/10/2018 10:21 PM HISTORY/REASON FOR EXAM:  Possible bacterial meningitis TECHNIQUE/EXAM DESCRIPTION: MRI of the thoracic spine without and with contrast. The study was performed on a [employer] 1.5 Kerri MRI scanner. T1 sagittal, T2 fast spin-echo sagittal, and T2 axial images were obtained of the thoracic spine. T1 post-contrast fat suppressed sagittal images were obtained. Optional T1 post-contrast axial images may be obtained. 20 mL Gadavist contrast was administered intravenously. COMPARISON:  None. FINDINGS: There is abnormal posterior epidural fluid collection extending from C6-7 to the  lumbar spine. Abnormal peripheral contrast enhancement noted surrounding the epidural fluid collection. The thoracic spinal cord is anteriorly displaced from the levels of T3-4 to  T9-10. Moderate central canal stenosis is seen. There is a hemangioma in the body of T11. There is no abnormal intramedullary T2 signal intensity in the thoracic spinal cord.     There is abnormal posterior epidural fluid collection extending from C6-7 to the lumbar spine. Abnormal peripheral contrast enhancement noted surrounding the epidural fluid collection. The thoracic spinal cord is anteriorly displaced from the levels of T3-4 to  T9-10. Moderate central canal stenosis is seen. These findings are highly concerning for posterior epidural abscess with moderate canal compromise.    Echocardiogram Comp W/o Cont    Result Date: 2018  Transthoracic Echo Report Echocardiography Laboratory CONCLUSIONS No prior study is available for comparison. Normal left ventricular systolic function. Left ventricular ejection fraction is visually estimated to be 65%. Normal diastolic function. Normal inferior vena cava size and inspiratory collapse. Mildly dilated left atrium. Moderate mitral regurgitation. JAYDON POLLARD Exam Date:         2018                    09:47 Exam Location:     Inpatient Priority:          Routine Ordering Physician:        DAVON FERNANDO Referring Physician:       [id number] Pagan Sonographer:               Sunshine Huang Age:    27     Gender:    M MRN:    [id number] :    1990 BSA:    2.21   Ht (in):    70     Wt (lb):    229 Exam Type:     Complete Indications:     Murmur ICD Codes:       R01.1 CPT Codes:       49952 BP:   120    /   73     HR: Technical Quality:       Fair MEASUREMENTS  (Male / Female) Normal Values 2D ECHO LV Diastolic Diameter PLAX        5.1 cm                4.2 - 5.9 / 3.9 - 5.3 cm LV Systolic Diameter PLAX         3.9 cm                2.1 - 4.0 cm IVS Diastolic Thickness            0.87 cm               LVPW Diastolic Thickness          1.3 cm                LVOT Diameter                     2 cm                  RA Diameter                       3.6 cm                Estimated LV Ejection Fraction    65 %                  LV Ejection Fraction MOD BP       62.9 %                >= 55  % LV Ejection Fraction MOD 4C       61.7 %                LV Ejection Fraction MOD 2C       64.1 %                LA Volume Index                   32.2 cm³/m²           16 - 28 cm³/m² DOPPLER AV Peak Velocity                  1.4 m/s               AV Peak Gradient                  8 mmHg                AV Mean Gradient                  4.7 mmHg              LVOT Peak Velocity                0.99 m/s              AV Area Cont Eq vti               2.3 cm²               Mitral E Point Velocity           0.93 m/s              Mitral E to A Ratio               1.8                   Mitral A Duration                 114 ms                MV Pressure Half Time             67.4 ms               MV Area PHT                       3.3 cm²               MV Deceleration Time              232 ms                MR Flow Convergence Radius        0.94 cm               MR ERO PISA                       0.33 cm²              MR Regurgitant Volume PISA        48.7 cm³              PV Peak Velocity                  0.97 m/s              PV Peak Gradient                  3.8 mmHg              RVOT Peak Velocity                0.94 m/s              * Indicates values subject to auto-interpretation LV EF:  65    % FINDINGS Left Ventricle Normal left ventricular chamber size. Normal left ventricular wall thickness. Normal left ventricular systolic function. Left ventricular ejection fraction is visually estimated to be 65%. Normal regional wall motion. Normal diastolic function. Right Ventricle The right ventricle was normal in size and function. Right Atrium The right atrium is normal in size.  Normal inferior vena cava size and  "inspiratory collapse. Left Atrium Mildly dilated left atrium. Mitral Valve Thickened mitral valve leaflets. No mitral stenosis. Moderate mitral regurgitation. ERO by PISA method is  0.33 sq cm. Aortic Valve Structurally normal aortic valve without significant stenosis or regurgitation. Tricuspid Valve Unable to estimate pulmonary artery pressure due to an inadequate tricuspid regurgitant jet. No tricuspid stenosis. Trace tricuspid regurgitation. Unable to estimate pulmonary artery pressure due to an inadequate tricuspid regurgitant jet. Pulmonic Valve The pulmonic valve is not well visualized. No stenosis or regurgitation seen. Pericardium Normal pericardium without effusion. Aorta The aortic root is normal. Hui Modi MD (Electronically Signed) Final Date:     11 July 2018                 13:51      Micro:  Results     Procedure Component Value Units Date/Time    CSF CULTURE [[id number]] Collected:  07/10/18 1440    Order Status:  Completed Specimen:  CSF Updated:  07/13/18 0837     Gram Stain Result No organisms seen.     Significant Indicator NEG     Source CSF     Site TAP     CSF Culture No growth at 72 hours.    BLOOD CULTURE [[id number]]  (Abnormal) Collected:  07/10/18 1419    Order Status:  Completed Specimen:  Blood from Peripheral Updated:  07/13/18 0810     Significant Indicator POS (POS)     Source BLD     Site PERIPHERAL     Blood Culture Growth detected by Bactec instrument. 07/11/2018  02:12 (A)      Viridans Streptococcus  See previous culture for sensitivity report.   (A)    Narrative:       CALL  Feng  TERRY tel. [phone],  CALLED  TERRY tel. [phone] 07/11/2018, 02:12, RB PERF. RESULTS CALLED TO: RN  [phone]  Per Hospital Policy: Only change Specimen Src: to \"Line\" if  specified by physician order.    BLOOD CULTURE [[id number]]  (Abnormal) Collected:  07/10/18 1419    Order Status:  Completed Specimen:  Blood from Peripheral Updated:  07/13/18 0809     Significant Indicator POS (POS)     Source BLD " "    Site PERIPHERAL     Blood Culture Growth detected by Bactec instrument. 07/11/2018  01:49 (A)      Viridans Streptococcus  See previous culture for sensitivity report.   (A)    Narrative:       CALL  Feng  TERRY tel. [phone],  CALLED  TERRY tel. [phone] 07/11/2018, 01:50, RB PERF. RESULTS CALLED TO: RN  [id number]  Per Hospital Policy: Only change Specimen Src: to \"Line\" if  specified by physician order.    BLOOD CULTURE [[id number]]  (Abnormal) Collected:  07/09/18 2333    Order Status:  Completed Specimen:  Blood from Peripheral Updated:  07/13/18 0807     Significant Indicator POS (POS)     Source BLD     Site PERIPHERAL     Blood Culture Growth detected by Bactec instrument. 07/10/2018  12:18 (A)      Viridans Streptococcus  See previous culture for sensitivity report.   (A)    Narrative:       CALL  Feng  [facility] tel. [phone],  CALLED  TERRY tel. [phone] 07/10/2018, 12:22, RB PERF. RESULTS CALLED  TO:[id number] RN  Per Hospital Policy: Only change Specimen Src: to \"Line\" if  specified by physician order.    SENSITIVITY, E-TEST [[id number]] Collected:  07/09/18 2333    Order Status:  Completed Specimen:  Blood Updated:  07/13/18 0807     ETEST Sensitivity FINAL    Narrative:       TERRY tel. [phone] 07/10/2018, 12:22, RB PERF. RESULTS CALLED TO:[id number] RN  Per Hospital Policy: Only change Specimen Src: to \"Line\" if  specified by physician order.    BLOOD CULTURE [[id number]]  (Abnormal)  (Susceptibility) Collected:  07/09/18 2333    Order Status:  Completed Specimen:  Blood from Peripheral Updated:  07/13/18 0807     Significant Indicator POS (POS)     Source BLD     Site PERIPHERAL     Blood Culture Growth detected by Bactec instrument. 07/10/2018  12:17 (A)      Viridans Streptococcus (A)    Narrative:       CALL  Feng  [facility] tel. [phone],  CALLED  TERRY tel. [phone] 07/10/2018, 12:22, RB PERF. RESULTS CALLED  TO:[phone] RN  Per Hospital Policy: Only change Specimen Src: to \"Line\" if  specified by physician order.    " "Culture & Susceptibility     VIRIDANS STREPTOCOCCUS     Antibiotic Sensitivity Microscan Unit Status    Cefotaxime Sensitive <=1 mcg/mL Final    Method: SENSITIVITY, E-TEST    Penicillin Sensitive <=0.12 mcg/mL Final    Method: SENSITIVITY, E-TEST                       BLOOD CULTURE [[id number]] Collected:  07/11/18 1219    Order Status:  Completed Specimen:  Blood from Peripheral Updated:  07/12/18 0751     Significant Indicator NEG     Source BLD     Site PERIPHERAL     Blood Culture No Growth    Note: Blood cultures are incubated for 5 days and  are monitored continuously.Positive blood cultures  are called to the RN and reported as soon as  they are identified.      Narrative:       Per Hospital Policy: Only change Specimen Src: to \"Line\" if  specified by physician order.    BLOOD CULTURE [[id number]] Collected:  07/11/18 1219    Order Status:  Completed Specimen:  Blood from Peripheral Updated:  07/12/18 0751     Significant Indicator NEG     Source BLD     Site PERIPHERAL     Blood Culture No Growth    Note: Blood cultures are incubated for 5 days and  are monitored continuously.Positive blood cultures  are called to the RN and reported as soon as  they are identified.      Narrative:       Per Hospital Policy: Only change Specimen Src: to \"Line\" if  specified by physician order.    GRAM STAIN [[id number]] Collected:  07/10/18 1440    Order Status:  Completed Specimen:  CSF Updated:  07/10/18 1620     Significant Indicator .     Source CSF     Site TAP     Gram Stain Result No organisms seen.    CSF CULTURE [[id number]]     Order Status:  Canceled Specimen:  CSF from Tap     URINALYSIS CULTURE, IF INDICATED [[id number]]     Order Status:  Canceled Specimen:  Urine           Assessment:  Active Hospital Problems    Diagnosis   •    • Streptococcal bacteremia [R78.81, B95.5]   • Hypokalemia [E87.6]   • Normocytic anemia [D64.9]   • Murmur [R01.1]       Plan:  Streptococcus viridans bacteremia  Blood cultures are " positive on 7/9/2018, 7/10/2018  Bcx negative from 7/11/2018  Sed rate is 49 and CRP is 2.99  Continue high-dose penicillin  PICC placed 7/14    Multifocal epidural abscess  Epidural abscesses extending from his cervical spine to L3-4  Evaluated by neurosurgery-no surgical intervention  Currently recommended only medical treatment  On abx above  Will need repeat MRI prior to stopping IV antibiotics    MV Endocarditis   TTE on 7/11/2018 showed mitral regurgitation  CHAR + small pedunculated mobile masses to tips of the valve causing severe regurgitation  MRI of the brain had 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  Consider MRI MRA to rule out mycotic aneurysm  On abx above  Anticipate 6-8 weeks of IV abx  Can do high dose continuous PCN    Discussed with internal medicine/Dr Rascon.

## 2018-07-15 NOTE — PROGRESS NOTES
AOX4 and easy respirations on RA; saturations mid to high 90s. Call bell reinforced and within reach

## 2018-07-15 NOTE — PROGRESS NOTES
Assumed care of patient at 1900. Patient A&O x4. PCN G given Q4. Complains of back pain, relieved with Oxy and fiorcet. Patient complains of constipation. VSS. Call bell at bedside. No other needs.

## 2018-07-15 NOTE — PROGRESS NOTES
Patient complaint of dysarthria, episode of confusion, left upper extremity numbness.  No motor weakness.  No other concerns.    Unhappy regarding de-escalation of pain regimen from oxycodone to tramadol.  Counseled and educated regarding adverse effects of narcotics.  Oxycodone reinitiated tramadol stopped.    Discussed case with Dr. Brown from neurology.  Per Dr. Brown's recommendation increase Keppra to 1500 mg twice daily, obtain interval EEG tomorrow.    Per Dr. Brown's recommendation obtain a stat CT of the head with and without contrast and CT of the cervical spine with and without contrast.  Need to rule out an acute bleed versus compression from underlying epidural abscess.  Subsequently obtain MRI evaluation of the head and cervical spine.  Orders for these placed.  Plan of care discussed again with the patient and his family.  Nursing informed to continue neuro check to inform hospitalist team for any changes or inform neurosurgical team.  Stat orders for imaging have been placed.     Carrington Rascon M.D.

## 2018-07-16 LAB
ALBUMIN SERPL BCP-MCNC: 3.8 G/DL (ref 3.2–4.9)
ALBUMIN/GLOB SERPL: 1.2 G/DL
ALP SERPL-CCNC: 68 U/L (ref 30–99)
ALT SERPL-CCNC: 26 U/L (ref 2–50)
ANION GAP SERPL CALC-SCNC: 8 MMOL/L (ref 0–11.9)
AST SERPL-CCNC: 15 U/L (ref 12–45)
BACTERIA BLD CULT: NORMAL
BACTERIA BLD CULT: NORMAL
BASOPHILS # BLD AUTO: 0.3 % (ref 0–1.8)
BASOPHILS # BLD: 0.03 K/UL (ref 0–0.12)
BILIRUB SERPL-MCNC: 0.5 MG/DL (ref 0.1–1.5)
BUN SERPL-MCNC: 15 MG/DL (ref 8–22)
CALCIUM SERPL-MCNC: 9.4 MG/DL (ref 8.5–10.5)
CHLORIDE SERPL-SCNC: 100 MMOL/L (ref 96–112)
CO2 SERPL-SCNC: 27 MMOL/L (ref 20–33)
CREAT SERPL-MCNC: 0.75 MG/DL (ref 0.5–1.4)
EOSINOPHIL # BLD AUTO: 0.21 K/UL (ref 0–0.51)
EOSINOPHIL NFR BLD: 2.4 % (ref 0–6.9)
ERYTHROCYTE [DISTWIDTH] IN BLOOD BY AUTOMATED COUNT: 41.3 FL (ref 35.9–50)
GLOBULIN SER CALC-MCNC: 3.2 G/DL (ref 1.9–3.5)
GLUCOSE SERPL-MCNC: 111 MG/DL (ref 65–99)
HCT VFR BLD AUTO: 40.9 % (ref 42–52)
HGB BLD-MCNC: 13.3 G/DL (ref 14–18)
IMM GRANULOCYTES # BLD AUTO: 0.12 K/UL (ref 0–0.11)
IMM GRANULOCYTES NFR BLD AUTO: 1.4 % (ref 0–0.9)
LYMPHOCYTES # BLD AUTO: 2.53 K/UL (ref 1–4.8)
LYMPHOCYTES NFR BLD: 29 % (ref 22–41)
MAGNESIUM SERPL-MCNC: 2.3 MG/DL (ref 1.5–2.5)
MCH RBC QN AUTO: 26.3 PG (ref 27–33)
MCHC RBC AUTO-ENTMCNC: 32.5 G/DL (ref 33.7–35.3)
MCV RBC AUTO: 81 FL (ref 81.4–97.8)
MONOCYTES # BLD AUTO: 0.4 K/UL (ref 0–0.85)
MONOCYTES NFR BLD AUTO: 4.6 % (ref 0–13.4)
NEUTROPHILS # BLD AUTO: 5.42 K/UL (ref 1.82–7.42)
NEUTROPHILS NFR BLD: 62.3 % (ref 44–72)
NRBC # BLD AUTO: 0 K/UL
NRBC BLD-RTO: 0 /100 WBC
PHOSPHATE SERPL-MCNC: 4.1 MG/DL (ref 2.5–4.5)
PLATELET # BLD AUTO: 367 K/UL (ref 164–446)
PMV BLD AUTO: 10.2 FL (ref 9–12.9)
POTASSIUM SERPL-SCNC: 4.3 MMOL/L (ref 3.6–5.5)
PROT SERPL-MCNC: 7 G/DL (ref 6–8.2)
RBC # BLD AUTO: 5.05 M/UL (ref 4.7–6.1)
SIGNIFICANT IND 70042: NORMAL
SIGNIFICANT IND 70042: NORMAL
SITE SITE: NORMAL
SITE SITE: NORMAL
SODIUM SERPL-SCNC: 135 MMOL/L (ref 135–145)
SOURCE SOURCE: NORMAL
SOURCE SOURCE: NORMAL
WBC # BLD AUTO: 8.7 K/UL (ref 4.8–10.8)

## 2018-07-16 PROCEDURE — 85025 COMPLETE CBC W/AUTO DIFF WBC: CPT

## 2018-07-16 PROCEDURE — 83735 ASSAY OF MAGNESIUM: CPT

## 2018-07-16 PROCEDURE — 770006 HCHG ROOM/CARE - MED/SURG/GYN SEMI*

## 2018-07-16 PROCEDURE — 95951 EEG: CPT | Mod: 52

## 2018-07-16 PROCEDURE — 4A10X4Z MONITORING OF CENTRAL NERVOUS ELECTRICAL ACTIVITY, EXTERNAL APPROACH: ICD-10-PCS | Performed by: PSYCHIATRY & NEUROLOGY

## 2018-07-16 PROCEDURE — A9270 NON-COVERED ITEM OR SERVICE: HCPCS | Performed by: INTERNAL MEDICINE

## 2018-07-16 PROCEDURE — 84100 ASSAY OF PHOSPHORUS: CPT

## 2018-07-16 PROCEDURE — 700111 HCHG RX REV CODE 636 W/ 250 OVERRIDE (IP): Performed by: INTERNAL MEDICINE

## 2018-07-16 PROCEDURE — 99233 SBSQ HOSP IP/OBS HIGH 50: CPT | Performed by: INTERNAL MEDICINE

## 2018-07-16 PROCEDURE — 99232 SBSQ HOSP IP/OBS MODERATE 35: CPT | Performed by: INTERNAL MEDICINE

## 2018-07-16 PROCEDURE — 700105 HCHG RX REV CODE 258: Performed by: INTERNAL MEDICINE

## 2018-07-16 PROCEDURE — 700102 HCHG RX REV CODE 250 W/ 637 OVERRIDE(OP): Performed by: INTERNAL MEDICINE

## 2018-07-16 PROCEDURE — 80053 COMPREHEN METABOLIC PANEL: CPT

## 2018-07-16 RX ADMIN — BUTALBITAL, ACETAMINOPHEN, AND CAFFEINE 1 TABLET: 50; 325; 40 TABLET ORAL at 14:48

## 2018-07-16 RX ADMIN — SODIUM CHLORIDE 3 MILLION UNITS: 9 INJECTION, SOLUTION INTRAVENOUS at 05:51

## 2018-07-16 RX ADMIN — NICOTINE 7 MG: 7 PATCH, EXTENDED RELEASE TRANSDERMAL at 05:50

## 2018-07-16 RX ADMIN — OXYCODONE HYDROCHLORIDE 5 MG: 5 TABLET ORAL at 15:24

## 2018-07-16 RX ADMIN — LEVETIRACETAM 1500 MG: 500 TABLET, FILM COATED ORAL at 17:39

## 2018-07-16 RX ADMIN — GENTAMICIN SULFATE 80 MG: 40 INJECTION, SOLUTION INTRAMUSCULAR; INTRAVENOUS at 15:30

## 2018-07-16 RX ADMIN — SODIUM CHLORIDE 3 MILLION UNITS: 9 INJECTION, SOLUTION INTRAVENOUS at 11:13

## 2018-07-16 RX ADMIN — SODIUM CHLORIDE 3 MILLION UNITS: 9 INJECTION, SOLUTION INTRAVENOUS at 01:59

## 2018-07-16 RX ADMIN — SODIUM CHLORIDE 3 MILLION UNITS: 9 INJECTION, SOLUTION INTRAVENOUS at 14:44

## 2018-07-16 RX ADMIN — SODIUM CHLORIDE 3 MILLION UNITS: 9 INJECTION, SOLUTION INTRAVENOUS at 21:28

## 2018-07-16 RX ADMIN — OXYCODONE HYDROCHLORIDE 5 MG: 5 TABLET ORAL at 10:26

## 2018-07-16 RX ADMIN — GENTAMICIN SULFATE 80 MG: 40 INJECTION, SOLUTION INTRAMUSCULAR; INTRAVENOUS at 21:45

## 2018-07-16 RX ADMIN — BUTALBITAL, ACETAMINOPHEN, AND CAFFEINE 1 TABLET: 50; 325; 40 TABLET ORAL at 23:37

## 2018-07-16 RX ADMIN — LEVETIRACETAM 1500 MG: 500 TABLET, FILM COATED ORAL at 05:50

## 2018-07-16 RX ADMIN — OXYCODONE HYDROCHLORIDE 5 MG: 5 TABLET ORAL at 21:28

## 2018-07-16 RX ADMIN — SODIUM CHLORIDE 3 MILLION UNITS: 9 INJECTION, SOLUTION INTRAVENOUS at 17:39

## 2018-07-16 ASSESSMENT — PAIN SCALES - GENERAL
PAINLEVEL_OUTOF10: 7
PAINLEVEL_OUTOF10: 3
PAINLEVEL_OUTOF10: 7
PAINLEVEL_OUTOF10: 3
PAINLEVEL_OUTOF10: 0

## 2018-07-16 ASSESSMENT — ENCOUNTER SYMPTOMS
PND: 0
HEMOPTYSIS: 0
SINUS PAIN: 0
FLANK PAIN: 0
HALLUCINATIONS: 0
SPUTUM PRODUCTION: 0
FALLS: 0
MYALGIAS: 0
NECK PAIN: 1
CONSTIPATION: 0
MEMORY LOSS: 0
ABDOMINAL PAIN: 0
WHEEZING: 0
PALPITATIONS: 0
INSOMNIA: 0
PHOTOPHOBIA: 0
WEIGHT LOSS: 0
SHORTNESS OF BREATH: 0
MYALGIAS: 1
BLURRED VISION: 0
HEADACHES: 1
HEARTBURN: 0
SEIZURES: 0
LOSS OF CONSCIOUSNESS: 0
WEAKNESS: 0
NERVOUS/ANXIOUS: 0
CLAUDICATION: 0
SPEECH CHANGE: 0
STRIDOR: 0
DIZZINESS: 0
BACK PAIN: 1
HEADACHES: 0
ORTHOPNEA: 0
COUGH: 0
VOMITING: 0
DOUBLE VISION: 0
DIAPHORESIS: 0
DIARRHEA: 0
NAUSEA: 0
CHILLS: 0
SENSORY CHANGE: 0
FEVER: 0
TREMORS: 0
FOCAL WEAKNESS: 0
DEPRESSION: 0
TINGLING: 0
SORE THROAT: 0

## 2018-07-16 ASSESSMENT — LIFESTYLE VARIABLES: SUBSTANCE_ABUSE: 0

## 2018-07-16 NOTE — CARE PLAN
Problem: Safety  Goal: Will remain free from falls  Outcome: PROGRESSING AS EXPECTED  Reviewed pt's mobility status, discussed with care team pt's needs, verifying appropriate safety precautions in place, providing pt education, ensuring call light within reach, non slip socks in use, evaluating needs, continuing with current plan of care.       Problem: Knowledge Deficit  Goal: Knowledge of disease process/condition, treatment plan, diagnostic tests, and medications will improve  Outcome: PROGRESSING AS EXPECTED  Educated pt on POC, activities, encouraging pt to ask questions, providing answerers to pt questions, educating pt about medications, encouraging pt evolvement in care process, and continuing with current POC.

## 2018-07-16 NOTE — PROGRESS NOTES
[facility] Hospitalist Progress Note    Date of Service: 7/16/2018    Chief Complaint  27 y.o. male admitted 7/9/2018 with numbness, left-sided, fevers on/off for 2 months with associated neck pain, photophobia, headaches in the outpatient setting. Diagnosed with streptococcal viridans bacteremia c/b infective endocarditis involving the mitral valve with severe MR, seeding of dura c/b extensive epidural abscess and likely embolic stroke c/b meningitis, ? Venous thrombosis.     Interval Problem Update  Patient seen and evaluated on rounds  Interval events from yesterday reviewed in yesterday's note  Dr Alvarado's office notified me of need for repeat eval per ID / Dr Tellez  ID team considering gentamicin   Symptoms from yesterday have resolved  No further headaches, this morning  Dysarthria has resolved  Left upper extremity numbness has resolved  Left upper extremity facial twitching as result  Back pain has improved  Appreciate neurosurgery / neurology input   No other complaints at this time    Consultants/Specialty  Neurology   Neurosurgery  Infectious disease  Cardiology   Cardiothoracic surgery     Disposition  Ongoing hospitalization for ongoing acute issues  Needs arrangement of outpatient infusion / abx therapy  SW recommended LTAC, referral - order placed      Review of Systems   Constitutional: Negative for chills, diaphoresis, fever, malaise/fatigue and weight loss.   HENT: Negative for congestion, ear discharge, ear pain, hearing loss, nosebleeds, sinus pain, sore throat and tinnitus.    Eyes: Negative for blurred vision, double vision and photophobia.   Respiratory: Negative for cough, hemoptysis, sputum production, shortness of breath, wheezing and stridor.    Cardiovascular: Negative for chest pain, palpitations, orthopnea, claudication, leg swelling and PND.   Gastrointestinal: Negative for abdominal pain, constipation, diarrhea, heartburn, nausea and vomiting.   Genitourinary: Negative for dysuria,  flank pain, frequency, hematuria and urgency.   Musculoskeletal: Positive for back pain and neck pain. Negative for falls, joint pain and myalgias.   Skin: Negative for itching and rash.   Neurological: Positive for headaches (Still intermittently). Negative for dizziness, tingling, tremors, sensory change, speech change, focal weakness, seizures, loss of consciousness and weakness.   Psychiatric/Behavioral: Negative for depression, hallucinations, memory loss, substance abuse and suicidal ideas. The patient is not nervous/anxious and does not have insomnia.       Physical Exam  Laboratory/Imaging   Hemodynamics  Temp (24hrs), Av.3 °C (97.4 °F), Min:36.2 °C (97.1 °F), Max:36.7 °C (98 °F)   Temperature: 36.7 °C (98 °F)  Pulse  Av.8  Min: 54  Max: 114    Blood Pressure: 100/59      Respiratory      Respiration: 16, Pulse Oximetry: 91 %             Fluids    Intake/Output Summary (Last 24 hours) at 18 1025  Last data filed at 18 0600   Gross per 24 hour   Intake             1020 ml   Output              600 ml   Net              420 ml       Nutrition  Orders Placed This Encounter   Procedures   • Diet Order Regular     Standing Status:   Standing     Number of Occurrences:   1     Order Specific Question:   Diet:     Answer:   Regular [1]     Physical Exam   Constitutional: He is oriented to person, place, and time. He appears well-developed and well-nourished. No distress.   HENT:   Head: Normocephalic.   Mouth/Throat: Oropharynx is clear and moist. No oropharyngeal exudate.   Eyes: Conjunctivae and EOM are normal. Pupils are equal, round, and reactive to light. No scleral icterus.   Neck: Normal range of motion. No JVD present.   Cardiovascular: Normal rate and regular rhythm.  Exam reveals no gallop and no friction rub.    Murmur heard.  Pulmonary/Chest: Effort normal and breath sounds normal. No stridor. No respiratory distress. He has no wheezes. He has no rales. He exhibits no tenderness.    Abdominal: Soft. Bowel sounds are normal. He exhibits no distension. There is no tenderness. There is no rebound and no guarding.   Musculoskeletal: Normal range of motion. He exhibits no edema, tenderness or deformity.   Neurological: He is alert and oriented to person, place, and time. No cranial nerve deficit. Coordination normal.   No neck stiffness, negative Kernig's and Brudzinski sign.  No temporal/mastoid tenderness. No saddle anesthesia. No urinary or bowel incontinence. Motor strength is 5/5 in b/l LE and UE. No sensory deficits. DTR are normal in all extremities.  Motor exam / neuro exam is unchanged.    Skin: Skin is warm, dry and intact. He is not diaphoretic.   Psychiatric: He has a normal mood and affect. His behavior is normal. Judgment and thought content normal.       Recent Labs      07/14/18   0647  07/15/18   0900  07/16/18   0228   WBC  7.5  7.2  8.7   RBC  4.58*  4.65*  5.05   HEMOGLOBIN  12.4*  12.4*  13.3*   HEMATOCRIT  37.3*  37.8*  40.9*   MCV  81.4  81.3*  81.0*   MCH  27.1  26.7*  26.3*   MCHC  33.2*  32.8*  32.5*   RDW  41.3  40.8  41.3   PLATELETCT  292  303  367   MPV  9.9  10.3  10.2     Recent Labs      07/14/18   0647  07/15/18   0900  07/16/18   0228   SODIUM  138  139  135   POTASSIUM  4.0  4.3  4.3   CHLORIDE  104  103  100   CO2  26  29  27   GLUCOSE  92  89  111*   BUN  14  17  15   CREATININE  0.76  0.80  0.75   CALCIUM  9.1  9.2  9.4                      Assessment/Plan     Acute bacterial endocarditis- (present on admission)   Assessment & Plan    Streptococcal (Viridans)  Involving mitral valve with severe MR  CHAR 07/13/18 confirms this  Discussed with Dr Annie Pereyra  Evaluated by Dr Alvarado from CTS this stay 07/14/2018, no surgical interventions planned at this time    Outpatient cardiology / CTS follow up with interval Echo / CHAR in 6 months was recommended on 07/14/2018  Now there is a concern for ongoing embolic infarcts of the brain and worsening epidural  abscess  I informed Dr. Alvarado's office ([place]) regarding this  Per ID, I have requested a repeat cardiothoracic surgery evaluation  Will await repeat cardiothoracic surgery evaluation to ensure they do not want to do a same-stay valvular repair/cleanout  ID team on board  On IV penicillin  ID team considering initiation of IV gentamicin, will need ongoing close monitoring of gentamicin toxicity  Defer abx therapy to ID team at this time        Epidural abscess- (present on admission)   Assessment & Plan    Extensive from cervical to lumbar spine without compromise   No neurological deficits  Worsening imaging findings now on 07/15/2018  Seen by Dr Torres from neurosurgery   No surgical interventions at this time per Dr Torres  ID team on board  Abx per ID team   Will need interval MRI in 3 weeks from prior MRI per neurosurgery team or earlier per NS team   Continue close clinical monitoring of neurological status  If any worsening notify neurosurgical team   Outpatient follow up with Dr Torres         Streptococcal meningitis- (present on admission)   Assessment & Plan    Likely embolic from bacteremia, Infective endocarditis  Continue Abx per ID recommendations        Streptococcal bacteremia- (present on admission)   Assessment & Plan    Complicated by infective endocarditis / epidural abscess  Embolic phenomenon likely leading to CNS infarct, ? 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)- (present on admission)   Assessment & Plan    On MRI brain negative MRV  Suspect related to embolic phenomenon from infective endocarditis  Evaluated by neurology  Worsening on interval MRI now  Neurology team did not recommend anticoagulation, no antiplatelet therapy  On Keppra 1500 mg BID per neurology team recommendations, wean  off coming days as infectious issues are managed  Defer need for antibiotic therapy to infectious disease team  Defer other needs to neurology team  Defer cardiothoracic surgery needs to cardiothoracic surgery team  Defer neurosurgical interventions and neurosurgical team        Non-rheumatic mitral regurgitation- (present on admission)   Assessment & Plan    Related to infective endocarditis  Severe  No surgical interventions planned at this time  Will need outpatient cardiology / CTS follow up  Dr Alvarado again notified by me today of repeat evaluation   Interval echo / CHAR in 6 weeks of hospital discharge         Normocytic anemia- (present on admission)   Assessment & Plan    Suspect consumptive from bacteremia  Monitor Hb / Restrictive transfusion strategy  Improved            Quality-Core Measures   Reviewed items::  Labs reviewed, Medications reviewed and Radiology images reviewed  Cruz catheter::  No Cruz  DVT prophylaxis pharmacological::  Not indicated at this time, ambulatory  DVT prophylaxis - mechanical:  SCDs  Antibiotics:  Treating active infection/contamination beyond 24 hours perioperative coverage  Assessed for rehabilitation services:  Patient returned to prior level of function, rehabilitation not indicated at this time

## 2018-07-16 NOTE — PROGRESS NOTES
Dr Brown updated me regarding results.     CT C spine - Slightly increased enhancement in the anterior aspect of the spinal canal beginning at the level of C6-7 extending inferiorly which may represent the epidural abscess seen on MRI.    CT Head - Focal cortical enhancement in the right frontal lobe. Questionable enhancing focus in the right cerebellum may be present. Findings are nonspecific and may represent septic emboli, cerebritis, or venous infarcts    MR C spine - Posterior epidural enhancement which appears to extend slightly more cephalad to approximately the C4 level and extending inferiorly below the level of the scan into the thoracic spine. This may indicate interval worsening of infectious process. There is  no definite evidence of osteomyelitis.    MR brain - Previously demonstrated small enhancing focus of cortical T2 and FLAIR signal hyperintensity in the right posterior frontal lobe appears more prominent compared to the prior study. In contrast to the prior study, previously demonstrated diffusion restriction is no longer seen in this lesion. Findings could be consistent with evolving cortical infarct or related to patient's known meningitis. 2 new punctate foci of enhancement in the high right posterior frontal cortex and right superior cerebellum with minimal associated T2 and FLAIR signal hyperintensity associated with the lesion in the right cerebellum. Gradient echo sequences demonstrate a faint focus of signal hypointensity corresponding to the location of this lesion suggestive of early hemorrhagic conversion. Findings could be consistent with subacute infarcts of embolic etiology. No evidence of new acute territorial infarct. Low-lying cerebellar tonsils.    Case discussed with Dr. Brown.  He recommended notifying neurosurgery regarding these findings.  He has not recommended anticoagulation, aspirin at this point in time.    I emergently notified Dr. Torres from neurosurgery regarding  these findings who has already come by and evaluated the patient.    I notified Dr. Rousseau from infectious disease regarding these new findings.  Discussed if I should change the antibiotics and consider addition of intravenous vancomycin or ceftriaxone with these new findings.  I also raised the possibility if penicillin was achieving good concentration/permeability across the blood-brain barrier.  She informed me that at this point in time patient has cultures documenting sensitivity to penicillin.  She reports that the penicillin should achieve a good permeability across the blood-brain barrier.  She reports to continue the penicillin as it would be superior.  No changes in antibiotics based on ID recommendation and on my discussion with Dr. Rousseau.    Otherwise I discussed with the patient and reevaluated him.  Neuro exam remains unchanged.  No motor weakness.  Deep tendon reflexes are full.  Minimal dysarthria if anything and this is questionable also.    We will continue close clinical monitoring.  Neuro checks every 4 hours to be maintained.    Will repeat bcx x 2    Carrington Rascon M.D.  7:13 PM

## 2018-07-16 NOTE — PROGRESS NOTES
Assumed pt care at 0700 and received bedside report.     Pt alert and oriented X 4. Pt denies chest pain, sob, numbness and tingling, nausea and vomiting, headache, and blurry or double vision. Pt c/o of neck and back pain and medicated per MAR. pain. Pt ambulating ad kassy in room. Educated on TKO and PICC line.   POC discussed and education provided on administered medications. All questions and concerns addressed. Fall precautions, hourly rounding and Q4 hour neuro checks in place.

## 2018-07-16 NOTE — PROCEDURES
VIDEO ELECTROENCEPHALOGRAM REPORT        Referring MD: Dr. Rascon.      DOS: 7/16/2018 (total recording of 27 minutes).      INDICATION:  Nicholas Neumann 27 y.o. male presenting with difficulty speaking.      CURRENT ANTIEPILEPTIC REGIMEN: Levetiracetam 500 mg bid.      TECHNIQUE: 30 channel video electroencephalogram (EEG) was performed in accordance with the international 10-20 system. The study was reviewed in bipolar and referential montages. The recording examined the patient during wakeful and drowsy state(s).      DESCRIPTION OF THE RECORD:  During the wakefulness, the background showed a symmetrical 10 Hz alpha activity posteriorly with amplitude of 70 mV.  There was reactivity to eye closure/opening.  A normal anterior-posterior gradient was noted with faster beta frequencies seen anteriorly.  During drowsiness, theta/delta frequencies were seen.     ACTIVATION PROCEDURES:   Hyperventilation was performed by the patient for a total of 3 minutes. The technician performing the test noted good effort. This technique failed to produce any significant electroencephalographic changes.     Intermittent Photic stimulation was performed in a stepwise fashion from 1 to 30 Hz, but without any significant background changes.      ICTAL AND/OR INTERICTAL FINDINGS:   No focal or generalized epileptiform activity noted. There is intermittent, briefly, rhythmic slowing in the frontal regions. No clinical events or seizures were reported or recorded during the study.      EKG: sampling of the EKG recording demonstrated sinus rhythm.      EVENTS:  None.      INTERPRETATION:  This is an abnormal video EEG recording in the awake and drowsy state(s).  Intermittently, there is rhythmic slowing in the frontal regions. The findings may increase risk for seizures and suggest underlying area(s) of cortical irritability and structural abnormality. No seizures captured during the study. Clinical and radiological correlation is  recommended.        Mitch Flowers MD   Epilepsy and Neurodiagnostics.   Clinical  of Neurology [facility].   Diplomate in Neurology, Epilepsy, and Electrodiagnostic Medicine.   Office: [phone]  Fax: [phone]    MARITZA / KIMBERLYN    DD:  07/16/2018 15:18:31  DT:  07/16/2018 15:24:39    D#:  [id number]  Job#:  [id number]

## 2018-07-16 NOTE — PROGRESS NOTES
"Pharmacy Kinetics 27 y.o. male on gentamicin day # 1 2018    Dosing Weight: 85 kg  Currently on Gentamicin 80 mg iv q8hr    Indication for treatment: viridans strep mitral valve endocarditis and epidural abscess    Pertinent history per medical record: Admitted on 2018 for viridans strep mitral valve endocarditis and epidural abscess. Cardiothoracic surgery, neurosurgery, and ID are consulting.     Other antibiotics: penicillin 3 million units IV q4h    Allergies: Nkda [no known drug allergy]     List concerns for renal function: none at this time    Pertinent cultures to date:   18 blood - peripheral x 2: viridans streptococci (pcn OKREY < 0.12)  7/10/18 blood - peripheral x 2: viridans streptococci  7/10/18 CSF: negative  18 blood - peripheral x 2: no growth  7/15/18 blood - peripheral x 2: no growth    Recent Labs      18   0647  07/15/18   0900  18   0228   WBC  7.5  7.2  8.7   NEUTSPOLYS   --    --   62.30     Recent Labs      18   0647  07/15/18   0900  18   0228   BUN  14  17  15   CREATININE  0.76  0.80  0.75   ALBUMIN   --    --   3.8       Intake/Output Summary (Last 24 hours) at 18 1325  Last data filed at 18 1200   Gross per 24 hour   Intake             1700 ml   Output              600 ml   Net             1100 ml      Blood pressure 100/59, pulse 77, temperature 36.7 °C (98 °F), resp. rate 16, height 1.778 m (5' 10\"), weight 105.1 kg (231 lb 11.3 oz), SpO2 91 %. Temp (24hrs), Av.3 °C (97.4 °F), Min:36.2 °C (97.1 °F), Max:36.7 °C (98 °F)      A/P   1. Gentamicin dose change: start 80 mg IV q8h  2. Next gentamicin level: ~3 days  3. Goal trough: undetectable  4. Comments: plan is for 2 weeks of IV gentamicin per discussion with Dr. Geoff Morse, PharmD, BCPS-ID      "

## 2018-07-16 NOTE — CONSULTS
DATE OF SERVICE:  07/16/2018    CARDIAC SURGERY CONSULTATION    REFERRING PHYSICIAN:  Carrington Rascon MD    CONSULTING PHYSICIAN:  Kevin Fernandez MD    REASON FOR CONSULTATION:  Mitral valve endocarditis.    CHIEF COMPLAINT:  Back pain.    HISTORY OF PRESENT ILLNESS:  This is a 27-year-old  otherwise   healthy with no history of IV drug use, surgery or traumatic infection   exposure who presented with 2-month history of generalized fatigue, weight   loss as well as intermittent pain, burning sensation in his back and was found   to have streptococcal bacteremia, epidural abscess and mitral valve   endocarditis.  The patient states that for about 2 months prior to this, he   had been feeling terrible, was losing weight and had severe fatigue, he   started developing pain in his thoracic and cervical area of his neck after   about a month.  Then a week prior to this hospitalization, he noted some   epigastric and left anterior chest pain that was sharp in nature, would come   and go, and was also sometimes associated with chest heaviness.  He has had an   exhaustive workup with many medical consultations during this   hospitalization.  Most recent MR C-spine reveals posterior epidural   enhancement that is extended slightly more cephalad to the C4 level indicating   interval worsening of the epidural abscess.  He has also had a repeat brain   MRI that revealed a more prominent right posterior frontal lobe enhancing   focus.  There were also 2 new punctate foci of enhancement in the high right   posterior frontal cortex and right superior cerebellum.  There is also some   suggestion of early hemorrhagic conversion.  His most recent transesophageal   echocardiogram revealed small pedunculated mobile masses adherent to the tips   of the mitral valve leaflet at A1 and P1.  These prolapse into the left atrium   and cause mild coaptation of the valve with resultant severe mitral   regurgitation and ejection  fraction of 65%.  EKG revealed sinus tachycardia   with no evidence of heart block.  I was asked to reevaluate this patient after   my colleague, Dr. Alvarado, evaluated him over the weekend for mitral valve   endocarditis given the presence of some evolving small embolic infarcts on MRI   as well as apparent worsening of epidural abscess on imaging.  The patient   notes he has had some what he calls some evidence of neurologic symptoms in   which he had left facial numbness and numbness of his left upper extremity.    This resolved after a few minutes yesterday.  Currently, he denies any   paresthesias in any of his extremities and is able to move them appropriately.    He does complain of constant severe burning of his cervical and thoracic   spinal areas.  He also noted some more heaviness to his chest and that it is a   little bit harder to breathe.  Overall, he is comfortable, sitting in bed,   accompanied by his mother.    PAST MEDICAL HISTORY:  Neck pain and Bell's palsy.    PAST SURGICAL HISTORY:  Left knee surgery ligament reconstruction,   tonsillectomy.    FAMILY HISTORY:  His maternal grandmother had mitral valve prolapse.  Father   has heart disease and hypertension.    SOCIAL HISTORY:  The patient smokes a half pack per day currently.  He does   not drink alcohol.  Denies any illicit drug use including IV drug abuse.  He   is a .    ALLERGIES:  No known drug allergies.    CURRENT OUTPATIENT MEDICATIONS:  None.    REVIEW OF SYSTEMS:  A complete 14-point review of systems was performed and is   negative except as noted in the HPI.    PHYSICAL EXAMINATION:  VITAL SIGNS:  Height 178 cm, weight 105 kilograms.  Temperature 36.7 degrees   Celsius, pulse 77, respiratory rate 16, satting 91% on room air, blood   pressure 100/59.  GENERAL:  He is a well-appearing adult  male, in no apparent   distress, accompanied by his mother.  HEENT:  Normocephalic, atraumatic.  His oral and nasal  mucosae are moist.  His   sclerae are anicteric.  Dentition is normal.  NECK:  There is no jugular venous distention.  There are no carotid bruits.    There is no cervical lymphadenopathy.  RESPIRATORY:  Moves air well bilaterally without the use of accessory   respiratory muscles.  CARDIOVASCULAR:  Normal rate, regular rhythm, 3/6 holosystolic murmur most   prominent at the apex.  ABDOMEN:  Soft, nontender, nondistended.  There are no palpable masses.  EXTREMITIES:  Warm and well perfused.  There is no cyanosis, clubbing or   edema.  No splinter hemorrhages are noted.  NEUROLOGIC:  He is alert and oriented x3.  There are no gross neurologic   deficits.  PSYCHIATRIC:  Mood and affect normal.  SKIN:  Normal without rashes.  MUSCULOSKELETAL:  Joints are within normal limits.    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    PLAN:  This is a previously healthy 27-year-old  who presented   with back pain, nausea and fatigue and was found to have strep bacteremia and   has been diagnosed with endocarditis of the native mitral valve as well as a   spinal epidural abscess.  Based on most recent MR of the C-spine of the spine,   the epidural abscess, there has been interval worsening of the spinal   epidural abscess.  Neurosurgery has been following closely.  Also, on most   recent brain MRI, there is some new punctate lesions as well as some early   evidence of a hemorrhagic conversion.  I had a long discussion with the   patient as well as his mother regarding his condition.  We discussed that even   if mitral valve repair were to recur at this point, he would have an almost   certain chance of reinfection of the mitral valve until source control was   obtained of the spinal epidural abscess.  We discussed that reinfection of the    valve in a much poorer prognosis.  In addition, we also discussed the   other risks of neurologic impairment that could be associated with   perioperative systemic heparinization needed for mitral valve repair.  We   discussed needing to obtain source control from the epidural space as well as   his blood prior to proceeding with any heart valve operation to reduce the   effects of the incidence of reinfection of the valve.  The lesions on the   mitral valve are quite small and have a relatively low risk of embolization   given their size is definitely less than 1 cm.  They are causing severe mitral   regurgitation; however, this seems to have a mild hemodynamic consequence for   this patient who is hemodynamically stable and he only has mild symptoms.    There is no evidence of heart block.  Therefore, I would recommend proceeding   with source control of the spinal epidural space and continuing with long-term   IV antibiotic therapy.  We discussed reevaluation in a 6-week time period,   which we could discuss mitral valve repair versus replacement if it is needed   at that time.    Thank you very much for allowing me to participate in the care of this   patient.  We will keep you updated with his progress.    Total time spent speaking with the patient and family as well as the patient's   physicians and reviewing diagnostic images was greater than 70 minutes.       ____________________________________     MD ROSA Kate / KIMBERLYN    DD:  07/16/2018 11:57:34  DT:  07/16/2018 13:09:37    D#:  [id number]  Job#:  [id number]

## 2018-07-16 NOTE — PROGRESS NOTES
Face and arm sensory phenomenon earlier today, as well as dysarthria. Repeat MR cervical shows non compressive extension to C5, not account for septic emboli picture R lateral frontal and cerebellar MR brain changes. My neuro exam is full strength and sensation, mild dysarthria. Will still have low threshold for proximal laminectomy int e coming days, will follow neuro checks

## 2018-07-16 NOTE — EEG PROGRESS NOTE
VIDEO ELECTROENCEPHALOGRAM REPORT      Referring MD: Dr. Rascon.     DOS: 7/16/2018 (total recording of 27 minutes).     INDICATION:  Nicholas Neumann 27 y.o. male presenting with difficulty speaking.     CURRENT ANTIEPILEPTIC REGIMEN: Levetiracetam 500 mg bid.     TECHNIQUE: 30 channel video electroencephalogram (EEG) was performed in accordance with the international 10-20 system. The study was reviewed in bipolar and referential montages. The recording examined the patient during wakeful and drowsy state(s).     DESCRIPTION OF THE RECORD:  During the wakefulness, the background showed a symmetrical 10 Hz alpha activity posteriorly with amplitude of 70 mV.  There was reactivity to eye closure/opening.  A normal anterior-posterior gradient was noted with faster beta frequencies seen anteriorly.  During drowsiness, theta/delta frequencies were seen.    ACTIVATION PROCEDURES:   Hyperventilation was performed by the patient for a total of 3 minutes. The technician performing the test noted good effort. This technique failed to produce any significant electroencephalographic changes.    Intermittent Photic stimulation was performed in a stepwise fashion from 1 to 30 Hz, but without any significant background changes.     ICTAL AND/OR INTERICTAL FINDINGS:   No focal or generalized epileptiform activity noted. There is intermittent, briefly, rhythmic slowing in the frontal regions. No clinical events or seizures were reported or recorded during the study.     EKG: sampling of the EKG recording demonstrated sinus rhythm.     EVENTS:  None.     INTERPRETATION:  This is an abnormal video EEG recording in the awake and drowsy state(s).  Intermittently, there is rhythmic slowing in the frontal regions. The findings may increase risk for seizures and suggest underlying area(s) of cortical irritability and structural abnormality. No seizures captured during the study. Clinical and radiological correlation is  recommended.      Mitch Flowers MD   Epilepsy and Neurodiagnostics.   Clinical  of Neurology [facility].   Diplomate in Neurology, Epilepsy, and Electrodiagnostic Medicine.   Office: [phone]  Fax: [phone]

## 2018-07-16 NOTE — DISCHARGE PLANNING
Anticipated Discharge Disposition: LTACH    Action: pt discussed during rounds. Pt is requiring long does of IV abx and is not medically clear. LSW requested LTACH order be placed.    CHOICE faxed to [facility]    Barriers to Discharge: Medical Clearance; LTACH acceptance    Plan: LSW to f/u for LTACH acceptance

## 2018-07-16 NOTE — PROGRESS NOTES
Neurosurgery Progress Note    Subjective:  Patient re-evaluated last PM with face and arm sensory phenomenon  Seen and evaluated by Dr. Torres with updated imaging of cervical spine and brain  This showed non compressive extension to C5 and septic emboli right lateral frontal and cerebellar   Remains neuro intact   Today states speech is improved  Patient with C5 to L3 epidural abscess  Mild intermittent headache  Tolerable and improving spine pain   Eating, voiding, has BM  No saddle anesthesia  Feels strong   Denies fascial symptoms   WBC 8.7  Afebrile    Exam:  GCS 15, EOMI  A&O x 3  Appropriate   CN 2-12 grossly intact   Upper and lower motor grossly strong, sensory grossly intact   DTRs =2 without Clonus or Hoffmans.        BP  Min: 100/59  Max: 127/86  Pulse  Av.2  Min: 77  Max: 88  Resp  Av.6  Min: 16  Max: 20  Temp  Av.3 °C (97.4 °F)  Min: 36.2 °C (97.1 °F)  Max: 36.7 °C (98 °F)  SpO2  Av.6 %  Min: 91 %  Max: 96 %    No Data Recorded    Recent Labs      18   0647  07/15/18   0918   WBC  7.5  7.2  8.7   RBC  4.58*  4.65*  5.05   HEMOGLOBIN  12.4*  12.4*  13.3*   HEMATOCRIT  37.3*  37.8*  40.9*   MCV  81.4  81.3*  81.0*   MCH  27.1  26.7*  26.3*   MCHC  33.2*  32.8*  32.5*   RDW  41.3  40.8  41.3   PLATELETCT  292  303  367   MPV  9.9  10.3  10.2     Recent Labs      1847  07/15/18   0918   SODIUM  138  139  135   POTASSIUM  4.0  4.3  4.3   CHLORIDE  104  103  100   CO2  26  29  27   GLUCOSE  92  89  111*   BUN  14    15   CREATININE  0.76  0.80  0.75   CALCIUM  9.1  9.2  9.4               Intake/Output       07/15/18 07 - 18 0659 18 - 18 06 Total [id number] Total       Intake    P.O.  --  720 720  --  -- --    P.O. -- 720 720 -- -- --    I.V.  --  300 300  --  -- --    PCN G -- 300 300 -- -- --    Total Intake -- 1020 1020 -- -- --       Output    Urine  --  600 600  --  -- --     Urine Void (mL) (non-catheter) -- 600 600 -- -- --    Total Output -- 600 600 -- -- --       Net I/O     -- 420 420 -- -- --            Intake/Output Summary (Last 24 hours) at 07/16/18 0824  Last data filed at 07/16/18 0600   Gross per 24 hour   Intake             1020 ml   Output              600 ml   Net              420 ml            • levETIRAcetam  1,500 mg BID   • oxyCODONE immediate-release  5 mg Q4HRS PRN   • nicotine  7 mg Daily-0600   • penicillin g  3 Million Units Q4HRS   • acetaminophen/caffeine/butalbital 325-40-50 mg  1 Tab Q6HRS PRN   • labetalol  10 mg Q4HRS PRN   • ondansetron  4 mg Q4HRS PRN   • ondansetron  4 mg Q4HRS PRN       Assessment and Plan:  Hospital day #4  No indication for evacuation of epidural abscess at this time as patient remains neuro intact  Recommend continued ID close follow up  Continue ID & IM care  No neurosurgery intervention planned unless he has neuro changes   Dr. oTrres recommending repeat spine MRI in 3 weeks

## 2018-07-16 NOTE — DISCHARGE PLANNING
Received Choice form at 3:46 pm  Agency/Facility Name: [facility]  Referral sent per Choice form @ 3:47 pm.

## 2018-07-16 NOTE — PROGRESS NOTES
Discussed imaging with Dr. Sapp/neuro interventional/neuroradiology, and imaging visualized and reviewed by this MD.  There is extension of his cervical epidural abscess, need contact neurosurgery ASAP as it appears this lesion has not improved with antibiotic therapy.  Furthermore the patient has new findings on his brain imaging; lesions are small and difficult to qualify, and these lesions could represent focal cerebritis/infectious versus thrombotic uncertain if arterial or venous in origin.  The right cerebellar lesion may have some early hemorrhagic transformation on gradient imaging which is very sensitive for blood product.    Plan  Increased dose of Keppra to 1500 every 12 hours, this is for some transient seizure activity that is resolved and the patient is back to his neurological baseline presently.  Neurosurgery for worsening of cervical epidural abscess.  Infectious disease opinion for same.  Hold antithrombotics; this patient is at risk for hemorrhagic transformation of CNS lesions due to ongoing infection, septic endocarditis, as well as some hemorrhagic transformation of right cerebellar lesion.  Core RN staff to monitor with frequent neurochecks  Consider repeat cardiac imaging with mitral valve septic endocarditis, may need antibiotic change if no resolution/improvement    Neuro hospitalist shift change in a.m., call Dr. Funez if any questions.  Patient discussed with Dr. Rascon who agrees with the above plan of care.

## 2018-07-16 NOTE — PROGRESS NOTES
Pt did not want to take CHG bath at this time. Pt has wipes at bs. Informed of importance of wiping down with these and the need to cover his picc line up so it doesn't get wet.

## 2018-07-16 NOTE — PROGRESS NOTES
Infectious Disease Progress Note    Author: Chantal Tellez M.D. Date & Time of service: 2018  2:09 PM    Chief Complaint:  FU viridans strep sepsis/IE    Interval History:  2018 T-max 98.9 WBC 13.1 platelets 301 creatinine 0.63 feels better but still has headache and back pain  - Tmax 98.6 wbc 9.3 ongoing back pain. WBC 9   AF WBC 7.5 back pain slightly improved, did not take any pain meds all day yesterday, tolerating abx without issues, plan of care d/w pt in detail  7/15 AF no CBC ongoing neck and back pain, patient received PICC without any issues, also seen by CT surgery with no plans for surgical intervention at this  2018-T-max 98.  Continues to get headache.  Apparently continues to get new emboli  Labs Reviewed, Medications Reviewed and Radiology Reviewed.    Review of Systems:  Review of Systems   Constitutional: Negative for chills and fever.   Respiratory: Negative for sputum production.    Cardiovascular: Negative for chest pain and leg swelling.   Gastrointestinal: Negative for abdominal pain, nausea and vomiting.   Genitourinary: Negative for dysuria.   Musculoskeletal: Positive for back pain, myalgias and neck pain.   Neurological: Negative for sensory change, speech change and headaches.       Hemodynamics:  Temp (24hrs), Av.4 °C (97.5 °F), Min:36.2 °C (97.1 °F), Max:36.7 °C (98 °F)  Temperature: 36.7 °C (98 °F)  Pulse  Av.8  Min: 54  Max: 114   Blood Pressure: 100/59       Physical Exam:  Physical Exam   Constitutional: He is oriented to person, place, and time. He appears well-developed and well-nourished. No distress.   HENT:   Head: Normocephalic.   Mouth/Throat: No oropharyngeal exudate.   Eyes: EOM are normal. Pupils are equal, round, and reactive to light. No scleral icterus.   Neck: Neck supple.   Cardiovascular: Regular rhythm.    Murmur heard.  Pulmonary/Chest: Effort normal. He has no wheezes. He has no rales.   Abdominal: Soft. There is no tenderness.  There is no rebound.   Musculoskeletal: He exhibits no edema.   Right upper extremity PICC line nontender   Neurological: He is alert and oriented to person, place, and time. He displays normal reflexes. No cranial nerve deficit. Coordination normal.   Skin: No rash noted. No erythema.   Vitals reviewed.      Meds:    Current Facility-Administered Medications:   •  MD ALERT... gentamicin  •  gentamicin (GARAMYCIN) IV  •  levETIRAcetam  •  oxyCODONE immediate-release  •  nicotine  •  penicillin g  •  acetaminophen/caffeine/butalbital 325-40-50 mg  •  labetalol **OR** [DISCONTINUED] hydrALAZINE  •  ondansetron  •  ondansetron    Labs:  Recent Labs      07/14/18   0647  07/15/18   0900  07/16/18   0228   WBC  7.5  7.2  8.7   RBC  4.58*  4.65*  5.05   HEMOGLOBIN  12.4*  12.4*  13.3*   HEMATOCRIT  37.3*  37.8*  40.9*   MCV  81.4  81.3*  81.0*   MCH  27.1  26.7*  26.3*   RDW  41.3  40.8  41.3   PLATELETCT  292  303  367   MPV  9.9  10.3  10.2   NEUTSPOLYS   --    --   62.30   LYMPHOCYTES   --    --   29.00   MONOCYTES   --    --   4.60   EOSINOPHILS   --    --   2.40   BASOPHILS   --    --   0.30     Recent Labs      07/14/18   0647  07/15/18   0900  07/16/18   0228   SODIUM  138  139  135   POTASSIUM  4.0  4.3  4.3   CHLORIDE  104  103  100   CO2  26  29  27   GLUCOSE  92  89  111*   BUN  14  17  15     Recent Labs      07/14/18   0647  07/15/18   0900  07/16/18   0228   ALBUMIN   --    --   3.8   TBILIRUBIN   --    --   0.5   ALKPHOSPHAT   --    --   68   TOTPROTEIN   --    --   7.0   ALTSGPT   --    --   26   ASTSGOT   --    --   15   CREATININE  0.76  0.80  0.75       Imaging:  Ct-cta Head With & W/o-post Process    Result Date: 7/10/2018  7/9/2018 11:49 PM HISTORY/REASON FOR EXAM:  Neurological Deficit TECHNIQUE/EXAM DESCRIPTION: CT angiogram of the [place] without and with contrast.  Initial precontrast images were obtained of the head from the skull base through the vertex.  Postcontrast images were  obtained of the [place] following the power injection of nonionic contrast at 5.0 mL/sec. Thin-section helical images were obtained with overlapping reconstruction interval. Coronal and sagittal multiplanar volume reformats were generated.  3D angiographic images were reviewed on PACS.  Maximum intensity projection (MIP) images were generated and reviewed. 100 mL of Omnipaque 350 nonionic contrast was injected intravenously. Low dose optimization technique was utilized for this CT exam including automated exposure control and adjustment of the mA and/or kV according to patient size. COMPARISON:  None. FINDINGS: The posterior circulation shows the distal vertebral arteries to be patent. The vertebrobasilar confluence is intact. The basilar artery is patent. No aneurysm or occlusive lesion is evident. The anterior circulation shows no stenotic or occlusive lesion. No aneurysm is evident about the [place]. The brain is unremarkable.     1.  CT angiogram of the [place] within normal limits.    Ct-cta Neck With & W/o-post Processing    Result Date: 7/10/2018  7/9/2018 11:49 PM HISTORY/REASON FOR EXAM: Left-sided facial numbness. TECHNIQUE/EXAM DESCRIPTION: CT angiogram of the neck with contrast. Postcontrast images were obtained of the neck from the great vessels through the skull base following the power injection of nonionic contrast at 5.0 mL/sec. Thin-section helical images were obtained with overlapping reconstruction interval. Coronal and oblique multiplanar volume reformats were generated. Cervical internal carotid artery percent stenosis is calculated using the standard method according to the NASCET criteria wherein a segment of uniform caliber mid or distal cervical internal carotid is used as the reference denominator. 3D angiographic images were reviewed on PACS.  Maximum intensity projection (MIP) images were generated and reviewed 100 mL of Omnipaque 350 nonionic contrast was  injected intravenously. Low dose optimization technique was utilized for this CT exam including automated exposure control and adjustment of the mA and/or kV according to patient size. COMPARISON:  None. FINDINGS: The arch origins of the great vessels appear intact. The aortic arch shows no abnormality. The right common carotid artery, cervical carotid bifurcation, and cervical internal carotid artery are within normal limits. There is no evidence of significant stenosis, thrombus, or other filling defect or ulceration. There is no evidence of dissection or aneurysm. The left common carotid artery, cervical carotid bifurcation, and cervical internal carotid artery are within normal limits. There is no evidence of significant stenosis, thrombus, or other filling defect or ulceration. There is no evidence of dissection  or aneurysm. The cervical vertebral arteries are normal bilaterally. The neck soft tissues and lung apices in the field of view are unremarkable.     CT angiogram of the neck within normal limits.    Ct-maxillofacial With & W/o Plus Recons    Result Date: 7/10/2018  7/10/2018 4:00 PM HISTORY/REASON FOR EXAM:  Streptococcal bacteremia. Evaluate for sinusitis or abscess. TECHNIQUE/EXAM DESCRIPTION AND NUMBER OF VIEWS:   CT scan maxillofacial with and without contrast, with reconstructions. Thin-section helical imaging was obtained of the maxillofacial structures from the orbital roofs through the mandible before and after injection of nonionic contrast. Coronal and sagittal multiplanar volume reformat images were generated from the axial data.. 100 mL of Omnipaque 350 nonionic contrast was injected intravenously. Low dose optimization technique was utilized for this CT exam including automated exposure control and adjustment of the mA and/or kV according to patient size. COMPARISON:  None. FINDINGS: There is rounded mucosal thickening or polyp formation in the inferior medial aspect of the right  maxillary sinus. The remainder of the visualized paranasal sinuses are clear. No air-fluid level is present to suggest acute sinusitis. No bony expansion is identified. No lytic or blastic bony change is identified. The soft tissues of the neck are within normal limits. No soft tissue abscess or inflammatory change is identified. Cervical lymph nodes are normal in size. No submandibular or parotid gland abnormality is noted. No posterior pharyngeal or laryngeal mucosal abnormality or mass is identified.     1.  Rounded mucosal thickening or polyp formation in the inferior aspect of the right maxillary sinus which could reflect chronic right maxillary sinusitis. 2.  No evidence of acute sinusitis. 3.  Otherwise clear paranasal sinuses.    Ct-post-fossa-ear With & W/o    Result Date: 7/10/2018  7/10/2018 4:00 PM HISTORY/REASON FOR EXAM:  Streptococcal bacteremia, facial numbness rule out facial nerve compression / mastoiditis. TECHNIQUE/EXAM DESCRIPTION AND NUMBER OF VIEWS: CT scan of the temporal bones without and with contrast. The study was performed on a [employer] CT scanner. Contiguous thin section 1.0 mm or 1.25 mm axial and direct coronal images were obtained of the temporal bones. Images are reviewed at magnified bone and soft tissue windows and non-magnified axial images are also displayed at soft tissue windows. Scans are obtained pre and post contrast. 100 mL of Omnipaque 300 nonionic contrast was injected intravenously. Low dose optimization technique was utilized for this CT exam including automated exposure control and adjustment of the mA and/or kV according to patient size. COMPARISON: CTA of the neck without and with contrast 7/9/2018 FINDINGS: On the right, the external auditory canal is normal except for some debris most consistent with cerumen.  The mastoid is normally pneumatized and aerated.  The middle ear cavity and mastoid antrum are clear.  The ossicles are normal in position  and configuration.   The scutum is sharp.  The bony labyrinth is normal, and the internal auditory canal shows normal caliber and is symmetric with the left side.  The facial nerve canal is unremarkable. On the left, the external auditory canal is normal except for some debris consistent with cerumen.  The mastoid is normally pneumatized and aerated.  The middle ear cavity and mastoid antrum are clear.  The ossicles are normal in position and configuration.  The scutum is sharp.  The bony labyrinth is normal, and the internal auditory canal shows normal caliber and is symmetric with the right side.  The facial nerve canal is unremarkable. The right transverse -- sigmoid sinus and right internal jugular vein are dominant. The paranasal sinuses show clustered retention cyst or polypoid mucosal thickening in the alveolar recess of the right maxillary sinus. There are no air-fluid levels. The posterior fossa structures are unremarkable.  The fourth ventricle is in the midline. There is no abnormal parenchymal or extra-axial/meningeal enhancement in the posterior fossa or supratentorial compartment within the field of view.     CT OF THE TEMPORAL BONES WITHOUT CONTRAST WITHIN NORMAL LIMITS.    Dx-chest-2 Views    Result Date: 7/10/2018    7/9/2018 10:30 PM HISTORY/REASON FOR EXAM: Shortness of Breath TECHNIQUE/EXAM DESCRIPTION:  PA and lateral views of the chest. COMPARISON: None FINDINGS: The cardiac silhouette appears within normal limits. The mediastinal contour appears within normal limits.  The central pulmonary vasculature appears normal. The lungs appear well expanded bilaterally.  Bilateral lungs are clear. No significant pleural effusions are identified. The bony structures appear age-appropriate.     1.  No acute cardiopulmonary disease.    Mr-brain-with & W/o    Result Date: 7/11/2018  7/10/2018 10:21 PM HISTORY/REASON FOR EXAM:  Possible meningitis. TECHNIQUE/EXAM DESCRIPTION:   MRI of the brain without and with contrast. T1  sagittal, T2 fast spin-echo axial, T1 coronal, FLAIR coronal, diffusion-weighted and apparent diffusion coefficient (ADC map) axial images were obtained of the whole brain. T1 postcontrast axial and T1 postcontrast coronal images were obtained. The study was performed on a [employer] 1.5 Kerri MRI scanner. 20 mL Gadavist contrast was administered intravenously. COMPARISON:  None. FINDINGS:  There is tiny area of restricted diffusion in the right frontal gray matter. Minimal contrast enhancement is seen. The coronal FLAIR images demonstrates abnormal T2 hyperintensity in the adjacent right frontal subarachnoid spaces. The axial gradient echo images demonstrates linear hypointensity within the sulci. There is low-lying cerebellar tonsils. There is no hydrocephalus.  There is no large lesion identified in the expected course of the intracranial portions of the cranial nerves. The visualized flow voids of cerebral vasculature appear normal within limits.  The skull bones appear normal. The paranasal sinuses are clear. The extracranial soft tissue including orbits appear grossly normal.     1.  There is tiny area of restricted diffusion in the right frontal gray matter. Minimal contrast enhancement is seen. The coronal FLAIR images demonstrates abnormal T2 hyperintensity in the adjacent right frontal subarachnoid spaces. The axial gradient echo images demonstrates linear hypointensity within the sulci. These findings likely represents a small cortical venous thrombosis with adjacent tiny infarct. Cortical venous thrombosis may cause focal subarachnoid hemorrhage. Please note that cortical venous thrombosis can be complication of meningitis. 2.  Low-lying cerebellar tonsils. This is concerning for Chiari I malformation. In view of history of lumbar puncture, this finding can be caused by the post lumbar puncture intracranial hypotension.    Mr-cervical Spine-with & W/o    Result Date: 7/11/2018  7/10/2018 10:21 PM  HISTORY/REASON FOR EXAM:  Possible bacterial meningitis. TECHNIQUE/EXAM DESCRIPTION: MRI of the cervical spine without and with contrast. The study was performed on a [employer] 1.5 Kerri MRI scanner. T1 sagittal, T2 fast spin-echo sagittal, and gradient echo axial images were obtained of the cervical spine. T1 post-contrast fat suppressed sagittal images were obtained of the cervical spine. Optional T1 post-contrast axial images may be obtained. 20 mL Omniscan contrast was administered intravenously. COMPARISON: None. FINDINGS: There is prominent posterior epidural fluid collection extending from C6-7 into the thoracic spine. Mild posterior epidural contrast enhancement is seen. Please see thoracic spine report for further details. The cervical spine maintains normal height and alignment. There is no fracture or dislocation. There is no pathologic marrow infiltration. There is no abnormal perivertebral fluid collection. There is no intradural extramedullary fluid collection. There is no abnormal intramedullary T2 signal intensity in the cervical spinal cord. At the level of C2-3, there is no spinal or neural foraminal stenosis. At the level of C3-4, there is no spinal or neural foraminal stenosis. At the level of C4-5, there is no spinal or neural foraminal stenosis. At the level of C5-6, there is no spinal or neural foraminal stenosis. At the level of C6-7, there is no spinal or neural foraminal stenosis. At the level of C7-T1, there is no spinal or neural foraminal stenosis. The visualized posterior fossa structures appear normal within limits.  The cervical spinal cord does not demonstrate any mass or abnormal T2 signal intensity. The visualized pre-and paraspinal soft tissues appear normal within limits.     1.  There is prominent posterior epidural fluid collection extending from C6-7 into the thoracic spine. Mild posterior epidural contrast enhancement is noted at this level. Please see thoracic spine report  for further details. 2.  There is no evidence of osteomyelitis in the cervical spine.    Mr-lumbar Spine-with & W/o    Result Date: 7/11/2018  7/10/2018 10:21 PM HISTORY/REASON FOR EXAM:  Possible bacterial meningitis. TECHNIQUE/EXAM DESCRIPTION: MRI of the lumbar spine without and with contrast. The study was performed on a [employer] Sign[employer] 1.5 Kerri MRI scanner. T1 sagittal, T2 fast spin-echo sagittal, and T2 axial images were obtained of the lumbar spine. T1 post-contrast fat-suppressed sagittal images were obtained. 20 mL Omniscan contrast was administered intravenously. COMPARISON:  None. FINDINGS: There is abnormal posterior epidural fluid collection seen extending from the lower cervical spine to the level of L3-4. Multifocal abnormal epidural contrast enhancement is seen. The lumbar spine maintains normal height and alignment. There is no evidence of fracture or dislocation. There is no pathologic marrow infiltration. There is no abnormal disc fluid. At the level of L5-S1, there is diffuse disc bulge. Mild facet joint arthropathy is seen. There is no spinal or neural foraminal stenosis. At the level of L4-5, there is no spinal or neural foraminal stenosis. At the level of L3-4, there is no spinal or neural foraminal stenosis. At the level of L2-3, there is no spinal or neural foraminal stenosis. At the level of L1-2, there is no spinal or neural foraminal stenosis. The conus terminates at the level of L1.     1.  There is abnormal posterior epidural fluid collection seen extending from the lower cervical spine to the level of L3-4. Multifocal abnormal epidural contrast enhancement is seen. These findings are concerning for thoracic and lumbar epidural abscess. 2.  There is no evidence of lumbar osteomyelitis. 3.  Findings were discussed with DAVON FERNANDO on 7/11/2018 1:08 PM.    Mr-thoracic Spine-with & W/o    Result Date: 7/11/2018  7/10/2018 10:21 PM HISTORY/REASON FOR EXAM:  Possible bacterial meningitis  TECHNIQUE/EXAM DESCRIPTION: MRI of the thoracic spine without and with contrast. The study was performed on a [employer] Signa 1.5 Kerri MRI scanner. T1 sagittal, T2 fast spin-echo sagittal, and T2 axial images were obtained of the thoracic spine. T1 post-contrast fat suppressed sagittal images were obtained. Optional T1 post-contrast axial images may be obtained. 20 mL Gadavist contrast was administered intravenously. COMPARISON:  None. FINDINGS: There is abnormal posterior epidural fluid collection extending from C6-7 to the lumbar spine. Abnormal peripheral contrast enhancement noted surrounding the epidural fluid collection. The thoracic spinal cord is anteriorly displaced from the levels of T3-4 to  T9-10. Moderate central canal stenosis is seen. There is a hemangioma in the body of T11. There is no abnormal intramedullary T2 signal intensity in the thoracic spinal cord.     There is abnormal posterior epidural fluid collection extending from C6-7 to the lumbar spine. Abnormal peripheral contrast enhancement noted surrounding the epidural fluid collection. The thoracic spinal cord is anteriorly displaced from the levels of T3-4 to  T9-10. Moderate central canal stenosis is seen. These findings are highly concerning for posterior epidural abscess with moderate canal compromise.    Echocardiogram Comp W/o Cont    Result Date: 7/11/2018  Transthoracic Echo Report Echocardiography Laboratory CONCLUSIONS No prior study is available for comparison. Normal left ventricular systolic function. Left ventricular ejection fraction is visually estimated to be 65%. Normal diastolic function. Normal inferior vena cava size and inspiratory collapse. Mildly dilated left atrium. Moderate mitral regurgitation. JAYDON POLLARD Exam Date:         07/11/2018                    09:47 Exam Location:     Inpatient Priority:          Routine Ordering Physician:        DAVON FERNANDO Referring Physician:       KASSIE Lockett Sonographer:                Sunshine Huang Age:    27     Gender:    M MRN:    [id number] :    1990 BSA:    2.21   Ht (in):    70     Wt (lb):    229 Exam Type:     Complete Indications:     Murmur ICD Codes:       R01.1 CPT Codes:       61839 BP:   120    /   73     HR: Technical Quality:       Fair MEASUREMENTS  (Male / Female) Normal Values 2D ECHO LV Diastolic Diameter PLAX        5.1 cm                4.2 - 5.9 / 3.9 - 5.3 cm LV Systolic Diameter PLAX         3.9 cm                2.1 - 4.0 cm IVS Diastolic Thickness           0.87 cm               LVPW Diastolic Thickness          1.3 cm                LVOT Diameter                     2 cm                  RA Diameter                       3.6 cm                Estimated LV Ejection Fraction    65 %                  LV Ejection Fraction MOD BP       62.9 %                >= 55  % LV Ejection Fraction MOD 4C       61.7 %                LV Ejection Fraction MOD 2C       64.1 %                LA Volume Index                   32.2 cm³/m²           16 - 28 cm³/m² DOPPLER AV Peak Velocity                  1.4 m/s               AV Peak Gradient                  8 mmHg                AV Mean Gradient                  4.7 mmHg              LVOT Peak Velocity                0.99 m/s              AV Area Cont Eq vti               2.3 cm²               Mitral E Point Velocity           0.93 m/s              Mitral E to A Ratio               1.8                   Mitral A Duration                 114 ms                MV Pressure Half Time             67.4 ms               MV Area PHT                       3.3 cm²               MV Deceleration Time              232 ms                MR Flow Convergence Radius        0.94 cm               MR ERO PISA                       0.33 cm²              MR Regurgitant Volume PISA        48.7 cm³              PV Peak Velocity                  0.97 m/s              PV Peak Gradient                  3.8 mmHg              RVOT Peak Velocity            "     0.94 m/s              * Indicates values subject to auto-interpretation LV EF:  65    % FINDINGS Left Ventricle Normal left ventricular chamber size. Normal left ventricular wall thickness. Normal left ventricular systolic function. Left ventricular ejection fraction is visually estimated to be 65%. Normal regional wall motion. Normal diastolic function. Right Ventricle The right ventricle was normal in size and function. Right Atrium The right atrium is normal in size.  Normal inferior vena cava size and inspiratory collapse. Left Atrium Mildly dilated left atrium. Mitral Valve Thickened mitral valve leaflets. No mitral stenosis. Moderate mitral regurgitation. ERO by PISA method is  0.33 sq cm. Aortic Valve Structurally normal aortic valve without significant stenosis or regurgitation. Tricuspid Valve Unable to estimate pulmonary artery pressure due to an inadequate tricuspid regurgitant jet. No tricuspid stenosis. Trace tricuspid regurgitation. Unable to estimate pulmonary artery pressure due to an inadequate tricuspid regurgitant jet. Pulmonic Valve The pulmonic valve is not well visualized. No stenosis or regurgitation seen. Pericardium Normal pericardium without effusion. Aorta The aortic root is normal. Hui Modi MD (Electronically Signed) Final Date:     11 July 2018                 13:51      Micro:  Results     Procedure Component Value Units Date/Time    BLOOD CULTURE [[id number]] Collected:  07/15/18 2049    Order Status:  Completed Specimen:  Blood from Peripheral Updated:  07/16/18 0641     Significant Indicator NEG     Source BLD     Site PERIPHERAL     Blood Culture No Growth    Note: Blood cultures are incubated for 5 days and  are monitored continuously.Positive blood cultures  are called to the RN and reported as soon as  they are identified.      Narrative:       Per Hospital Policy: Only change Specimen Src: to \"Line\" if  specified by physician order.    BLOOD CULTURE [[id number]] Collected: " " 07/15/18 2049    Order Status:  Completed Specimen:  Blood from Peripheral Updated:  07/16/18 0641     Significant Indicator NEG     Source BLD     Site PERIPHERAL     Blood Culture No Growth    Note: Blood cultures are incubated for 5 days and  are monitored continuously.Positive blood cultures  are called to the RN and reported as soon as  they are identified.      Narrative:       Per Hospital Policy: Only change Specimen Src: to \"Line\" if  specified by physician order.    CSF CULTURE [[id number]] Collected:  07/10/18 1440    Order Status:  Completed Specimen:  CSF Updated:  07/13/18 0837     Gram Stain Result No organisms seen.     Significant Indicator NEG     Source CSF     Site TAP     CSF Culture No growth at 72 hours.    BLOOD CULTURE [[id number]]  (Abnormal) Collected:  07/10/18 1419    Order Status:  Completed Specimen:  Blood from Peripheral Updated:  07/13/18 0810     Significant Indicator POS (POS)     Source BLD     Site PERIPHERAL     Blood Culture Growth detected by Bactec instrument. 07/11/2018  02:12 (A)      Viridans Streptococcus  See previous culture for sensitivity report.   (A)    Narrative:       CALL  Feng  [facility] tel. [phone],  CALLED  [facility] tel. [phone] 07/11/2018, 02:12, RB PERF. RESULTS CALLED TO: RN  [phone]  Per Hospital Policy: Only change Specimen Src: to \"Line\" if  specified by physician order.    BLOOD CULTURE [[id number]]  (Abnormal) Collected:  07/10/18 1419    Order Status:  Completed Specimen:  Blood from Peripheral Updated:  07/13/18 0809     Significant Indicator POS (POS)     Source BLD     Site PERIPHERAL     Blood Culture Growth detected by Bactec instrument. 07/11/2018  01:49 (A)      Viridans Streptococcus  See previous culture for sensitivity report.   (A)    Narrative:       CALL  Feng  [facility] tel. [phone],  CALLED  [facility] tel. [phone] 07/11/2018, 01:50, RB PERF. RESULTS CALLED TO: RN  [phone]  Per Hospital Policy: Only change Specimen Src: to \"Line\" if  specified by " "physician order.    BLOOD CULTURE [[id number]]  (Abnormal) Collected:  07/09/18 2333    Order Status:  Completed Specimen:  Blood from Peripheral Updated:  07/13/18 0807     Significant Indicator POS (POS)     Source BLD     Site PERIPHERAL     Blood Culture Growth detected by Bactec instrument. 07/10/2018  12:18 (A)      Viridans Streptococcus  See previous culture for sensitivity report.   (A)    Narrative:       CALL  Feng  [facility] tel. [phone],  CALLED  [facility] tel. [phone] 07/10/2018, 12:22, RB PERF. RESULTS CALLED  TO:[phone] RN  Per Hospital Policy: Only change Specimen Src: to \"Line\" if  specified by physician order.    SENSITIVITY, E-TEST [[id number]] Collected:  07/09/18 2333    Order Status:  Completed Specimen:  Blood Updated:  07/13/18 0807     ETEST Sensitivity FINAL    Narrative:       [facility] tel. [phone] 07/10/2018, 12:22, RB PERF. RESULTS CALLED TO:[phone] RN  Per Hospital Policy: Only change Specimen Src: to \"Line\" if  specified by physician order.    BLOOD CULTURE [[id number]]  (Abnormal)  (Susceptibility) Collected:  07/09/18 2333    Order Status:  Completed Specimen:  Blood from Peripheral Updated:  07/13/18 0807     Significant Indicator POS (POS)     Source BLD     Site PERIPHERAL     Blood Culture Growth detected by Bactec instrument. 07/10/2018  12:17 (A)      Viridans Streptococcus (A)    Narrative:       CALL  Feng  [facility] tel. [phone],  CALLED  [facility] tel. [phone] 07/10/2018, 12:22, RB PERF. RESULTS CALLED  TO:[phone] RN  Per Hospital Policy: Only change Specimen Src: to \"Line\" if  specified by physician order.    Culture & Susceptibility     VIRIDANS STREPTOCOCCUS     Antibiotic Sensitivity Microscan Unit Status    Cefotaxime Sensitive <=1 mcg/mL Final    Method: SENSITIVITY, E-TEST    Penicillin Sensitive <=0.12 mcg/mL Final    Method: SENSITIVITY, E-TEST                       BLOOD CULTURE [[id number]] Collected:  07/11/18 1219    Order Status:  Completed Specimen:  Blood from Peripheral Updated:  " "07/12/18 0751     Significant Indicator NEG     Source BLD     Site PERIPHERAL     Blood Culture No Growth    Note: Blood cultures are incubated for 5 days and  are monitored continuously.Positive blood cultures  are called to the RN and reported as soon as  they are identified.      Narrative:       Per Hospital Policy: Only change Specimen Src: to \"Line\" if  specified by physician order.    BLOOD CULTURE [[id number]] Collected:  07/11/18 1219    Order Status:  Completed Specimen:  Blood from Peripheral Updated:  07/12/18 0751     Significant Indicator NEG     Source BLD     Site PERIPHERAL     Blood Culture No Growth    Note: Blood cultures are incubated for 5 days and  are monitored continuously.Positive blood cultures  are called to the RN and reported as soon as  they are identified.      Narrative:       Per Hospital Policy: Only change Specimen Src: to \"Line\" if  specified by physician order.    GRAM STAIN [[id number]] Collected:  07/10/18 1440    Order Status:  Completed Specimen:  CSF Updated:  07/10/18 1620     Significant Indicator .     Source CSF     Site TAP     Gram Stain Result No organisms seen.    CSF CULTURE [[id number]]     Order Status:  Canceled Specimen:  CSF from Tap     URINALYSIS CULTURE, IF INDICATED [[id number]]     Order Status:  Canceled Specimen:  Urine           Assessment:  Active Hospital Problems    Diagnosis   •    • Streptococcal bacteremia [R78.81, B95.5]   • Hypokalemia [E87.6]   • Normocytic anemia [D64.9]   • Murmur [R01.1]       Plan:  Streptococcus viridans bacteremia/endocarditis  Blood cultures are positive on 7/9/2018, 7/10/2018  Bcx negative from 7/11/2018  Sed rate is 49 and CRP is 2.99  Continue high-dose penicillin  Add gentamicin  PICC placed 7/14    Multifocal epidural abscess  Epidural abscesses extending from his cervical spine to L3-4  Evaluated by neurosurgery-no surgical intervention  Currently recommended only medical treatment  On abx above  Will need repeat MRI " prior to stopping IV antibiotics    MV Endocarditis   TTE on 7/11/2018 showed mitral regurgitation  CHAR + small pedunculated mobile masses to tips of the valve causing severe regurgitation  MRI of the brain had 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  Consider MRI MRA to rule out mycotic aneurysm  On abx above  Anticipate 6-8 weeks of IV abx  There is ongoing increase in the septic emboli to brain  CT surgery reevaluation    Discussed with internal medicine/Dr Rascon.

## 2018-07-17 LAB
ANION GAP SERPL CALC-SCNC: 8 MMOL/L (ref 0–11.9)
BUN SERPL-MCNC: 20 MG/DL (ref 8–22)
CALCIUM SERPL-MCNC: 9.5 MG/DL (ref 8.5–10.5)
CHLORIDE SERPL-SCNC: 101 MMOL/L (ref 96–112)
CO2 SERPL-SCNC: 28 MMOL/L (ref 20–33)
CREAT SERPL-MCNC: 0.92 MG/DL (ref 0.5–1.4)
ERYTHROCYTE [DISTWIDTH] IN BLOOD BY AUTOMATED COUNT: 40.4 FL (ref 35.9–50)
GLUCOSE SERPL-MCNC: 98 MG/DL (ref 65–99)
HCT VFR BLD AUTO: 37.8 % (ref 42–52)
HGB BLD-MCNC: 12.5 G/DL (ref 14–18)
MCH RBC QN AUTO: 26.7 PG (ref 27–33)
MCHC RBC AUTO-ENTMCNC: 33.1 G/DL (ref 33.7–35.3)
MCV RBC AUTO: 80.8 FL (ref 81.4–97.8)
PLATELET # BLD AUTO: 306 K/UL (ref 164–446)
PMV BLD AUTO: 10.2 FL (ref 9–12.9)
POTASSIUM SERPL-SCNC: 4.4 MMOL/L (ref 3.6–5.5)
RBC # BLD AUTO: 4.68 M/UL (ref 4.7–6.1)
SODIUM SERPL-SCNC: 137 MMOL/L (ref 135–145)
WBC # BLD AUTO: 7.9 K/UL (ref 4.8–10.8)

## 2018-07-17 PROCEDURE — 85027 COMPLETE CBC AUTOMATED: CPT

## 2018-07-17 PROCEDURE — 700105 HCHG RX REV CODE 258: Performed by: INTERNAL MEDICINE

## 2018-07-17 PROCEDURE — 700102 HCHG RX REV CODE 250 W/ 637 OVERRIDE(OP): Performed by: HOSPITALIST

## 2018-07-17 PROCEDURE — 700102 HCHG RX REV CODE 250 W/ 637 OVERRIDE(OP): Performed by: INTERNAL MEDICINE

## 2018-07-17 PROCEDURE — 700111 HCHG RX REV CODE 636 W/ 250 OVERRIDE (IP): Performed by: INTERNAL MEDICINE

## 2018-07-17 PROCEDURE — A9270 NON-COVERED ITEM OR SERVICE: HCPCS

## 2018-07-17 PROCEDURE — 99232 SBSQ HOSP IP/OBS MODERATE 35: CPT | Performed by: HOSPITALIST

## 2018-07-17 PROCEDURE — 99232 SBSQ HOSP IP/OBS MODERATE 35: CPT | Performed by: INTERNAL MEDICINE

## 2018-07-17 PROCEDURE — 80048 BASIC METABOLIC PNL TOTAL CA: CPT

## 2018-07-17 PROCEDURE — 700102 HCHG RX REV CODE 250 W/ 637 OVERRIDE(OP)

## 2018-07-17 PROCEDURE — A9270 NON-COVERED ITEM OR SERVICE: HCPCS | Performed by: HOSPITALIST

## 2018-07-17 PROCEDURE — A9270 NON-COVERED ITEM OR SERVICE: HCPCS | Performed by: INTERNAL MEDICINE

## 2018-07-17 PROCEDURE — 770006 HCHG ROOM/CARE - MED/SURG/GYN SEMI*

## 2018-07-17 RX ORDER — POLYETHYLENE GLYCOL 3350 17 G/17G
1 POWDER, FOR SOLUTION ORAL
Status: DISCONTINUED | OUTPATIENT
Start: 2018-07-17 | End: 2018-07-20 | Stop reason: HOSPADM

## 2018-07-17 RX ORDER — OXYCODONE HYDROCHLORIDE 5 MG/1
TABLET ORAL
Status: COMPLETED
Start: 2018-07-17 | End: 2018-07-17

## 2018-07-17 RX ORDER — LEVETIRACETAM 500 MG/1
TABLET ORAL
Status: COMPLETED
Start: 2018-07-17 | End: 2018-07-17

## 2018-07-17 RX ORDER — OXYCODONE HYDROCHLORIDE 5 MG/1
5 TABLET ORAL EVERY 4 HOURS PRN
Status: DISCONTINUED | OUTPATIENT
Start: 2018-07-17 | End: 2018-07-20 | Stop reason: HOSPADM

## 2018-07-17 RX ORDER — OXYCODONE HYDROCHLORIDE 10 MG/1
10 TABLET ORAL EVERY 4 HOURS PRN
Status: DISCONTINUED | OUTPATIENT
Start: 2018-07-17 | End: 2018-07-20 | Stop reason: HOSPADM

## 2018-07-17 RX ORDER — AMOXICILLIN 250 MG
1 CAPSULE ORAL DAILY
Status: DISCONTINUED | OUTPATIENT
Start: 2018-07-17 | End: 2018-07-20 | Stop reason: HOSPADM

## 2018-07-17 RX ADMIN — OXYCODONE HYDROCHLORIDE 5 MG: 5 TABLET ORAL at 01:41

## 2018-07-17 RX ADMIN — SODIUM CHLORIDE 3 MILLION UNITS: 9 INJECTION, SOLUTION INTRAVENOUS at 04:58

## 2018-07-17 RX ADMIN — SODIUM CHLORIDE 3 MILLION UNITS: 9 INJECTION, SOLUTION INTRAVENOUS at 18:23

## 2018-07-17 RX ADMIN — NICOTINE: 7 PATCH, EXTENDED RELEASE TOPICAL at 05:00

## 2018-07-17 RX ADMIN — GENTAMICIN SULFATE 80 MG: 40 INJECTION, SOLUTION INTRAMUSCULAR; INTRAVENOUS at 20:52

## 2018-07-17 RX ADMIN — SODIUM CHLORIDE 3 MILLION UNITS: 9 INJECTION, SOLUTION INTRAVENOUS at 01:44

## 2018-07-17 RX ADMIN — GENTAMICIN SULFATE 80 MG: 40 INJECTION, SOLUTION INTRAMUSCULAR; INTRAVENOUS at 04:58

## 2018-07-17 RX ADMIN — STANDARDIZED SENNA CONCENTRATE AND DOCUSATE SODIUM 1 TABLET: 8.6; 5 TABLET, FILM COATED ORAL at 17:56

## 2018-07-17 RX ADMIN — NICOTINE 7 MG: 7 PATCH, EXTENDED RELEASE TRANSDERMAL at 04:58

## 2018-07-17 RX ADMIN — BUTALBITAL, ACETAMINOPHEN, AND CAFFEINE 1 TABLET: 50; 325; 40 TABLET ORAL at 09:45

## 2018-07-17 RX ADMIN — OXYCODONE HYDROCHLORIDE 10 MG: 10 TABLET ORAL at 20:54

## 2018-07-17 RX ADMIN — LEVETIRACETAM 1500 MG: 500 TABLET, FILM COATED ORAL at 17:56

## 2018-07-17 RX ADMIN — SODIUM CHLORIDE 3 MILLION UNITS: 9 INJECTION, SOLUTION INTRAVENOUS at 09:45

## 2018-07-17 RX ADMIN — OXYCODONE HYDROCHLORIDE: 5 TABLET ORAL at 01:45

## 2018-07-17 RX ADMIN — SODIUM CHLORIDE 3 MILLION UNITS: 9 INJECTION, SOLUTION INTRAVENOUS at 14:58

## 2018-07-17 RX ADMIN — OXYCODONE HYDROCHLORIDE 5 MG: 5 TABLET ORAL at 09:43

## 2018-07-17 RX ADMIN — OXYCODONE HYDROCHLORIDE 5 MG: 5 TABLET ORAL at 13:42

## 2018-07-17 RX ADMIN — SODIUM CHLORIDE 3 MILLION UNITS: 9 INJECTION, SOLUTION INTRAVENOUS at 23:40

## 2018-07-17 RX ADMIN — LEVETIRACETAM 1500 MG: 500 TABLET, FILM COATED ORAL at 04:58

## 2018-07-17 RX ADMIN — LEVETIRACETAM: 500 TABLET, FILM COATED ORAL at 05:00

## 2018-07-17 RX ADMIN — GENTAMICIN SULFATE 80 MG: 40 INJECTION, SOLUTION INTRAMUSCULAR; INTRAVENOUS at 13:43

## 2018-07-17 ASSESSMENT — ENCOUNTER SYMPTOMS
ABDOMINAL PAIN: 0
PALPITATIONS: 0
CHILLS: 0
FEVER: 0
SPUTUM PRODUCTION: 0
PSYCHIATRIC NEGATIVE: 1
FALLS: 0
SHORTNESS OF BREATH: 0
SENSORY CHANGE: 0
HEADACHES: 1
SPEECH CHANGE: 0
LOSS OF CONSCIOUSNESS: 0
WEAKNESS: 0
NECK PAIN: 1
VOMITING: 0
NAUSEA: 0
MYALGIAS: 1
BACK PAIN: 1

## 2018-07-17 ASSESSMENT — PAIN SCALES - GENERAL
PAINLEVEL_OUTOF10: 7
PAINLEVEL_OUTOF10: 6
PAINLEVEL_OUTOF10: 2

## 2018-07-17 NOTE — DISCHARGE PLANNING
Agency/Facility Name: [facility]  Spoke To: Abby  Outcome: Abby to review referral and will call tomorrow morning. LSRANDY Au notified.

## 2018-07-17 NOTE — PROGRESS NOTES
Neurosurgery Progress Note    Subjective:  Patient stable today, c/o back pain making it difficult for him to sleep  Seen and evaluated by Dr. Torres  PM with updated imaging of cervical spine and brain  This showed non compressive extension to C5 and septic emboli right lateral frontal and cerebellar   Remains neuro intact   Today states speech is improved  Patient with C5 to L3 epidural abscess  Mild intermittent headache  Tolerable and improving spine pain   Eating, voiding, has BM  No saddle anesthesia  Feels strong   Denies fascial symptoms   WBC 7.9  Afebrile  CTS evaluated and recommended no MVR at this time    Exam:  GCS 15, EOMI  A&O x 3  Appropriate   CN 2-12 grossly intact   Upper and lower motor grossly strong, sensory grossly intact   DTRs =2 without Clonus or Hoffmans.        BP  Min: 105/51  Max: 133/63  Pulse  Av  Min: 69  Max: 93  Resp  Av  Min: 16  Max: 20  Temp  Av.5 °C (97.7 °F)  Min: 36.2 °C (97.2 °F)  Max: 36.7 °C (98 °F)  SpO2  Av.3 %  Min: 91 %  Max: 98 %    No Data Recorded    Recent Labs      07/15/18   0918   0228  18   0145   WBC  7.2  8.7  7.9   RBC  4.65*  5.05  4.68*   HEMOGLOBIN  12.4*  13.3*  12.5*   HEMATOCRIT  37.8*  40.9*  37.8*   MCV  81.3*  81.0*  80.8*   MCH  26.7*  26.3*  26.7*   MCHC  32.8*  32.5*  33.1*   RDW  40.8  41.3  40.4   PLATELETCT  303  367  306   MPV  10.3  10.2  10.2     Recent Labs      07/15/18   0918   0228  18   0145   SODIUM  139  135  137   POTASSIUM  4.3  4.3  4.4   CHLORIDE  103  100  101   CO2  29  27  28   GLUCOSE  89  111*  98   BUN  17  15  20   CREATININE  0.80  0.75  0.92   CALCIUM  9.2  9.4  9.5               Intake/Output       18 07 - 18 0659 18 - 18 0659      2590-3263 4211-2031 Total 8835-9417 0829-7612 Total       Intake    P.O.  840  240 1080  --  -- --    P.O.  -- -- --    I.V.  360  -- 360  --  -- --    PCN G 200 -- 200 -- -- --    IV Volume  160 -- 160 -- -- --    Total Intake 1509 114 0301 -- -- --       Output    Urine  --  -- --  --  -- --    Number of Times Voided 2 x 4 x 6 x -- -- --    Stool  --  -- --  --  -- --    Number of Times Stooled -- 1 x 1 x -- -- --    Total Output -- -- -- -- -- --       Net I/O     [id number] -- -- --            Intake/Output Summary (Last 24 hours) at 07/17/18 0829  Last data filed at 07/16/18 2100   Gross per 24 hour   Intake             1200 ml   Output                0 ml   Net             1200 ml            • MD ALERT... gentamicin   pharmacy to dose   • gentamicin (GARAMYCIN) IV  80 mg Q8HR   • levETIRAcetam  1,500 mg BID   • oxyCODONE immediate-release  5 mg Q4HRS PRN   • nicotine  7 mg Daily-0600   • penicillin g  3 Million Units Q4HRS   • acetaminophen/caffeine/butalbital 325-40-50 mg  1 Tab Q6HRS PRN   • labetalol  10 mg Q4HRS PRN   • ondansetron  4 mg Q4HRS PRN   • ondansetron  4 mg Q4HRS PRN       Assessment and Plan:  Hospital day #5  No indication for evacuation of epidural abscess at this time as patient remains neuro intact  Recommend continued ID close follow up  Continue ID & IM care  No neurosurgery intervention planned unless he has neuro changes   Dr. Torres recommending repeat spine MRI in 3 weeks

## 2018-07-17 NOTE — PROGRESS NOTES
"Patient up and ambulating in halls and in room with family at bedside. Complaints of headache this morning upon assessment, given PRN pain medication which was effective. Right upper arm PICC in tact, dressing CDI and flushing with good blood return. Neuro assessment within baseline for patient, patient is aware to let RN know of any changes. \"I feel better than I have been feeling today.\" Continue IV ABX, continue to assess pain using numeric scale. Patient is eating and drinking at bedside. Is able to make needs known, up in room independently. Will continue to monitor.  "

## 2018-07-17 NOTE — PROGRESS NOTES
Infectious Disease Progress Note    Author: Chantal Tellez M.D. Date & Time of service: 2018  1:00 PM    Chief Complaint:  FU viridans strep sepsis/IE    Interval History:  2018 T-max 98.9 WBC 13.1 platelets 301 creatinine 0.63 feels better but still has headache and back pain  - Tmax 98.6 wbc 9.3 ongoing back pain. WBC 9   AF WBC 7.5 back pain slightly improved, did not take any pain meds all day yesterday, tolerating abx without issues, plan of care d/w pt in detail  7/15 AF no CBC ongoing neck and back pain, patient received PICC without any issues, also seen by CT surgery with no plans for surgical intervention at this  2018-T-max 98.  Continues to get headache.  Apparently continues to get new emboli  2018 T-max 98 continues to get headache and back pain and neck pain.  Overall feels improved  Labs Reviewed, Medications Reviewed and Radiology Reviewed.    Review of Systems:  Review of Systems   Constitutional: Negative for chills and fever.   Respiratory: Negative for sputum production.    Cardiovascular: Negative for chest pain and leg swelling.   Gastrointestinal: Negative for abdominal pain, nausea and vomiting.   Genitourinary: Negative for dysuria.   Musculoskeletal: Positive for back pain, myalgias and neck pain.   Neurological: Positive for headaches. Negative for sensory change and speech change.       Hemodynamics:  Temp (24hrs), Av.4 °C (97.6 °F), Min:36.2 °C (97.2 °F), Max:36.7 °C (98 °F)  Temperature: 36.3 °C (97.3 °F)  Pulse  Av.7  Min: 54  Max: 114   Blood Pressure: 100/60       Physical Exam:  Physical Exam   Constitutional: He is oriented to person, place, and time. He appears well-developed and well-nourished. No distress.   HENT:   Head: Normocephalic.   Mouth/Throat: No oropharyngeal exudate.   Eyes: EOM are normal. Pupils are equal, round, and reactive to light. No scleral icterus.   Neck: Neck supple.   Cardiovascular: Regular rhythm.    Murmur  heard.  Pulmonary/Chest: Effort normal. He has no wheezes. He has no rales.   Abdominal: Soft. There is no tenderness. There is no rebound.   Musculoskeletal: He exhibits no edema.   Right upper extremity PICC line nontender   Neurological: He is alert and oriented to person, place, and time. He displays normal reflexes. No cranial nerve deficit. Coordination normal.   Skin: No rash noted. No erythema.   Vitals reviewed.      Meds:    Current Facility-Administered Medications:   •  MD ALERT... gentamicin  •  gentamicin (GARAMYCIN) IV  •  levETIRAcetam  •  oxyCODONE immediate-release  •  nicotine  •  penicillin g  •  acetaminophen/caffeine/butalbital 325-40-50 mg  •  labetalol **OR** [DISCONTINUED] hydrALAZINE  •  ondansetron  •  ondansetron    Labs:  Recent Labs      07/15/18   0900  07/16/18   0228  07/17/18   0145   WBC  7.2  8.7  7.9   RBC  4.65*  5.05  4.68*   HEMOGLOBIN  12.4*  13.3*  12.5*   HEMATOCRIT  37.8*  40.9*  37.8*   MCV  81.3*  81.0*  80.8*   MCH  26.7*  26.3*  26.7*   RDW  40.8  41.3  40.4   PLATELETCT  303  367  306   MPV  10.3  10.2  10.2   NEUTSPOLYS   --   62.30   --    LYMPHOCYTES   --   29.00   --    MONOCYTES   --   4.60   --    EOSINOPHILS   --   2.40   --    BASOPHILS   --   0.30   --      Recent Labs      07/15/18   0900  07/16/18   0228  07/17/18   0145   SODIUM  139  135  137   POTASSIUM  4.3  4.3  4.4   CHLORIDE  103  100  101   CO2  29  27  28   GLUCOSE  89  111*  98   BUN  17  15  20     Recent Labs      07/15/18   0900  07/16/18   0228  07/17/18   0145   ALBUMIN   --   3.8   --    TBILIRUBIN   --   0.5   --    ALKPHOSPHAT   --   68   --    TOTPROTEIN   --   7.0   --    ALTSGPT   --   26   --    ASTSGOT   --   15   --    CREATININE  0.80  0.75  0.92       Imaging:  Ct-cta Head With & W/o-post Process    Result Date: 7/10/2018  7/9/2018 11:49 PM HISTORY/REASON FOR EXAM:  Neurological Deficit TECHNIQUE/EXAM DESCRIPTION: CT angiogram of the [place] without and with contrast.   Initial precontrast images were obtained of the head from the skull base through the vertex.  Postcontrast images were obtained of the [place] following the power injection of nonionic contrast at 5.0 mL/sec. Thin-section helical images were obtained with overlapping reconstruction interval. Coronal and sagittal multiplanar volume reformats were generated.  3D angiographic images were reviewed on PACS.  Maximum intensity projection (MIP) images were generated and reviewed. 100 mL of Omnipaque 350 nonionic contrast was injected intravenously. Low dose optimization technique was utilized for this CT exam including automated exposure control and adjustment of the mA and/or kV according to patient size. COMPARISON:  None. FINDINGS: The posterior circulation shows the distal vertebral arteries to be patent. The vertebrobasilar confluence is intact. The basilar artery is patent. No aneurysm or occlusive lesion is evident. The anterior circulation shows no stenotic or occlusive lesion. No aneurysm is evident about the [place]. The brain is unremarkable.     1.  CT angiogram of the [place] within normal limits.    Ct-cta Neck With & W/o-post Processing    Result Date: 7/10/2018  7/9/2018 11:49 PM HISTORY/REASON FOR EXAM: Left-sided facial numbness. TECHNIQUE/EXAM DESCRIPTION: CT angiogram of the neck with contrast. Postcontrast images were obtained of the neck from the great vessels through the skull base following the power injection of nonionic contrast at 5.0 mL/sec. Thin-section helical images were obtained with overlapping reconstruction interval. Coronal and oblique multiplanar volume reformats were generated. Cervical internal carotid artery percent stenosis is calculated using the standard method according to the NASCET criteria wherein a segment of uniform caliber mid or distal cervical internal carotid is used as the reference denominator. 3D angiographic images were reviewed on PACS.   Maximum intensity projection (MIP) images were generated and reviewed 100 mL of Omnipaque 350 nonionic contrast was injected intravenously. Low dose optimization technique was utilized for this CT exam including automated exposure control and adjustment of the mA and/or kV according to patient size. COMPARISON:  None. FINDINGS: The arch origins of the great vessels appear intact. The aortic arch shows no abnormality. The right common carotid artery, cervical carotid bifurcation, and cervical internal carotid artery are within normal limits. There is no evidence of significant stenosis, thrombus, or other filling defect or ulceration. There is no evidence of dissection or aneurysm. The left common carotid artery, cervical carotid bifurcation, and cervical internal carotid artery are within normal limits. There is no evidence of significant stenosis, thrombus, or other filling defect or ulceration. There is no evidence of dissection  or aneurysm. The cervical vertebral arteries are normal bilaterally. The neck soft tissues and lung apices in the field of view are unremarkable.     CT angiogram of the neck within normal limits.    Ct-maxillofacial With & W/o Plus Recons    Result Date: 7/10/2018  7/10/2018 4:00 PM HISTORY/REASON FOR EXAM:  Streptococcal bacteremia. Evaluate for sinusitis or abscess. TECHNIQUE/EXAM DESCRIPTION AND NUMBER OF VIEWS:   CT scan maxillofacial with and without contrast, with reconstructions. Thin-section helical imaging was obtained of the maxillofacial structures from the orbital roofs through the mandible before and after injection of nonionic contrast. Coronal and sagittal multiplanar volume reformat images were generated from the axial data.. 100 mL of Omnipaque 350 nonionic contrast was injected intravenously. Low dose optimization technique was utilized for this CT exam including automated exposure control and adjustment of the mA and/or kV according to patient size. COMPARISON:  None.  FINDINGS: There is rounded mucosal thickening or polyp formation in the inferior medial aspect of the right maxillary sinus. The remainder of the visualized paranasal sinuses are clear. No air-fluid level is present to suggest acute sinusitis. No bony expansion is identified. No lytic or blastic bony change is identified. The soft tissues of the neck are within normal limits. No soft tissue abscess or inflammatory change is identified. Cervical lymph nodes are normal in size. No submandibular or parotid gland abnormality is noted. No posterior pharyngeal or laryngeal mucosal abnormality or mass is identified.     1.  Rounded mucosal thickening or polyp formation in the inferior aspect of the right maxillary sinus which could reflect chronic right maxillary sinusitis. 2.  No evidence of acute sinusitis. 3.  Otherwise clear paranasal sinuses.    Ct-post-fossa-ear With & W/o    Result Date: 7/10/2018  7/10/2018 4:00 PM HISTORY/REASON FOR EXAM:  Streptococcal bacteremia, facial numbness rule out facial nerve compression / mastoiditis. TECHNIQUE/EXAM DESCRIPTION AND NUMBER OF VIEWS: CT scan of the temporal bones without and with contrast. The study was performed on a [employer] CT scanner. Contiguous thin section 1.0 mm or 1.25 mm axial and direct coronal images were obtained of the temporal bones. Images are reviewed at magnified bone and soft tissue windows and non-magnified axial images are also displayed at soft tissue windows. Scans are obtained pre and post contrast. 100 mL of Omnipaque 300 nonionic contrast was injected intravenously. Low dose optimization technique was utilized for this CT exam including automated exposure control and adjustment of the mA and/or kV according to patient size. COMPARISON: CTA of the neck without and with contrast 7/9/2018 FINDINGS: On the right, the external auditory canal is normal except for some debris most consistent with cerumen.  The mastoid is normally pneumatized and aerated.  The  middle ear cavity and mastoid antrum are clear.  The ossicles are normal in position  and configuration.  The scutum is sharp.  The bony labyrinth is normal, and the internal auditory canal shows normal caliber and is symmetric with the left side.  The facial nerve canal is unremarkable. On the left, the external auditory canal is normal except for some debris consistent with cerumen.  The mastoid is normally pneumatized and aerated.  The middle ear cavity and mastoid antrum are clear.  The ossicles are normal in position and configuration.  The scutum is sharp.  The bony labyrinth is normal, and the internal auditory canal shows normal caliber and is symmetric with the right side.  The facial nerve canal is unremarkable. The right transverse -- sigmoid sinus and right internal jugular vein are dominant. The paranasal sinuses show clustered retention cyst or polypoid mucosal thickening in the alveolar recess of the right maxillary sinus. There are no air-fluid levels. The posterior fossa structures are unremarkable.  The fourth ventricle is in the midline. There is no abnormal parenchymal or extra-axial/meningeal enhancement in the posterior fossa or supratentorial compartment within the field of view.     CT OF THE TEMPORAL BONES WITHOUT CONTRAST WITHIN NORMAL LIMITS.    Dx-chest-2 Views    Result Date: 7/10/2018    7/9/2018 10:30 PM HISTORY/REASON FOR EXAM: Shortness of Breath TECHNIQUE/EXAM DESCRIPTION:  PA and lateral views of the chest. COMPARISON: None FINDINGS: The cardiac silhouette appears within normal limits. The mediastinal contour appears within normal limits.  The central pulmonary vasculature appears normal. The lungs appear well expanded bilaterally.  Bilateral lungs are clear. No significant pleural effusions are identified. The bony structures appear age-appropriate.     1.  No acute cardiopulmonary disease.    Mr-brain-with & W/o    Result Date: 7/11/2018  7/10/2018 10:21 PM HISTORY/REASON FOR  EXAM:  Possible meningitis. TECHNIQUE/EXAM DESCRIPTION:   MRI of the brain without and with contrast. T1 sagittal, T2 fast spin-echo axial, T1 coronal, FLAIR coronal, diffusion-weighted and apparent diffusion coefficient (ADC map) axial images were obtained of the whole brain. T1 postcontrast axial and T1 postcontrast coronal images were obtained. The study was performed on a [employer] Signa 1.5 Kerri MRI scanner. 20 mL Gadavist contrast was administered intravenously. COMPARISON:  None. FINDINGS:  There is tiny area of restricted diffusion in the right frontal gray matter. Minimal contrast enhancement is seen. The coronal FLAIR images demonstrates abnormal T2 hyperintensity in the adjacent right frontal subarachnoid spaces. The axial gradient echo images demonstrates linear hypointensity within the sulci. There is low-lying cerebellar tonsils. There is no hydrocephalus.  There is no large lesion identified in the expected course of the intracranial portions of the cranial nerves. The visualized flow voids of cerebral vasculature appear normal within limits.  The skull bones appear normal. The paranasal sinuses are clear. The extracranial soft tissue including orbits appear grossly normal.     1.  There is tiny area of restricted diffusion in the right frontal gray matter. Minimal contrast enhancement is seen. The coronal FLAIR images demonstrates abnormal T2 hyperintensity in the adjacent right frontal subarachnoid spaces. The axial gradient echo images demonstrates linear hypointensity within the sulci. These findings likely represents a small cortical venous thrombosis with adjacent tiny infarct. Cortical venous thrombosis may cause focal subarachnoid hemorrhage. Please note that cortical venous thrombosis can be complication of meningitis. 2.  Low-lying cerebellar tonsils. This is concerning for Chiari I malformation. In view of history of lumbar puncture, this finding can be caused by the post lumbar puncture  intracranial hypotension.    Mr-cervical Spine-with & W/o    Result Date: 7/11/2018  7/10/2018 10:21 PM HISTORY/REASON FOR EXAM:  Possible bacterial meningitis. TECHNIQUE/EXAM DESCRIPTION: MRI of the cervical spine without and with contrast. The study was performed on a [employer] Signa 1.5 Kerri MRI scanner. T1 sagittal, T2 fast spin-echo sagittal, and gradient echo axial images were obtained of the cervical spine. T1 post-contrast fat suppressed sagittal images were obtained of the cervical spine. Optional T1 post-contrast axial images may be obtained. 20 mL Omniscan contrast was administered intravenously. COMPARISON: None. FINDINGS: There is prominent posterior epidural fluid collection extending from C6-7 into the thoracic spine. Mild posterior epidural contrast enhancement is seen. Please see thoracic spine report for further details. The cervical spine maintains normal height and alignment. There is no fracture or dislocation. There is no pathologic marrow infiltration. There is no abnormal perivertebral fluid collection. There is no intradural extramedullary fluid collection. There is no abnormal intramedullary T2 signal intensity in the cervical spinal cord. At the level of C2-3, there is no spinal or neural foraminal stenosis. At the level of C3-4, there is no spinal or neural foraminal stenosis. At the level of C4-5, there is no spinal or neural foraminal stenosis. At the level of C5-6, there is no spinal or neural foraminal stenosis. At the level of C6-7, there is no spinal or neural foraminal stenosis. At the level of C7-T1, there is no spinal or neural foraminal stenosis. The visualized posterior fossa structures appear normal within limits.  The cervical spinal cord does not demonstrate any mass or abnormal T2 signal intensity. The visualized pre-and paraspinal soft tissues appear normal within limits.     1.  There is prominent posterior epidural fluid collection extending from C6-7 into the thoracic  spine. Mild posterior epidural contrast enhancement is noted at this level. Please see thoracic spine report for further details. 2.  There is no evidence of osteomyelitis in the cervical spine.    Mr-lumbar Spine-with & W/o    Result Date: 7/11/2018  7/10/2018 10:21 PM HISTORY/REASON FOR EXAM:  Possible bacterial meningitis. TECHNIQUE/EXAM DESCRIPTION: MRI of the lumbar spine without and with contrast. The study was performed on a [employer] Signa 1.5 Kerri MRI scanner. T1 sagittal, T2 fast spin-echo sagittal, and T2 axial images were obtained of the lumbar spine. T1 post-contrast fat-suppressed sagittal images were obtained. 20 mL Omniscan contrast was administered intravenously. COMPARISON:  None. FINDINGS: There is abnormal posterior epidural fluid collection seen extending from the lower cervical spine to the level of L3-4. Multifocal abnormal epidural contrast enhancement is seen. The lumbar spine maintains normal height and alignment. There is no evidence of fracture or dislocation. There is no pathologic marrow infiltration. There is no abnormal disc fluid. At the level of L5-S1, there is diffuse disc bulge. Mild facet joint arthropathy is seen. There is no spinal or neural foraminal stenosis. At the level of L4-5, there is no spinal or neural foraminal stenosis. At the level of L3-4, there is no spinal or neural foraminal stenosis. At the level of L2-3, there is no spinal or neural foraminal stenosis. At the level of L1-2, there is no spinal or neural foraminal stenosis. The conus terminates at the level of L1.     1.  There is abnormal posterior epidural fluid collection seen extending from the lower cervical spine to the level of L3-4. Multifocal abnormal epidural contrast enhancement is seen. These findings are concerning for thoracic and lumbar epidural abscess. 2.  There is no evidence of lumbar osteomyelitis. 3.  Findings were discussed with DAVON FERNANDO on 7/11/2018 1:08 PM.    Mr-thoracic Spine-with &  W/o    Result Date: 7/11/2018  7/10/2018 10:21 PM HISTORY/REASON FOR EXAM:  Possible bacterial meningitis TECHNIQUE/EXAM DESCRIPTION: MRI of the thoracic spine without and with contrast. The study was performed on a [employer] Signa 1.5 Kerri MRI scanner. T1 sagittal, T2 fast spin-echo sagittal, and T2 axial images were obtained of the thoracic spine. T1 post-contrast fat suppressed sagittal images were obtained. Optional T1 post-contrast axial images may be obtained. 20 mL Gadavist contrast was administered intravenously. COMPARISON:  None. FINDINGS: There is abnormal posterior epidural fluid collection extending from C6-7 to the lumbar spine. Abnormal peripheral contrast enhancement noted surrounding the epidural fluid collection. The thoracic spinal cord is anteriorly displaced from the levels of T3-4 to  T9-10. Moderate central canal stenosis is seen. There is a hemangioma in the body of T11. There is no abnormal intramedullary T2 signal intensity in the thoracic spinal cord.     There is abnormal posterior epidural fluid collection extending from C6-7 to the lumbar spine. Abnormal peripheral contrast enhancement noted surrounding the epidural fluid collection. The thoracic spinal cord is anteriorly displaced from the levels of T3-4 to  T9-10. Moderate central canal stenosis is seen. These findings are highly concerning for posterior epidural abscess with moderate canal compromise.    Echocardiogram Comp W/o Cont    Result Date: 7/11/2018  Transthoracic Echo Report Echocardiography Laboratory CONCLUSIONS No prior study is available for comparison. Normal left ventricular systolic function. Left ventricular ejection fraction is visually estimated to be 65%. Normal diastolic function. Normal inferior vena cava size and inspiratory collapse. Mildly dilated left atrium. Moderate mitral regurgitation. JAYDON POLLARD Exam Date:         07/11/2018                    09:47 Exam Location:     Inpatient Priority:           Routine Ordering Physician:        DAVON FERNANDO Referring Physician:       KASSIE Lockett Sonographer:               Sunshine Huang Age:    27     Gender:    M MRN:    [id number] :    1990 BSA:    2.21   Ht (in):    70     Wt (lb):    229 Exam Type:     Complete Indications:     Murmur ICD Codes:       R01.1 CPT Codes:       86977 BP:   120    /   73     HR: Technical Quality:       Fair MEASUREMENTS  (Male / Female) Normal Values 2D ECHO LV Diastolic Diameter PLAX        5.1 cm                4.2 - 5.9 / 3.9 - 5.3 cm LV Systolic Diameter PLAX         3.9 cm                2.1 - 4.0 cm IVS Diastolic Thickness           0.87 cm               LVPW Diastolic Thickness          1.3 cm                LVOT Diameter                     2 cm                  RA Diameter                       3.6 cm                Estimated LV Ejection Fraction    65 %                  LV Ejection Fraction MOD BP       62.9 %                >= 55  % LV Ejection Fraction MOD 4C       61.7 %                LV Ejection Fraction MOD 2C       64.1 %                LA Volume Index                   32.2 cm³/m²           16 - 28 cm³/m² DOPPLER AV Peak Velocity                  1.4 m/s               AV Peak Gradient                  8 mmHg                AV Mean Gradient                  4.7 mmHg              LVOT Peak Velocity                0.99 m/s              AV Area Cont Eq vti               2.3 cm²               Mitral E Point Velocity           0.93 m/s              Mitral E to A Ratio               1.8                   Mitral A Duration                 114 ms                MV Pressure Half Time             67.4 ms               MV Area PHT                       3.3 cm²               MV Deceleration Time              232 ms                MR Flow Convergence Radius        0.94 cm               MR ERO PISA                       0.33 cm²              MR Regurgitant Volume PISA        48.7 cm³              PV Peak Velocity                "   0.97 m/s              PV Peak Gradient                  3.8 mmHg              RVOT Peak Velocity                0.94 m/s              * Indicates values subject to auto-interpretation LV EF:  65    % FINDINGS Left Ventricle Normal left ventricular chamber size. Normal left ventricular wall thickness. Normal left ventricular systolic function. Left ventricular ejection fraction is visually estimated to be 65%. Normal regional wall motion. Normal diastolic function. Right Ventricle The right ventricle was normal in size and function. Right Atrium The right atrium is normal in size.  Normal inferior vena cava size and inspiratory collapse. Left Atrium Mildly dilated left atrium. Mitral Valve Thickened mitral valve leaflets. No mitral stenosis. Moderate mitral regurgitation. ERO by PISA method is  0.33 sq cm. Aortic Valve Structurally normal aortic valve without significant stenosis or regurgitation. Tricuspid Valve Unable to estimate pulmonary artery pressure due to an inadequate tricuspid regurgitant jet. No tricuspid stenosis. Trace tricuspid regurgitation. Unable to estimate pulmonary artery pressure due to an inadequate tricuspid regurgitant jet. Pulmonic Valve The pulmonic valve is not well visualized. No stenosis or regurgitation seen. Pericardium Normal pericardium without effusion. Aorta The aortic root is normal. Hui Modi MD (Electronically Signed) Final Date:     11 July 2018                 13:51      Micro:  Results     Procedure Component Value Units Date/Time    BLOOD CULTURE [[id number]] Collected:  07/11/18 1219    Order Status:  Completed Specimen:  Blood from Peripheral Updated:  07/16/18 1500     Significant Indicator NEG     Source BLD     Site PERIPHERAL     Blood Culture No growth after 5 days of incubation.    Narrative:       Per Hospital Policy: Only change Specimen Src: to \"Line\" if  specified by physician order.    BLOOD CULTURE [[id number]] Collected:  07/11/18 1219    Order Status:  " "Completed Specimen:  Blood from Peripheral Updated:  07/16/18 1500     Significant Indicator NEG     Source BLD     Site PERIPHERAL     Blood Culture No growth after 5 days of incubation.    Narrative:       Per Hospital Policy: Only change Specimen Src: to \"Line\" if  specified by physician order.    BLOOD CULTURE [[id number]] Collected:  07/15/18 2049    Order Status:  Completed Specimen:  Blood from Peripheral Updated:  07/16/18 0641     Significant Indicator NEG     Source BLD     Site PERIPHERAL     Blood Culture No Growth    Note: Blood cultures are incubated for 5 days and  are monitored continuously.Positive blood cultures  are called to the RN and reported as soon as  they are identified.      Narrative:       Per Hospital Policy: Only change Specimen Src: to \"Line\" if  specified by physician order.    BLOOD CULTURE [[id number]] Collected:  07/15/18 2049    Order Status:  Completed Specimen:  Blood from Peripheral Updated:  07/16/18 0641     Significant Indicator NEG     Source BLD     Site PERIPHERAL     Blood Culture No Growth    Note: Blood cultures are incubated for 5 days and  are monitored continuously.Positive blood cultures  are called to the RN and reported as soon as  they are identified.      Narrative:       Per Hospital Policy: Only change Specimen Src: to \"Line\" if  specified by physician order.    CSF CULTURE [[id number]] Collected:  07/10/18 1440    Order Status:  Completed Specimen:  CSF Updated:  07/13/18 0837     Gram Stain Result No organisms seen.     Significant Indicator NEG     Source CSF     Site TAP     CSF Culture No growth at 72 hours.    BLOOD CULTURE [[id number]]  (Abnormal) Collected:  07/10/18 1419    Order Status:  Completed Specimen:  Blood from Peripheral Updated:  07/13/18 0810     Significant Indicator POS (POS)     Source BLD     Site PERIPHERAL     Blood Culture Growth detected by Bactec instrument. 07/11/2018  02:12 (A)      Viridans Streptococcus  See previous culture for " "sensitivity report.   (A)    Narrative:       CALL  Feng  [facility] tel. [phone],  CALLED  [facility] tel. [phone] 07/11/2018, 02:12, RB PERF. RESULTS CALLED TO: RN  [phone]  Per Hospital Policy: Only change Specimen Src: to \"Line\" if  specified by physician order.    BLOOD CULTURE [[id number]]  (Abnormal) Collected:  07/10/18 1419    Order Status:  Completed Specimen:  Blood from Peripheral Updated:  07/13/18 0809     Significant Indicator POS (POS)     Source BLD     Site PERIPHERAL     Blood Culture Growth detected by Bactec instrument. 07/11/2018  01:49 (A)      Viridans Streptococcus  See previous culture for sensitivity report.   (A)    Narrative:       CALL  Feng  [facility] tel. [phone],  CALLED  [facility] tel. [phone] 07/11/2018, 01:50, RB PERF. RESULTS CALLED TO: RN  [phone]  Per Hospital Policy: Only change Specimen Src: to \"Line\" if  specified by physician order.    BLOOD CULTURE [[id number]]  (Abnormal) Collected:  07/09/18 2333    Order Status:  Completed Specimen:  Blood from Peripheral Updated:  07/13/18 0807     Significant Indicator POS (POS)     Source BLD     Site PERIPHERAL     Blood Culture Growth detected by Bactec instrument. 07/10/2018  12:18 (A)      Viridans Streptococcus  See previous culture for sensitivity report.   (A)    Narrative:       CALL  [facility]eng  [facility] tel. [phone],  CALLED  [facility] tel. [phone] 07/10/2018, 12:22, RB PERF. RESULTS CALLED  TO:[phone] RN  Per Hospital Policy: Only change Specimen Src: to \"Line\" if  specified by physician order.    SENSITIVITY, E-TEST [[id number]] Collected:  07/09/18 2333    Order Status:  Completed Specimen:  Blood Updated:  07/13/18 0807     ETEST Sensitivity FINAL    Narrative:       [facility] tel. [phone] 07/10/2018, 12:22, RB PERF. RESULTS CALLED TO:[phone] RN  Per Hospital Policy: Only change Specimen Src: to \"Line\" if  specified by physician order.    BLOOD CULTURE [[id number]]  (Abnormal)  (Susceptibility) Collected:  07/09/18 2333    Order Status:  Completed Specimen:  " "Blood from Peripheral Updated:  07/13/18 0807     Significant Indicator POS (POS)     Source BLD     Site PERIPHERAL     Blood Culture Growth detected by Bactec instrument. 07/10/2018  12:17 (A)      Viridans Streptococcus (A)    Narrative:       CALL  Feng  [facility] tel. [phone],  CALLED  [facility] tel. [phone] 07/10/2018, 12:22, RB PERF. RESULTS CALLED  TO:[phone] RN  Per Hospital Policy: Only change Specimen Src: to \"Line\" if  specified by physician order.    Culture & Susceptibility     VIRIDANS STREPTOCOCCUS     Antibiotic Sensitivity Microscan Unit Status    Cefotaxime Sensitive <=1 mcg/mL Final    Method: SENSITIVITY, E-TEST    Penicillin Sensitive <=0.12 mcg/mL Final    Method: SENSITIVITY, E-TEST                       GRAM STAIN [[id number]] Collected:  07/10/18 1440    Order Status:  Completed Specimen:  CSF Updated:  07/10/18 1620     Significant Indicator .     Source CSF     Site TAP     Gram Stain Result No organisms seen.    CSF CULTURE [[id number]]     Order Status:  Canceled Specimen:  CSF from Tap           Assessment:  Active Hospital Problems    Diagnosis   •    • Streptococcal bacteremia [R78.81, B95.5]   • Hypokalemia [E87.6]   • Normocytic anemia [D64.9]   • Murmur [R01.1]       Plan:  Streptococcus viridans bacteremia/endocarditis  Blood cultures are positive on 7/9/2018, 7/10/2018  Bcx negative from 7/11/2018  Sed rate is 49 and CRP is 2.99  Continue high-dose penicillin  Add gentamicin  PICC placed 7/14    Multifocal epidural abscess  Epidural abscesses extending from his cervical spine to L3-4  Evaluated by neurosurgery-no surgical intervention  Currently recommended only medical treatment  On abx above  Repeat MRI in 3 weeks    MV Endocarditis   TTE on 7/11/2018 showed mitral regurgitation  CHAR + small pedunculated mobile masses to tips of the valve causing severe regurgitation  MRI of the brain had 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  Consider MRI MRA to rule out mycotic " aneurysm  On abx above  Anticipate 6-8 weeks of IV abx  There is ongoing increase in the septic emboli to brain  CT surgery reevaluation noted    Discussed with internal medicine/Dr Costa.

## 2018-07-17 NOTE — PROGRESS NOTES
"Pharmacy Kinetics 27 y.o. male on gentamicin day # 2 of 14 2018    Dosing Weight: 85 Kg   Currently on Gentamicin 80mg iv q8hr (0545, 1345, 2145)    Indication for treatment: Viridans strep mitral valve endocarditis and epidural abscess    Pertinent history per medical record: Admitted on 2018 for viridans strep mitral valve endocarditis and epidural abscess. Cardiothoracic surgery, neurosurgery, and ID are consulting.      Other antibiotics: penicillin 3 million units IV q4h     Allergies: Nkda [no known drug allergy]      List concerns for renal function: none at this time     Pertinent cultures to date:   18 blood - peripheral x 2: viridans streptococci (pcn KOREY < 0.12)  7/10/18 blood - peripheral x 2: viridans streptococci  7/10/18 CSF: negative  18 blood - peripheral x 2: no growth  7/15/18 blood - peripheral x 2: no growth       Recent Labs      07/15/18   0900  18   0228  18   0145   WBC  7.2  8.7  7.9   NEUTSPOLYS   --   62.30   --      Recent Labs      07/15/18   0900  18   0228  18   0145   BUN  17  15  20   CREATININE  0.80  0.75  0.92   ALBUMIN   --   3.8   --      No results for input(s): GENTTROUGH, GENTPEAK, GENTRANDOM in the last 72 hours.  Intake/Output Summary (Last 24 hours) at 18 0947  Last data filed at 18 2100   Gross per 24 hour   Intake             1200 ml   Output                0 ml   Net             1200 ml      Blood pressure 100/60, pulse 69, temperature 36.3 °C (97.3 °F), resp. rate 16, height 1.778 m (5' 10\"), weight 105.1 kg (231 lb 11.3 oz), SpO2 95 %. Temp (24hrs), Av.4 °C (97.6 °F), Min:36.2 °C (97.2 °F), Max:36.7 °C (98 °F)      A/P   1. Gentamicin dose change: No.   2. Next gentamicin level: Tomorrow,  at 0515  3. Goal trough: Undetectable   4. Comments: ~ 18% increase in SCr, will continue to monitor closely with the addition of gentamicin synergy treatment yesterday. First trough level tomorrow morning - clinical " pharmacist to adjust dosing as indicated according to trough level goal as outlined above. ID consulting - plan for 2 weeks of gentamicin and 6 to 8 weeks of penicillin therapy.     Vivian Amos, PharmD

## 2018-07-17 NOTE — PROGRESS NOTES
[facility] Hospitalist Progress Note    Date of Service: 2018    Chief Complaint  27 y.o. male admitted 2018 with numbness, left-sided, fevers on/off for 2 months with associated neck pain, photophobia, headaches in the outpatient setting. Diagnosed with streptococcal viridans bacteremia c/b infective endocarditis involving the mitral valve with severe MR, seeding of dura c/b extensive epidural abscess and likely embolic stroke c/b meningitis, ? Venous thrombosis.     Interval Problem Update  Doing well this AM, family at the bedside. States that he's doing well. No concerns at this time. No acute overnight events.     Consultants/Specialty  Neurology   Neurosurgery  Infectious disease  Cardiology   Cardiothoracic surgery     Disposition  Needs arrangement of outpatient infusion / abx therapy vs LTAC      Review of Systems   Constitutional: Positive for malaise/fatigue. Negative for chills and fever.   Respiratory: Negative for shortness of breath.    Cardiovascular: Negative for chest pain, palpitations and leg swelling.   Gastrointestinal: Negative for abdominal pain, nausea and vomiting.   Genitourinary: Negative for dysuria.   Musculoskeletal: Negative for falls.   Neurological: Positive for headaches. Negative for loss of consciousness and weakness.   Psychiatric/Behavioral: Negative.    All other systems reviewed and are negative.     Physical Exam  Laboratory/Imaging   Hemodynamics  Temp (24hrs), Av.4 °C (97.6 °F), Min:36.2 °C (97.2 °F), Max:36.7 °C (98 °F)   Temperature: 36.2 °C (97.2 °F)  Pulse  Av  Min: 54  Max: 114    Blood Pressure: 105/51      Respiratory      Respiration: 16, Pulse Oximetry: 91 %             Fluids    Intake/Output Summary (Last 24 hours) at 18 0727  Last data filed at 18 2100   Gross per 24 hour   Intake             1440 ml   Output                0 ml   Net             1440 ml       Nutrition  Orders Placed This Encounter   Procedures   • Diet Order Regular      Standing Status:   Standing     Number of Occurrences:   1     Order Specific Question:   Diet:     Answer:   Regular [1]     Physical Exam   Constitutional: He is oriented to person, place, and time. He appears well-developed. No distress.   HENT:   Head: Normocephalic.   Mouth/Throat: Oropharynx is clear and moist. No oropharyngeal exudate.   Eyes: EOM are normal. Pupils are equal, round, and reactive to light. No scleral icterus.   Neck: Normal range of motion. Neck supple.   Cardiovascular: Normal rate and regular rhythm.  Exam reveals no gallop and no friction rub.    Murmur heard.  Pulmonary/Chest: Effort normal and breath sounds normal. No stridor. No respiratory distress. He has no rales.   Abdominal: Soft. Bowel sounds are normal. He exhibits no distension. There is no tenderness.   Musculoskeletal: Normal range of motion. He exhibits no edema.   Neurological: He is alert and oriented to person, place, and time. No cranial nerve deficit. Coordination normal.   Motor strength is 5/5 in b/l LE and UE. No sensory deficits. DTR are normal in all extremities.    Skin: Skin is warm, dry and intact. He is not diaphoretic.   Psychiatric: He has a normal mood and affect. His behavior is normal.   Vitals reviewed.      Recent Labs      07/15/18   0900  07/16/18 0228 07/17/18   0145   WBC  7.2  8.7  7.9   RBC  4.65*  5.05  4.68*   HEMOGLOBIN  12.4*  13.3*  12.5*   HEMATOCRIT  37.8*  40.9*  37.8*   MCV  81.3*  81.0*  80.8*   MCH  26.7*  26.3*  26.7*   MCHC  32.8*  32.5*  33.1*   RDW  40.8  41.3  40.4   PLATELETCT  303  367  306   MPV  10.3  10.2  10.2     Recent Labs      07/15/18   0900  07/16/18 0228  07/17/18   0145   SODIUM  139  135  137   POTASSIUM  4.3  4.3  4.4   CHLORIDE  103  100  101   CO2  29  27  28   GLUCOSE  89  111*  98   BUN  17  15  20   CREATININE  0.80  0.75  0.92   CALCIUM  9.2  9.4  9.5                      Assessment/Plan     Acute bacterial endocarditis- (present on admission)    Assessment & Plan    Streptococcal (Viridans) involving mitral valve with severe MR. Confirmed with CHAR 07/13/18. Evaluated by Dr Alvarado from CTS this stay 07/14/2018, no surgical interventions planned at this time    - outpatient cardiology / CTS follow up with interval Echo / CHAR in 6 months  - repeat MRI in 3 months per neurosurg  - continue with PCN, added gentamycin today  - monitor clinically        Epidural abscess- (present on admission)   Assessment & Plan    Extensive from cervical to lumbar spine without compromise. No neurological deficits  Worsening imaging findings now on 07/15/2018  Seen by Dr Torres from neurosurgery, no surgical interventions at this time   - management as above  - Outpatient follow up with Dr Torres   - Will need interval MRI in 3 weeks from prior MRI per neurosurgery team or earlier per NS team, will review this  - Continue close clinical monitoring of neurological status        Streptococcal meningitis- (present on admission)   Assessment & Plan    Likely embolic from bacteremia, Infective endocarditis  Continue Abx per ID recommendations        Streptococcal bacteremia- (present on admission)   Assessment & Plan    Complicated by infective endocarditis / epidural abscess. 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 Symptoms have improved.  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 Suspect related to embolic phenomenon from infective endocarditis. Worsening on interval MRI now  - Neurology team did not recommend anticoagulation, no antiplatelet therapy  - continue Keppra 1500 mg BID per neurology, wean off coming days as infectious issues are managed        Non-rheumatic mitral regurgitation- (present on admission)   Assessment & Plan    Related to infective endocarditis. Severe. No surgical interventions planned at this time  - Will need outpatient  cardiology / CTS follow up  - Interval echo / CHAR in 6 weeks of hospital discharge         Normocytic anemia- (present on admission)   Assessment & Plan    Suspect consumptive from bacteremia  Monitor Hb / Restrictive transfusion strategy  Improved            Quality-Core Measures   Reviewed items::  Labs reviewed, Medications reviewed and Radiology images reviewed  Cruz catheter::  No Cruz  DVT prophylaxis pharmacological::  Not indicated at this time, ambulatory  DVT prophylaxis - mechanical:  SCDs

## 2018-07-18 LAB
ANION GAP SERPL CALC-SCNC: 5 MMOL/L (ref 0–11.9)
BUN SERPL-MCNC: 13 MG/DL (ref 8–22)
CALCIUM SERPL-MCNC: 9.5 MG/DL (ref 8.5–10.5)
CHLORIDE SERPL-SCNC: 104 MMOL/L (ref 96–112)
CO2 SERPL-SCNC: 30 MMOL/L (ref 20–33)
CREAT SERPL-MCNC: 0.81 MG/DL (ref 0.5–1.4)
ERYTHROCYTE [DISTWIDTH] IN BLOOD BY AUTOMATED COUNT: 41.9 FL (ref 35.9–50)
GENTAMICIN SERPL-MCNC: 0.72 UG/ML (ref 1–2)
GLUCOSE SERPL-MCNC: 91 MG/DL (ref 65–99)
HCT VFR BLD AUTO: 37.7 % (ref 42–52)
HGB BLD-MCNC: 12.4 G/DL (ref 14–18)
MCH RBC QN AUTO: 27 PG (ref 27–33)
MCHC RBC AUTO-ENTMCNC: 32.9 G/DL (ref 33.7–35.3)
MCV RBC AUTO: 82 FL (ref 81.4–97.8)
PLATELET # BLD AUTO: 266 K/UL (ref 164–446)
PMV BLD AUTO: 10.2 FL (ref 9–12.9)
POTASSIUM SERPL-SCNC: 4.2 MMOL/L (ref 3.6–5.5)
RBC # BLD AUTO: 4.6 M/UL (ref 4.7–6.1)
SODIUM SERPL-SCNC: 139 MMOL/L (ref 135–145)
WBC # BLD AUTO: 7.3 K/UL (ref 4.8–10.8)

## 2018-07-18 PROCEDURE — 700102 HCHG RX REV CODE 250 W/ 637 OVERRIDE(OP): Performed by: INTERNAL MEDICINE

## 2018-07-18 PROCEDURE — 700102 HCHG RX REV CODE 250 W/ 637 OVERRIDE(OP): Performed by: HOSPITALIST

## 2018-07-18 PROCEDURE — 80170 ASSAY OF GENTAMICIN: CPT

## 2018-07-18 PROCEDURE — A9270 NON-COVERED ITEM OR SERVICE: HCPCS | Performed by: HOSPITALIST

## 2018-07-18 PROCEDURE — 80048 BASIC METABOLIC PNL TOTAL CA: CPT

## 2018-07-18 PROCEDURE — 770006 HCHG ROOM/CARE - MED/SURG/GYN SEMI*

## 2018-07-18 PROCEDURE — 85027 COMPLETE CBC AUTOMATED: CPT

## 2018-07-18 PROCEDURE — 700105 HCHG RX REV CODE 258: Performed by: INTERNAL MEDICINE

## 2018-07-18 PROCEDURE — 700111 HCHG RX REV CODE 636 W/ 250 OVERRIDE (IP): Performed by: INTERNAL MEDICINE

## 2018-07-18 PROCEDURE — A9270 NON-COVERED ITEM OR SERVICE: HCPCS | Performed by: INTERNAL MEDICINE

## 2018-07-18 PROCEDURE — 99231 SBSQ HOSP IP/OBS SF/LOW 25: CPT | Performed by: HOSPITALIST

## 2018-07-18 PROCEDURE — 99232 SBSQ HOSP IP/OBS MODERATE 35: CPT | Performed by: INTERNAL MEDICINE

## 2018-07-18 RX ADMIN — SODIUM CHLORIDE 3 MILLION UNITS: 9 INJECTION, SOLUTION INTRAVENOUS at 10:00

## 2018-07-18 RX ADMIN — NICOTINE 7 MG: 7 PATCH, EXTENDED RELEASE TRANSDERMAL at 05:11

## 2018-07-18 RX ADMIN — GENTAMICIN SULFATE 80 MG: 40 INJECTION, SOLUTION INTRAMUSCULAR; INTRAVENOUS at 05:10

## 2018-07-18 RX ADMIN — STANDARDIZED SENNA CONCENTRATE AND DOCUSATE SODIUM 1 TABLET: 8.6; 5 TABLET, FILM COATED ORAL at 07:00

## 2018-07-18 RX ADMIN — SODIUM CHLORIDE 3 MILLION UNITS: 9 INJECTION, SOLUTION INTRAVENOUS at 22:50

## 2018-07-18 RX ADMIN — LEVETIRACETAM 1500 MG: 500 TABLET, FILM COATED ORAL at 18:00

## 2018-07-18 RX ADMIN — LEVETIRACETAM 1500 MG: 500 TABLET, FILM COATED ORAL at 05:11

## 2018-07-18 RX ADMIN — SODIUM CHLORIDE 3 MILLION UNITS: 9 INJECTION, SOLUTION INTRAVENOUS at 01:51

## 2018-07-18 RX ADMIN — GENTAMICIN SULFATE 80 MG: 40 INJECTION, SOLUTION INTRAMUSCULAR; INTRAVENOUS at 21:41

## 2018-07-18 RX ADMIN — GENTAMICIN SULFATE 80 MG: 40 INJECTION, SOLUTION INTRAMUSCULAR; INTRAVENOUS at 13:45

## 2018-07-18 RX ADMIN — SODIUM CHLORIDE 3 MILLION UNITS: 9 INJECTION, SOLUTION INTRAVENOUS at 14:35

## 2018-07-18 RX ADMIN — SODIUM CHLORIDE 3 MILLION UNITS: 9 INJECTION, SOLUTION INTRAVENOUS at 18:00

## 2018-07-18 RX ADMIN — SODIUM CHLORIDE 3 MILLION UNITS: 9 INJECTION, SOLUTION INTRAVENOUS at 05:09

## 2018-07-18 RX ADMIN — OXYCODONE HYDROCHLORIDE 10 MG: 10 TABLET ORAL at 21:56

## 2018-07-18 RX ADMIN — OXYCODONE HYDROCHLORIDE 10 MG: 10 TABLET ORAL at 17:55

## 2018-07-18 RX ADMIN — OXYCODONE HYDROCHLORIDE 10 MG: 10 TABLET ORAL at 12:08

## 2018-07-18 ASSESSMENT — ENCOUNTER SYMPTOMS
FALLS: 0
SPUTUM PRODUCTION: 0
WEAKNESS: 0
VOMITING: 0
SENSORY CHANGE: 0
SHORTNESS OF BREATH: 0
PALPITATIONS: 0
MYALGIAS: 1
EYE PAIN: 0
HEADACHES: 1
BLURRED VISION: 0
LOSS OF CONSCIOUSNESS: 0
FEVER: 0
CHILLS: 0
NAUSEA: 0
NECK PAIN: 1
FOCAL WEAKNESS: 0
BACK PAIN: 1
SPEECH CHANGE: 0
PSYCHIATRIC NEGATIVE: 1
ABDOMINAL PAIN: 0

## 2018-07-18 ASSESSMENT — PAIN SCALES - GENERAL
PAINLEVEL_OUTOF10: 6
PAINLEVEL_OUTOF10: 4

## 2018-07-18 NOTE — PROGRESS NOTES
Infectious Disease Progress Note    Author: Chantal Tellez M.D. Date & Time of service: 2018  11:27 AM    Chief Complaint:  FU viridans strep sepsis/IE    Interval History:  2018 T-max 98.9 WBC 13.1 platelets 301 creatinine 0.63 feels better but still has headache and back pain  - Tmax 98.6 wbc 9.3 ongoing back pain. WBC 9   AF WBC 7.5 back pain slightly improved, did not take any pain meds all day yesterday, tolerating abx without issues, plan of care d/w pt in detail  7/15 AF no CBC ongoing neck and back pain, patient received PICC without any issues, also seen by CT surgery with no plans for surgical intervention at this  2018-T-max 98.  Continues to get headache.  Apparently continues to get new emboli  2018 T-max 98 continues to get headache and back pain and neck pain.  Overall feels improved  2018 T-max 98.4.  The symptoms seem to be improved and wants to go to the gym.  Other than headache no new issues overnight  Labs Reviewed, Medications Reviewed and Radiology Reviewed.    Review of Systems:  Review of Systems   Constitutional: Negative for chills and fever.   Respiratory: Negative for sputum production.    Cardiovascular: Negative for chest pain and leg swelling.   Gastrointestinal: Negative for abdominal pain, nausea and vomiting.   Genitourinary: Negative for dysuria.   Musculoskeletal: Positive for back pain, myalgias and neck pain.        Improved   Neurological: Positive for headaches. Negative for sensory change and speech change.       Hemodynamics:  Temp (24hrs), Av.8 °C (98.2 °F), Min:36.4 °C (97.6 °F), Max:36.9 °C (98.4 °F)  Temperature: 36.9 °C (98.4 °F)  Pulse  Av.6  Min: 54  Max: 114   Blood Pressure: 107/60       Physical Exam:  Physical Exam   Constitutional: He is oriented to person, place, and time. He appears well-developed and well-nourished. No distress.   HENT:   Head: Normocephalic.   Mouth/Throat: No oropharyngeal exudate.   Eyes: EOM are  normal. Pupils are equal, round, and reactive to light. No scleral icterus.   Neck: Neck supple.   Cardiovascular: Regular rhythm.    Murmur heard.  Pulmonary/Chest: Effort normal. He has no wheezes. He has no rales.   Abdominal: Soft. There is no tenderness. There is no rebound.   Musculoskeletal: He exhibits no edema.   Right upper extremity PICC line nontender   Neurological: He is alert and oriented to person, place, and time. He displays normal reflexes. No cranial nerve deficit. Coordination normal.   Skin: No rash noted. No erythema.   Vitals reviewed.      Meds:    Current Facility-Administered Medications:   •  senna-docusate  •  polyethylene glycol/lytes **OR** magnesium hydroxide  •  oxyCODONE immediate-release **OR** oxyCODONE immediate-release  •  MD ALERT... gentamicin  •  gentamicin (GARAMYCIN) IV  •  levETIRAcetam  •  nicotine  •  penicillin g  •  acetaminophen/caffeine/butalbital 325-40-50 mg  •  labetalol **OR** [DISCONTINUED] hydrALAZINE  •  ondansetron  •  ondansetron    Labs:  Recent Labs      07/16/18 0228 07/17/18 0145 07/18/18   0600   WBC  8.7  7.9  7.3   RBC  5.05  4.68*  4.60*   HEMOGLOBIN  13.3*  12.5*  12.4*   HEMATOCRIT  40.9*  37.8*  37.7*   MCV  81.0*  80.8*  82.0   MCH  26.3*  26.7*  27.0   RDW  41.3  40.4  41.9   PLATELETCT  367  306  266   MPV  10.2  10.2  10.2   NEUTSPOLYS  62.30   --    --    LYMPHOCYTES  29.00   --    --    MONOCYTES  4.60   --    --    EOSINOPHILS  2.40   --    --    BASOPHILS  0.30   --    --      Recent Labs      07/16/18 0228 07/17/18 0145 07/18/18   0600   SODIUM  135  137  139   POTASSIUM  4.3  4.4  4.2   CHLORIDE  100  101  104   CO2  27  28  30   GLUCOSE  111*  98  91   BUN  15  20  13     Recent Labs      07/16/18 0228 07/17/18 0145 07/18/18   0600   ALBUMIN  3.8   --    --    TBILIRUBIN  0.5   --    --    ALKPHOSPHAT  68   --    --    TOTPROTEIN  7.0   --    --    ALTSGPT  26   --    --    ASTSGOT  15   --    --    CREATININE  0.75   0.92  0.81       Imaging:  Ct-cta Head With & W/o-post Process    Result Date: 7/10/2018  7/9/2018 11:49 PM HISTORY/REASON FOR EXAM:  Neurological Deficit TECHNIQUE/EXAM DESCRIPTION: CT angiogram of the [place] without and with contrast.  Initial precontrast images were obtained of the head from the skull base through the vertex.  Postcontrast images were obtained of the [place] following the power injection of nonionic contrast at 5.0 mL/sec. Thin-section helical images were obtained with overlapping reconstruction interval. Coronal and sagittal multiplanar volume reformats were generated.  3D angiographic images were reviewed on PACS.  Maximum intensity projection (MIP) images were generated and reviewed. 100 mL of Omnipaque 350 nonionic contrast was injected intravenously. Low dose optimization technique was utilized for this CT exam including automated exposure control and adjustment of the mA and/or kV according to patient size. COMPARISON:  None. FINDINGS: The posterior circulation shows the distal vertebral arteries to be patent. The vertebrobasilar confluence is intact. The basilar artery is patent. No aneurysm or occlusive lesion is evident. The anterior circulation shows no stenotic or occlusive lesion. No aneurysm is evident about the [place]. The brain is unremarkable.     1.  CT angiogram of the [place] within normal limits.    Ct-cta Neck With & W/o-post Processing    Result Date: 7/10/2018  7/9/2018 11:49 PM HISTORY/REASON FOR EXAM: Left-sided facial numbness. TECHNIQUE/EXAM DESCRIPTION: CT angiogram of the neck with contrast. Postcontrast images were obtained of the neck from the great vessels through the skull base following the power injection of nonionic contrast at 5.0 mL/sec. Thin-section helical images were obtained with overlapping reconstruction interval. Coronal and oblique multiplanar volume reformats were generated. Cervical internal carotid artery  percent stenosis is calculated using the standard method according to the NASCET criteria wherein a segment of uniform caliber mid or distal cervical internal carotid is used as the reference denominator. 3D angiographic images were reviewed on PACS.  Maximum intensity projection (MIP) images were generated and reviewed 100 mL of Omnipaque 350 nonionic contrast was injected intravenously. Low dose optimization technique was utilized for this CT exam including automated exposure control and adjustment of the mA and/or kV according to patient size. COMPARISON:  None. FINDINGS: The arch origins of the great vessels appear intact. The aortic arch shows no abnormality. The right common carotid artery, cervical carotid bifurcation, and cervical internal carotid artery are within normal limits. There is no evidence of significant stenosis, thrombus, or other filling defect or ulceration. There is no evidence of dissection or aneurysm. The left common carotid artery, cervical carotid bifurcation, and cervical internal carotid artery are within normal limits. There is no evidence of significant stenosis, thrombus, or other filling defect or ulceration. There is no evidence of dissection  or aneurysm. The cervical vertebral arteries are normal bilaterally. The neck soft tissues and lung apices in the field of view are unremarkable.     CT angiogram of the neck within normal limits.    Ct-maxillofacial With & W/o Plus Recons    Result Date: 7/10/2018  7/10/2018 4:00 PM HISTORY/REASON FOR EXAM:  Streptococcal bacteremia. Evaluate for sinusitis or abscess. TECHNIQUE/EXAM DESCRIPTION AND NUMBER OF VIEWS:   CT scan maxillofacial with and without contrast, with reconstructions. Thin-section helical imaging was obtained of the maxillofacial structures from the orbital roofs through the mandible before and after injection of nonionic contrast. Coronal and sagittal multiplanar volume reformat images were generated from the axial  data.. 100 mL of Omnipaque 350 nonionic contrast was injected intravenously. Low dose optimization technique was utilized for this CT exam including automated exposure control and adjustment of the mA and/or kV according to patient size. COMPARISON:  None. FINDINGS: There is rounded mucosal thickening or polyp formation in the inferior medial aspect of the right maxillary sinus. The remainder of the visualized paranasal sinuses are clear. No air-fluid level is present to suggest acute sinusitis. No bony expansion is identified. No lytic or blastic bony change is identified. The soft tissues of the neck are within normal limits. No soft tissue abscess or inflammatory change is identified. Cervical lymph nodes are normal in size. No submandibular or parotid gland abnormality is noted. No posterior pharyngeal or laryngeal mucosal abnormality or mass is identified.     1.  Rounded mucosal thickening or polyp formation in the inferior aspect of the right maxillary sinus which could reflect chronic right maxillary sinusitis. 2.  No evidence of acute sinusitis. 3.  Otherwise clear paranasal sinuses.    Ct-post-fossa-ear With & W/o    Result Date: 7/10/2018  7/10/2018 4:00 PM HISTORY/REASON FOR EXAM:  Streptococcal bacteremia, facial numbness rule out facial nerve compression / mastoiditis. TECHNIQUE/EXAM DESCRIPTION AND NUMBER OF VIEWS: CT scan of the temporal bones without and with contrast. The study was performed on a [employer] CT scanner. Contiguous thin section 1.0 mm or 1.25 mm axial and direct coronal images were obtained of the temporal bones. Images are reviewed at magnified bone and soft tissue windows and non-magnified axial images are also displayed at soft tissue windows. Scans are obtained pre and post contrast. 100 mL of Omnipaque 300 nonionic contrast was injected intravenously. Low dose optimization technique was utilized for this CT exam including automated exposure control and adjustment of the mA and/or kV  according to patient size. COMPARISON: CTA of the neck without and with contrast 7/9/2018 FINDINGS: On the right, the external auditory canal is normal except for some debris most consistent with cerumen.  The mastoid is normally pneumatized and aerated.  The middle ear cavity and mastoid antrum are clear.  The ossicles are normal in position  and configuration.  The scutum is sharp.  The bony labyrinth is normal, and the internal auditory canal shows normal caliber and is symmetric with the left side.  The facial nerve canal is unremarkable. On the left, the external auditory canal is normal except for some debris consistent with cerumen.  The mastoid is normally pneumatized and aerated.  The middle ear cavity and mastoid antrum are clear.  The ossicles are normal in position and configuration.  The scutum is sharp.  The bony labyrinth is normal, and the internal auditory canal shows normal caliber and is symmetric with the right side.  The facial nerve canal is unremarkable. The right transverse -- sigmoid sinus and right internal jugular vein are dominant. The paranasal sinuses show clustered retention cyst or polypoid mucosal thickening in the alveolar recess of the right maxillary sinus. There are no air-fluid levels. The posterior fossa structures are unremarkable.  The fourth ventricle is in the midline. There is no abnormal parenchymal or extra-axial/meningeal enhancement in the posterior fossa or supratentorial compartment within the field of view.     CT OF THE TEMPORAL BONES WITHOUT CONTRAST WITHIN NORMAL LIMITS.    Dx-chest-2 Views    Result Date: 7/10/2018    7/9/2018 10:30 PM HISTORY/REASON FOR EXAM: Shortness of Breath TECHNIQUE/EXAM DESCRIPTION:  PA and lateral views of the chest. COMPARISON: None FINDINGS: The cardiac silhouette appears within normal limits. The mediastinal contour appears within normal limits.  The central pulmonary vasculature appears normal. The lungs appear well expanded  bilaterally.  Bilateral lungs are clear. No significant pleural effusions are identified. The bony structures appear age-appropriate.     1.  No acute cardiopulmonary disease.    Mr-brain-with & W/o    Result Date: 7/11/2018  7/10/2018 10:21 PM HISTORY/REASON FOR EXAM:  Possible meningitis. TECHNIQUE/EXAM DESCRIPTION:   MRI of the brain without and with contrast. T1 sagittal, T2 fast spin-echo axial, T1 coronal, FLAIR coronal, diffusion-weighted and apparent diffusion coefficient (ADC map) axial images were obtained of the whole brain. T1 postcontrast axial and T1 postcontrast coronal images were obtained. The study was performed on a [employer] 1.5 Kerri MRI scanner. 20 mL Gadavist contrast was administered intravenously. COMPARISON:  None. FINDINGS:  There is tiny area of restricted diffusion in the right frontal gray matter. Minimal contrast enhancement is seen. The coronal FLAIR images demonstrates abnormal T2 hyperintensity in the adjacent right frontal subarachnoid spaces. The axial gradient echo images demonstrates linear hypointensity within the sulci. There is low-lying cerebellar tonsils. There is no hydrocephalus.  There is no large lesion identified in the expected course of the intracranial portions of the cranial nerves. The visualized flow voids of cerebral vasculature appear normal within limits.  The skull bones appear normal. The paranasal sinuses are clear. The extracranial soft tissue including orbits appear grossly normal.     1.  There is tiny area of restricted diffusion in the right frontal gray matter. Minimal contrast enhancement is seen. The coronal FLAIR images demonstrates abnormal T2 hyperintensity in the adjacent right frontal subarachnoid spaces. The axial gradient echo images demonstrates linear hypointensity within the sulci. These findings likely represents a small cortical venous thrombosis with adjacent tiny infarct. Cortical venous thrombosis may cause focal subarachnoid  hemorrhage. Please note that cortical venous thrombosis can be complication of meningitis. 2.  Low-lying cerebellar tonsils. This is concerning for Chiari I malformation. In view of history of lumbar puncture, this finding can be caused by the post lumbar puncture intracranial hypotension.    Mr-cervical Spine-with & W/o    Result Date: 7/11/2018  7/10/2018 10:21 PM HISTORY/REASON FOR EXAM:  Possible bacterial meningitis. TECHNIQUE/EXAM DESCRIPTION: MRI of the cervical spine without and with contrast. The study was performed on a [employer] 1.5 Kerri MRI scanner. T1 sagittal, T2 fast spin-echo sagittal, and gradient echo axial images were obtained of the cervical spine. T1 post-contrast fat suppressed sagittal images were obtained of the cervical spine. Optional T1 post-contrast axial images may be obtained. 20 mL Omniscan contrast was administered intravenously. COMPARISON: None. FINDINGS: There is prominent posterior epidural fluid collection extending from C6-7 into the thoracic spine. Mild posterior epidural contrast enhancement is seen. Please see thoracic spine report for further details. The cervical spine maintains normal height and alignment. There is no fracture or dislocation. There is no pathologic marrow infiltration. There is no abnormal perivertebral fluid collection. There is no intradural extramedullary fluid collection. There is no abnormal intramedullary T2 signal intensity in the cervical spinal cord. At the level of C2-3, there is no spinal or neural foraminal stenosis. At the level of C3-4, there is no spinal or neural foraminal stenosis. At the level of C4-5, there is no spinal or neural foraminal stenosis. At the level of C5-6, there is no spinal or neural foraminal stenosis. At the level of C6-7, there is no spinal or neural foraminal stenosis. At the level of C7-T1, there is no spinal or neural foraminal stenosis. The visualized posterior fossa structures appear normal within limits.  The  cervical spinal cord does not demonstrate any mass or abnormal T2 signal intensity. The visualized pre-and paraspinal soft tissues appear normal within limits.     1.  There is prominent posterior epidural fluid collection extending from C6-7 into the thoracic spine. Mild posterior epidural contrast enhancement is noted at this level. Please see thoracic spine report for further details. 2.  There is no evidence of osteomyelitis in the cervical spine.    Mr-lumbar Spine-with & W/o    Result Date: 7/11/2018  7/10/2018 10:21 PM HISTORY/REASON FOR EXAM:  Possible bacterial meningitis. TECHNIQUE/EXAM DESCRIPTION: MRI of the lumbar spine without and with contrast. The study was performed on a [employer] 1.5 Kerri MRI scanner. T1 sagittal, T2 fast spin-echo sagittal, and T2 axial images were obtained of the lumbar spine. T1 post-contrast fat-suppressed sagittal images were obtained. 20 mL Omniscan contrast was administered intravenously. COMPARISON:  None. FINDINGS: There is abnormal posterior epidural fluid collection seen extending from the lower cervical spine to the level of L3-4. Multifocal abnormal epidural contrast enhancement is seen. The lumbar spine maintains normal height and alignment. There is no evidence of fracture or dislocation. There is no pathologic marrow infiltration. There is no abnormal disc fluid. At the level of L5-S1, there is diffuse disc bulge. Mild facet joint arthropathy is seen. There is no spinal or neural foraminal stenosis. At the level of L4-5, there is no spinal or neural foraminal stenosis. At the level of L3-4, there is no spinal or neural foraminal stenosis. At the level of L2-3, there is no spinal or neural foraminal stenosis. At the level of L1-2, there is no spinal or neural foraminal stenosis. The conus terminates at the level of L1.     1.  There is abnormal posterior epidural fluid collection seen extending from the lower cervical spine to the level of L3-4. Multifocal abnormal  epidural contrast enhancement is seen. These findings are concerning for thoracic and lumbar epidural abscess. 2.  There is no evidence of lumbar osteomyelitis. 3.  Findings were discussed with DAVON FERNANDO on 7/11/2018 1:08 PM.    Mr-thoracic Spine-with & W/o    Result Date: 7/11/2018  7/10/2018 10:21 PM HISTORY/REASON FOR EXAM:  Possible bacterial meningitis TECHNIQUE/EXAM DESCRIPTION: MRI of the thoracic spine without and with contrast. The study was performed on a [employer] 1.5 Kerri MRI scanner. T1 sagittal, T2 fast spin-echo sagittal, and T2 axial images were obtained of the thoracic spine. T1 post-contrast fat suppressed sagittal images were obtained. Optional T1 post-contrast axial images may be obtained. 20 mL Gadavist contrast was administered intravenously. COMPARISON:  None. FINDINGS: There is abnormal posterior epidural fluid collection extending from C6-7 to the lumbar spine. Abnormal peripheral contrast enhancement noted surrounding the epidural fluid collection. The thoracic spinal cord is anteriorly displaced from the levels of T3-4 to  T9-10. Moderate central canal stenosis is seen. There is a hemangioma in the body of T11. There is no abnormal intramedullary T2 signal intensity in the thoracic spinal cord.     There is abnormal posterior epidural fluid collection extending from C6-7 to the lumbar spine. Abnormal peripheral contrast enhancement noted surrounding the epidural fluid collection. The thoracic spinal cord is anteriorly displaced from the levels of T3-4 to  T9-10. Moderate central canal stenosis is seen. These findings are highly concerning for posterior epidural abscess with moderate canal compromise.    Echocardiogram Comp W/o Cont    Result Date: 7/11/2018  Transthoracic Echo Report Echocardiography Laboratory CONCLUSIONS No prior study is available for comparison. Normal left ventricular systolic function. Left ventricular ejection fraction is visually estimated to be 65%. Normal  diastolic function. Normal inferior vena cava size and inspiratory collapse. Mildly dilated left atrium. Moderate mitral regurgitation. JAYDON POLLARD Exam Date:         2018                    09:47 Exam Location:     Inpatient Priority:          Routine Ordering Physician:        DAVON FERNANDO Referring Physician:       KASSIE Lockett Sonographer:               Sunshine Huang Age:    27     Gender:    M MRN:    [id number] :    1990 BSA:    2.21   Ht (in):    70     Wt (lb):    229 Exam Type:     Complete Indications:     Murmur ICD Codes:       R01.1 CPT Codes:       65744 BP:   120    /   73     HR: Technical Quality:       Fair MEASUREMENTS  (Male / Female) Normal Values 2D ECHO LV Diastolic Diameter PLAX        5.1 cm                4.2 - 5.9 / 3.9 - 5.3 cm LV Systolic Diameter PLAX         3.9 cm                2.1 - 4.0 cm IVS Diastolic Thickness           0.87 cm               LVPW Diastolic Thickness          1.3 cm                LVOT Diameter                     2 cm                  RA Diameter                       3.6 cm                Estimated LV Ejection Fraction    65 %                  LV Ejection Fraction MOD BP       62.9 %                >= 55  % LV Ejection Fraction MOD 4C       61.7 %                LV Ejection Fraction MOD 2C       64.1 %                LA Volume Index                   32.2 cm³/m²           16 - 28 cm³/m² DOPPLER AV Peak Velocity                  1.4 m/s               AV Peak Gradient                  8 mmHg                AV Mean Gradient                  4.7 mmHg              LVOT Peak Velocity                0.99 m/s              AV Area Cont Eq vti               2.3 cm²               Mitral E Point Velocity           0.93 m/s              Mitral E to A Ratio               1.8                   Mitral A Duration                 114 ms                MV Pressure Half Time             67.4 ms               MV Area PHT                       3.3 cm²                MV Deceleration Time              232 ms                MR Flow Convergence Radius        0.94 cm               MR ERO PISA                       0.33 cm²              MR Regurgitant Volume PISA        48.7 cm³              PV Peak Velocity                  0.97 m/s              PV Peak Gradient                  3.8 mmHg              RVOT Peak Velocity                0.94 m/s              * Indicates values subject to auto-interpretation LV EF:  65    % FINDINGS Left Ventricle Normal left ventricular chamber size. Normal left ventricular wall thickness. Normal left ventricular systolic function. Left ventricular ejection fraction is visually estimated to be 65%. Normal regional wall motion. Normal diastolic function. Right Ventricle The right ventricle was normal in size and function. Right Atrium The right atrium is normal in size.  Normal inferior vena cava size and inspiratory collapse. Left Atrium Mildly dilated left atrium. Mitral Valve Thickened mitral valve leaflets. No mitral stenosis. Moderate mitral regurgitation. ERO by PISA method is  0.33 sq cm. Aortic Valve Structurally normal aortic valve without significant stenosis or regurgitation. Tricuspid Valve Unable to estimate pulmonary artery pressure due to an inadequate tricuspid regurgitant jet. No tricuspid stenosis. Trace tricuspid regurgitation. Unable to estimate pulmonary artery pressure due to an inadequate tricuspid regurgitant jet. Pulmonic Valve The pulmonic valve is not well visualized. No stenosis or regurgitation seen. Pericardium Normal pericardium without effusion. Aorta The aortic root is normal. Hui Modi MD (Electronically Signed) Final Date:     11 July 2018                 13:51      Micro:  Results     Procedure Component Value Units Date/Time    BLOOD CULTURE [[id number]] Collected:  07/11/18 1219    Order Status:  Completed Specimen:  Blood from Peripheral Updated:  07/16/18 1500     Significant Indicator NEG     Source BLD      "Site PERIPHERAL     Blood Culture No growth after 5 days of incubation.    Narrative:       Per Hospital Policy: Only change Specimen Src: to \"Line\" if  specified by physician order.    BLOOD CULTURE [[id number]] Collected:  07/11/18 1219    Order Status:  Completed Specimen:  Blood from Peripheral Updated:  07/16/18 1500     Significant Indicator NEG     Source BLD     Site PERIPHERAL     Blood Culture No growth after 5 days of incubation.    Narrative:       Per Hospital Policy: Only change Specimen Src: to \"Line\" if  specified by physician order.    BLOOD CULTURE [[id number]] Collected:  07/15/18 2049    Order Status:  Completed Specimen:  Blood from Peripheral Updated:  07/16/18 0641     Significant Indicator NEG     Source BLD     Site PERIPHERAL     Blood Culture No Growth    Note: Blood cultures are incubated for 5 days and  are monitored continuously.Positive blood cultures  are called to the RN and reported as soon as  they are identified.      Narrative:       Per Hospital Policy: Only change Specimen Src: to \"Line\" if  specified by physician order.    BLOOD CULTURE [[id number]] Collected:  07/15/18 2049    Order Status:  Completed Specimen:  Blood from Peripheral Updated:  07/16/18 0641     Significant Indicator NEG     Source BLD     Site PERIPHERAL     Blood Culture No Growth    Note: Blood cultures are incubated for 5 days and  are monitored continuously.Positive blood cultures  are called to the RN and reported as soon as  they are identified.      Narrative:       Per Hospital Policy: Only change Specimen Src: to \"Line\" if  specified by physician order.    CSF CULTURE [[id number]] Collected:  07/10/18 1440    Order Status:  Completed Specimen:  CSF Updated:  07/13/18 0837     Gram Stain Result No organisms seen.     Significant Indicator NEG     Source CSF     Site TAP     CSF Culture No growth at 72 hours.    BLOOD CULTURE [[id number]]  (Abnormal) Collected:  07/10/18 1419    Order Status:  Completed " "Specimen:  Blood from Peripheral Updated:  07/13/18 0810     Significant Indicator POS (POS)     Source BLD     Site PERIPHERAL     Blood Culture Growth detected by Bactec instrument. 07/11/2018  02:12 (A)      Viridans Streptococcus  See previous culture for sensitivity report.   (A)    Narrative:       CALL  Feng  [facility] tel. [phone],  CALLED  TERRY tel. [phone] 07/11/2018, 02:12, RB PERF. RESULTS CALLED TO: RN  [phone]  Per Hospital Policy: Only change Specimen Src: to \"Line\" if  specified by physician order.    BLOOD CULTURE [[id number]]  (Abnormal) Collected:  07/10/18 1419    Order Status:  Completed Specimen:  Blood from Peripheral Updated:  07/13/18 0809     Significant Indicator POS (POS)     Source BLD     Site PERIPHERAL     Blood Culture Growth detected by Bactec instrument. 07/11/2018  01:49 (A)      Viridans Streptococcus  See previous culture for sensitivity report.   (A)    Narrative:       CALL  Feng  [facility] tel. [phone],  CALLED  [facility] tel. [phone] 07/11/2018, 01:50, RB PERF. RESULTS CALLED TO: RN  [phone]  Per Hospital Policy: Only change Specimen Src: to \"Line\" if  specified by physician order.    BLOOD CULTURE [[id number]]  (Abnormal) Collected:  07/09/18 2333    Order Status:  Completed Specimen:  Blood from Peripheral Updated:  07/13/18 0807     Significant Indicator POS (POS)     Source BLD     Site PERIPHERAL     Blood Culture Growth detected by Bactec instrument. 07/10/2018  12:18 (A)      Viridans Streptococcus  See previous culture for sensitivity report.   (A)    Narrative:       CALL  Feng  [facility] tel. [phone],  CALLED  [facility] tel. [phone] 07/10/2018, 12:22, RB PERF. RESULTS CALLED  TO:[phone] RN  Per Hospital Policy: Only change Specimen Src: to \"Line\" if  specified by physician order.    SENSITIVITY, E-TEST [[id number]] Collected:  07/09/18 2333    Order Status:  Completed Specimen:  Blood Updated:  07/13/18 0807     ETEST Sensitivity FINAL    Narrative:       [facility] tel. [phone] 07/10/2018, " "12:22, RB PERF. RESULTS CALLED TO:[phone] RN  Per Hospital Policy: Only change Specimen Src: to \"Line\" if  specified by physician order.    BLOOD CULTURE [[id number]]  (Abnormal)  (Susceptibility) Collected:  07/09/18 2333    Order Status:  Completed Specimen:  Blood from Peripheral Updated:  07/13/18 0807     Significant Indicator POS (POS)     Source BLD     Site PERIPHERAL     Blood Culture Growth detected by Bactec instrument. 07/10/2018  12:17 (A)      Viridans Streptococcus (A)    Narrative:       CALL  Feng  [facility] tel. [phone],  CALLED  [facility] tel. [phone] 07/10/2018, 12:22, RB PERF. RESULTS CALLED  TO:[phone] RN  Per Hospital Policy: Only change Specimen Src: to \"Line\" if  specified by physician order.    Culture & Susceptibility     VIRIDANS STREPTOCOCCUS     Antibiotic Sensitivity Microscan Unit Status    Cefotaxime Sensitive <=1 mcg/mL Final    Method: SENSITIVITY, E-TEST    Penicillin Sensitive <=0.12 mcg/mL Final    Method: SENSITIVITY, E-TEST                             Assessment:  Active Hospital Problems    Diagnosis   •    • Streptococcal bacteremia [R78.81, B95.5]   • Hypokalemia [E87.6]   • Normocytic anemia [D64.9]   • Murmur [R01.1]       Plan:  Streptococcus viridans bacteremia/endocarditis  Blood cultures are positive on 7/9/2018, 7/10/2018  Bcx negative from 7/11/2018  Sed rate is 49 and CRP is 2.99  Continue high-dose penicillin  Add gentamicin.  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  Consider " MRI MRA to rule out mycotic aneurysm  On abx above  Anticipate 6-8 weeks of IV abx  There is ongoing increase in the septic emboli to brain  CT surgery reevaluation noted    Discussed with internal medicine/Dr Costa.

## 2018-07-18 NOTE — DISCHARGE PLANNING
Anticipated Discharge Disposition: [facility]    Action: LSW received VM from Abby with [facility]. LM for call back    LSW made tc to Abby with [facility]. ([phone]) Pt has been accepted and has been submitted to insurance for auth.    Barriers to Discharge: Ins auth    Plan: LSW to f/u with [facility]

## 2018-07-18 NOTE — PROGRESS NOTES
Neurosurgery Progress Note    Subjective:  Patient stable today, c/o back pain making it difficult for him to sleep  Seen and evaluated by Dr. Torres  PM with updated imaging of cervical spine and brain  This showed non compressive extension to C5 and septic emboli right lateral frontal and cerebellar   Remains neuro intact   Patient with C5 to L3 epidural abscess  Mild intermittent headache  Tolerable and improving spine pain   No saddle anesthesia  Feels strong   Denies fascial symptoms   WBC 7.3  Afebrile  CTS evaluated and recommended no MVR at this time    Exam:  GCS 15, EOMI  A&O x 3  Appropriate   CN 2-12 grossly intact   Upper and lower motor grossly strong, sensory grossly intact   DTRs =2 without Clonus or Hoffmans.        BP  Min: 105/62  Max: 110/72  Pulse  Av.5  Min: 64  Max: 90  Resp  Av.8  Min: 15  Max: 20  Temp  Av.8 °C (98.2 °F)  Min: 36.4 °C (97.6 °F)  Max: 36.9 °C (98.4 °F)  SpO2  Av.3 %  Min: 93 %  Max: 96 %    No Data Recorded    Recent Labs      18   0145  18   0600   WBC  8.7  7.9  7.3   RBC  5.05  4.68*  4.60*   HEMOGLOBIN  13.3*  12.5*  12.4*   HEMATOCRIT  40.9*  37.8*  37.7*   MCV  81.0*  80.8*  82.0   MCH  26.3*  26.7*  27.0   MCHC  32.5*  33.1*  32.9*   RDW  41.3  40.4  41.9   PLATELETCT  367  306  266   MPV  10.2  10.2  10.2     Recent Labs      18   0145  18   0600   SODIUM  135  137  139   POTASSIUM  4.3  4.4  4.2   CHLORIDE  100  101  104   CO2  27  28  30   GLUCOSE  111*  98  91   BUN  15  20  13   CREATININE  0.75  0.92  0.81   CALCIUM  9.4  9.5  9.5               Intake/Output     None          No intake or output data in the 24 hours ending 18 0832         • senna-docusate  1 Tab DAILY   • polyethylene glycol/lytes  1 Packet QDAY PRN    Or   • magnesium hydroxide  30 mL QDAY PRN   • oxyCODONE immediate-release  5 mg Q4HRS PRN    Or   • oxyCODONE immediate-release  10 mg Q4HRS PRN   • MD  ALERT... gentamicin   pharmacy to dose   • gentamicin (GARAMYCIN) IV  80 mg Q8HR   • levETIRAcetam  1,500 mg BID   • nicotine  7 mg Daily-0600   • penicillin g  3 Million Units Q4HRS   • acetaminophen/caffeine/butalbital 325-40-50 mg  1 Tab Q6HRS PRN   • labetalol  10 mg Q4HRS PRN   • ondansetron  4 mg Q4HRS PRN   • ondansetron  4 mg Q4HRS PRN       Assessment and Plan:  Hospital day #6  No indication for evacuation of epidural abscess at this time as patient remains neuro intact  Recommend continued ID close follow up  Continue ID & IM care  No neurosurgery intervention planned unless he has neuro changes   Dr. Torres recommending repeat spine MRI in 3 weeks   Will sign off for now, please call with questions

## 2018-07-18 NOTE — PROGRESS NOTES
Patient resting quietly during bedside report, denies need for PRN pain medication.  Call light in reach.

## 2018-07-18 NOTE — PROGRESS NOTES
[facility] Hospitalist Progress Note    Date of Service: 2018    Chief Complaint  27 y.o. male admitted 2018 with numbness, left-sided, fevers on/off for 2 months with associated neck pain, photophobia, headaches in the outpatient setting. Diagnosed with streptococcal viridans bacteremia c/b infective endocarditis involving the mitral valve with severe MR, seeding of dura c/b extensive epidural abscess and likely embolic stroke c/b meningitis, ? Venous thrombosis.     Interval Problem Update  Doing well this AM, was concerned about the possibility of being moved so suddenly. Explained that he needs to be evaluated first. No acute overnight events.     Consultants/Specialty  Neurology   Neurosurgery  Infectious disease  Cardiology   Cardiothoracic surgery     Disposition  Accepted to [facility], pending insurance auth      Review of Systems   Constitutional: Positive for malaise/fatigue. Negative for chills and fever.   Eyes: Negative for blurred vision and pain.   Respiratory: Negative for shortness of breath.    Cardiovascular: Negative for chest pain, palpitations and leg swelling.   Gastrointestinal: Negative for abdominal pain, nausea and vomiting.   Genitourinary: Negative for dysuria.   Musculoskeletal: Negative for falls.   Neurological: Positive for headaches (nearly resolved). Negative for sensory change, speech change, focal weakness, loss of consciousness and weakness.   Psychiatric/Behavioral: Negative.    All other systems reviewed and are negative.     Physical Exam  Laboratory/Imaging   Hemodynamics  Temp (24hrs), Av.6 °C (97.9 °F), Min:36.3 °C (97.3 °F), Max:36.9 °C (98.4 °F)   Temperature: 36.9 °C (98.4 °F)  Pulse  Av.9  Min: 54  Max: 114    Blood Pressure: 110/72      Respiratory      Respiration: 20, Pulse Oximetry: 96 %             Fluids  No intake or output data in the 24 hours ending 18 0710    Nutrition  Orders Placed This Encounter   Procedures   • Diet Order Regular      Standing Status:   Standing     Number of Occurrences:   1     Order Specific Question:   Diet:     Answer:   Regular [1]     Physical Exam   Constitutional: He is oriented to person, place, and time. He appears well-developed. No distress.   HENT:   Head: Normocephalic.   Mouth/Throat: Oropharynx is clear and moist. No oropharyngeal exudate.   Eyes: EOM are normal. Pupils are equal, round, and reactive to light. No scleral icterus.   Neck: Normal range of motion.   Cardiovascular: Normal rate, regular rhythm and intact distal pulses.    Murmur heard.  Pulmonary/Chest: Effort normal. No stridor. No respiratory distress.   Abdominal: Soft. He exhibits no distension. There is no tenderness.   Musculoskeletal: Normal range of motion. He exhibits no edema.   Neurological: He is alert and oriented to person, place, and time. No cranial nerve deficit. Coordination normal.   Motor strength is 5/5 in b/l LE and UE. No sensory deficits. DTR are normal in all extremities. No point tenderness along spine.    Skin: Skin is warm, dry and intact. He is not diaphoretic.   Psychiatric: He has a normal mood and affect. His behavior is normal.   Vitals reviewed.      Recent Labs      07/16/18   0228  07/17/18   0145  07/18/18   0600   WBC  8.7  7.9  7.3   RBC  5.05  4.68*  4.60*   HEMOGLOBIN  13.3*  12.5*  12.4*   HEMATOCRIT  40.9*  37.8*  37.7*   MCV  81.0*  80.8*  82.0   MCH  26.3*  26.7*  27.0   MCHC  32.5*  33.1*  32.9*   RDW  41.3  40.4  41.9   PLATELETCT  367  306  266   MPV  10.2  10.2  10.2     Recent Labs      07/15/18   0900  07/16/18   0228  07/17/18   0145   SODIUM  139  135  137   POTASSIUM  4.3  4.3  4.4   CHLORIDE  103  100  101   CO2  29  27  28   GLUCOSE  89  111*  98   BUN  17  15  20   CREATININE  0.80  0.75  0.92   CALCIUM  9.2  9.4  9.5                      Assessment/Plan     Acute bacterial endocarditis- (present on admission)   Assessment & Plan    Streptococcal (Viridans) involving mitral valve with severe  MR. Confirmed with CHAR 07/13/18. Evaluated by Dr Alvarado from CTS 07/14/2018, no surgical interventions planned at this time    - outpatient cardiology / CTS follow up with interval Echo / CHAR in 6 months  - repeat MRI in 3 months per neurosurg  - continue with PCN, added gentamycin on 7/17  - monitor clinically        Epidural abscess- (present on admission)   Assessment & Plan    Extensive from cervical to lumbar spine without compromise. No new neurological deficits and prior deficits resolved.  Worsening imaging findings now on 07/15/2018  Seen by Dr Torres from neurosurgery, no surgical interventions at this time   - management as above  - Outpatient follow up with Dr Torres   - interval MRI in 3 weeks from prior C spine MRI of 7/15, per neurosurgery  - Continue close clinical monitoring of neurological status, has stabilized.        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 Symptoms have improved.  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 Suspect related to embolic phenomenon from infective endocarditis. Worsening on interval MRI now  - Neurology team did not recommend anticoagulation, no antiplatelet therapy  - continue Keppra 1500 mg BID per neurology, wean off coming days as infectious issues are managed        Non-rheumatic mitral regurgitation- (present on admission)   Assessment & Plan    Related to infective endocarditis. Severe. No surgical interventions planned at this time  - Will need outpatient cardiology / CTS follow up  - Interval echo / CHAR in 6 weeks of hospital  discharge         Normocytic anemia- (present on admission)   Assessment & Plan    Stable. Suspect consumptive from bacteremia  - Monitor Hb / Restrictive transfusion strategy            Quality-Core Measures   Reviewed items::  Labs reviewed and Medications reviewed  Cruz catheter::  No Cruz  DVT prophylaxis pharmacological::  Not indicated at this time, ambulatory  DVT prophylaxis - mechanical:  SCDs

## 2018-07-18 NOTE — CARE PLAN
Problem: Safety  Goal: Will remain free from injury    Intervention: Provide assistance with mobility  Pt calls appropriately for assistance.      Problem: Pain Management  Goal: Pain level will decrease to patient's comfort goal    Intervention: Follow pain managment plan developed in collaboration with patient and Interdisciplinary Team  Pt resting following prn pain medication.

## 2018-07-18 NOTE — PROGRESS NOTES
Dr. Garcia notified per pt request. Pt needing stronger pain medication. Orders modified  for 1-2 oxy q4 prn pain.

## 2018-07-19 LAB — GENTAMICIN SERPL-MCNC: 0.74 UG/ML (ref 1–2)

## 2018-07-19 PROCEDURE — 770006 HCHG ROOM/CARE - MED/SURG/GYN SEMI*

## 2018-07-19 PROCEDURE — 700111 HCHG RX REV CODE 636 W/ 250 OVERRIDE (IP): Performed by: INTERNAL MEDICINE

## 2018-07-19 PROCEDURE — 99232 SBSQ HOSP IP/OBS MODERATE 35: CPT | Performed by: INTERNAL MEDICINE

## 2018-07-19 PROCEDURE — 700105 HCHG RX REV CODE 258: Performed by: INTERNAL MEDICINE

## 2018-07-19 PROCEDURE — 99231 SBSQ HOSP IP/OBS SF/LOW 25: CPT | Performed by: HOSPITALIST

## 2018-07-19 PROCEDURE — A9270 NON-COVERED ITEM OR SERVICE: HCPCS | Performed by: HOSPITALIST

## 2018-07-19 PROCEDURE — A9270 NON-COVERED ITEM OR SERVICE: HCPCS | Performed by: INTERNAL MEDICINE

## 2018-07-19 PROCEDURE — 700102 HCHG RX REV CODE 250 W/ 637 OVERRIDE(OP): Performed by: HOSPITALIST

## 2018-07-19 PROCEDURE — 80170 ASSAY OF GENTAMICIN: CPT

## 2018-07-19 PROCEDURE — 700102 HCHG RX REV CODE 250 W/ 637 OVERRIDE(OP): Performed by: INTERNAL MEDICINE

## 2018-07-19 RX ORDER — LEVETIRACETAM 750 MG/1
1500 TABLET ORAL 2 TIMES DAILY
Qty: 60 TAB | Status: ON HOLD
Start: 2018-07-19 | End: 2018-09-20 | Stop reason: CLARIF

## 2018-07-19 RX ORDER — POLYETHYLENE GLYCOL 3350 17 G/17G
17 POWDER, FOR SOLUTION ORAL
Refills: 3 | Status: ON HOLD
Start: 2018-07-19 | End: 2018-09-20 | Stop reason: CLARIF

## 2018-07-19 RX ORDER — ONDANSETRON 4 MG/1
4 TABLET, ORALLY DISINTEGRATING ORAL EVERY 4 HOURS PRN
Qty: 10 TAB | Refills: 0 | Status: ON HOLD
Start: 2018-07-19 | End: 2018-09-20 | Stop reason: CLARIF

## 2018-07-19 RX ORDER — OXYCODONE HYDROCHLORIDE 10 MG/1
10 TABLET ORAL EVERY 6 HOURS PRN
Qty: 15 TAB | Refills: 0 | Status: SHIPPED | OUTPATIENT
Start: 2018-07-19 | End: 2018-07-22

## 2018-07-19 RX ADMIN — SODIUM CHLORIDE 3 MILLION UNITS: 9 INJECTION, SOLUTION INTRAVENOUS at 15:57

## 2018-07-19 RX ADMIN — OXYCODONE HYDROCHLORIDE 10 MG: 10 TABLET ORAL at 10:58

## 2018-07-19 RX ADMIN — LEVETIRACETAM 1500 MG: 500 TABLET, FILM COATED ORAL at 18:19

## 2018-07-19 RX ADMIN — NICOTINE 7 MG: 7 PATCH, EXTENDED RELEASE TRANSDERMAL at 06:08

## 2018-07-19 RX ADMIN — SODIUM CHLORIDE 3 MILLION UNITS: 9 INJECTION, SOLUTION INTRAVENOUS at 02:02

## 2018-07-19 RX ADMIN — LEVETIRACETAM 1500 MG: 500 TABLET, FILM COATED ORAL at 06:08

## 2018-07-19 RX ADMIN — SODIUM CHLORIDE 3 MILLION UNITS: 9 INJECTION, SOLUTION INTRAVENOUS at 06:08

## 2018-07-19 RX ADMIN — GENTAMICIN SULFATE 80 MG: 40 INJECTION, SOLUTION INTRAMUSCULAR; INTRAVENOUS at 14:08

## 2018-07-19 RX ADMIN — OXYCODONE HYDROCHLORIDE 10 MG: 10 TABLET ORAL at 18:19

## 2018-07-19 RX ADMIN — GENTAMICIN SULFATE 80 MG: 40 INJECTION, SOLUTION INTRAMUSCULAR; INTRAVENOUS at 06:51

## 2018-07-19 RX ADMIN — STANDARDIZED SENNA CONCENTRATE AND DOCUSATE SODIUM 1 TABLET: 8.6; 5 TABLET, FILM COATED ORAL at 06:08

## 2018-07-19 RX ADMIN — SODIUM CHLORIDE 3 MILLION UNITS: 9 INJECTION, SOLUTION INTRAVENOUS at 10:49

## 2018-07-19 RX ADMIN — SODIUM CHLORIDE 3 MILLION UNITS: 9 INJECTION, SOLUTION INTRAVENOUS at 18:19

## 2018-07-19 RX ADMIN — OXYCODONE HYDROCHLORIDE 10 MG: 10 TABLET ORAL at 23:14

## 2018-07-19 RX ADMIN — SODIUM CHLORIDE 3 MILLION UNITS: 9 INJECTION, SOLUTION INTRAVENOUS at 22:20

## 2018-07-19 ASSESSMENT — ENCOUNTER SYMPTOMS
VOMITING: 0
SENSORY CHANGE: 0
ABDOMINAL PAIN: 0
MYALGIAS: 1
NAUSEA: 0
EYE PAIN: 0
PSYCHIATRIC NEGATIVE: 1
HEADACHES: 0
LOSS OF CONSCIOUSNESS: 0
BACK PAIN: 1
SHORTNESS OF BREATH: 0
SPUTUM PRODUCTION: 0
PALPITATIONS: 0
FOCAL WEAKNESS: 0
NECK PAIN: 1
FEVER: 0
WEAKNESS: 0
FALLS: 0
HEADACHES: 1
BLURRED VISION: 0
SPEECH CHANGE: 0
CHILLS: 0

## 2018-07-19 ASSESSMENT — PAIN SCALES - GENERAL
PAINLEVEL_OUTOF10: 4
PAINLEVEL_OUTOF10: 0
PAINLEVEL_OUTOF10: 0
PAINLEVEL_OUTOF10: 4
PAINLEVEL_OUTOF10: 0
PAINLEVEL_OUTOF10: 7

## 2018-07-19 NOTE — PROGRESS NOTES
Assumed pt care @1900. Pt A/Ox4. c/o back pain 6/10. Medicated per MAR. Alternative comfort measure provided such as heat packs and warm blanket. Family at bedside. Able to ambulate in a steady gait. Placed call light within pt reach. Hourly rounding in placed.

## 2018-07-19 NOTE — DISCHARGE PLANNING
Anticipated Discharge Disposition: [facility]    Action: LSW spoke with Abby with [facility]. Pt's insurance [place] [employer] denied authorization as pt does not require 3 IV abx. Peer to peer is suggested. Abby will speak with Dr. Costa in regards to peer to peer conversation.    Barriers to Discharge: Ins auth    Plan: LSW to f/u with [facility]

## 2018-07-19 NOTE — PROGRESS NOTES
[facility] Hospitalist Progress Note    Date of Service: 2018    Chief Complaint  27 y.o. male admitted 2018 with numbness, left-sided, fevers on/off for 2 months with associated neck pain, photophobia, headaches in the outpatient setting. Diagnosed with streptococcal viridans bacteremia c/b infective endocarditis involving the mitral valve with severe MR, seeding of dura c/b extensive epidural abscess and likely embolic stroke c/b meningitis    Interval Problem Update  Doing well, no neurologic symptoms. Slept well overnight. Curious about the various blood tests that were completed, reviewed with the patient (incl negative hep panel, RPR and HIV). No acute overnight events.     Consultants/Specialty  Neurology   Neurosurgery  Infectious disease  Cardiology   Cardiothoracic surgery     Disposition  Accepted to [facility], pending insurance auth      Review of Systems   Constitutional: Negative for chills, fever and malaise/fatigue.   Eyes: Negative for blurred vision and pain.   Respiratory: Negative for shortness of breath.    Cardiovascular: Negative for chest pain, palpitations and leg swelling.   Gastrointestinal: Negative for abdominal pain, nausea and vomiting.   Genitourinary: Negative for dysuria.   Musculoskeletal: Negative for falls.   Neurological: Negative for sensory change, speech change, focal weakness, loss of consciousness, weakness and headaches.   Psychiatric/Behavioral: Negative.    All other systems reviewed and are negative.     Physical Exam  Laboratory/Imaging   Hemodynamics  Temp (24hrs), Av.8 °C (98.3 °F), Min:36.3 °C (97.4 °F), Max:37.3 °C (99.2 °F)   Temperature: 36.3 °C (97.4 °F)  Pulse  Av.4  Min: 54  Max: 114    Blood Pressure: 105/64      Respiratory      Respiration: 18, Pulse Oximetry: 96 %             Fluids  No intake or output data in the 24 hours ending 18 0657    Nutrition  Orders Placed This Encounter   Procedures   • Diet Order Regular     Standing Status:    Standing     Number of Occurrences:   1     Order Specific Question:   Diet:     Answer:   Regular [1]     Physical Exam   Constitutional: He is oriented to person, place, and time. He appears well-developed. No distress.   HENT:   Head: Normocephalic.   Mouth/Throat: Oropharynx is clear and moist.   Eyes: EOM are normal. Pupils are equal, round, and reactive to light. No scleral icterus.   Neck: Normal range of motion.   Cardiovascular: Normal rate, regular rhythm and intact distal pulses.    Murmur heard.  Pulmonary/Chest: Effort normal. No stridor. No respiratory distress.   Abdominal: Soft. He exhibits no distension. There is no tenderness.   Musculoskeletal: Normal range of motion. He exhibits no edema.   Neurological: He is alert and oriented to person, place, and time. No cranial nerve deficit. Coordination normal.   Motor strength is 5/5 in b/l LE and UE. No sensory deficits. No point tenderness along spine.    Skin: Skin is warm, dry and intact. He is not diaphoretic.   Psychiatric: He has a normal mood and affect. His behavior is normal.   Vitals reviewed.      Recent Labs      07/17/18   0145  07/18/18   0600   WBC  7.9  7.3   RBC  4.68*  4.60*   HEMOGLOBIN  12.5*  12.4*   HEMATOCRIT  37.8*  37.7*   MCV  80.8*  82.0   MCH  26.7*  27.0   MCHC  33.1*  32.9*   RDW  40.4  41.9   PLATELETCT  306  266   MPV  10.2  10.2     Recent Labs      07/17/18   0145  07/18/18   0600   SODIUM  137  139   POTASSIUM  4.4  4.2   CHLORIDE  101  104   CO2  28  30   GLUCOSE  98  91   BUN  20  13   CREATININE  0.92  0.81   CALCIUM  9.5  9.5                      Assessment/Plan     Acute bacterial endocarditis- (present on admission)   Assessment & Plan    Streptococcal (Viridans) involving mitral valve with severe MR. Confirmed with CHAR 07/13/18. Evaluated by Dr Alvarado from CTS 07/14/2018, no surgical interventions planned during this hospitalization.  - interval CHAR in 6 weeks, outpatient cardiology / CTS follow up  - repeat MRI  spine in 3 weeks per neurosurg  - continue with PCN, added gentamycin on 7/17  - monitor clinically        Epidural abscess- (present on admission)   Assessment & Plan    Extensive from cervical to lumbar spine without compromise. No new neurological deficits and prior deficits resolved.  Worsening imaging findings now on 07/15/2018  Seen by Dr Torres from neurosurgery, no surgical interventions at this time   - management as above  - Outpatient follow up with Dr Torres   - interval MRI in 3 weeks from prior C spine MRI of 7/15, per neurosurgery  - Continue close clinical monitoring of neurological status, has stabilized.        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 Symptoms have improved.  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 Suspect related to embolic phenomenon from infective endocarditis. Worsening on interval MRI now  - Neurology team did not recommend anticoagulation, no antiplatelet therapy  - continue Keppra 1500 mg BID per neurology, can be weaned once infectious process has been controlled        Non-rheumatic mitral regurgitation- (present on admission)   Assessment & Plan    Related to infective endocarditis. Severe. No surgical interventions planned at this time  - f/u with CTS in 6 weeks to discuss MV repair vs replacement  - Interval echo / CHAR in 6 weeks of hospital discharge         Normocytic anemia- (present on admission)   Assessment & Plan    Stable. Suspect consumptive from bacteremia  - Monitor Hb / Restrictive transfusion strategy            Quality-Core  Measures   Reviewed items::  Labs reviewed and Medications reviewed  Cruz catheter::  No Cruz  DVT prophylaxis pharmacological::  Not indicated at this time, ambulatory  DVT prophylaxis - mechanical:  SCDs

## 2018-07-19 NOTE — DISCHARGE SUMMARY
Discharge Summary    CHIEF COMPLAINT ON ADMISSION  Chief Complaint   Patient presents with   • Numbness       Reason for Admission  Facial Numbness; Difficulty Talking     CODE STATUS  Full Code    HPI & HOSPITAL COURSE  This is a 27 y.o. male here with left sided facial numbness and drooping. It was associated with dysarthria, fever, weight loss, and nausea. He was admitted for TIA work up and on further evaluation he was found to be severely septic with Strep viridans bacteremia, complicated by multiple epidural abscess, MV vegetations and resultant severe mitral regurgitation. Infectious disease, neurosurgery, neurology, and CT surgery were consulted. He was started on IV antibiotics and keppra for seizure prophylaxis.     He continued to have intermittent headaches, back pain and weakness. Then on 7/15 he had an acute episode of dysarthria, confusion and left upper extremity dysesthesias. Stat imaging showed worsening of the cervical abscesses and concern for cerebritis/abscess/venous infarct on imaging. Keppra was increased and EEG was obtained which showed rhythmic slowing of the frontal lobes, increasing the risk of seizure activity. Gentamycin was added to his high dose PCN regimen and he responded well. Neurologic symptoms resolved and no new ones developed. Neurosurgery deemed it best for infection control prior to the consideration of any surgery or decompression, as his neurologic symptoms resolved and he was responding to antibiotics. CT surgery also thought it best to wait for intervention as the risks of reinfecting a replaced or repaired valve outweighed the benefits at this time. He will need interval imaging and follow up, listed below.       Therefore, he is discharged in good and stable condition to a long-term acute care hospital.    The patient met 2-midnight criteria for an inpatient stay at the time of discharge.      FOLLOW UP ITEMS POST DISCHARGE  1. Repeat MRI total spine (3 weeks from  7/15)   2. Follow up with Neurosurgery after MRI spine.  3. Repeat CHAR in 6 weeks.  4. Follow up with CT surgery in 6 weeks.  5. Follow up with neurology for evaluate and titrate Keppra down as indicated.  6. Statin was not started as neurologic symptoms were from septic emboli, can follow up lipid panel with PCP.    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 *      FOLLOW UP  Minnie Burrell A.[facility]  [address]  [place] [address]  [phone]          Aiden Torres III, M.D.  [address]  [place] [address]  [phone]          Avila Alvarado M.D.  [address]  [place] [address]  [phone]          Sanju Brown M.D.  [address]  [address]  [place] [address]  [phone]            MEDICATIONS ON DISCHARGE     Medication List      START taking these medications      Instructions   levetiracetam 750 MG tablet  Commonly known as:  KEPPRA   Take 2 Tabs by mouth 2 Times a Day.  Dose:  1500 mg     nicotine 7 MG/24HR Pt24  Commonly known as:  NICODERM   Apply 1 Patch to skin as directed every 24 hours.  Dose:  1 Patch     NS SOLN 100 mL with gentamicin 40 MG/ML SOLN 80 mg   80 mg by Intravenous route every 12 hours.  Dose:  80 mg     NS SOLN 100 mL with penicillin G potassium [id number] UNIT SOLR 3 Million Units   3 Million Units by Intravenous route every 4 hours.  Dose:  3 Million Units     ondansetron 4 MG Tbdp  Commonly known as:  ZOFRAN ODT   Take 1 Tab by mouth every four hours as needed (give PO if no IV route available).  Dose:  4 mg     oxyCODONE immediate release 10 MG immediate release tablet  Commonly known as:  ROXICODONE   Take 1 Tab  by mouth every 6 hours as needed for Severe Pain for up to 3 days.  Dose:  10 mg     polyethylene glycol/lytes Pack  Commonly known as:  MIRALAX   Take 1 Packet by mouth 1 time daily as needed.  Dose:  17 g        STOP taking these medications    ibuprofen 200 MG Tabs  Commonly known as:  MOTRIN            Allergies  Allergies   Allergen Reactions   • Nkda [No Known Drug Allergy]        DIET  Orders Placed This Encounter   Procedures   • Diet Order Regular     Standing Status:   Standing     Number of Occurrences:   1     Order Specific Question:   Diet:     Answer:   Regular [1]       ACTIVITY  As tolerated.  Weight bearing as tolerated    LINES, DRAINS, AND WOUNDS  This is an automated list. Peripheral IVs will be removed prior to discharge.  Central Line Group Right;Brachial Single Lumen;PICC 4 (Active)   Line Length (cm) 44 cm 7/14/2018 12:00 PM   Line Secured Securing Device 7/19/2018  8:00 AM   Patency and Function Check Performed at Beginning of Shift 7/19/2018  8:00 AM   Line Necessity Assessed Antibiotic Therapy Greater than 7 Days 7/19/2018  8:00 AM   Consider Removal of Femoral Line Not Applicable 7/19/2018  8:00 AM   Closed Tubing Set Up Yes 7/19/2018  8:00 AM   Hand Washing / Gloves Prior to Every Access Yes 7/19/2018  8:00 AM   Next Daily Chlorhexidine Bath Due (Regional ONLY) 07/19/18 7/19/2018  8:00 AM   Port Access  Scrub the Hub Prior to Access 7/19/2018  8:00 AM   Site Condition / Description Assessed;Patent;Clean;Dry;Intact 7/19/2018  1:00 PM   Signs and Symptoms of Infection None Apparent at this Time 7/19/2018  1:00 PM   Dressing Type / Description Antimicrobial Patch (BioPatch) 7/19/2018  1:00 PM   Dressing Status Observed 7/19/2018  1:00 PM   Next Dressing Change  07/21/18 7/19/2018  8:00 AM   Date Secondary Tubing Changed 07/17/18 7/17/2018  8:00 PM   NEXT Primary Tubing Change  07/21/18 7/18/2018  8:00 PM   NEXT Secondary Tubing Change  07/18/18 7/17/2018  8:00 PM   Date IV Connector(s)  Changed 07/17/18 7/17/2018  8:00 PM   NEXT IV Connector(s) Change Date 07/21/18 7/18/2018  8:00 PM                     MENTAL STATUS ON TRANSFER  Level of Consciousness: Alert  Orientation : Oriented x 4  Speech: Speech Clear    CONSULTATIONS  Infectious Disease  Neurosurgery  Neurology  CT surgery    PROCEDURES  CXR-  1.  No acute cardiopulmonary disease.    CTA head and neck-  1.  CT angiogram of the [place] within normal limits.  2.  CT angiogram of the neck within normal limits.    CT max/face-  1.  Rounded mucosal thickening or polyp formation in the inferior aspect of the right maxillary sinus which could reflect chronic right maxillary sinusitis.  2.  No evidence of acute sinusitis.  3.  Otherwise clear paranasal sinuses.    CT posterior fossa-  CT OF THE TEMPORAL BONES WITHOUT CONTRAST WITHIN NORMAL LIMITS.    Lumbar puncture.    EEG-  This is an abnormal video EEG recording in the awake and drowsy state(s).  Intermittently, there is rhythmic slowing in the frontal regions. The findings may increase risk for seizures and suggest underlying area(s) of cortical irritability and structural abnormality. No seizures captured during the study. Clinical and radiological correlation is recommended.    MRI C spine-  1.  There is prominent posterior epidural fluid collection extending from C6-7 into the thoracic spine. Mild posterior epidural contrast enhancement is noted at this level. Please see thoracic spine report for further details.  2.  There is no evidence of osteomyelitis in the cervical spine.    MRI T spine-  There is abnormal posterior epidural fluid collection extending from C6-7 to the lumbar spine. Abnormal peripheral contrast enhancement noted surrounding the epidural fluid collection. The thoracic spinal cord is anteriorly displaced from the levels of T3-4 to T9-10. Moderate central canal stenosis is seen. These findings are highly concerning for posterior epidural abscess with moderate  canal compromise.    MRI L spine-  1.  There is abnormal posterior epidural fluid collection seen extending from the lower cervical spine to the level of L3-4. Multifocal abnormal epidural contrast enhancement is seen. These findings are concerning for thoracic and lumbar epidural abscess.  2.  There is no evidence of lumbar osteomyelitis.    MRI brain-  1.  There is tiny area of restricted diffusion in the right frontal gray matter. Minimal contrast enhancement is seen. The coronal FLAIR images demonstrates abnormal T2 hyperintensity in the adjacent right frontal subarachnoid spaces. The axial gradient echo images demonstrates linear hypointensity within the sulci. These findings likely represents a small cortical venous thrombosis with adjacent tiny infarct. Cortical venous thrombosis may cause focal subarachnoid hemorrhage. Please note that cortical venous thrombosis can be complication of meningitis.    2.  Low-lying cerebellar tonsils. This is concerning for Chiari I malformation. In view of history of lumbar puncture, this finding can be caused by the post lumbar puncture intracranial hypotension.    Echo-  No prior study is available for comparison.   Normal left ventricular systolic function.  Left ventricular ejection fraction is visually estimated to be 65%.  Normal diastolic function.  Normal inferior vena cava size and inspiratory collapse.  Mildly dilated left atrium.  Moderate mitral regurgitation.    MRV-  1.  Normal MR venogram of the brain without contrast.  2.  Low-lying cerebellar tonsils are again noted.    Transesophageal Echo-  Left ventricular ejection fraction is visually estimated to be 65%.  Small, pedunculated mobile masses adherent to the tips of the valve leaflets, notably A1 & P1.  These prolapse into the left atrium.  This causes malcoaptation of the valve itself, and resultant explosive severe regurgitation.    PICC line insertion    MRI C spine-  Posterior epidural enhancement which  appears to extend slightly more cephalad to approximately the C4 level and extending inferiorly below the level of the scan into the thoracic spine. This may indicate interval worsening of infectious process. There is no definite evidence of osteomyelitis.    CT head-  1.  Focal cortical enhancement in the right frontal lobe. Questionable enhancing focus in the right cerebellum may be present. Findings are nonspecific and may represent septic emboli, cerebritis, or venous infarcts  2.  No mass effect or midline shift.    CT C spine-  1.  Slightly increased enhancement in the anterior aspect of the spinal canal beginning at the level of C6-7 extending inferiorly which may represent the epidural abscess seen on MRI.  2.  No evidence of cervical spinal osteomyelitis or paraspinous inflammation.  3.  No acute cervical spine fracture or dislocation.  4.  No cervical spine degenerative disc disease.    LABORATORY  Lab Results   Component Value Date    SODIUM 139 07/18/2018    POTASSIUM 4.2 07/18/2018    CHLORIDE 104 07/18/2018    CO2 30 07/18/2018    GLUCOSE 91 07/18/2018    BUN 13 07/18/2018    CREATININE 0.81 07/18/2018        Lab Results   Component Value Date    WBC 7.3 07/18/2018    HEMOGLOBIN 12.4 (L) 07/18/2018    HEMATOCRIT 37.7 (L) 07/18/2018    PLATELETCT 266 07/18/2018        Total time of the discharge process exceeds 46 minutes.

## 2018-07-19 NOTE — PROGRESS NOTES
"Pharmacy Kinetics 27 y.o. male on gentamicin day # 3 of 14  2018    Dosing Weight: 85 Kg   Currently on Gentamicin 80mg iv q8hr (0545, 1345, 2145)     Indication for treatment: Viridans strep mitral valve endocarditis and epidural abscess     Pertinent history per medical record: Admitted on 2018 for viridans strep mitral valve endocarditis and epidural abscess. Cardiothoracic surgery, neurosurgery, and ID are consulting.      Other antibiotics: penicillin 3 million units IV q4h     Allergies: Nkda [no known drug allergy]      List concerns for renal function: none at this time     Pertinent cultures to date:   18 blood - peripheral x 2: viridans streptococci (pcn KOREY < 0.12)  7/10/18 blood - peripheral x 2: viridans streptococci  7/10/18 CSF: negative  18 blood - peripheral x 2: no growth  7/15/18 blood - peripheral x 2: no growth       Recent Labs      18   0228  18   0145  18   0600   WBC  8.7  7.9  7.3   NEUTSPOLYS  62.30   --    --      Recent Labs      18   0228  18   0145  18   0600   BUN  15  20  13   CREATININE  0.75  0.92  0.81   ALBUMIN  3.8   --    --      Recent Labs      18   0600   GENTTROUGH  0.72*   No intake or output data in the 24 hours ending 18 1722   Blood pressure 107/60, pulse 64, temperature 36.9 °C (98.4 °F), resp. rate 15, height 1.778 m (5' 10\"), weight 105.1 kg (231 lb 11.3 oz), SpO2 93 %. Temp (24hrs), Av.9 °C (98.4 °F), Min:36.9 °C (98.4 °F), Max:36.9 °C (98.4 °F)      A/P   1. Gentamicin dose change: No.   2. Next gentamicin level: Tomorrow,  at 0515   3. Goal trough: undetectable  4. Comments: Gentamicin trough level draw recorded at 0600, gentamicin dose charted as given at 0510. Therefore, level is not a trough level. Re-ordered for tomorrow morning. Renal indices trending downward. ID consulting - Anticipate 6-8 weeks of IV abx    Mayur CuevasD      "

## 2018-07-19 NOTE — PROGRESS NOTES
"Pharmacy Kinetics 27 y.o. male on gentamicin day # 4 of 14  2018    Dosing Weight: 85 kg (adjusted BW)   Currently on Gentamicin 80mg iv q8hr (9448, 9051, 8395)     Indication for treatment: Viridans strep mitral valve endocarditis and epidural abscess     Pertinent history per medical record: Admitted on 2018 for viridans strep mitral valve endocarditis and epidural abscess. Cardiothoracic surgery, neurosurgery, and ID are consulting.      Other antibiotics:    CTX (7/10-)   vanco (7/10-)   penicillin 3 million units IV q4h (--     Allergies: Nkda [no known drug allergy]      List concerns for renal function: none at this time     Pertinent cultures to date:    18 blood - peripheral x 2: viridans streptococci (pcn KOREY < 0.12)   7/10/18 blood - peripheral x 2: viridans streptococci   7/10/18 CSF: negative   18 blood - peripheral x 2: no growth (final )   7/15/18 blood - peripheral x 2: NGTD     Recent Labs      18   0145  18   0600   WBC  7.9  7.3     Recent Labs      18   0145  18   0600   BUN  20  13   CREATININE  0.92  0.81     Recent Labs      18   0600  18   0512   GENTTROUGH  0.72*  0.74*     Blood pressure 104/58, pulse 65, temperature 36.7 °C (98 °F), resp. rate 20, height 1.778 m (5' 10\"), weight 105.1 kg (231 lb 11.3 oz), SpO2 95 %. Temp (24hrs), Av.8 °C (98.2 °F), Min:36.3 °C (97.4 °F), Max:37.3 °C (99.2 °F)    A/P   1. Gentamicin dose change: 80 mg IV Q12H (0200, 1400)  2. Next gentamicin level: ~ 3-4 days  3. Goal trough: undetectable  4. Comments:   · Afebrile, vital signs stable, WBC wnl, renal incides stable.Blood cultures from 18 are negative, repeat blood cultures 7/15/18 remain negative.   · ID consulting - Anticipate 6-8 weeks of IV abx, gentamicin for synergy x2 weeks (day )  · Gentamicin trough drawn appropriately (~7.5 hours after last dose) and is above our goal trough of undetectable.   · Per discussion with " our ID clinical specialist, will change to Q12H dosing. Repeat level in 3-4 days. Pharmacist will continue to monitor daily for changes in clinical status or renal function and adjust accordingly.      Erlinda Rivera, Pharm.D

## 2018-07-19 NOTE — PROGRESS NOTES
Pt is A&Ox4 c/o generalized achy pains. Denies any needs at this time. Call light within reach, hourly rounding in place.

## 2018-07-19 NOTE — PROGRESS NOTES
Infectious Disease Progress Note    Author: Chantal Tellez M.D. Date & Time of service: 2018  1:09 PM    Chief Complaint:  FU viridans strep sepsis/IE    Interval History:  2018 T-max 98.9 WBC 13.1 platelets 301 creatinine 0.63 feels better but still has headache and back pain  - Tmax 98.6 wbc 9.3 ongoing back pain. WBC 9   AF WBC 7.5 back pain slightly improved, did not take any pain meds all day yesterday, tolerating abx without issues, plan of care d/w pt in detail  7/15 AF no CBC ongoing neck and back pain, patient received PICC without any issues, also seen by CT surgery with no plans for surgical intervention at this  2018-T-max 98.  Continues to get headache.  Apparently continues to get new emboli  2018 T-max 98 continues to get headache and back pain and neck pain.  Overall feels improved  2018 T-max 98.4.  The symptoms seem to be improved and wants to go to the gym.  Other than headache no new issues overnight  2018-T-max 99.2.  No new issues overnight.  Labs Reviewed, Medications Reviewed and Radiology Reviewed.    Review of Systems:  Review of Systems   Constitutional: Negative for chills and fever.   Respiratory: Negative for sputum production.    Cardiovascular: Negative for chest pain and leg swelling.   Gastrointestinal: Negative for abdominal pain, nausea and vomiting.   Genitourinary: Negative for dysuria.   Musculoskeletal: Positive for back pain, myalgias and neck pain.        Improved   Neurological: Positive for headaches. Negative for sensory change and speech change.       Hemodynamics:  Temp (24hrs), Av.8 °C (98.2 °F), Min:36.3 °C (97.4 °F), Max:37.3 °C (99.2 °F)  Temperature: 36.7 °C (98 °F)  Pulse  Av.1  Min: 54  Max: 114   Blood Pressure: 104/58       Physical Exam:  Physical Exam   Constitutional: He is oriented to person, place, and time. He appears well-developed and well-nourished. No distress.   HENT:   Head: Normocephalic.    Mouth/Throat: No oropharyngeal exudate.   Eyes: EOM are normal. Pupils are equal, round, and reactive to light. No scleral icterus.   Neck: Neck supple.   Cardiovascular: Regular rhythm.    Murmur heard.  Pulmonary/Chest: Effort normal. He has no wheezes. He has no rales.   Abdominal: Soft. There is no tenderness. There is no rebound.   Musculoskeletal: He exhibits no edema.   Right upper extremity PICC line nontender   Neurological: He is alert and oriented to person, place, and time. He displays normal reflexes. No cranial nerve deficit. Coordination normal.   Skin: No rash noted. No erythema.   Vitals reviewed.      Meds:    Current Facility-Administered Medications:   •  senna-docusate  •  polyethylene glycol/lytes **OR** magnesium hydroxide  •  oxyCODONE immediate-release **OR** oxyCODONE immediate-release  •  MD ALERT... gentamicin  •  gentamicin (GARAMYCIN) IV  •  levETIRAcetam  •  nicotine  •  penicillin g  •  acetaminophen/caffeine/butalbital 325-40-50 mg  •  labetalol **OR** [DISCONTINUED] hydrALAZINE  •  ondansetron  •  ondansetron    Labs:  Recent Labs      07/17/18   0145  07/18/18   0600   WBC  7.9  7.3   RBC  4.68*  4.60*   HEMOGLOBIN  12.5*  12.4*   HEMATOCRIT  37.8*  37.7*   MCV  80.8*  82.0   MCH  26.7*  27.0   RDW  40.4  41.9   PLATELETCT  306  266   MPV  10.2  10.2     Recent Labs      07/17/18   0145  07/18/18   0600   SODIUM  137  139   POTASSIUM  4.4  4.2   CHLORIDE  101  104   CO2  28  30   GLUCOSE  98  91   BUN  20  13     Recent Labs      07/17/18   0145  07/18/18   0600   CREATININE  0.92  0.81       Imaging:  Ct-cta Head With & W/o-post Process    Result Date: 7/10/2018  7/9/2018 11:49 PM HISTORY/REASON FOR EXAM:  Neurological Deficit TECHNIQUE/EXAM DESCRIPTION: CT angiogram of the [place] without and with contrast.  Initial precontrast images were obtained of the head from the skull base through the vertex.  Postcontrast images were obtained of the [place] following  the power injection of nonionic contrast at 5.0 mL/sec. Thin-section helical images were obtained with overlapping reconstruction interval. Coronal and sagittal multiplanar volume reformats were generated.  3D angiographic images were reviewed on PACS.  Maximum intensity projection (MIP) images were generated and reviewed. 100 mL of Omnipaque 350 nonionic contrast was injected intravenously. Low dose optimization technique was utilized for this CT exam including automated exposure control and adjustment of the mA and/or kV according to patient size. COMPARISON:  None. FINDINGS: The posterior circulation shows the distal vertebral arteries to be patent. The vertebrobasilar confluence is intact. The basilar artery is patent. No aneurysm or occlusive lesion is evident. The anterior circulation shows no stenotic or occlusive lesion. No aneurysm is evident about the [place]. The brain is unremarkable.     1.  CT angiogram of the [place] within normal limits.    Ct-cta Neck With & W/o-post Processing    Result Date: 7/10/2018  7/9/2018 11:49 PM HISTORY/REASON FOR EXAM: Left-sided facial numbness. TECHNIQUE/EXAM DESCRIPTION: CT angiogram of the neck with contrast. Postcontrast images were obtained of the neck from the great vessels through the skull base following the power injection of nonionic contrast at 5.0 mL/sec. Thin-section helical images were obtained with overlapping reconstruction interval. Coronal and oblique multiplanar volume reformats were generated. Cervical internal carotid artery percent stenosis is calculated using the standard method according to the NASCET criteria wherein a segment of uniform caliber mid or distal cervical internal carotid is used as the reference denominator. 3D angiographic images were reviewed on PACS.  Maximum intensity projection (MIP) images were generated and reviewed 100 mL of Omnipaque 350 nonionic contrast was injected intravenously. Low dose optimization  technique was utilized for this CT exam including automated exposure control and adjustment of the mA and/or kV according to patient size. COMPARISON:  None. FINDINGS: The arch origins of the great vessels appear intact. The aortic arch shows no abnormality. The right common carotid artery, cervical carotid bifurcation, and cervical internal carotid artery are within normal limits. There is no evidence of significant stenosis, thrombus, or other filling defect or ulceration. There is no evidence of dissection or aneurysm. The left common carotid artery, cervical carotid bifurcation, and cervical internal carotid artery are within normal limits. There is no evidence of significant stenosis, thrombus, or other filling defect or ulceration. There is no evidence of dissection  or aneurysm. The cervical vertebral arteries are normal bilaterally. The neck soft tissues and lung apices in the field of view are unremarkable.     CT angiogram of the neck within normal limits.    Ct-maxillofacial With & W/o Plus Recons    Result Date: 7/10/2018  7/10/2018 4:00 PM HISTORY/REASON FOR EXAM:  Streptococcal bacteremia. Evaluate for sinusitis or abscess. TECHNIQUE/EXAM DESCRIPTION AND NUMBER OF VIEWS:   CT scan maxillofacial with and without contrast, with reconstructions. Thin-section helical imaging was obtained of the maxillofacial structures from the orbital roofs through the mandible before and after injection of nonionic contrast. Coronal and sagittal multiplanar volume reformat images were generated from the axial data.. 100 mL of Omnipaque 350 nonionic contrast was injected intravenously. Low dose optimization technique was utilized for this CT exam including automated exposure control and adjustment of the mA and/or kV according to patient size. COMPARISON:  None. FINDINGS: There is rounded mucosal thickening or polyp formation in the inferior medial aspect of the right maxillary sinus. The remainder of the visualized  paranasal sinuses are clear. No air-fluid level is present to suggest acute sinusitis. No bony expansion is identified. No lytic or blastic bony change is identified. The soft tissues of the neck are within normal limits. No soft tissue abscess or inflammatory change is identified. Cervical lymph nodes are normal in size. No submandibular or parotid gland abnormality is noted. No posterior pharyngeal or laryngeal mucosal abnormality or mass is identified.     1.  Rounded mucosal thickening or polyp formation in the inferior aspect of the right maxillary sinus which could reflect chronic right maxillary sinusitis. 2.  No evidence of acute sinusitis. 3.  Otherwise clear paranasal sinuses.    Ct-post-fossa-ear With & W/o    Result Date: 7/10/2018  7/10/2018 4:00 PM HISTORY/REASON FOR EXAM:  Streptococcal bacteremia, facial numbness rule out facial nerve compression / mastoiditis. TECHNIQUE/EXAM DESCRIPTION AND NUMBER OF VIEWS: CT scan of the temporal bones without and with contrast. The study was performed on a [employer] CT scanner. Contiguous thin section 1.0 mm or 1.25 mm axial and direct coronal images were obtained of the temporal bones. Images are reviewed at magnified bone and soft tissue windows and non-magnified axial images are also displayed at soft tissue windows. Scans are obtained pre and post contrast. 100 mL of Omnipaque 300 nonionic contrast was injected intravenously. Low dose optimization technique was utilized for this CT exam including automated exposure control and adjustment of the mA and/or kV according to patient size. COMPARISON: CTA of the neck without and with contrast 7/9/2018 FINDINGS: On the right, the external auditory canal is normal except for some debris most consistent with cerumen.  The mastoid is normally pneumatized and aerated.  The middle ear cavity and mastoid antrum are clear.  The ossicles are normal in position  and configuration.  The scutum is sharp.  The bony labyrinth is normal,  and the internal auditory canal shows normal caliber and is symmetric with the left side.  The facial nerve canal is unremarkable. On the left, the external auditory canal is normal except for some debris consistent with cerumen.  The mastoid is normally pneumatized and aerated.  The middle ear cavity and mastoid antrum are clear.  The ossicles are normal in position and configuration.  The scutum is sharp.  The bony labyrinth is normal, and the internal auditory canal shows normal caliber and is symmetric with the right side.  The facial nerve canal is unremarkable. The right transverse -- sigmoid sinus and right internal jugular vein are dominant. The paranasal sinuses show clustered retention cyst or polypoid mucosal thickening in the alveolar recess of the right maxillary sinus. There are no air-fluid levels. The posterior fossa structures are unremarkable.  The fourth ventricle is in the midline. There is no abnormal parenchymal or extra-axial/meningeal enhancement in the posterior fossa or supratentorial compartment within the field of view.     CT OF THE TEMPORAL BONES WITHOUT CONTRAST WITHIN NORMAL LIMITS.    Dx-chest-2 Views    Result Date: 7/10/2018    7/9/2018 10:30 PM HISTORY/REASON FOR EXAM: Shortness of Breath TECHNIQUE/EXAM DESCRIPTION:  PA and lateral views of the chest. COMPARISON: None FINDINGS: The cardiac silhouette appears within normal limits. The mediastinal contour appears within normal limits.  The central pulmonary vasculature appears normal. The lungs appear well expanded bilaterally.  Bilateral lungs are clear. No significant pleural effusions are identified. The bony structures appear age-appropriate.     1.  No acute cardiopulmonary disease.    Mr-brain-with & W/o    Result Date: 7/11/2018  7/10/2018 10:21 PM HISTORY/REASON FOR EXAM:  Possible meningitis. TECHNIQUE/EXAM DESCRIPTION:   MRI of the brain without and with contrast. T1 sagittal, T2 fast spin-echo axial, T1 coronal, FLAIR  coronal, diffusion-weighted and apparent diffusion coefficient (ADC map) axial images were obtained of the whole brain. T1 postcontrast axial and T1 postcontrast coronal images were obtained. The study was performed on a [employer] 1.5 Kerri MRI scanner. 20 mL Gadavist contrast was administered intravenously. COMPARISON:  None. FINDINGS:  There is tiny area of restricted diffusion in the right frontal gray matter. Minimal contrast enhancement is seen. The coronal FLAIR images demonstrates abnormal T2 hyperintensity in the adjacent right frontal subarachnoid spaces. The axial gradient echo images demonstrates linear hypointensity within the sulci. There is low-lying cerebellar tonsils. There is no hydrocephalus.  There is no large lesion identified in the expected course of the intracranial portions of the cranial nerves. The visualized flow voids of cerebral vasculature appear normal within limits.  The skull bones appear normal. The paranasal sinuses are clear. The extracranial soft tissue including orbits appear grossly normal.     1.  There is tiny area of restricted diffusion in the right frontal gray matter. Minimal contrast enhancement is seen. The coronal FLAIR images demonstrates abnormal T2 hyperintensity in the adjacent right frontal subarachnoid spaces. The axial gradient echo images demonstrates linear hypointensity within the sulci. These findings likely represents a small cortical venous thrombosis with adjacent tiny infarct. Cortical venous thrombosis may cause focal subarachnoid hemorrhage. Please note that cortical venous thrombosis can be complication of meningitis. 2.  Low-lying cerebellar tonsils. This is concerning for Chiari I malformation. In view of history of lumbar puncture, this finding can be caused by the post lumbar puncture intracranial hypotension.    Mr-cervical Spine-with & W/o    Result Date: 7/11/2018  7/10/2018 10:21 PM HISTORY/REASON FOR EXAM:  Possible bacterial meningitis.  TECHNIQUE/EXAM DESCRIPTION: MRI of the cervical spine without and with contrast. The study was performed on a [employer] 1.5 Kerri MRI scanner. T1 sagittal, T2 fast spin-echo sagittal, and gradient echo axial images were obtained of the cervical spine. T1 post-contrast fat suppressed sagittal images were obtained of the cervical spine. Optional T1 post-contrast axial images may be obtained. 20 mL Omniscan contrast was administered intravenously. COMPARISON: None. FINDINGS: There is prominent posterior epidural fluid collection extending from C6-7 into the thoracic spine. Mild posterior epidural contrast enhancement is seen. Please see thoracic spine report for further details. The cervical spine maintains normal height and alignment. There is no fracture or dislocation. There is no pathologic marrow infiltration. There is no abnormal perivertebral fluid collection. There is no intradural extramedullary fluid collection. There is no abnormal intramedullary T2 signal intensity in the cervical spinal cord. At the level of C2-3, there is no spinal or neural foraminal stenosis. At the level of C3-4, there is no spinal or neural foraminal stenosis. At the level of C4-5, there is no spinal or neural foraminal stenosis. At the level of C5-6, there is no spinal or neural foraminal stenosis. At the level of C6-7, there is no spinal or neural foraminal stenosis. At the level of C7-T1, there is no spinal or neural foraminal stenosis. The visualized posterior fossa structures appear normal within limits.  The cervical spinal cord does not demonstrate any mass or abnormal T2 signal intensity. The visualized pre-and paraspinal soft tissues appear normal within limits.     1.  There is prominent posterior epidural fluid collection extending from C6-7 into the thoracic spine. Mild posterior epidural contrast enhancement is noted at this level. Please see thoracic spine report for further details. 2.  There is no evidence of  osteomyelitis in the cervical spine.    Mr-lumbar Spine-with & W/o    Result Date: 7/11/2018  7/10/2018 10:21 PM HISTORY/REASON FOR EXAM:  Possible bacterial meningitis. TECHNIQUE/EXAM DESCRIPTION: MRI of the lumbar spine without and with contrast. The study was performed on a [employer] 1.5 Kerri MRI scanner. T1 sagittal, T2 fast spin-echo sagittal, and T2 axial images were obtained of the lumbar spine. T1 post-contrast fat-suppressed sagittal images were obtained. 20 mL Omniscan contrast was administered intravenously. COMPARISON:  None. FINDINGS: There is abnormal posterior epidural fluid collection seen extending from the lower cervical spine to the level of L3-4. Multifocal abnormal epidural contrast enhancement is seen. The lumbar spine maintains normal height and alignment. There is no evidence of fracture or dislocation. There is no pathologic marrow infiltration. There is no abnormal disc fluid. At the level of L5-S1, there is diffuse disc bulge. Mild facet joint arthropathy is seen. There is no spinal or neural foraminal stenosis. At the level of L4-5, there is no spinal or neural foraminal stenosis. At the level of L3-4, there is no spinal or neural foraminal stenosis. At the level of L2-3, there is no spinal or neural foraminal stenosis. At the level of L1-2, there is no spinal or neural foraminal stenosis. The conus terminates at the level of L1.     1.  There is abnormal posterior epidural fluid collection seen extending from the lower cervical spine to the level of L3-4. Multifocal abnormal epidural contrast enhancement is seen. These findings are concerning for thoracic and lumbar epidural abscess. 2.  There is no evidence of lumbar osteomyelitis. 3.  Findings were discussed with DAVON FERNANDO on 7/11/2018 1:08 PM.    Mr-thoracic Spine-with & W/o    Result Date: 7/11/2018  7/10/2018 10:21 PM HISTORY/REASON FOR EXAM:  Possible bacterial meningitis TECHNIQUE/EXAM DESCRIPTION: MRI of the thoracic spine  without and with contrast. The study was performed on a [employer] 1.5 Kerri MRI scanner. T1 sagittal, T2 fast spin-echo sagittal, and T2 axial images were obtained of the thoracic spine. T1 post-contrast fat suppressed sagittal images were obtained. Optional T1 post-contrast axial images may be obtained. 20 mL Gadavist contrast was administered intravenously. COMPARISON:  None. FINDINGS: There is abnormal posterior epidural fluid collection extending from C6-7 to the lumbar spine. Abnormal peripheral contrast enhancement noted surrounding the epidural fluid collection. The thoracic spinal cord is anteriorly displaced from the levels of T3-4 to  T9-10. Moderate central canal stenosis is seen. There is a hemangioma in the body of T11. There is no abnormal intramedullary T2 signal intensity in the thoracic spinal cord.     There is abnormal posterior epidural fluid collection extending from C6-7 to the lumbar spine. Abnormal peripheral contrast enhancement noted surrounding the epidural fluid collection. The thoracic spinal cord is anteriorly displaced from the levels of T3-4 to  T9-10. Moderate central canal stenosis is seen. These findings are highly concerning for posterior epidural abscess with moderate canal compromise.    Echocardiogram Comp W/o Cont    Result Date: 7/11/2018  Transthoracic Echo Report Echocardiography Laboratory CONCLUSIONS No prior study is available for comparison. Normal left ventricular systolic function. Left ventricular ejection fraction is visually estimated to be 65%. Normal diastolic function. Normal inferior vena cava size and inspiratory collapse. Mildly dilated left atrium. Moderate mitral regurgitation. JAYDON POLLARD Exam Date:         07/11/2018                    09:47 Exam Location:     Inpatient Priority:          Routine Ordering Physician:        DAVON FERNANDO Referring Physician:       KASSIE Lockett Sonographer:               Sunshine Huang Age:    27     Gender:    M  MRN:    [id number] :    1990 BSA:    2.21   Ht (in):    70     Wt (lb):    229 Exam Type:     Complete Indications:     Murmur ICD Codes:       R01.1 CPT Codes:       51927 BP:   120    /   73     HR: Technical Quality:       Fair MEASUREMENTS  (Male / Female) Normal Values 2D ECHO LV Diastolic Diameter PLAX        5.1 cm                4.2 - 5.9 / 3.9 - 5.3 cm LV Systolic Diameter PLAX         3.9 cm                2.1 - 4.0 cm IVS Diastolic Thickness           0.87 cm               LVPW Diastolic Thickness          1.3 cm                LVOT Diameter                     2 cm                  RA Diameter                       3.6 cm                Estimated LV Ejection Fraction    65 %                  LV Ejection Fraction MOD BP       62.9 %                >= 55  % LV Ejection Fraction MOD 4C       61.7 %                LV Ejection Fraction MOD 2C       64.1 %                LA Volume Index                   32.2 cm³/m²           16 - 28 cm³/m² DOPPLER AV Peak Velocity                  1.4 m/s               AV Peak Gradient                  8 mmHg                AV Mean Gradient                  4.7 mmHg              LVOT Peak Velocity                0.99 m/s              AV Area Cont Eq vti               2.3 cm²               Mitral E Point Velocity           0.93 m/s              Mitral E to A Ratio               1.8                   Mitral A Duration                 114 ms                MV Pressure Half Time             67.4 ms               MV Area PHT                       3.3 cm²               MV Deceleration Time              232 ms                MR Flow Convergence Radius        0.94 cm               MR ERO PISA                       0.33 cm²              MR Regurgitant Volume PISA        48.7 cm³              PV Peak Velocity                  0.97 m/s              PV Peak Gradient                  3.8 mmHg              RVOT Peak Velocity                0.94 m/s              * Indicates values  "subject to auto-interpretation LV EF:  65    % FINDINGS Left Ventricle Normal left ventricular chamber size. Normal left ventricular wall thickness. Normal left ventricular systolic function. Left ventricular ejection fraction is visually estimated to be 65%. Normal regional wall motion. Normal diastolic function. Right Ventricle The right ventricle was normal in size and function. Right Atrium The right atrium is normal in size.  Normal inferior vena cava size and inspiratory collapse. Left Atrium Mildly dilated left atrium. Mitral Valve Thickened mitral valve leaflets. No mitral stenosis. Moderate mitral regurgitation. ERO by PISA method is  0.33 sq cm. Aortic Valve Structurally normal aortic valve without significant stenosis or regurgitation. Tricuspid Valve Unable to estimate pulmonary artery pressure due to an inadequate tricuspid regurgitant jet. No tricuspid stenosis. Trace tricuspid regurgitation. Unable to estimate pulmonary artery pressure due to an inadequate tricuspid regurgitant jet. Pulmonic Valve The pulmonic valve is not well visualized. No stenosis or regurgitation seen. Pericardium Normal pericardium without effusion. Aorta The aortic root is normal. Hui Modi MD (Electronically Signed) Final Date:     11 July 2018                 13:51      Micro:  Results     Procedure Component Value Units Date/Time    BLOOD CULTURE [[id number]] Collected:  07/11/18 1219    Order Status:  Completed Specimen:  Blood from Peripheral Updated:  07/16/18 1500     Significant Indicator NEG     Source BLD     Site PERIPHERAL     Blood Culture No growth after 5 days of incubation.    Narrative:       Per Hospital Policy: Only change Specimen Src: to \"Line\" if  specified by physician order.    BLOOD CULTURE [[id number]] Collected:  07/11/18 1219    Order Status:  Completed Specimen:  Blood from Peripheral Updated:  07/16/18 1500     Significant Indicator NEG     Source BLD     Site PERIPHERAL     Blood Culture No growth " "after 5 days of incubation.    Narrative:       Per Hospital Policy: Only change Specimen Src: to \"Line\" if  specified by physician order.    BLOOD CULTURE [[id number]] Collected:  07/15/18 2049    Order Status:  Completed Specimen:  Blood from Peripheral Updated:  07/16/18 0641     Significant Indicator NEG     Source BLD     Site PERIPHERAL     Blood Culture No Growth    Note: Blood cultures are incubated for 5 days and  are monitored continuously.Positive blood cultures  are called to the RN and reported as soon as  they are identified.      Narrative:       Per Hospital Policy: Only change Specimen Src: to \"Line\" if  specified by physician order.    BLOOD CULTURE [[id number]] Collected:  07/15/18 2049    Order Status:  Completed Specimen:  Blood from Peripheral Updated:  07/16/18 0641     Significant Indicator NEG     Source BLD     Site PERIPHERAL     Blood Culture No Growth    Note: Blood cultures are incubated for 5 days and  are monitored continuously.Positive blood cultures  are called to the RN and reported as soon as  they are identified.      Narrative:       Per Hospital Policy: Only change Specimen Src: to \"Line\" if  specified by physician order.    CSF CULTURE [[id number]] Collected:  07/10/18 1440    Order Status:  Completed Specimen:  CSF Updated:  07/13/18 0837     Gram Stain Result No organisms seen.     Significant Indicator NEG     Source CSF     Site TAP     CSF Culture No growth at 72 hours.    BLOOD CULTURE [[id number]]  (Abnormal) Collected:  07/10/18 1419    Order Status:  Completed Specimen:  Blood from Peripheral Updated:  07/13/18 0810     Significant Indicator POS (POS)     Source BLD     Site PERIPHERAL     Blood Culture Growth detected by Bactec instrument. 07/11/2018  02:12 (A)      Viridans Streptococcus  See previous culture for sensitivity report.   (A)    Narrative:       CALL  Feng  [facility] tel. [phone],  CALLED  TERRY tel. [phone] 07/11/2018, 02:12, RB PERF. RESULTS CALLED TO: " "RN  [phone]  Per Hospital Policy: Only change Specimen Src: to \"Line\" if  specified by physician order.    BLOOD CULTURE [[id number]]  (Abnormal) Collected:  07/10/18 1419    Order Status:  Completed Specimen:  Blood from Peripheral Updated:  07/13/18 0809     Significant Indicator POS (POS)     Source BLD     Site PERIPHERAL     Blood Culture Growth detected by Bactec instrument. 07/11/2018  01:49 (A)      Viridans Streptococcus  See previous culture for sensitivity report.   (A)    Narrative:       CALL  Feng  [facility] tel. [phone],  CALLED  TERRY tel. [phone] 07/11/2018, 01:50, RB PERF. RESULTS CALLED TO: RN  [phone]  Per Hospital Policy: Only change Specimen Src: to \"Line\" if  specified by physician order.    BLOOD CULTURE [[id number]]  (Abnormal) Collected:  07/09/18 2333    Order Status:  Completed Specimen:  Blood from Peripheral Updated:  07/13/18 0807     Significant Indicator POS (POS)     Source BLD     Site PERIPHERAL     Blood Culture Growth detected by Bactec instrument. 07/10/2018  12:18 (A)      Viridans Streptococcus  See previous culture for sensitivity report.   (A)    Narrative:       CALL  Feng  [facility] tel. [phone],  CALLED  TERRY tel. [phone] 07/10/2018, 12:22, RB PERF. RESULTS CALLED  TO:[phone] RN  Per Hospital Policy: Only change Specimen Src: to \"Line\" if  specified by physician order.    SENSITIVITY, E-TEST [[id number]] Collected:  07/09/18 2333    Order Status:  Completed Specimen:  Blood Updated:  07/13/18 0807     ETEST Sensitivity FINAL    Narrative:       [facility] tel. [phone] 07/10/2018, 12:22, RB PERF. RESULTS CALLED TO:[phone] RN  Per Hospital Policy: Only change Specimen Src: to \"Line\" if  specified by physician order.    BLOOD CULTURE [[id number]]  (Abnormal)  (Susceptibility) Collected:  07/09/18 2333    Order Status:  Completed Specimen:  Blood from Peripheral Updated:  07/13/18 0807     Significant Indicator POS (POS)     Source BLD     Site PERIPHERAL     Blood Culture Growth detected by " "Bactec instrument. 07/10/2018  12:17 (A)      Viridans Streptococcus (A)    Narrative:       CALL  Feng  [facility] tel. [phone],  CALLED  [facility]RY tel. [phone] 07/10/2018, 12:22, RB PERF. RESULTS CALLED  TO:[phone] RN  Per Hospital Policy: Only change Specimen Src: to \"Line\" if  specified by physician order.    Culture & Susceptibility     VIRIDANS STREPTOCOCCUS     Antibiotic Sensitivity Microscan Unit Status    Cefotaxime Sensitive <=1 mcg/mL Final    Method: SENSITIVITY, E-TEST    Penicillin Sensitive <=0.12 mcg/mL Final    Method: SENSITIVITY, E-TEST                             Assessment:  Active Hospital Problems    Diagnosis   •    • Streptococcal bacteremia [R78.81, B95.5]   • Hypokalemia [E87.6]   • Normocytic anemia [D64.9]   • Murmur [R01.1]       Plan:  Streptococcus viridans bacteremia/endocarditis  Blood cultures are positive on 7/9/2018, 7/10/2018  Bcx negative from 7/11/2018  Sed rate is 49 and CRP is 2.99  Continue high-dose penicillin  Add gentamicin.  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  Consider MRI MRA to rule out mycotic aneurysm  On abx above  Anticipate 6-8 weeks of IV abx  There is ongoing increase in the septic emboli to brain  CT surgery reevaluation noted    Discussed with internal medicine/Dr Costa.  "

## 2018-07-19 NOTE — CARE PLAN
Problem: Infection  Goal: Will remain free from infection  Outcome: PROGRESSING AS EXPECTED  Assess s/s of infection.  ABT administration    Problem: Pain Management  Goal: Pain level will decrease to patient's comfort goal  Outcome: PROGRESSING AS EXPECTED  Keep pt medicated for pain on timely manner to alleviate pain to comfort level  Provided heat packs for comfort measure.  Provided extra warm blanket

## 2018-07-20 ENCOUNTER — HOSPITAL ENCOUNTER (OUTPATIENT)
Facility: MEDICAL CENTER | Age: 28
End: 2018-07-27
Attending: HOSPITALIST | Admitting: HOSPITALIST
Payer: MEDICAID

## 2018-07-20 VITALS
DIASTOLIC BLOOD PRESSURE: 68 MMHG | RESPIRATION RATE: 20 BRPM | HEART RATE: 72 BPM | WEIGHT: 231.7 LBS | SYSTOLIC BLOOD PRESSURE: 108 MMHG | HEIGHT: 70 IN | OXYGEN SATURATION: 94 % | TEMPERATURE: 97.3 F | BODY MASS INDEX: 33.17 KG/M2

## 2018-07-20 LAB
BACTERIA BLD CULT: NORMAL
BACTERIA BLD CULT: NORMAL
CHOLEST SERPL-MCNC: 134 MG/DL (ref 100–199)
HDLC SERPL-MCNC: 28 MG/DL
LDLC SERPL CALC-MCNC: 86 MG/DL
SIGNIFICANT IND 70042: NORMAL
SIGNIFICANT IND 70042: NORMAL
SITE SITE: NORMAL
SITE SITE: NORMAL
SOURCE SOURCE: NORMAL
SOURCE SOURCE: NORMAL
TRIGL SERPL-MCNC: 99 MG/DL (ref 0–149)

## 2018-07-20 PROCEDURE — 700102 HCHG RX REV CODE 250 W/ 637 OVERRIDE(OP): Performed by: INTERNAL MEDICINE

## 2018-07-20 PROCEDURE — 99232 SBSQ HOSP IP/OBS MODERATE 35: CPT | Performed by: INTERNAL MEDICINE

## 2018-07-20 PROCEDURE — 87641 MR-STAPH DNA AMP PROBE: CPT

## 2018-07-20 PROCEDURE — A9270 NON-COVERED ITEM OR SERVICE: HCPCS | Performed by: INTERNAL MEDICINE

## 2018-07-20 PROCEDURE — 700102 HCHG RX REV CODE 250 W/ 637 OVERRIDE(OP): Performed by: HOSPITALIST

## 2018-07-20 PROCEDURE — A9270 NON-COVERED ITEM OR SERVICE: HCPCS | Performed by: HOSPITALIST

## 2018-07-20 PROCEDURE — 80061 LIPID PANEL: CPT

## 2018-07-20 PROCEDURE — 700105 HCHG RX REV CODE 258: Performed by: INTERNAL MEDICINE

## 2018-07-20 PROCEDURE — 700111 HCHG RX REV CODE 636 W/ 250 OVERRIDE (IP): Performed by: INTERNAL MEDICINE

## 2018-07-20 PROCEDURE — 99239 HOSP IP/OBS DSCHRG MGMT >30: CPT | Performed by: HOSPITALIST

## 2018-07-20 RX ADMIN — OXYCODONE HYDROCHLORIDE 10 MG: 10 TABLET ORAL at 03:16

## 2018-07-20 RX ADMIN — OXYCODONE HYDROCHLORIDE 10 MG: 10 TABLET ORAL at 11:07

## 2018-07-20 RX ADMIN — GENTAMICIN SULFATE 80 MG: 40 INJECTION, SOLUTION INTRAMUSCULAR; INTRAVENOUS at 14:11

## 2018-07-20 RX ADMIN — GENTAMICIN SULFATE 80 MG: 40 INJECTION, SOLUTION INTRAMUSCULAR; INTRAVENOUS at 02:47

## 2018-07-20 RX ADMIN — LEVETIRACETAM 1500 MG: 500 TABLET, FILM COATED ORAL at 05:27

## 2018-07-20 RX ADMIN — SODIUM CHLORIDE 3 MILLION UNITS: 9 INJECTION, SOLUTION INTRAVENOUS at 02:02

## 2018-07-20 RX ADMIN — NICOTINE 7 MG: 7 PATCH, EXTENDED RELEASE TRANSDERMAL at 05:26

## 2018-07-20 RX ADMIN — STANDARDIZED SENNA CONCENTRATE AND DOCUSATE SODIUM 1 TABLET: 8.6; 5 TABLET, FILM COATED ORAL at 05:26

## 2018-07-20 RX ADMIN — SODIUM CHLORIDE 3 MILLION UNITS: 9 INJECTION, SOLUTION INTRAVENOUS at 13:35

## 2018-07-20 RX ADMIN — OXYCODONE HYDROCHLORIDE 10 MG: 10 TABLET ORAL at 15:03

## 2018-07-20 RX ADMIN — SODIUM CHLORIDE 3 MILLION UNITS: 9 INJECTION, SOLUTION INTRAVENOUS at 05:31

## 2018-07-20 RX ADMIN — SODIUM CHLORIDE 3 MILLION UNITS: 9 INJECTION, SOLUTION INTRAVENOUS at 10:00

## 2018-07-20 ASSESSMENT — ENCOUNTER SYMPTOMS
VOMITING: 0
FEVER: 0
SPUTUM PRODUCTION: 0
HEADACHES: 0
CHILLS: 0
ABDOMINAL PAIN: 0
BACK PAIN: 1
SENSORY CHANGE: 0
NAUSEA: 0
SPEECH CHANGE: 0
MYALGIAS: 1
NECK PAIN: 1

## 2018-07-20 ASSESSMENT — PAIN SCALES - GENERAL
PAINLEVEL_OUTOF10: 5
PAINLEVEL_OUTOF10: 5
PAINLEVEL_OUTOF10: 6

## 2018-07-20 NOTE — DISCHARGE PLANNING
Agency/Facility Name: [facility]  Spoke To: Vivek  Outcome: Received call from Vivek at [facility] stating that [facility] team was onsite to pick patient up, however, per nurse, patient was discharged to [facility] via cab as [facility]TM sent cab. [facility] notified [facility]TM that patient needed stretcher transport as patient discharging to long term acute care facility. Contacted Abby at [facility] and confirmed patient has arrived safely. Angely(LSW), Kiera(Supervisor of ) and Bishop(Charge RN) notified.

## 2018-07-20 NOTE — DISCHARGE PLANNING
Received Transport Form @ Angely([facility])  Spoke to Vivek @ [facility]    Transport is scheduled for 7/20 @ 1500 going to [facility]. Angely([facility]) and Abby([facility]) notified of transport time. Contacted [facility] as patient has Medicaid. MTM to contact [facility].

## 2018-07-20 NOTE — CARE PLAN
Problem: Infection  Goal: Will remain free from infection  Outcome: PROGRESSING AS EXPECTED  Assessed s/s of infection  Pt on ABT    Problem: Pain Management  Goal: Pain level will decrease to patient's comfort goal  Outcome: PROGRESSING AS EXPECTED  Pt will be able to tolerate pain to comfortable level.

## 2018-07-20 NOTE — PROGRESS NOTES
Infectious Disease Progress Note    Author: Chantal Tellez M.D. Date & Time of service: 2018  10:57 AM    Chief Complaint:  FU viridans strep sepsis/IE    Interval History:  2018 T-max 98.9 WBC 13.1 platelets 301 creatinine 0.63 feels better but still has headache and back pain  - Tmax 98.6 wbc 9.3 ongoing back pain. WBC 9   AF WBC 7.5 back pain slightly improved, did not take any pain meds all day yesterday, tolerating abx without issues, plan of care d/w pt in detail  7/15 AF no CBC ongoing neck and back pain, patient received PICC without any issues, also seen by CT surgery with no plans for surgical intervention at this  2018-T-max 98.  Continues to get headache.  Apparently continues to get new emboli  2018 T-max 98 continues to get headache and back pain and neck pain.  Overall feels improved  2018 T-max 98.4.  The symptoms seem to be improved and wants to go to the gym.  Other than headache no new issues overnight  2018-T-max 99.2.  No new issues overnight.  2018-T-max 98.3.  States  he feels healthy.  Back pain is improving  Labs Reviewed, Medications Reviewed and Radiology Reviewed.    Review of Systems:  Review of Systems   Constitutional: Negative for chills and fever.   Respiratory: Negative for sputum production.    Cardiovascular: Negative for chest pain and leg swelling.   Gastrointestinal: Negative for abdominal pain, nausea and vomiting.   Genitourinary: Negative for dysuria.   Musculoskeletal: Positive for back pain, myalgias and neck pain.        Improved   Neurological: Negative for sensory change, speech change and headaches.       Hemodynamics:  Temp (24hrs), Av.7 °C (98.1 °F), Min:36.5 °C (97.7 °F), Max:36.8 °C (98.3 °F)  Temperature: 36.8 °C (98.2 °F)  Pulse  Av.9  Min: 54  Max: 114   Blood Pressure: 100/58       Physical Exam:  Physical Exam   Constitutional: He is oriented to person, place, and time. He appears well-developed and  well-nourished. No distress.   HENT:   Head: Normocephalic.   Mouth/Throat: No oropharyngeal exudate.   Eyes: EOM are normal. Pupils are equal, round, and reactive to light. No scleral icterus.   Neck: Neck supple.   Cardiovascular: Regular rhythm.    Murmur heard.  Pulmonary/Chest: Effort normal. He has no wheezes. He has no rales.   Abdominal: Soft. There is no tenderness. There is no rebound.   Musculoskeletal: He exhibits no edema.   Right upper extremity PICC line nontender   Neurological: He is alert and oriented to person, place, and time. He displays normal reflexes. No cranial nerve deficit. Coordination normal.   Skin: No rash noted. No erythema.   Vitals reviewed.      Meds:    Current Facility-Administered Medications:   •  gentamicin (GARAMYCIN) IV  •  senna-docusate  •  polyethylene glycol/lytes **OR** magnesium hydroxide  •  oxyCODONE immediate-release **OR** oxyCODONE immediate-release  •  MD ALERT... gentamicin  •  levETIRAcetam  •  nicotine  •  penicillin g  •  acetaminophen/caffeine/butalbital 325-40-50 mg  •  labetalol **OR** [DISCONTINUED] hydrALAZINE  •  ondansetron  •  ondansetron    Labs:  Recent Labs      07/18/18   0600   WBC  7.3   RBC  4.60*   HEMOGLOBIN  12.4*   HEMATOCRIT  37.7*   MCV  82.0   MCH  27.0   RDW  41.9   PLATELETCT  266   MPV  10.2     Recent Labs      07/18/18   0600   SODIUM  139   POTASSIUM  4.2   CHLORIDE  104   CO2  30   GLUCOSE  91   BUN  13     Recent Labs      07/18/18   0600   CREATININE  0.81       Imaging:  Ct-cta Head With & W/o-post Process    Result Date: 7/10/2018  7/9/2018 11:49 PM HISTORY/REASON FOR EXAM:  Neurological Deficit TECHNIQUE/EXAM DESCRIPTION: CT angiogram of the [place] without and with contrast.  Initial precontrast images were obtained of the head from the skull base through the vertex.  Postcontrast images were obtained of the [place] following the power injection of nonionic contrast at 5.0 mL/sec. Thin-section helical  images were obtained with overlapping reconstruction interval. Coronal and sagittal multiplanar volume reformats were generated.  3D angiographic images were reviewed on PACS.  Maximum intensity projection (MIP) images were generated and reviewed. 100 mL of Omnipaque 350 nonionic contrast was injected intravenously. Low dose optimization technique was utilized for this CT exam including automated exposure control and adjustment of the mA and/or kV according to patient size. COMPARISON:  None. FINDINGS: The posterior circulation shows the distal vertebral arteries to be patent. The vertebrobasilar confluence is intact. The basilar artery is patent. No aneurysm or occlusive lesion is evident. The anterior circulation shows no stenotic or occlusive lesion. No aneurysm is evident about the [place]. The brain is unremarkable.     1.  CT angiogram of the [place] within normal limits.    Ct-cta Neck With & W/o-post Processing    Result Date: 7/10/2018  7/9/2018 11:49 PM HISTORY/REASON FOR EXAM: Left-sided facial numbness. TECHNIQUE/EXAM DESCRIPTION: CT angiogram of the neck with contrast. Postcontrast images were obtained of the neck from the great vessels through the skull base following the power injection of nonionic contrast at 5.0 mL/sec. Thin-section helical images were obtained with overlapping reconstruction interval. Coronal and oblique multiplanar volume reformats were generated. Cervical internal carotid artery percent stenosis is calculated using the standard method according to the NASCET criteria wherein a segment of uniform caliber mid or distal cervical internal carotid is used as the reference denominator. 3D angiographic images were reviewed on PACS.  Maximum intensity projection (MIP) images were generated and reviewed 100 mL of Omnipaque 350 nonionic contrast was injected intravenously. Low dose optimization technique was utilized for this CT exam including automated exposure control  and adjustment of the mA and/or kV according to patient size. COMPARISON:  None. FINDINGS: The arch origins of the great vessels appear intact. The aortic arch shows no abnormality. The right common carotid artery, cervical carotid bifurcation, and cervical internal carotid artery are within normal limits. There is no evidence of significant stenosis, thrombus, or other filling defect or ulceration. There is no evidence of dissection or aneurysm. The left common carotid artery, cervical carotid bifurcation, and cervical internal carotid artery are within normal limits. There is no evidence of significant stenosis, thrombus, or other filling defect or ulceration. There is no evidence of dissection  or aneurysm. The cervical vertebral arteries are normal bilaterally. The neck soft tissues and lung apices in the field of view are unremarkable.     CT angiogram of the neck within normal limits.    Ct-maxillofacial With & W/o Plus Recons    Result Date: 7/10/2018  7/10/2018 4:00 PM HISTORY/REASON FOR EXAM:  Streptococcal bacteremia. Evaluate for sinusitis or abscess. TECHNIQUE/EXAM DESCRIPTION AND NUMBER OF VIEWS:   CT scan maxillofacial with and without contrast, with reconstructions. Thin-section helical imaging was obtained of the maxillofacial structures from the orbital roofs through the mandible before and after injection of nonionic contrast. Coronal and sagittal multiplanar volume reformat images were generated from the axial data.. 100 mL of Omnipaque 350 nonionic contrast was injected intravenously. Low dose optimization technique was utilized for this CT exam including automated exposure control and adjustment of the mA and/or kV according to patient size. COMPARISON:  None. FINDINGS: There is rounded mucosal thickening or polyp formation in the inferior medial aspect of the right maxillary sinus. The remainder of the visualized paranasal sinuses are clear. No air-fluid level is present to suggest acute  sinusitis. No bony expansion is identified. No lytic or blastic bony change is identified. The soft tissues of the neck are within normal limits. No soft tissue abscess or inflammatory change is identified. Cervical lymph nodes are normal in size. No submandibular or parotid gland abnormality is noted. No posterior pharyngeal or laryngeal mucosal abnormality or mass is identified.     1.  Rounded mucosal thickening or polyp formation in the inferior aspect of the right maxillary sinus which could reflect chronic right maxillary sinusitis. 2.  No evidence of acute sinusitis. 3.  Otherwise clear paranasal sinuses.    Ct-post-fossa-ear With & W/o    Result Date: 7/10/2018  7/10/2018 4:00 PM HISTORY/REASON FOR EXAM:  Streptococcal bacteremia, facial numbness rule out facial nerve compression / mastoiditis. TECHNIQUE/EXAM DESCRIPTION AND NUMBER OF VIEWS: CT scan of the temporal bones without and with contrast. The study was performed on a Privepass CT scanner. Contiguous thin section 1.0 mm or 1.25 mm axial and direct coronal images were obtained of the temporal bones. Images are reviewed at magnified bone and soft tissue windows and non-magnified axial images are also displayed at soft tissue windows. Scans are obtained pre and post contrast. 100 mL of Omnipaque 300 nonionic contrast was injected intravenously. Low dose optimization technique was utilized for this CT exam including automated exposure control and adjustment of the mA and/or kV according to patient size. COMPARISON: CTA of the neck without and with contrast 7/9/2018 FINDINGS: On the right, the external auditory canal is normal except for some debris most consistent with cerumen.  The mastoid is normally pneumatized and aerated.  The middle ear cavity and mastoid antrum are clear.  The ossicles are normal in position  and configuration.  The scutum is sharp.  The bony labyrinth is normal, and the internal auditory canal shows normal caliber and is symmetric with  the left side.  The facial nerve canal is unremarkable. On the left, the external auditory canal is normal except for some debris consistent with cerumen.  The mastoid is normally pneumatized and aerated.  The middle ear cavity and mastoid antrum are clear.  The ossicles are normal in position and configuration.  The scutum is sharp.  The bony labyrinth is normal, and the internal auditory canal shows normal caliber and is symmetric with the right side.  The facial nerve canal is unremarkable. The right transverse -- sigmoid sinus and right internal jugular vein are dominant. The paranasal sinuses show clustered retention cyst or polypoid mucosal thickening in the alveolar recess of the right maxillary sinus. There are no air-fluid levels. The posterior fossa structures are unremarkable.  The fourth ventricle is in the midline. There is no abnormal parenchymal or extra-axial/meningeal enhancement in the posterior fossa or supratentorial compartment within the field of view.     CT OF THE TEMPORAL BONES WITHOUT CONTRAST WITHIN NORMAL LIMITS.    Dx-chest-2 Views    Result Date: 7/10/2018    7/9/2018 10:30 PM HISTORY/REASON FOR EXAM: Shortness of Breath TECHNIQUE/EXAM DESCRIPTION:  PA and lateral views of the chest. COMPARISON: None FINDINGS: The cardiac silhouette appears within normal limits. The mediastinal contour appears within normal limits.  The central pulmonary vasculature appears normal. The lungs appear well expanded bilaterally.  Bilateral lungs are clear. No significant pleural effusions are identified. The bony structures appear age-appropriate.     1.  No acute cardiopulmonary disease.    Mr-brain-with & W/o    Result Date: 7/11/2018  7/10/2018 10:21 PM HISTORY/REASON FOR EXAM:  Possible meningitis. TECHNIQUE/EXAM DESCRIPTION:   MRI of the brain without and with contrast. T1 sagittal, T2 fast spin-echo axial, T1 coronal, FLAIR coronal, diffusion-weighted and apparent diffusion coefficient (ADC map) axial  images were obtained of the whole brain. T1 postcontrast axial and T1 postcontrast coronal images were obtained. The study was performed on a Afrimarket Signa 1.5 Kreri MRI scanner. 20 mL Gadavist contrast was administered intravenously. COMPARISON:  None. FINDINGS:  There is tiny area of restricted diffusion in the right frontal gray matter. Minimal contrast enhancement is seen. The coronal FLAIR images demonstrates abnormal T2 hyperintensity in the adjacent right frontal subarachnoid spaces. The axial gradient echo images demonstrates linear hypointensity within the sulci. There is low-lying cerebellar tonsils. There is no hydrocephalus.  There is no large lesion identified in the expected course of the intracranial portions of the cranial nerves. The visualized flow voids of cerebral vasculature appear normal within limits.  The skull bones appear normal. The paranasal sinuses are clear. The extracranial soft tissue including orbits appear grossly normal.     1.  There is tiny area of restricted diffusion in the right frontal gray matter. Minimal contrast enhancement is seen. The coronal FLAIR images demonstrates abnormal T2 hyperintensity in the adjacent right frontal subarachnoid spaces. The axial gradient echo images demonstrates linear hypointensity within the sulci. These findings likely represents a small cortical venous thrombosis with adjacent tiny infarct. Cortical venous thrombosis may cause focal subarachnoid hemorrhage. Please note that cortical venous thrombosis can be complication of meningitis. 2.  Low-lying cerebellar tonsils. This is concerning for Chiari I malformation. In view of history of lumbar puncture, this finding can be caused by the post lumbar puncture intracranial hypotension.    Mr-cervical Spine-with & W/o    Result Date: 7/11/2018  7/10/2018 10:21 PM HISTORY/REASON FOR EXAM:  Possible bacterial meningitis. TECHNIQUE/EXAM DESCRIPTION: MRI of the cervical spine without and with contrast.  The study was performed on a Zigabid Signa 1.5 Kerri MRI scanner. T1 sagittal, T2 fast spin-echo sagittal, and gradient echo axial images were obtained of the cervical spine. T1 post-contrast fat suppressed sagittal images were obtained of the cervical spine. Optional T1 post-contrast axial images may be obtained. 20 mL Omniscan contrast was administered intravenously. COMPARISON: None. FINDINGS: There is prominent posterior epidural fluid collection extending from C6-7 into the thoracic spine. Mild posterior epidural contrast enhancement is seen. Please see thoracic spine report for further details. The cervical spine maintains normal height and alignment. There is no fracture or dislocation. There is no pathologic marrow infiltration. There is no abnormal perivertebral fluid collection. There is no intradural extramedullary fluid collection. There is no abnormal intramedullary T2 signal intensity in the cervical spinal cord. At the level of C2-3, there is no spinal or neural foraminal stenosis. At the level of C3-4, there is no spinal or neural foraminal stenosis. At the level of C4-5, there is no spinal or neural foraminal stenosis. At the level of C5-6, there is no spinal or neural foraminal stenosis. At the level of C6-7, there is no spinal or neural foraminal stenosis. At the level of C7-T1, there is no spinal or neural foraminal stenosis. The visualized posterior fossa structures appear normal within limits.  The cervical spinal cord does not demonstrate any mass or abnormal T2 signal intensity. The visualized pre-and paraspinal soft tissues appear normal within limits.     1.  There is prominent posterior epidural fluid collection extending from C6-7 into the thoracic spine. Mild posterior epidural contrast enhancement is noted at this level. Please see thoracic spine report for further details. 2.  There is no evidence of osteomyelitis in the cervical spine.    Mr-lumbar Spine-with & W/o    Result Date:  7/11/2018  7/10/2018 10:21 PM HISTORY/REASON FOR EXAM:  Possible bacterial meningitis. TECHNIQUE/EXAM DESCRIPTION: MRI of the lumbar spine without and with contrast. The study was performed on a Cymphonixa 1.5 Kerri MRI scanner. T1 sagittal, T2 fast spin-echo sagittal, and T2 axial images were obtained of the lumbar spine. T1 post-contrast fat-suppressed sagittal images were obtained. 20 mL Omniscan contrast was administered intravenously. COMPARISON:  None. FINDINGS: There is abnormal posterior epidural fluid collection seen extending from the lower cervical spine to the level of L3-4. Multifocal abnormal epidural contrast enhancement is seen. The lumbar spine maintains normal height and alignment. There is no evidence of fracture or dislocation. There is no pathologic marrow infiltration. There is no abnormal disc fluid. At the level of L5-S1, there is diffuse disc bulge. Mild facet joint arthropathy is seen. There is no spinal or neural foraminal stenosis. At the level of L4-5, there is no spinal or neural foraminal stenosis. At the level of L3-4, there is no spinal or neural foraminal stenosis. At the level of L2-3, there is no spinal or neural foraminal stenosis. At the level of L1-2, there is no spinal or neural foraminal stenosis. The conus terminates at the level of L1.     1.  There is abnormal posterior epidural fluid collection seen extending from the lower cervical spine to the level of L3-4. Multifocal abnormal epidural contrast enhancement is seen. These findings are concerning for thoracic and lumbar epidural abscess. 2.  There is no evidence of lumbar osteomyelitis. 3.  Findings were discussed with DAVON FERNANDO on 7/11/2018 1:08 PM.    Mr-thoracic Spine-with & W/o    Result Date: 7/11/2018  7/10/2018 10:21 PM HISTORY/REASON FOR EXAM:  Possible bacterial meningitis TECHNIQUE/EXAM DESCRIPTION: MRI of the thoracic spine without and with contrast. The study was performed on a Cymphonixa 1.5 Kerri MRI  scanner. T1 sagittal, T2 fast spin-echo sagittal, and T2 axial images were obtained of the thoracic spine. T1 post-contrast fat suppressed sagittal images were obtained. Optional T1 post-contrast axial images may be obtained. 20 mL Gadavist contrast was administered intravenously. COMPARISON:  None. FINDINGS: There is abnormal posterior epidural fluid collection extending from C6-7 to the lumbar spine. Abnormal peripheral contrast enhancement noted surrounding the epidural fluid collection. The thoracic spinal cord is anteriorly displaced from the levels of T3-4 to  T9-10. Moderate central canal stenosis is seen. There is a hemangioma in the body of T11. There is no abnormal intramedullary T2 signal intensity in the thoracic spinal cord.     There is abnormal posterior epidural fluid collection extending from C6-7 to the lumbar spine. Abnormal peripheral contrast enhancement noted surrounding the epidural fluid collection. The thoracic spinal cord is anteriorly displaced from the levels of T3-4 to  T9-10. Moderate central canal stenosis is seen. These findings are highly concerning for posterior epidural abscess with moderate canal compromise.    Echocardiogram Comp W/o Cont    Result Date: 2018  Transthoracic Echo Report Echocardiography Laboratory CONCLUSIONS No prior study is available for comparison. Normal left ventricular systolic function. Left ventricular ejection fraction is visually estimated to be 65%. Normal diastolic function. Normal inferior vena cava size and inspiratory collapse. Mildly dilated left atrium. Moderate mitral regurgitation. JAYDON POLLARD Exam Date:         2018                    09:47 Exam Location:     Inpatient Priority:          Routine Ordering Physician:        DAVON FERNANDO Referring Physician:       [id number] Pagan Sonographer:               Sunshine Huang Age:    27     Gender:    M MRN:    [id number] :    1990 BSA:    2.21   Ht (in):    70     Wt (lb):    229  Exam Type:     Complete Indications:     Murmur ICD Codes:       R01.1 CPT Codes:       68011 BP:   120    /   73     HR: Technical Quality:       Fair MEASUREMENTS  (Male / Female) Normal Values 2D ECHO LV Diastolic Diameter PLAX        5.1 cm                4.2 - 5.9 / 3.9 - 5.3 cm LV Systolic Diameter PLAX         3.9 cm                2.1 - 4.0 cm IVS Diastolic Thickness           0.87 cm               LVPW Diastolic Thickness          1.3 cm                LVOT Diameter                     2 cm                  RA Diameter                       3.6 cm                Estimated LV Ejection Fraction    65 %                  LV Ejection Fraction MOD BP       62.9 %                >= 55  % LV Ejection Fraction MOD 4C       61.7 %                LV Ejection Fraction MOD 2C       64.1 %                LA Volume Index                   32.2 cm³/m²           16 - 28 cm³/m² DOPPLER AV Peak Velocity                  1.4 m/s               AV Peak Gradient                  8 mmHg                AV Mean Gradient                  4.7 mmHg              LVOT Peak Velocity                0.99 m/s              AV Area Cont Eq vti               2.3 cm²               Mitral E Point Velocity           0.93 m/s              Mitral E to A Ratio               1.8                   Mitral A Duration                 114 ms                MV Pressure Half Time             67.4 ms               MV Area PHT                       3.3 cm²               MV Deceleration Time              232 ms                MR Flow Convergence Radius        0.94 cm               MR ERO PISA                       0.33 cm²              MR Regurgitant Volume PISA        48.7 cm³              PV Peak Velocity                  0.97 m/s              PV Peak Gradient                  3.8 mmHg              RVOT Peak Velocity                0.94 m/s              * Indicates values subject to auto-interpretation LV EF:  65    % FINDINGS Left Ventricle Normal left  "ventricular chamber size. Normal left ventricular wall thickness. Normal left ventricular systolic function. Left ventricular ejection fraction is visually estimated to be 65%. Normal regional wall motion. Normal diastolic function. Right Ventricle The right ventricle was normal in size and function. Right Atrium The right atrium is normal in size.  Normal inferior vena cava size and inspiratory collapse. Left Atrium Mildly dilated left atrium. Mitral Valve Thickened mitral valve leaflets. No mitral stenosis. Moderate mitral regurgitation. ERO by PISA method is  0.33 sq cm. Aortic Valve Structurally normal aortic valve without significant stenosis or regurgitation. Tricuspid Valve Unable to estimate pulmonary artery pressure due to an inadequate tricuspid regurgitant jet. No tricuspid stenosis. Trace tricuspid regurgitation. Unable to estimate pulmonary artery pressure due to an inadequate tricuspid regurgitant jet. Pulmonic Valve The pulmonic valve is not well visualized. No stenosis or regurgitation seen. Pericardium Normal pericardium without effusion. Aorta The aortic root is normal. Hui Modi MD (Electronically Signed) Final Date:     11 July 2018                 13:51      Micro:  Results     Procedure Component Value Units Date/Time    BLOOD CULTURE [[id number]] Collected:  07/11/18 1219    Order Status:  Completed Specimen:  Blood from Peripheral Updated:  07/16/18 1500     Significant Indicator NEG     Source BLD     Site PERIPHERAL     Blood Culture No growth after 5 days of incubation.    Narrative:       Per Hospital Policy: Only change Specimen Src: to \"Line\" if  specified by physician order.    BLOOD CULTURE [[id number]] Collected:  07/11/18 1219    Order Status:  Completed Specimen:  Blood from Peripheral Updated:  07/16/18 1500     Significant Indicator NEG     Source BLD     Site PERIPHERAL     Blood Culture No growth after 5 days of incubation.    Narrative:       Per Hospital Policy: Only change " "Specimen Src: to \"Line\" if  specified by physician order.    BLOOD CULTURE [[id number]] Collected:  07/15/18 2049    Order Status:  Completed Specimen:  Blood from Peripheral Updated:  07/16/18 0641     Significant Indicator NEG     Source BLD     Site PERIPHERAL     Blood Culture No Growth    Note: Blood cultures are incubated for 5 days and  are monitored continuously.Positive blood cultures  are called to the RN and reported as soon as  they are identified.      Narrative:       Per Hospital Policy: Only change Specimen Src: to \"Line\" if  specified by physician order.    BLOOD CULTURE [[id number]] Collected:  07/15/18 2049    Order Status:  Completed Specimen:  Blood from Peripheral Updated:  07/16/18 0641     Significant Indicator NEG     Source BLD     Site PERIPHERAL     Blood Culture No Growth    Note: Blood cultures are incubated for 5 days and  are monitored continuously.Positive blood cultures  are called to the RN and reported as soon as  they are identified.      Narrative:       Per Hospital Policy: Only change Specimen Src: to \"Line\" if  specified by physician order.          Assessment:  Active Hospital Problems    Diagnosis   •    • Streptococcal bacteremia [R78.81, B95.5]   • Hypokalemia [E87.6]   • Normocytic anemia [D64.9]   • Murmur [R01.1]       Plan:  Streptococcus viridans bacteremia/endocarditis  Blood cultures are positive on 7/9/2018, 7/10/2018  Bcx negative from 7/11/2018  Sed rate is 49 and CRP is 2.99  Continue high-dose penicillin  Added gentamicin.  Aim for at least 2 weeks of gentamicin  Being dosed by pharmacy  PICC placed 7/14    Multifocal epidural abscess  Epidural abscesses extending from his cervical spine to L3-4  Evaluated by neurosurgery-no surgical intervention  Currently recommended only medical treatment  On abx above  Repeat MRI in 3 weeks    MV Endocarditis   TTE on 7/11/2018 showed mitral regurgitation  CHAR + small pedunculated mobile masses to tips of the valve causing " severe regurgitation  MRI of the brain had 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  Consider MRI MRA to rule out mycotic aneurysm  On abx above  Anticipate 6-8 weeks of IV abx  There is ongoing increase in the septic emboli to brain  CT surgery reevaluation noted    Discussed with internal medicine/Dr Costa.

## 2018-07-20 NOTE — PROGRESS NOTES
Received call from medical transport stating that the patient has transport set up via cab at 1500. [facility] notified of cab transport. Clarified with Katiana Hospitalist RN and charge RN regarding transport to facility via cab.

## 2018-07-20 NOTE — DISCHARGE INSTRUCTIONS
Discharge Instructions    Discharged to other by medical transportation with escort. Discharged via wheelchair, hospital escort: Yes.  Special equipment needed: Not Applicable    Be sure to schedule a follow-up appointment with your primary care doctor or any specialists as instructed.     Discharge Plan:   Diet Plan: Discussed  Activity Level: Discussed  Smoking Cessation Offered: Patient Refused  Confirmed Follow up Appointment: Appointment Scheduled  Confirmed Symptoms Management: Discussed  Medication Reconciliation Updated: Yes  Influenza Vaccine Indication: Patient Refuses    I understand that a diet low in cholesterol, fat, and sodium is recommended for good health. Unless I have been given specific instructions below for another diet, I accept this instruction as my diet prescription.   Other diet: Resume normal diet    Special Instructions:     Stroke/CVA/TIA/Hemorrhagic Ischemia Discharge Instructions  You have had a stroke. Your risk factors have been identified as follows:  Gender - Men are at a higher risk than women  High blood pressure  Heart disease  Artery Disease / Peripheral Vascular Disease   High Cholesterol and lipids  Alcohol or drug abuse  It is important that you reduce your risk factors to avoid another stroke in the future. Here are some general guidelines to follow:  · Eat healthy - avoid food high in fat.  · Get regular exercise.  · Maintain a healthy weight.  · Avoid smoking.  · Avoid alcohol and illegal drug use.  · Take your medications as directed.  For more information regarding risk factors, refer to pages 17-19 in your Stroke Patient Education Guide. Stroke Education Guide was given to patient.    Warning signs of a stroke include (which can also be found on page 3 of your Stroke Patient Education Guide):  · Sudden numbness of weakness of the face, arm or leg (especially on one side of the body).  · Sudden confusion, trouble speaking or understanding.  · Sudden trouble seeing in  one or both eyes.  · Sudden trouble walking, dizziness, loss of balance or coordination.  · Sudden severe headache with no known cause.  It is very important to get treatment quickly when a stroke occurs. If you experience any of the above warning signs, call [phone] immediately.     Some patients who have had a stroke will be going home on a blood thinner medication called Warfarin (Coumadin).  This medication requires very close monitoring and follow up.  This follow up can be provided by either your Primary Care Physician or by [facility]tico[facility].  The Outpatient Anticoagulation Service is located at the [facility] ([facility]).  If you do not know when your follow up appointment is scheduled, call [phone] to verify your appointment time.      · Is patient discharged on Warfarin / Coumadin?   No     Depression / Suicide Risk    As you are discharged from this [facility], it is important to learn how to keep safe from harming yourself.    Recognize the warning signs:  · Abrupt changes in personality, positive or negative- including increase in energy   · Giving away possessions  · Change in eating patterns- significant weight changes-  positive or negative  · Change in sleeping patterns- unable to sleep or sleeping all the time   · Unwillingness or inability to communicate  · Depression  · Unusual sadness, discouragement and loneliness  · Talk of wanting to die  · Neglect of personal appearance   · Rebelliousness- reckless behavior  · Withdrawal from people/activities they love  · Confusion- inability to concentrate     If you or a loved one observes any of these behaviors or has concerns about self-harm, here's what you can do:  · Talk about it- your feelings and reasons for harming yourself  · Remove any means that you might use to hurt yourself (examples: pills, rope, extension cords, firearm)  · Get  professional help from the community (Mental Health, Substance Abuse, psychological counseling)  · Do not be alone:Call your Safe Contact- someone whom you trust who will be there for you.  · Call your local CRISIS HOTLINE 085-6993 or 929-867-9224  · Call your local Children's Mobile Crisis Response Team [place] (230) 871-5601 or www.[employer]  · Call the toll free National Suicide Prevention Hotlines   · National Suicide Prevention Lifeline 592-442-SBRU (9419)  · National Hope Line Network 800-SUICIDE (018-6229)

## 2018-07-20 NOTE — PROGRESS NOTES
Assume pt care. Pt A/Ox4. Able to verbalize needs. 7/10 pain @2300 medicated per MAR. Calls appropriately. Hourly rounding in place.

## 2018-07-20 NOTE — DISCHARGE PLANNING
Anticipated Discharge Disposition: [facility]    Action: LSW notified by Abby with [facility] that pt will transport via [facility] at 12 pm.    Bedside RN, Zulema and MD notified. MD signed COBRA.    LSW faxed [employer] and transport form to Elina CONKLIN    LSW notified by Abby with [facility] that pt's transport time has been moved back to 1400. Pt will be admitted to [facility]    LSW notified by Elina CONKLIN that [facility] is scheduled for 1500.    LSW met with pt bedside. Pt is agreeable to dc to [facility] today at 1500. COBRA authorization obtained.    COBRA placed in pt's chart.    Barriers to Discharge: None    Plan: Pt to dc to [facility] at 1500

## 2018-07-20 NOTE — PROGRESS NOTES
Pt A&Ox4 c/o pain to mid back rated 5/10 denies needs for pain medication. Call light within reach and hourly rounding in place

## 2018-07-21 ENCOUNTER — APPOINTMENT (OUTPATIENT)
Dept: RADIOLOGY | Facility: MEDICAL CENTER | Age: 28
End: 2018-07-21
Attending: HOSPITALIST
Payer: COMMERCIAL

## 2018-07-21 LAB
BASOPHILS # BLD AUTO: 0.2 % (ref 0–1.8)
BASOPHILS # BLD: 0.01 K/UL (ref 0–0.12)
EOSINOPHIL # BLD AUTO: 0.19 K/UL (ref 0–0.51)
EOSINOPHIL NFR BLD: 3 % (ref 0–6.9)
ERYTHROCYTE [DISTWIDTH] IN BLOOD BY AUTOMATED COUNT: 42.3 FL (ref 35.9–50)
HCT VFR BLD AUTO: 37.4 % (ref 42–52)
HGB BLD-MCNC: 12.3 G/DL (ref 14–18)
IMM GRANULOCYTES # BLD AUTO: 0.02 K/UL (ref 0–0.11)
IMM GRANULOCYTES NFR BLD AUTO: 0.3 % (ref 0–0.9)
LYMPHOCYTES # BLD AUTO: 2.2 K/UL (ref 1–4.8)
LYMPHOCYTES NFR BLD: 35.1 % (ref 22–41)
MCH RBC QN AUTO: 27 PG (ref 27–33)
MCHC RBC AUTO-ENTMCNC: 32.9 G/DL (ref 33.7–35.3)
MCV RBC AUTO: 82.2 FL (ref 81.4–97.8)
MONOCYTES # BLD AUTO: 0.42 K/UL (ref 0–0.85)
MONOCYTES NFR BLD AUTO: 6.7 % (ref 0–13.4)
MRSA DNA SPEC QL NAA+PROBE: NORMAL
NEUTROPHILS # BLD AUTO: 3.42 K/UL (ref 1.82–7.42)
NEUTROPHILS NFR BLD: 54.7 % (ref 44–72)
NRBC # BLD AUTO: 0 K/UL
NRBC BLD-RTO: 0 /100 WBC
PLATELET # BLD AUTO: 222 K/UL (ref 164–446)
PMV BLD AUTO: 10.3 FL (ref 9–12.9)
RBC # BLD AUTO: 4.55 M/UL (ref 4.7–6.1)
SIGNIFICANT IND 70042: NORMAL
SITE SITE: NORMAL
SOURCE SOURCE: NORMAL
WBC # BLD AUTO: 6.3 K/UL (ref 4.8–10.8)

## 2018-07-21 PROCEDURE — 85025 COMPLETE CBC W/AUTO DIFF WBC: CPT

## 2018-07-21 PROCEDURE — 71045 X-RAY EXAM CHEST 1 VIEW: CPT

## 2018-07-21 ASSESSMENT — ENCOUNTER SYMPTOMS
INSOMNIA: 1
CHILLS: 0
NAUSEA: 0
COUGH: 0
BACK PAIN: 1
DOUBLE VISION: 0
PALPITATIONS: 0
VOMITING: 0
CONSTIPATION: 0
FEVER: 0
SHORTNESS OF BREATH: 0
SPUTUM PRODUCTION: 0
SORE THROAT: 0
ABDOMINAL PAIN: 0
DIARRHEA: 0
PHOTOPHOBIA: 0

## 2018-07-21 NOTE — [facility]
Hospital Medicine Progress Note, Adult, Complex               Author: Rhea Trujillo Date & Time created: 7/21/2018  9:42 AM     CC: Viridans MV BE, epidural abscess, septic cerebral emboli    Interval History:  Transferred from [facility] cab last afternoon  Didn't sleep well last night  Back pain controlled with medication  No neuro decline, no new concerns    Review of Systems:  Review of Systems   Constitutional: Negative for chills and fever.   HENT: Negative for sore throat.    Eyes: Negative for double vision and photophobia.   Respiratory: Negative for cough, sputum production and shortness of breath.    Cardiovascular: Negative for chest pain and palpitations.   Gastrointestinal: Negative for abdominal pain, constipation, diarrhea, nausea and vomiting.   Genitourinary: Negative for dysuria.   Musculoskeletal: Positive for back pain. Negative for joint pain.   Skin: Negative for rash.   Psychiatric/Behavioral: The patient has insomnia.        T 97.4 P 53 BP 96/61 RR 18 SaO2 95% I/O 1.8/brp  Physical Exam   Constitutional: He is oriented to person, place, and time. He appears well-developed. No distress.   HENT:   Head: Normocephalic and atraumatic.   Mouth/Throat: No oropharyngeal exudate.   Eyes: Conjunctivae are normal. Right eye exhibits no discharge. Left eye exhibits no discharge. No scleral icterus.   Neck: No tracheal deviation present.   Cardiovascular: Normal rate, regular rhythm and intact distal pulses.    Murmur heard.  Pulmonary/Chest: Effort normal and breath sounds normal. No respiratory distress. He exhibits no tenderness.   Abdominal: Soft. Bowel sounds are normal. He exhibits no distension. There is no tenderness.   Musculoskeletal: He exhibits no edema.   Neurological: He is alert and oriented to person, place, and time. He displays normal reflexes. No cranial nerve deficit. Coordination normal.   Skin: Skin is warm. No erythema.   Vitals reviewed.      Labs:        Invalid input(s):  [id number]      No results for input(s): SODIUM, POTASSIUM, CHLORIDE, CO2, BUN, CREATININE, MAGNESIUM, PHOSPHORUS, CALCIUM in the last 72 hours.  No results for input(s): ALTSGPT, ASTSGOT, ALKPHOSPHAT, TBILIRUBIN, DBILIRUBIN, GAMMAGT, AMYLASE, LIPASE, ALB, PREALBUMIN, GLUCOSE in the last 72 hours.  Recent Labs      07/21/18   0548   RBC  4.55*   HEMOGLOBIN  12.3*   HEMATOCRIT  37.4*   PLATELETCT  222     Recent Labs      07/21/18   0548   WBC  6.3   NEUTSPOLYS  54.70   LYMPHOCYTES  35.10   MONOCYTES  6.70   EOSINOPHILS  3.00   BASOPHILS  0.20        GI/Nutrition:  Orders Placed This Encounter   Procedures   • Diet Order Regular     Standing Status:   Standing     Number of Occurrences:   1     Order Specific Question:   Diet:     Answer:   Regular [1]     Order Specific Question:   Consistency/Fluid modifications:     Answer:   Thin Liquids [3]     Medical Decision Making, by Problem:  Strep Viridans MV BE   -Gent/PCN   Extensive spinal epidural abscess C6-L3-4   -No surgical intervention at this time   -MRI in 3 weeks   -(PHYLLIS Torres)  Septic cerebral emboli secondary to above with hemorrhagic transformation   -keppra for seizure px  Cerebral venous thrombosis with normal MRV   -no anticoagulation per neuro due to hemorrhagic transformation risk   -Repeat MRI in 3-4 weeks  Severe MR   -no MVR currently, anticipate as outpatient   -no clinical symptoms  Mild anemia secondary to acute illness  Back pain   -lidopatch, prn oxy  Insomnia   -trial restoril   -melatonin        Quality-Core Measures   Reviewed items::  Labs reviewed, Medications reviewed and Radiology images reviewed  Cruz catheter::  No Cruz  Central line in place:  Concentrated IV drugs  DVT prophylaxis pharmacological::  Not indicated at this time, ambulatory  Antibiotics:  Treating active infection/contamination beyond 24 hours perioperative coverage

## 2018-07-22 ENCOUNTER — APPOINTMENT (OUTPATIENT)
Dept: RADIOLOGY | Facility: MEDICAL CENTER | Age: 28
End: 2018-07-22
Attending: HOSPITALIST
Payer: COMMERCIAL

## 2018-07-22 PROCEDURE — 73620 X-RAY EXAM OF FOOT: CPT | Mod: RT

## 2018-07-22 PROCEDURE — 99232 SBSQ HOSP IP/OBS MODERATE 35: CPT | Performed by: INTERNAL MEDICINE

## 2018-07-22 ASSESSMENT — ENCOUNTER SYMPTOMS
SPUTUM PRODUCTION: 0
CONSTIPATION: 0
BACK PAIN: 1
SORE THROAT: 0
COUGH: 0
ABDOMINAL PAIN: 0
PALPITATIONS: 0
PHOTOPHOBIA: 0
MYALGIAS: 1
NAUSEA: 0
FEVER: 0
CHILLS: 0
VOMITING: 0
DIARRHEA: 0
DOUBLE VISION: 0
SHORTNESS OF BREATH: 0

## 2018-07-22 NOTE — [facility]
Infectious Disease Progress Note    Author: Jeri Spence M.D. Date & Time of service: 7/22/2018  2:31 PM    Chief Complaint:  FU viridans strep sepsis/IE      Interval History:  7/12/2018 T-max 98.9 WBC 13.1 platelets 301 creatinine 0.63 feels better but still has headache and back pain  7/13- Tmax 98.6 wbc 9.3 ongoing back pain. WBC 9  7/14 AF WBC 7.5 back pain slightly improved, did not take any pain meds all day yesterday, tolerating abx without issues, plan of care d/w pt in detail  7/15 AF no CBC ongoing neck and back pain, patient received PICC without any issues, also seen by CT surgery with no plans for surgical intervention at this  7/16/2018-T-max 98.  Continues to get headache.  Apparently continues to get new emboli  7/17/2018 T-max 98 continues to get headache and back pain and neck pain.  Overall feels improved  7/18/2018 T-max 98.4.  The symptoms seem to be improved and wants to go to the gym.  Other than headache no new issues overnight  7/19/2018-T-max 99.2.  No new issues overnight.  7/20/2018-T-max 98.3.  States  he feels healthy.  Back pain is improving  7/22 AF transferred to [facility]-no new complaints  Labs Reviewed, Medications Reviewed and Radiology Reviewed.    Review of Systems:  Review of Systems   Constitutional: Negative for chills and fever.   Musculoskeletal: Positive for joint pain and myalgias.   All other systems reviewed and are negative.      Hemodynamics:  T 97.7P76BP 101/671 RR 17 SaO2 94% 1BM          Physical Exam:  Physical Exam   Constitutional: He is oriented to person, place, and time. He appears well-developed.   HENT:   Head: Normocephalic and atraumatic.   Eyes: EOM are normal. Pupils are equal, round, and reactive to light.   Neck: Neck supple.   Cardiovascular: Normal rate.    Pulmonary/Chest: Effort normal. No respiratory distress.   Abdominal: Soft.   Musculoskeletal: He exhibits no edema.   RUE PICC nontender, no erythema   Neurological: He is alert and oriented  to person, place, and time.   Skin: No rash noted. He is not diaphoretic.   Vitals reviewed.      Meds:  No current facility-administered medications for this encounter.     Labs:  Recent Labs      07/21/18   0548   WBC  6.3   RBC  4.55*   HEMOGLOBIN  12.3*   HEMATOCRIT  37.4*   MCV  82.2   MCH  27.0   RDW  42.3   PLATELETCT  222   MPV  10.3   NEUTSPOLYS  54.70   LYMPHOCYTES  35.10   MONOCYTES  6.70   EOSINOPHILS  3.00   BASOPHILS  0.20     No results for input(s): SODIUM, POTASSIUM, CHLORIDE, CO2, GLUCOSE, BUN, CPKTOTAL in the last 72 hours.  No results for input(s): ALBUMIN, TBILIRUBIN, ALKPHOSPHAT, TOTPROTEIN, ALTSGPT, ASTSGOT, CREATININE in the last 72 hours.    Imaging:  Ct-cspine With Plus Recons    Result Date: 7/15/2018  7/15/2018 4:21 PM HISTORY/REASON FOR EXAM:  Left upper extremity numbness. 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 TECHNIQUE/EXAM DESCRIPTION:  CT cervical spine with contrast with reconstructions. Thin-section helical scanning was performed from the skull base through T1 following the intrathecal administration of 100 mL of Omnipaque 350 nonionic contrast. Sagittal and coronal reconstructions were generated from the axial images. Low dose optimization technique was utilized for this CT exam including automated exposure control and adjustment of the mA and/or kV according to patient size. COMPARISON:  MRI cervical, thoracic, and lumbar spine 7/10/2018 FINDINGS: No cervical vertebral body fracture or subluxation is present. The posterior elements are intact. No disc space narrowing or degenerative change is present. There is slightly increased enhancement in the anterior aspect of the spinal canal beginning at the level of C6-7 extending inferiorly into the thoracic spine. This may represent the epidural abscess seen well on the recent MRI examination. No paravertebral soft tissue mass or abscess is identified. No lytic or blastic bony change is present.     1.  Slightly increased enhancement  in the anterior aspect of the spinal canal beginning at the level of C6-7 extending inferiorly which may represent the epidural abscess seen on MRI. 2.  No evidence of cervical spinal osteomyelitis or paraspinous inflammation. 3.  No acute cervical spine fracture or dislocation. 4.  No cervical spine degenerative disc disease.    Ct-cta Head With & W/o-post Process    Result Date: 7/10/2018  7/9/2018 11:49 PM HISTORY/REASON FOR EXAM:  Neurological Deficit TECHNIQUE/EXAM DESCRIPTION: CT angiogram of the [place] without and with contrast.  Initial precontrast images were obtained of the head from the skull base through the vertex.  Postcontrast images were obtained of the [place] following the power injection of nonionic contrast at 5.0 mL/sec. Thin-section helical images were obtained with overlapping reconstruction interval. Coronal and sagittal multiplanar volume reformats were generated.  3D angiographic images were reviewed on PACS.  Maximum intensity projection (MIP) images were generated and reviewed. 100 mL of Omnipaque 350 nonionic contrast was injected intravenously. Low dose optimization technique was utilized for this CT exam including automated exposure control and adjustment of the mA and/or kV according to patient size. COMPARISON:  None. FINDINGS: The posterior circulation shows the distal vertebral arteries to be patent. The vertebrobasilar confluence is intact. The basilar artery is patent. No aneurysm or occlusive lesion is evident. The anterior circulation shows no stenotic or occlusive lesion. No aneurysm is evident about the [place]. The brain is unremarkable.     1.  CT angiogram of the [place] within normal limits.    Ct-cta Neck With & W/o-post Processing    Result Date: 7/10/2018  7/9/2018 11:49 PM HISTORY/REASON FOR EXAM: Left-sided facial numbness. TECHNIQUE/EXAM DESCRIPTION: CT angiogram of the neck with contrast. Postcontrast images were obtained of the  neck from the great vessels through the skull base following the power injection of nonionic contrast at 5.0 mL/sec. Thin-section helical images were obtained with overlapping reconstruction interval. Coronal and oblique multiplanar volume reformats were generated. Cervical internal carotid artery percent stenosis is calculated using the standard method according to the NASCET criteria wherein a segment of uniform caliber mid or distal cervical internal carotid is used as the reference denominator. 3D angiographic images were reviewed on PACS.  Maximum intensity projection (MIP) images were generated and reviewed 100 mL of Omnipaque 350 nonionic contrast was injected intravenously. Low dose optimization technique was utilized for this CT exam including automated exposure control and adjustment of the mA and/or kV according to patient size. COMPARISON:  None. FINDINGS: The arch origins of the great vessels appear intact. The aortic arch shows no abnormality. The right common carotid artery, cervical carotid bifurcation, and cervical internal carotid artery are within normal limits. There is no evidence of significant stenosis, thrombus, or other filling defect or ulceration. There is no evidence of dissection or aneurysm. The left common carotid artery, cervical carotid bifurcation, and cervical internal carotid artery are within normal limits. There is no evidence of significant stenosis, thrombus, or other filling defect or ulceration. There is no evidence of dissection  or aneurysm. The cervical vertebral arteries are normal bilaterally. The neck soft tissues and lung apices in the field of view are unremarkable.     CT angiogram of the neck within normal limits.    Ct-head With & W/o    Result Date: 7/15/2018  7/15/2018 4:21 PM HISTORY/REASON FOR EXAM:  Left upper extremity numbness. History of bacteremia. TECHNIQUE/EXAM DESCRIPTION AND NUMBER OF VIEWS: CT scan of the head without and with contrast. The study was  performed on a helical multidetector CT scanner. Contiguous 2.5 mm axial sections were obtained from the skull base through the vertex both prior to and after the administration of IV contrast. 100 mL of Omnipaque 350 nonionic contrast was injected intravenously. Up to date radiation dose reduction adjustments have been utilized to meet ALARA standards for radiation dose reduction. COMPARISON:  7/10/2018 FINDINGS: No acute intracranial hemorrhage, mass effect, midline shift. No acute ischemia or masses identified. There is abnormal focal cortical enhancement in the right frontal lobe seen on recent MRI. Questionable focus of enhancement in the right cerebellum. Ventricles and cisterns are patent. Gray/white matter infiltration is maintained. The paranasal sinuses and mastoid air cells are clear.     1.  Focal cortical enhancement in the right frontal lobe. Questionable enhancing focus in the right cerebellum may be present. Findings are nonspecific and may represent septic emboli, cerebritis, or venous infarcts 2.  No mass effect or midline shift.    Ct-maxillofacial With & W/o Plus Recons    Result Date: 7/10/2018  7/10/2018 4:00 PM HISTORY/REASON FOR EXAM:  Streptococcal bacteremia. Evaluate for sinusitis or abscess. TECHNIQUE/EXAM DESCRIPTION AND NUMBER OF VIEWS:   CT scan maxillofacial with and without contrast, with reconstructions. Thin-section helical imaging was obtained of the maxillofacial structures from the orbital roofs through the mandible before and after injection of nonionic contrast. Coronal and sagittal multiplanar volume reformat images were generated from the axial data.. 100 mL of Omnipaque 350 nonionic contrast was injected intravenously. Low dose optimization technique was utilized for this CT exam including automated exposure control and adjustment of the mA and/or kV according to patient size. COMPARISON:  None. FINDINGS: There is rounded mucosal thickening or polyp formation in the  inferior medial aspect of the right maxillary sinus. The remainder of the visualized paranasal sinuses are clear. No air-fluid level is present to suggest acute sinusitis. No bony expansion is identified. No lytic or blastic bony change is identified. The soft tissues of the neck are within normal limits. No soft tissue abscess or inflammatory change is identified. Cervical lymph nodes are normal in size. No submandibular or parotid gland abnormality is noted. No posterior pharyngeal or laryngeal mucosal abnormality or mass is identified.     1.  Rounded mucosal thickening or polyp formation in the inferior aspect of the right maxillary sinus which could reflect chronic right maxillary sinusitis. 2.  No evidence of acute sinusitis. 3.  Otherwise clear paranasal sinuses.    Ct-post-fossa-ear With & W/o    Result Date: 7/10/2018  7/10/2018 4:00 PM HISTORY/REASON FOR EXAM:  Streptococcal bacteremia, facial numbness rule out facial nerve compression / mastoiditis. TECHNIQUE/EXAM DESCRIPTION AND NUMBER OF VIEWS: CT scan of the temporal bones without and with contrast. The study was performed on a [employer] CT scanner. Contiguous thin section 1.0 mm or 1.25 mm axial and direct coronal images were obtained of the temporal bones. Images are reviewed at magnified bone and soft tissue windows and non-magnified axial images are also displayed at soft tissue windows. Scans are obtained pre and post contrast. 100 mL of Omnipaque 300 nonionic contrast was injected intravenously. Low dose optimization technique was utilized for this CT exam including automated exposure control and adjustment of the mA and/or kV according to patient size. COMPARISON: CTA of the neck without and with contrast 7/9/2018 FINDINGS: On the right, the external auditory canal is normal except for some debris most consistent with cerumen.  The mastoid is normally pneumatized and aerated.  The middle ear cavity and mastoid antrum are clear.  The ossicles are normal  in position  and configuration.  The scutum is sharp.  The bony labyrinth is normal, and the internal auditory canal shows normal caliber and is symmetric with the left side.  The facial nerve canal is unremarkable. On the left, the external auditory canal is normal except for some debris consistent with cerumen.  The mastoid is normally pneumatized and aerated.  The middle ear cavity and mastoid antrum are clear.  The ossicles are normal in position and configuration.  The scutum is sharp.  The bony labyrinth is normal, and the internal auditory canal shows normal caliber and is symmetric with the right side.  The facial nerve canal is unremarkable. The right transverse -- sigmoid sinus and right internal jugular vein are dominant. The paranasal sinuses show clustered retention cyst or polypoid mucosal thickening in the alveolar recess of the right maxillary sinus. There are no air-fluid levels. The posterior fossa structures are unremarkable.  The fourth ventricle is in the midline. There is no abnormal parenchymal or extra-axial/meningeal enhancement in the posterior fossa or supratentorial compartment within the field of view.     CT OF THE TEMPORAL BONES WITHOUT CONTRAST WITHIN NORMAL LIMITS.    Dx-chest-2 Views    Result Date: 7/10/2018    7/9/2018 10:30 PM HISTORY/REASON FOR EXAM: Shortness of Breath TECHNIQUE/EXAM DESCRIPTION:  PA and lateral views of the chest. COMPARISON: None FINDINGS: The cardiac silhouette appears within normal limits. The mediastinal contour appears within normal limits.  The central pulmonary vasculature appears normal. The lungs appear well expanded bilaterally.  Bilateral lungs are clear. No significant pleural effusions are identified. The bony structures appear age-appropriate.     1.  No acute cardiopulmonary disease.    Dx-chest-for Picc Line Perform Procedure In: Patient's Room    Result Date: 7/14/2018 7/14/2018 12:59 PM HISTORY/REASON FOR EXAM:  Right PICC line placement  TECHNIQUE/EXAM DESCRIPTION AND NUMBER OF VIEWS: Single AP view of the chest. COMPARISON: 7/19/2018 FINDINGS: There is interval placement of a right PICC line with its tip located projecting over the superior aspect of the superior vena cava. The cardiac silhouette and mediastinal contours are unremarkable. There is mild left basilar atelectasis. No pleural fluid or pneumothorax. No suspicious bony lesions.     1.  Right PICC line tip projects over the superior aspect of the superior vena cava. 2.  Left basilar atelectasis.    Dx-chest-portable (1 View)    Result Date: 7/21/2018 7/21/2018 6:30 AM HISTORY/REASON FOR EXAM:  PICC placement TECHNIQUE/EXAM DESCRIPTION AND NUMBER OF VIEWS: Single portable view of the chest. COMPARISON: 7/14/2018 FINDINGS: A right-sided PICC is present, the tip of which projects in the area of the upper SVC or possibly in the right subclavian vein. The cardiomediastinal silhouettes and liam are normal. Lungs are well expanded with no infiltrate or other acute process.     No acute cardiopulmonary abnormality. Right-sided PICC in place as noted above.    Dx-foot-2- Right    Result Date: 7/22/2018 7/22/2018 8:27 AM HISTORY/REASON FOR EXAM:  Pain/Deformity Following Trauma RT foot pain and swelling on fifth phalange and metatarsal X 2 days, pain worse on plantar surface following injury: patient stubbed fifth phalange TECHNIQUE/EXAM DESCRIPTION AND NUMBER OF VIEWS: 2 views of the RIGHT foot. COMPARISON:  None. FINDINGS: No acute fracture or dislocation. Soft tissue swelling overlying the fifth digit. No joint osteoarthritis.     No acute osseous abnormality.    Mr-brain-with & W/o    Result Date: 7/15/2018  7/15/2018 3:25 PM HISTORY/REASON FOR EXAM:  Change in neurologic status. History of stroke. Rule out intracranial hemorrhage or new stroke. TECHNIQUE/EXAM DESCRIPTION: MRI of the brain without and with contrast, with diffusion. The study was performed on a [employer] 1.5 Kerri MRI  scanner. Spoiled-GRASS sagittal, thin-section T2 axial, T1 coronal, T1 postcontrast coronal, T1 postcontrast axial, FLAIR coronal and diffusion-weighted axial images were obtained of the whole brain.. 20 mL Gadavist contrast were administered intravenously. COMPARISON:  7/10/2018. FINDINGS: Low-lying cerebellar tonsils are again noted. The cortical sulci and gyri are within normal limits. The ventricular system is within normal limits. The bony calvaria are intact. There is no evidence of extra-axial fluid collection or intracranial mass effect. The previously demonstrated small enhancing focus of cortical T2 and FLAIR signal hyperintensity in the right posterior frontal lobe appears more prominent compared to the prior study. In contrast to the prior study, previously demonstrated diffusion restriction is no longer seen in this lesion. There are 2 new punctate foci of enhancement in the high right posterior frontal cortex and right superior cerebellum with minimal associated T2 and FLAIR signal hyperintensity associated with the lesion in the right cerebellum. Gradient echo sequences demonstrate a faint focus of signal hypointensity corresponding to the location of this lesion suggestive of early hemorrhagic conversion. There is no evidence of abnormal diffusion-weighted signal intensity in the brain. There are normal flow voids in the cavernous carotid arteries and M1 segments. There are normal flow voids in the distal vertebral basilar system. There are normal flow voids in the dural venous sinuses. The visualized paranasal sinuses and mastoid air cells appear clear.     1.  Previously demonstrated small enhancing focus of cortical T2 and FLAIR signal hyperintensity in the right posterior frontal lobe appears more prominent compared to the prior study. In contrast to the prior study, previously demonstrated diffusion restriction is no longer seen in this lesion. Findings could be consistent with evolving cortical  infarct or related to patient's known meningitis. 2.  2 new punctate foci of enhancement in the high right posterior frontal cortex and right superior cerebellum with minimal associated T2 and FLAIR signal hyperintensity associated with the lesion in the right cerebellum. Gradient echo sequences demonstrate a faint focus of signal hypointensity corresponding to the location of this lesion suggestive of early hemorrhagic conversion. 3.  Findings could be consistent with subacute infarcts of embolic etiology. 4.  No evidence of new acute territorial infarct. 5.  Low-lying cerebellar tonsils. Attempts to convey these findings to Dr. Brown were initiated on 7/15/2018 5:06 PM. These findings were discussed with Dr. Brown on 7/15/2018 5:25 PM.    Mr-brain-with & W/o    Result Date: 7/11/2018  7/10/2018 10:21 PM HISTORY/REASON FOR EXAM:  Possible meningitis. TECHNIQUE/EXAM DESCRIPTION:   MRI of the brain without and with contrast. T1 sagittal, T2 fast spin-echo axial, T1 coronal, FLAIR coronal, diffusion-weighted and apparent diffusion coefficient (ADC map) axial images were obtained of the whole brain. T1 postcontrast axial and T1 postcontrast coronal images were obtained. The study was performed on a [employer] 1.5 Kerri MRI scanner. 20 mL Gadavist contrast was administered intravenously. COMPARISON:  None. FINDINGS:  There is tiny area of restricted diffusion in the right frontal gray matter. Minimal contrast enhancement is seen. The coronal FLAIR images demonstrates abnormal T2 hyperintensity in the adjacent right frontal subarachnoid spaces. The axial gradient echo images demonstrates linear hypointensity within the sulci. There is low-lying cerebellar tonsils. There is no hydrocephalus.  There is no large lesion identified in the expected course of the intracranial portions of the cranial nerves. The visualized flow voids of cerebral vasculature appear normal within limits.  The skull bones appear normal. The  paranasal sinuses are clear. The extracranial soft tissue including orbits appear grossly normal.     1.  There is tiny area of restricted diffusion in the right frontal gray matter. Minimal contrast enhancement is seen. The coronal FLAIR images demonstrates abnormal T2 hyperintensity in the adjacent right frontal subarachnoid spaces. The axial gradient echo images demonstrates linear hypointensity within the sulci. These findings likely represents a small cortical venous thrombosis with adjacent tiny infarct. Cortical venous thrombosis may cause focal subarachnoid hemorrhage. Please note that cortical venous thrombosis can be complication of meningitis. 2.  Low-lying cerebellar tonsils. This is concerning for Chiari I malformation. In view of history of lumbar puncture, this finding can be caused by the post lumbar puncture intracranial hypotension.    Mr-cervical Spine-with & W/o    Result Date: 7/15/2018  7/15/2018 3:24 PM HISTORY/REASON FOR EXAM:  Left upper extremity numbness with underlying epidural abscess. TECHNIQUE/EXAM DESCRIPTION: MRI of the cervical spine without and with contrast. The study was performed on a [employer] 1.5 Kerri MRI scanner. T1 sagittal, T2 fast spin-echo sagittal, T1 postcontrast fat-suppressed sagittal, and gradient-echo axial images were obtained of the cervical spine. 20 mL Gadavist contrast were administered  intravenously. COMPARISON:  None. FINDINGS: There is redemonstrated posterior epidural enhancement which appears to extend slightly more cephalad to approximately the C4 level and extending inferiorly below the level of the scan into the thoracic spine. This may indicate interval worsening of infectious process. There is no definite evidence of osteomyelitis. Cervical spine demonstrates normal vertebral body height and alignment. Other findings are unchanged.     Posterior epidural enhancement which appears to extend slightly more cephalad to approximately the C4 level and  extending inferiorly below the level of the scan into the thoracic spine. This may indicate interval worsening of infectious process. There is  no definite evidence of osteomyelitis. Attempts to convey these findings to Dr. Brown were initiated on 7/15/2018 5:06 PM. These findings were discussed with Dr. Brown on 7/15/2018 5:25 PM.    Mr-cervical Spine-with & W/o    Result Date: 7/11/2018  7/10/2018 10:21 PM HISTORY/REASON FOR EXAM:  Possible bacterial meningitis. TECHNIQUE/EXAM DESCRIPTION: MRI of the cervical spine without and with contrast. The study was performed on a [employer] 1.5 Kerri MRI scanner. T1 sagittal, T2 fast spin-echo sagittal, and gradient echo axial images were obtained of the cervical spine. T1 post-contrast fat suppressed sagittal images were obtained of the cervical spine. Optional T1 post-contrast axial images may be obtained. 20 mL Omniscan contrast was administered intravenously. COMPARISON: None. FINDINGS: There is prominent posterior epidural fluid collection extending from C6-7 into the thoracic spine. Mild posterior epidural contrast enhancement is seen. Please see thoracic spine report for further details. The cervical spine maintains normal height and alignment. There is no fracture or dislocation. There is no pathologic marrow infiltration. There is no abnormal perivertebral fluid collection. There is no intradural extramedullary fluid collection. There is no abnormal intramedullary T2 signal intensity in the cervical spinal cord. At the level of C2-3, there is no spinal or neural foraminal stenosis. At the level of C3-4, there is no spinal or neural foraminal stenosis. At the level of C4-5, there is no spinal or neural foraminal stenosis. At the level of C5-6, there is no spinal or neural foraminal stenosis. At the level of C6-7, there is no spinal or neural foraminal stenosis. At the level of C7-T1, there is no spinal or neural foraminal stenosis. The visualized posterior fossa  structures appear normal within limits.  The cervical spinal cord does not demonstrate any mass or abnormal T2 signal intensity. The visualized pre-and paraspinal soft tissues appear normal within limits.     1.  There is prominent posterior epidural fluid collection extending from C6-7 into the thoracic spine. Mild posterior epidural contrast enhancement is noted at this level. Please see thoracic spine report for further details. 2.  There is no evidence of osteomyelitis in the cervical spine.    Mr-lumbar Spine-with & W/o    Result Date: 7/11/2018  7/10/2018 10:21 PM HISTORY/REASON FOR EXAM:  Possible bacterial meningitis. TECHNIQUE/EXAM DESCRIPTION: MRI of the lumbar spine without and with contrast. The study was performed on a [employer] Signa 1.5 Kerri MRI scanner. T1 sagittal, T2 fast spin-echo sagittal, and T2 axial images were obtained of the lumbar spine. T1 post-contrast fat-suppressed sagittal images were obtained. 20 mL Omniscan contrast was administered intravenously. COMPARISON:  None. FINDINGS: There is abnormal posterior epidural fluid collection seen extending from the lower cervical spine to the level of L3-4. Multifocal abnormal epidural contrast enhancement is seen. The lumbar spine maintains normal height and alignment. There is no evidence of fracture or dislocation. There is no pathologic marrow infiltration. There is no abnormal disc fluid. At the level of L5-S1, there is diffuse disc bulge. Mild facet joint arthropathy is seen. There is no spinal or neural foraminal stenosis. At the level of L4-5, there is no spinal or neural foraminal stenosis. At the level of L3-4, there is no spinal or neural foraminal stenosis. At the level of L2-3, there is no spinal or neural foraminal stenosis. At the level of L1-2, there is no spinal or neural foraminal stenosis. The conus terminates at the level of L1.     1.  There is abnormal posterior epidural fluid collection seen extending from the lower cervical  spine to the level of L3-4. Multifocal abnormal epidural contrast enhancement is seen. These findings are concerning for thoracic and lumbar epidural abscess. 2.  There is no evidence of lumbar osteomyelitis. 3.  Findings were discussed with DAVON FERNANDO on 7/11/2018 1:08 PM.    Mr-thoracic Spine-with & W/o    Result Date: 7/11/2018  7/10/2018 10:21 PM HISTORY/REASON FOR EXAM:  Possible bacterial meningitis TECHNIQUE/EXAM DESCRIPTION: MRI of the thoracic spine without and with contrast. The study was performed on a [employer] Signa 1.5 Kerri MRI scanner. T1 sagittal, T2 fast spin-echo sagittal, and T2 axial images were obtained of the thoracic spine. T1 post-contrast fat suppressed sagittal images were obtained. Optional T1 post-contrast axial images may be obtained. 20 mL Gadavist contrast was administered intravenously. COMPARISON:  None. FINDINGS: There is abnormal posterior epidural fluid collection extending from C6-7 to the lumbar spine. Abnormal peripheral contrast enhancement noted surrounding the epidural fluid collection. The thoracic spinal cord is anteriorly displaced from the levels of T3-4 to  T9-10. Moderate central canal stenosis is seen. There is a hemangioma in the body of T11. There is no abnormal intramedullary T2 signal intensity in the thoracic spinal cord.     There is abnormal posterior epidural fluid collection extending from C6-7 to the lumbar spine. Abnormal peripheral contrast enhancement noted surrounding the epidural fluid collection. The thoracic spinal cord is anteriorly displaced from the levels of T3-4 to  T9-10. Moderate central canal stenosis is seen. These findings are highly concerning for posterior epidural abscess with moderate canal compromise.    Ir-picc Line Placement    Result Date: 7/14/2018  HISTORY/REASON FOR EXAM:   PICC placement. TECHNIQUE/EXAM DESCRIPTION AND NUMBER OF VIEWS:   PICC line insertion with ultrasound guidance.  The procedure was performed using maximal sterile  barrier technique including sterile gown, mask, cap, and donning of sterile gloves following appropriate hand hygiene and/or sterile scrub. Patient skin site was prepped with 2% Chlorhexidine solution. FINDINGS:  PICC line insertion with Ultrasound Guidance was performed by qualified nursing staff without the assistance of a Radiologist. PICC positioning appropriateness confirmed by 3CG technology; chest xray only needed in the instance 3CG unable to confirm placement.              Ultrasound-guided PICC placement performed by qualified nursing staff as above.     Mr-venogram (mrv) Head    Result Date: 7/12/2018  HISTORY/REASON FOR EXAM: Minimal subarachnoid hemorrhage effacement and left tiny cortical infarct in right frontal lobe. TECHNIQUE/EXAM DESCRIPTION: MR venogram of the head without contrast, 11/12/2010. The study was performed on a Signa 1.5 Kerri MRI scanner. Multiple axial and coronal MR venographic sequences were obtained and maximum intensity projection images were generated utilizing the [employer] workstation. COMPARISON: MRI scan of the brain 7/10/2018 FINDINGS: There is excellent flow signal intensity in the superior sagittal sinus, transverse sinuses, and sigmoid sinuses.  The deep venous structures are widely patent.  There is no evidence of dural venous sinus thrombosis. There are low-lying cerebral tonsils     1.  Normal MR venogram of the brain without contrast. 2.  Low-lying cerebellar tonsils are again noted.    Echocardiogram Comp W/o Cont    Result Date: 7/11/2018  Transthoracic Echo Report Echocardiography Laboratory CONCLUSIONS No prior study is available for comparison. Normal left ventricular systolic function. Left ventricular ejection fraction is visually estimated to be 65%. Normal diastolic function. Normal inferior vena cava size and inspiratory collapse. Mildly dilated left atrium. Moderate mitral regurgitation. JAYDON POLLARD Exam Date:         07/11/2018                    LV  EF:  65    % FINDINGS Left Ventricle Normal left ventricular chamber size. Normal left ventricular wall thickness. Normal left ventricular systolic function. Left ventricular ejection fraction is visually estimated to be 65%. Normal regional wall motion. Normal diastolic function. Right Ventricle The right ventricle was normal in size and function. Right Atrium The right atrium is normal in size.  Normal inferior vena cava size and inspiratory collapse. Left Atrium Mildly dilated left atrium. Mitral Valve Thickened mitral valve leaflets. No mitral stenosis. Moderate mitral regurgitation. ERO by PISA method is  0.33 sq cm. Aortic Valve Structurally normal aortic valve without significant stenosis or regurgitation. Tricuspid Valve Unable to estimate pulmonary artery pressure due to an inadequate tricuspid regurgitant jet. No tricuspid stenosis. Trace tricuspid regurgitation. Unable to estimate pulmonary artery pressure due to an inadequate tricuspid regurgitant jet. Pulmonic Valve The pulmonic valve is not well visualized. No stenosis or regurgitation seen. Pericardium Normal pericardium without effusion. Aorta The aortic root is normal. Hui Modi MD (Electronically Signed) Final Date:     11 July 2018                 13:51    Transesophageal Echo W/o Cont    Result Date: 7/13/2018  Transesophageal Echo Report Echocardiography Laboratory CONCLUSIONS Left ventricular ejection fraction is visually estimated to be 65%. Small, pedunculated mobile masses adherent to the tips of the valve leaflets, notably A1 & P1.  These prolapse into the left atrium.  This causes malcoaptation of the valve itself, and resultant explosive severe regurgitation. Results called to Dr Rascon at the time of the test.  FINDINGS Left Ventricle Normal left ventricular size, thickness, systolic function, and diastolic function. Normal left ventricular size, thickness, systolic function, and diastolic function. Left ventricular ejection fraction is  visually estimated to be 65%. Right Ventricle Normal right ventricular size and systolic function. Right Atrium Normal right atrial size. Left Atrium Normal left atrial size. No atrial septal defect present. LA Appendage Normal left atrial appendage. IA Septum Normal interatrial septum. IV Septum Mitral Valve Small, pedunculated mobile masses adherent to the tips of the valve leaflets, notably A1 & P1.  These prolapse into the left atrium.  This causes malcoaptation of the valve itself, and resultant explosive severe regurgitation. Aortic Valve Structurally normal aortic valve without significant stenosis or regurgitation. Tricuspid Valve Structurally normal tricuspid valve without significant stenosis or regurgitation. Pulmonic Valve Structurally normal pulmonic valve. Pericardium No pericardial effusion seen. Aorta Normal aortic root for body surface area. Normal ascending aorta. nAnie Pereyra M.D. (Electronically Signed) Final Date:     13 July 2018                 14:25      Micro:  Results     Procedure Component Value Units Date/Time    MRSA BY PCR (AMP) [[id number]] Collected:  07/20/18 1628    Order Status:  Completed Specimen:  Respirate from [facility] Updated:  07/21/18 0609     Significant Indicator NEG     Source RESP     Site NARES     MRSA PCR Negative for MRSA by PCR.    Narrative:       Collected By:[id number] CONSTANTINO ALCARAZ          Assessment:  Streptococcus viridans bacteremia/endocarditis  Multifocal epidural abscess  Septic emboli   MV Endocarditis     Plan:  Streptococcus viridans endocarditis, MV  Afebrile  No current leukocytosis  Blood cultures +7/9/2018, 7/10/2018  Bcx negative from 7/11/2018  CHAR + small pedunculated mobile masses to tips of the valve causing severe regurgitation  Continue high-dose penicillin for 6 weeks  + gentamicin for at least 2 weeks     Multifocal epidural abscess  Epidural abscesses extending from his cervical spine to L3-4  Evaluated by neurosurgery-no surgical  intervention  Currently recommended only medical treatment  On abx above  Repeat MRI in 3 weeks     Septic emboli   MRI of the brain had 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   On abx above  Anticipate 6-8 weeks of IV abx

## 2018-07-22 NOTE — [facility]
Hospital Medicine Progress Note, Adult, Complex               Author: Rhea Trujillo Date & Time created: 7/22/2018  7:50 AM     CC: Viridans MV BE, epidural abscess, septic cerebral emboli    Interval History:  R toe hurting, injured at [facility] prior to transfer  Slept better  No concerns otherwise  family visited yesterday    Review of Systems:  Review of Systems   Constitutional: Negative for chills and fever.   HENT: Negative for sore throat.    Eyes: Negative for double vision and photophobia.   Respiratory: Negative for cough, sputum production and shortness of breath.    Cardiovascular: Negative for chest pain and palpitations.   Gastrointestinal: Negative for abdominal pain, constipation, diarrhea, nausea and vomiting.   Genitourinary: Negative for dysuria.   Musculoskeletal: Positive for back pain and joint pain (R pinky toe).   Skin: Negative for rash.       T 97.7P76BP 101/671 RR 17 SaO2 94% I/O3.7/brp 1BM  Physical Exam   Constitutional: He is oriented to person, place, and time. He appears well-developed. No distress.   HENT:   Head: Normocephalic and atraumatic.   Mouth/Throat: No oropharyngeal exudate.   Eyes: Conjunctivae are normal. Right eye exhibits no discharge. Left eye exhibits no discharge. No scleral icterus.   Neck: No tracheal deviation present.   Cardiovascular: Normal rate, regular rhythm and intact distal pulses.    Murmur heard.  Pulmonary/Chest: Effort normal and breath sounds normal. No respiratory distress. He has no wheezes. He exhibits no tenderness.   Abdominal: Soft. Bowel sounds are normal. He exhibits no distension. There is no tenderness.   Musculoskeletal: He exhibits no edema.   R 5th MT swollen, TTP on MT   Neurological: He is alert and oriented to person, place, and time. He displays normal reflexes. No cranial nerve deficit. Coordination normal.   Skin: Skin is warm. No erythema.   Vitals reviewed.      Labs:        Invalid input(s): [id number]      No results for  input(s): SODIUM, POTASSIUM, CHLORIDE, CO2, BUN, CREATININE, MAGNESIUM, PHOSPHORUS, CALCIUM in the last 72 hours.  No results for input(s): ALTSGPT, ASTSGOT, ALKPHOSPHAT, TBILIRUBIN, DBILIRUBIN, GAMMAGT, AMYLASE, LIPASE, ALB, PREALBUMIN, GLUCOSE in the last 72 hours.  Recent Labs      07/21/18   0548   RBC  4.55*   HEMOGLOBIN  12.3*   HEMATOCRIT  37.4*   PLATELETCT  222     Recent Labs      07/21/18   0548   WBC  6.3   NEUTSPOLYS  54.70   LYMPHOCYTES  35.10   MONOCYTES  6.70   EOSINOPHILS  3.00   BASOPHILS  0.20        GI/Nutrition:  Orders Placed This Encounter   Procedures   • Diet Order Regular     Standing Status:   Standing     Number of Occurrences:   1     Order Specific Question:   Diet:     Answer:   Regular [1]     Order Specific Question:   Consistency/Fluid modifications:     Answer:   Thin Liquids [3]     Medical Decision Making, by Problem:  Strep Viridans MV BE   -Gent/PCN    -request pharmacy to follow gent levels  Extensive spinal epidural abscess C6-L3-4   -No surgical intervention at this time   -MRI in 3 weeks   -(PHYLLIS Torres)  Septic cerebral emboli secondary to above with hemorrhagic transformation   -keppra for seizure px  Cerebral venous thrombosis with normal MRV   -no anticoagulation per neuro due to hemorrhagic transformation risk   -Repeat MRI in 3-4 weeks  Severe MR   -no MVR currently, anticipate as outpatient   -no clinical symptoms  Mild anemia secondary to acute illness  Back pain   -lidopatch, prn oxy  Insomnia   -restoril   -melatonin  Right 5th toe pain   -check xray   -offloading shoe for ambulation    Am labs        Quality-Core Measures   Reviewed items::  Medications reviewed  Cruz catheter::  No Cruz  Central line in place:  Concentrated IV drugs  DVT prophylaxis pharmacological::  Not indicated at this time, ambulatory  Antibiotics:  Treating active infection/contamination beyond 24 hours perioperative coverage

## 2018-07-23 LAB
ALBUMIN SERPL BCP-MCNC: 3.2 G/DL (ref 3.2–4.9)
ALBUMIN/GLOB SERPL: 1 G/DL
ALP SERPL-CCNC: 63 U/L (ref 30–99)
ALT SERPL-CCNC: 16 U/L (ref 2–50)
ANION GAP SERPL CALC-SCNC: 8 MMOL/L (ref 0–11.9)
AST SERPL-CCNC: 12 U/L (ref 12–45)
BILIRUB SERPL-MCNC: 0.4 MG/DL (ref 0.1–1.5)
BUN SERPL-MCNC: 11 MG/DL (ref 8–22)
CALCIUM SERPL-MCNC: 9 MG/DL (ref 8.4–10.2)
CHLORIDE SERPL-SCNC: 107 MMOL/L (ref 96–112)
CO2 SERPL-SCNC: 24 MMOL/L (ref 20–33)
CREAT SERPL-MCNC: 0.9 MG/DL (ref 0.5–1.4)
CRP SERPL HS-MCNC: 2.71 MG/DL (ref 0–0.75)
ERYTHROCYTE [DISTWIDTH] IN BLOOD BY AUTOMATED COUNT: 42.6 FL (ref 35.9–50)
ERYTHROCYTE [SEDIMENTATION RATE] IN BLOOD BY WESTERGREN METHOD: 26 MM/HOUR (ref 0–15)
GENTAMICIN SERPL-MCNC: 0.23 UG/ML (ref 1–2)
GLOBULIN SER CALC-MCNC: 3.1 G/DL (ref 1.9–3.5)
GLUCOSE SERPL-MCNC: 103 MG/DL (ref 65–99)
HCT VFR BLD AUTO: 36.5 % (ref 42–52)
HGB BLD-MCNC: 11.9 G/DL (ref 14–18)
MCH RBC QN AUTO: 26.9 PG (ref 27–33)
MCHC RBC AUTO-ENTMCNC: 32.6 G/DL (ref 33.7–35.3)
MCV RBC AUTO: 82.6 FL (ref 81.4–97.8)
PLATELET # BLD AUTO: 209 K/UL (ref 164–446)
PMV BLD AUTO: 10.6 FL (ref 9–12.9)
POTASSIUM SERPL-SCNC: 4 MMOL/L (ref 3.6–5.5)
PROT SERPL-MCNC: 6.3 G/DL (ref 6–8.2)
RBC # BLD AUTO: 4.42 M/UL (ref 4.7–6.1)
SODIUM SERPL-SCNC: 139 MMOL/L (ref 135–145)
WBC # BLD AUTO: 7.3 K/UL (ref 4.8–10.8)

## 2018-07-23 PROCEDURE — 99232 SBSQ HOSP IP/OBS MODERATE 35: CPT | Performed by: INTERNAL MEDICINE

## 2018-07-23 PROCEDURE — 86140 C-REACTIVE PROTEIN: CPT

## 2018-07-23 PROCEDURE — 80170 ASSAY OF GENTAMICIN: CPT

## 2018-07-23 PROCEDURE — 85027 COMPLETE CBC AUTOMATED: CPT

## 2018-07-23 PROCEDURE — 80053 COMPREHEN METABOLIC PANEL: CPT

## 2018-07-23 PROCEDURE — 82962 GLUCOSE BLOOD TEST: CPT | Mod: 91

## 2018-07-23 PROCEDURE — 85652 RBC SED RATE AUTOMATED: CPT

## 2018-07-23 ASSESSMENT — ENCOUNTER SYMPTOMS
SPUTUM PRODUCTION: 0
DOUBLE VISION: 0
HEADACHES: 0
INSOMNIA: 0
MYALGIAS: 1
NAUSEA: 0
ABDOMINAL PAIN: 0
FEVER: 0
CONSTIPATION: 0
BACK PAIN: 1
PHOTOPHOBIA: 0
SHORTNESS OF BREATH: 0
COUGH: 0
VOMITING: 0
CHILLS: 0
DIARRHEA: 0

## 2018-07-23 NOTE — [facility]
Hospital Medicine Progress Note, Adult, Complex               Author: Rhea Trujillo Date & Time created: 7/23/2018  6:05 AM     CC: Viridans MV BE, epidural abscess, septic cerebral emboli    Interval History:  Xray without fracture of 5th MT  TURK with activity, not worse than initial illness onset  Says he gets shaky if not eating regularly    Review of Systems:  Review of Systems   Constitutional: Negative for fever.   Eyes: Negative for double vision and photophobia.   Respiratory: Negative for cough, sputum production and shortness of breath.         TURK   Cardiovascular: Negative for chest pain.   Gastrointestinal: Negative for abdominal pain, constipation, diarrhea, nausea and vomiting.   Genitourinary: Negative for dysuria.   Musculoskeletal: Positive for back pain and joint pain (R pinky toe, a little better).   Skin: Negative for rash.   Neurological: Negative for headaches.   Psychiatric/Behavioral: The patient does not have insomnia.        T 97.8P70BP 92/58 RR 17 SaO2 94% I/O2.4/brp noBM  Physical Exam   Constitutional: He is oriented to person, place, and time. He appears well-developed and well-nourished. No distress.   HENT:   Head: Normocephalic and atraumatic.   Mouth/Throat: No oropharyngeal exudate.   Eyes: Conjunctivae are normal. Right eye exhibits no discharge. Left eye exhibits no discharge. No scleral icterus.   Neck: No tracheal deviation present.   Cardiovascular: Normal rate, regular rhythm and intact distal pulses.    Murmur heard.  Pulmonary/Chest: Effort normal and breath sounds normal. No respiratory distress. He has no wheezes. He exhibits no tenderness.   Abdominal: Soft. Bowel sounds are normal. He exhibits no distension. There is no tenderness. There is no rebound.   Musculoskeletal: He exhibits no edema.   R 5th MT swollen, TTP on MT   Neurological: He is alert and oriented to person, place, and time. He displays normal reflexes. No cranial nerve deficit. Coordination normal.    Skin: Skin is warm. No erythema.   Vitals reviewed.      Labs:        Invalid input(s): [id number]      Recent Labs      07/23/18   0256   SODIUM  139   POTASSIUM  4.0   CHLORIDE  107   CO2  24   BUN  11   CREATININE  0.90   CALCIUM  9.0     Recent Labs      07/23/18   0256   ALTSGPT  16   ASTSGOT  12   ALKPHOSPHAT  63   TBILIRUBIN  0.4   GLUCOSE  103*     Recent Labs      07/21/18   0548  07/23/18   0256   RBC  4.55*  4.42*   HEMOGLOBIN  12.3*  11.9*   HEMATOCRIT  37.4*  36.5*   PLATELETCT  222  209     Recent Labs      07/21/18   0548  07/23/18   0256   WBC  6.3  7.3   NEUTSPOLYS  54.70   --    LYMPHOCYTES  35.10   --    MONOCYTES  6.70   --    EOSINOPHILS  3.00   --    BASOPHILS  0.20   --    ASTSGOT   --   12   ALTSGPT   --   16   ALKPHOSPHAT   --   63   TBILIRUBIN   --   0.4        GI/Nutrition:  Orders Placed This Encounter   Procedures   • Diet Order Regular     Standing Status:   Standing     Number of Occurrences:   1     Order Specific Question:   Diet:     Answer:   Regular [1]     Order Specific Question:   Consistency/Fluid modifications:     Answer:   Thin Liquids [3]     Medical Decision Making, by Problem:  Strep Viridans MV BE   -Gent/PCN (gent synergy 2 weeks per ID)   -requested pharmacy to follow gent levels, have not ordered, I will order  Extensive spinal epidural abscess C6-L3-4   -No surgical intervention at this time   -MRI in 2 weeks   -(PHYLLIS Torres)  Septic cerebral emboli secondary to above with hemorrhagic transformation   -keppra for seizure px  Cerebral venous thrombosis with normal MRV   -no anticoagulation per neuro due to hemorrhagic transformation risk   -Repeat MRI in 3 weeks  Severe MR   -no MVR currently, anticipate as outpatient   -TURK   -CXR without edema   -follow  Mild anemia secondary to acute illness  Back pain   -lidopatch, prn oxy  Insomnia   -restoril prn   -melatonin  Right 5th toe pain, no fracture   -add voltaren gel   -offloading shoe for  ambulation    Quality-Core Measures   Reviewed items::  Medications reviewed, Labs reviewed and Radiology images reviewed  Cruz catheter::  No Cruz  Central line in place:  Concentrated IV drugs  DVT prophylaxis pharmacological::  Not indicated at this time, ambulatory  Antibiotics:  Treating active infection/contamination beyond 24 hours perioperative coverage

## 2018-07-23 NOTE — [facility]
Infectious Disease Progress Note    Author: Jeri Spence M.D. Date & Time of service: 7/23/2018  1:35 PM    Chief Complaint:  FU viridans strep sepsis/IE      Interval History:  7/12/2018 T-max 98.9 WBC 13.1 platelets 301 creatinine 0.63 feels better but still has headache and back pain  7/13- Tmax 98.6 wbc 9.3 ongoing back pain. WBC 9  7/14 AF WBC 7.5 back pain slightly improved, did not take any pain meds all day yesterday, tolerating abx without issues, plan of care d/w pt in detail  7/15 AF no CBC ongoing neck and back pain, patient received PICC without any issues, also seen by CT surgery with no plans for surgical intervention at this  7/16/2018-T-max 98.  Continues to get headache.  Apparently continues to get new emboli  7/17/2018 T-max 98 continues to get headache and back pain and neck pain.  Overall feels improved  7/18/2018 T-max 98.4.  The symptoms seem to be improved and wants to go to the gym.  Other than headache no new issues overnight  7/19/2018-T-max 99.2.  No new issues overnight.  7/20/2018-T-max 98.3.  States  he feels healthy.  Back pain is improving  7/22 AF transferred to [facility]-no new complaints  7/23 AF WBC 7.3 ice pack on foot-banged his toe Otherwise feels well  Labs Reviewed, Medications Reviewed and Radiology Reviewed.    Review of Systems:  Review of Systems   Constitutional: Negative for chills and fever.   Musculoskeletal: Positive for joint pain and myalgias.   All other systems reviewed and are negative.      Hemodynamics:  T 97.8 P70 BP92/58 RR 17 SO2 94%  noBM            Physical Exam:  Physical Exam   Constitutional: He is oriented to person, place, and time. He appears well-developed.   HENT:   Head: Normocephalic and atraumatic.   Eyes: EOM are normal. Pupils are equal, round, and reactive to light.   Neck: Neck supple.   Cardiovascular: Normal rate.    Pulmonary/Chest: Effort normal. No respiratory distress.   Abdominal: Soft.   Musculoskeletal: He exhibits no edema.    RUE PICC nontender, no erythema  Ice pack right foot   Neurological: He is alert and oriented to person, place, and time.   Skin: No rash noted. He is not diaphoretic.   Vitals reviewed.      Meds:  No current facility-administered medications for this encounter.     Labs:  Recent Labs      07/21/18   0548  07/23/18   0256   WBC  6.3  7.3   RBC  4.55*  4.42*   HEMOGLOBIN  12.3*  11.9*   HEMATOCRIT  37.4*  36.5*   MCV  82.2  82.6   MCH  27.0  26.9*   RDW  42.3  42.6   PLATELETCT  222  209   MPV  10.3  10.6   NEUTSPOLYS  54.70   --    LYMPHOCYTES  35.10   --    MONOCYTES  6.70   --    EOSINOPHILS  3.00   --    BASOPHILS  0.20   --      Recent Labs      07/23/18   0256   SODIUM  139   POTASSIUM  4.0   CHLORIDE  107   CO2  24   GLUCOSE  103*   BUN  11     Recent Labs      07/23/18   0256   ALBUMIN  3.2   TBILIRUBIN  0.4   ALKPHOSPHAT  63   TOTPROTEIN  6.3   ALTSGPT  16   ASTSGOT  12   CREATININE  0.90       Imaging:  Ct-cspine With Plus Recons    Result Date: 7/15/2018  7/15/2018 4:21 PM HISTORY/REASON FOR EXAM:  Left upper extremity numbness. 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 TECHNIQUE/EXAM DESCRIPTION:  CT cervical spine with contrast with reconstructions. Thin-section helical scanning was performed from the skull base through T1 following the intrathecal administration of 100 mL of Omnipaque 350 nonionic contrast. Sagittal and coronal reconstructions were generated from the axial images. Low dose optimization technique was utilized for this CT exam including automated exposure control and adjustment of the mA and/or kV according to patient size. COMPARISON:  MRI cervical, thoracic, and lumbar spine 7/10/2018 FINDINGS: No cervical vertebral body fracture or subluxation is present. The posterior elements are intact. No disc space narrowing or degenerative change is present. There is slightly increased enhancement in the anterior aspect of the spinal canal beginning at the level of C6-7 extending inferiorly into  the thoracic spine. This may represent the epidural abscess seen well on the recent MRI examination. No paravertebral soft tissue mass or abscess is identified. No lytic or blastic bony change is present.     1.  Slightly increased enhancement in the anterior aspect of the spinal canal beginning at the level of C6-7 extending inferiorly which may represent the epidural abscess seen on MRI. 2.  No evidence of cervical spinal osteomyelitis or paraspinous inflammation. 3.  No acute cervical spine fracture or dislocation. 4.  No cervical spine degenerative disc disease.    Ct-cta Head With & W/o-post Process    Result Date: 7/10/2018  7/9/2018 11:49 PM HISTORY/REASON FOR EXAM:  Neurological Deficit TECHNIQUE/EXAM DESCRIPTION: CT angiogram of the [place] without and with contrast.  Initial precontrast images were obtained of the head from the skull base through the vertex.  Postcontrast images were obtained of the [place] following the power injection of nonionic contrast at 5.0 mL/sec. Thin-section helical images were obtained with overlapping reconstruction interval. Coronal and sagittal multiplanar volume reformats were generated.  3D angiographic images were reviewed on PACS.  Maximum intensity projection (MIP) images were generated and reviewed. 100 mL of Omnipaque 350 nonionic contrast was injected intravenously. Low dose optimization technique was utilized for this CT exam including automated exposure control and adjustment of the mA and/or kV according to patient size. COMPARISON:  None. FINDINGS: The posterior circulation shows the distal vertebral arteries to be patent. The vertebrobasilar confluence is intact. The basilar artery is patent. No aneurysm or occlusive lesion is evident. The anterior circulation shows no stenotic or occlusive lesion. No aneurysm is evident about the [place]. The brain is unremarkable.     1.  CT angiogram of the [place] within normal  limits.    Ct-cta Neck With & W/o-post Processing    Result Date: 7/10/2018  7/9/2018 11:49 PM HISTORY/REASON FOR EXAM: Left-sided facial numbness. TECHNIQUE/EXAM DESCRIPTION: CT angiogram of the neck with contrast. Postcontrast images were obtained of the neck from the great vessels through the skull base following the power injection of nonionic contrast at 5.0 mL/sec. Thin-section helical images were obtained with overlapping reconstruction interval. Coronal and oblique multiplanar volume reformats were generated. Cervical internal carotid artery percent stenosis is calculated using the standard method according to the NASCET criteria wherein a segment of uniform caliber mid or distal cervical internal carotid is used as the reference denominator. 3D angiographic images were reviewed on PACS.  Maximum intensity projection (MIP) images were generated and reviewed 100 mL of Omnipaque 350 nonionic contrast was injected intravenously. Low dose optimization technique was utilized for this CT exam including automated exposure control and adjustment of the mA and/or kV according to patient size. COMPARISON:  None. FINDINGS: The arch origins of the great vessels appear intact. The aortic arch shows no abnormality. The right common carotid artery, cervical carotid bifurcation, and cervical internal carotid artery are within normal limits. There is no evidence of significant stenosis, thrombus, or other filling defect or ulceration. There is no evidence of dissection or aneurysm. The left common carotid artery, cervical carotid bifurcation, and cervical internal carotid artery are within normal limits. There is no evidence of significant stenosis, thrombus, or other filling defect or ulceration. There is no evidence of dissection  or aneurysm. The cervical vertebral arteries are normal bilaterally. The neck soft tissues and lung apices in the field of view are unremarkable.     CT angiogram of the neck within normal  limits.    Ct-head With & W/o    Result Date: 7/15/2018  7/15/2018 4:21 PM HISTORY/REASON FOR EXAM:  Left upper extremity numbness. History of bacteremia. TECHNIQUE/EXAM DESCRIPTION AND NUMBER OF VIEWS: CT scan of the head without and with contrast. The study was performed on a helical multidetector CT scanner. Contiguous 2.5 mm axial sections were obtained from the skull base through the vertex both prior to and after the administration of IV contrast. 100 mL of Omnipaque 350 nonionic contrast was injected intravenously. Up to date radiation dose reduction adjustments have been utilized to meet ALARA standards for radiation dose reduction. COMPARISON:  7/10/2018 FINDINGS: No acute intracranial hemorrhage, mass effect, midline shift. No acute ischemia or masses identified. There is abnormal focal cortical enhancement in the right frontal lobe seen on recent MRI. Questionable focus of enhancement in the right cerebellum. Ventricles and cisterns are patent. Gray/white matter infiltration is maintained. The paranasal sinuses and mastoid air cells are clear.     1.  Focal cortical enhancement in the right frontal lobe. Questionable enhancing focus in the right cerebellum may be present. Findings are nonspecific and may represent septic emboli, cerebritis, or venous infarcts 2.  No mass effect or midline shift.    Ct-maxillofacial With & W/o Plus Recons    Result Date: 7/10/2018  7/10/2018 4:00 PM HISTORY/REASON FOR EXAM:  Streptococcal bacteremia. Evaluate for sinusitis or abscess. TECHNIQUE/EXAM DESCRIPTION AND NUMBER OF VIEWS:   CT scan maxillofacial with and without contrast, with reconstructions. Thin-section helical imaging was obtained of the maxillofacial structures from the orbital roofs through the mandible before and after injection of nonionic contrast. Coronal and sagittal multiplanar volume reformat images were generated from the axial data.. 100 mL of Omnipaque 350 nonionic contrast was injected  intravenously. Low dose optimization technique was utilized for this CT exam including automated exposure control and adjustment of the mA and/or kV according to patient size. COMPARISON:  None. FINDINGS: There is rounded mucosal thickening or polyp formation in the inferior medial aspect of the right maxillary sinus. The remainder of the visualized paranasal sinuses are clear. No air-fluid level is present to suggest acute sinusitis. No bony expansion is identified. No lytic or blastic bony change is identified. The soft tissues of the neck are within normal limits. No soft tissue abscess or inflammatory change is identified. Cervical lymph nodes are normal in size. No submandibular or parotid gland abnormality is noted. No posterior pharyngeal or laryngeal mucosal abnormality or mass is identified.     1.  Rounded mucosal thickening or polyp formation in the inferior aspect of the right maxillary sinus which could reflect chronic right maxillary sinusitis. 2.  No evidence of acute sinusitis. 3.  Otherwise clear paranasal sinuses.    Ct-post-fossa-ear With & W/o    Result Date: 7/10/2018  7/10/2018 4:00 PM HISTORY/REASON FOR EXAM:  Streptococcal bacteremia, facial numbness rule out facial nerve compression / mastoiditis. TECHNIQUE/EXAM DESCRIPTION AND NUMBER OF VIEWS: CT scan of the temporal bones without and with contrast. The study was performed on a [employer] CT scanner. Contiguous thin section 1.0 mm or 1.25 mm axial and direct coronal images were obtained of the temporal bones. Images are reviewed at magnified bone and soft tissue windows and non-magnified axial images are also displayed at soft tissue windows. Scans are obtained pre and post contrast. 100 mL of Omnipaque 300 nonionic contrast was injected intravenously. Low dose optimization technique was utilized for this CT exam including automated exposure control and adjustment of the mA and/or kV according to patient size. COMPARISON: CTA of the neck without  and with contrast 7/9/2018 FINDINGS: On the right, the external auditory canal is normal except for some debris most consistent with cerumen.  The mastoid is normally pneumatized and aerated.  The middle ear cavity and mastoid antrum are clear.  The ossicles are normal in position  and configuration.  The scutum is sharp.  The bony labyrinth is normal, and the internal auditory canal shows normal caliber and is symmetric with the left side.  The facial nerve canal is unremarkable. On the left, the external auditory canal is normal except for some debris consistent with cerumen.  The mastoid is normally pneumatized and aerated.  The middle ear cavity and mastoid antrum are clear.  The ossicles are normal in position and configuration.  The scutum is sharp.  The bony labyrinth is normal, and the internal auditory canal shows normal caliber and is symmetric with the right side.  The facial nerve canal is unremarkable. The right transverse -- sigmoid sinus and right internal jugular vein are dominant. The paranasal sinuses show clustered retention cyst or polypoid mucosal thickening in the alveolar recess of the right maxillary sinus. There are no air-fluid levels. The posterior fossa structures are unremarkable.  The fourth ventricle is in the midline. There is no abnormal parenchymal or extra-axial/meningeal enhancement in the posterior fossa or supratentorial compartment within the field of view.     CT OF THE TEMPORAL BONES WITHOUT CONTRAST WITHIN NORMAL LIMITS.    Dx-chest-2 Views    Result Date: 7/10/2018    7/9/2018 10:30 PM HISTORY/REASON FOR EXAM: Shortness of Breath TECHNIQUE/EXAM DESCRIPTION:  PA and lateral views of the chest. COMPARISON: None FINDINGS: The cardiac silhouette appears within normal limits. The mediastinal contour appears within normal limits.  The central pulmonary vasculature appears normal. The lungs appear well expanded bilaterally.  Bilateral lungs are clear. No significant pleural  effusions are identified. The bony structures appear age-appropriate.     1.  No acute cardiopulmonary disease.    Dx-chest-for Picc Line Perform Procedure In: Patient's Room    Result Date: 7/14/2018 7/14/2018 12:59 PM HISTORY/REASON FOR EXAM:  Right PICC line placement TECHNIQUE/EXAM DESCRIPTION AND NUMBER OF VIEWS: Single AP view of the chest. COMPARISON: 7/19/2018 FINDINGS: There is interval placement of a right PICC line with its tip located projecting over the superior aspect of the superior vena cava. The cardiac silhouette and mediastinal contours are unremarkable. There is mild left basilar atelectasis. No pleural fluid or pneumothorax. No suspicious bony lesions.     1.  Right PICC line tip projects over the superior aspect of the superior vena cava. 2.  Left basilar atelectasis.    Dx-chest-portable (1 View)    Result Date: 7/21/2018 7/21/2018 6:30 AM HISTORY/REASON FOR EXAM:  PICC placement TECHNIQUE/EXAM DESCRIPTION AND NUMBER OF VIEWS: Single portable view of the chest. COMPARISON: 7/14/2018 FINDINGS: A right-sided PICC is present, the tip of which projects in the area of the upper SVC or possibly in the right subclavian vein. The cardiomediastinal silhouettes and liam are normal. Lungs are well expanded with no infiltrate or other acute process.     No acute cardiopulmonary abnormality. Right-sided PICC in place as noted above.    Dx-foot-2- Right    Result Date: 7/22/2018 7/22/2018 8:27 AM HISTORY/REASON FOR EXAM:  Pain/Deformity Following Trauma RT foot pain and swelling on fifth phalange and metatarsal X 2 days, pain worse on plantar surface following injury: patient stubbed fifth phalange TECHNIQUE/EXAM DESCRIPTION AND NUMBER OF VIEWS: 2 views of the RIGHT foot. COMPARISON:  None. FINDINGS: No acute fracture or dislocation. Soft tissue swelling overlying the fifth digit. No joint osteoarthritis.     No acute osseous abnormality.    Mr-brain-with & W/o    Result Date: 7/15/2018  7/15/2018 3:25  PM HISTORY/REASON FOR EXAM:  Change in neurologic status. History of stroke. Rule out intracranial hemorrhage or new stroke. TECHNIQUE/EXAM DESCRIPTION: MRI of the brain without and with contrast, with diffusion. The study was performed on a [employer] Signa 1.5 Kerri MRI scanner. Spoiled-GRASS sagittal, thin-section T2 axial, T1 coronal, T1 postcontrast coronal, T1 postcontrast axial, FLAIR coronal and diffusion-weighted axial images were obtained of the whole brain.. 20 mL Gadavist contrast were administered intravenously. COMPARISON:  7/10/2018. FINDINGS: Low-lying cerebellar tonsils are again noted. The cortical sulci and gyri are within normal limits. The ventricular system is within normal limits. The bony calvaria are intact. There is no evidence of extra-axial fluid collection or intracranial mass effect. The previously demonstrated small enhancing focus of cortical T2 and FLAIR signal hyperintensity in the right posterior frontal lobe appears more prominent compared to the prior study. In contrast to the prior study, previously demonstrated diffusion restriction is no longer seen in this lesion. There are 2 new punctate foci of enhancement in the high right posterior frontal cortex and right superior cerebellum with minimal associated T2 and FLAIR signal hyperintensity associated with the lesion in the right cerebellum. Gradient echo sequences demonstrate a faint focus of signal hypointensity corresponding to the location of this lesion suggestive of early hemorrhagic conversion. There is no evidence of abnormal diffusion-weighted signal intensity in the brain. There are normal flow voids in the cavernous carotid arteries and M1 segments. There are normal flow voids in the distal vertebral basilar system. There are normal flow voids in the dural venous sinuses. The visualized paranasal sinuses and mastoid air cells appear clear.     1.  Previously demonstrated small enhancing focus of cortical T2 and FLAIR signal  hyperintensity in the right posterior frontal lobe appears more prominent compared to the prior study. In contrast to the prior study, previously demonstrated diffusion restriction is no longer seen in this lesion. Findings could be consistent with evolving cortical infarct or related to patient's known meningitis. 2.  2 new punctate foci of enhancement in the high right posterior frontal cortex and right superior cerebellum with minimal associated T2 and FLAIR signal hyperintensity associated with the lesion in the right cerebellum. Gradient echo sequences demonstrate a faint focus of signal hypointensity corresponding to the location of this lesion suggestive of early hemorrhagic conversion. 3.  Findings could be consistent with subacute infarcts of embolic etiology. 4.  No evidence of new acute territorial infarct. 5.  Low-lying cerebellar tonsils. Attempts to convey these findings to Dr. Brown were initiated on 7/15/2018 5:06 PM. These findings were discussed with Dr. Brown on 7/15/2018 5:25 PM.    Mr-brain-with & W/o    Result Date: 7/11/2018  7/10/2018 10:21 PM HISTORY/REASON FOR EXAM:  Possible meningitis. TECHNIQUE/EXAM DESCRIPTION:   MRI of the brain without and with contrast. T1 sagittal, T2 fast spin-echo axial, T1 coronal, FLAIR coronal, diffusion-weighted and apparent diffusion coefficient (ADC map) axial images were obtained of the whole brain. T1 postcontrast axial and T1 postcontrast coronal images were obtained. The study was performed on a [employer] Signa 1.5 Kerri MRI scanner. 20 mL Gadavist contrast was administered intravenously. COMPARISON:  None. FINDINGS:  There is tiny area of restricted diffusion in the right frontal gray matter. Minimal contrast enhancement is seen. The coronal FLAIR images demonstrates abnormal T2 hyperintensity in the adjacent right frontal subarachnoid spaces. The axial gradient echo images demonstrates linear hypointensity within the sulci. There is low-lying cerebellar  tonsils. There is no hydrocephalus.  There is no large lesion identified in the expected course of the intracranial portions of the cranial nerves. The visualized flow voids of cerebral vasculature appear normal within limits.  The skull bones appear normal. The paranasal sinuses are clear. The extracranial soft tissue including orbits appear grossly normal.     1.  There is tiny area of restricted diffusion in the right frontal gray matter. Minimal contrast enhancement is seen. The coronal FLAIR images demonstrates abnormal T2 hyperintensity in the adjacent right frontal subarachnoid spaces. The axial gradient echo images demonstrates linear hypointensity within the sulci. These findings likely represents a small cortical venous thrombosis with adjacent tiny infarct. Cortical venous thrombosis may cause focal subarachnoid hemorrhage. Please note that cortical venous thrombosis can be complication of meningitis. 2.  Low-lying cerebellar tonsils. This is concerning for Chiari I malformation. In view of history of lumbar puncture, this finding can be caused by the post lumbar puncture intracranial hypotension.    Mr-cervical Spine-with & W/o    Result Date: 7/15/2018  7/15/2018 3:24 PM HISTORY/REASON FOR EXAM:  Left upper extremity numbness with underlying epidural abscess. TECHNIQUE/EXAM DESCRIPTION: MRI of the cervical spine without and with contrast. The study was performed on a [email or web address] Signa 1.5 Kerri MRI scanner. T1 sagittal, T2 fast spin-echo sagittal, T1 postcontrast fat-suppressed sagittal, and gradient-echo axial images were obtained of the cervical spine. 20 mL Gadavist contrast were administered  intravenously. COMPARISON:  None. FINDINGS: There is redemonstrated posterior epidural enhancement which appears to extend slightly more cephalad to approximately the C4 level and extending inferiorly below the level of the scan into the thoracic spine. This may indicate interval worsening of infectious process. There  is no definite evidence of osteomyelitis. Cervical spine demonstrates normal vertebral body height and alignment. Other findings are unchanged.     Posterior epidural enhancement which appears to extend slightly more cephalad to approximately the C4 level and extending inferiorly below the level of the scan into the thoracic spine. This may indicate interval worsening of infectious process. There is  no definite evidence of osteomyelitis. Attempts to convey these findings to Dr. Brown were initiated on 7/15/2018 5:06 PM. These findings were discussed with Dr. Brown on 7/15/2018 5:25 PM.    Mr-cervical Spine-with & W/o    Result Date: 7/11/2018  7/10/2018 10:21 PM HISTORY/REASON FOR EXAM:  Possible bacterial meningitis. TECHNIQUE/EXAM DESCRIPTION: MRI of the cervical spine without and with contrast. The study was performed on a [employer] Signa 1.5 Kerri MRI scanner. T1 sagittal, T2 fast spin-echo sagittal, and gradient echo axial images were obtained of the cervical spine. T1 post-contrast fat suppressed sagittal images were obtained of the cervical spine. Optional T1 post-contrast axial images may be obtained. 20 mL Omniscan contrast was administered intravenously. COMPARISON: None. FINDINGS: There is prominent posterior epidural fluid collection extending from C6-7 into the thoracic spine. Mild posterior epidural contrast enhancement is seen. Please see thoracic spine report for further details. The cervical spine maintains normal height and alignment. There is no fracture or dislocation. There is no pathologic marrow infiltration. There is no abnormal perivertebral fluid collection. There is no intradural extramedullary fluid collection. There is no abnormal intramedullary T2 signal intensity in the cervical spinal cord. At the level of C2-3, there is no spinal or neural foraminal stenosis. At the level of C3-4, there is no spinal or neural foraminal stenosis. At the level of C4-5, there is no spinal or neural  foraminal stenosis. At the level of C5-6, there is no spinal or neural foraminal stenosis. At the level of C6-7, there is no spinal or neural foraminal stenosis. At the level of C7-T1, there is no spinal or neural foraminal stenosis. The visualized posterior fossa structures appear normal within limits.  The cervical spinal cord does not demonstrate any mass or abnormal T2 signal intensity. The visualized pre-and paraspinal soft tissues appear normal within limits.     1.  There is prominent posterior epidural fluid collection extending from C6-7 into the thoracic spine. Mild posterior epidural contrast enhancement is noted at this level. Please see thoracic spine report for further details. 2.  There is no evidence of osteomyelitis in the cervical spine.    Mr-lumbar Spine-with & W/o    Result Date: 7/11/2018  7/10/2018 10:21 PM HISTORY/REASON FOR EXAM:  Possible bacterial meningitis. TECHNIQUE/EXAM DESCRIPTION: MRI of the lumbar spine without and with contrast. The study was performed on a [employer] Signa 1.5 Kerri MRI scanner. T1 sagittal, T2 fast spin-echo sagittal, and T2 axial images were obtained of the lumbar spine. T1 post-contrast fat-suppressed sagittal images were obtained. 20 mL Omniscan contrast was administered intravenously. COMPARISON:  None. FINDINGS: There is abnormal posterior epidural fluid collection seen extending from the lower cervical spine to the level of L3-4. Multifocal abnormal epidural contrast enhancement is seen. The lumbar spine maintains normal height and alignment. There is no evidence of fracture or dislocation. There is no pathologic marrow infiltration. There is no abnormal disc fluid. At the level of L5-S1, there is diffuse disc bulge. Mild facet joint arthropathy is seen. There is no spinal or neural foraminal stenosis. At the level of L4-5, there is no spinal or neural foraminal stenosis. At the level of L3-4, there is no spinal or neural foraminal stenosis. At the level of L2-3,  there is no spinal or neural foraminal stenosis. At the level of L1-2, there is no spinal or neural foraminal stenosis. The conus terminates at the level of L1.     1.  There is abnormal posterior epidural fluid collection seen extending from the lower cervical spine to the level of L3-4. Multifocal abnormal epidural contrast enhancement is seen. These findings are concerning for thoracic and lumbar epidural abscess. 2.  There is no evidence of lumbar osteomyelitis. 3.  Findings were discussed with DAVON FERNANDO on 7/11/2018 1:08 PM.    Mr-thoracic Spine-with & W/o    Result Date: 7/11/2018  7/10/2018 10:21 PM HISTORY/REASON FOR EXAM:  Possible bacterial meningitis TECHNIQUE/EXAM DESCRIPTION: MRI of the thoracic spine without and with contrast. The study was performed on a [employer] Signa 1.5 Kerri MRI scanner. T1 sagittal, T2 fast spin-echo sagittal, and T2 axial images were obtained of the thoracic spine. T1 post-contrast fat suppressed sagittal images were obtained. Optional T1 post-contrast axial images may be obtained. 20 mL Gadavist contrast was administered intravenously. COMPARISON:  None. FINDINGS: There is abnormal posterior epidural fluid collection extending from C6-7 to the lumbar spine. Abnormal peripheral contrast enhancement noted surrounding the epidural fluid collection. The thoracic spinal cord is anteriorly displaced from the levels of T3-4 to  T9-10. Moderate central canal stenosis is seen. There is a hemangioma in the body of T11. There is no abnormal intramedullary T2 signal intensity in the thoracic spinal cord.     There is abnormal posterior epidural fluid collection extending from C6-7 to the lumbar spine. Abnormal peripheral contrast enhancement noted surrounding the epidural fluid collection. The thoracic spinal cord is anteriorly displaced from the levels of T3-4 to  T9-10. Moderate central canal stenosis is seen. These findings are highly concerning for posterior epidural abscess with  moderate canal compromise.    Ir-picc Line Placement    Result Date: 7/14/2018  HISTORY/REASON FOR EXAM:   PICC placement. TECHNIQUE/EXAM DESCRIPTION AND NUMBER OF VIEWS:   PICC line insertion with ultrasound guidance.  The procedure was performed using maximal sterile barrier technique including sterile gown, mask, cap, and donning of sterile gloves following appropriate hand hygiene and/or sterile scrub. Patient skin site was prepped with 2% Chlorhexidine solution. FINDINGS:  PICC line insertion with Ultrasound Guidance was performed by qualified nursing staff without the assistance of a Radiologist. PICC positioning appropriateness confirmed by 3CG technology; chest xray only needed in the instance 3CG unable to confirm placement.              Ultrasound-guided PICC placement performed by qualified nursing staff as above.     Mr-venogram (mrv) Head    Result Date: 7/12/2018  HISTORY/REASON FOR EXAM: Minimal subarachnoid hemorrhage effacement and left tiny cortical infarct in right frontal lobe. TECHNIQUE/EXAM DESCRIPTION: MR venogram of the head without contrast, 11/12/2010. The study was performed on a Signa 1.5 Kerri MRI scanner. Multiple axial and coronal MR venographic sequences were obtained and maximum intensity projection images were generated utilizing the [employer] workstation. COMPARISON: MRI scan of the brain 7/10/2018 FINDINGS: There is excellent flow signal intensity in the superior sagittal sinus, transverse sinuses, and sigmoid sinuses.  The deep venous structures are widely patent.  There is no evidence of dural venous sinus thrombosis. There are low-lying cerebral tonsils     1.  Normal MR venogram of the brain without contrast. 2.  Low-lying cerebellar tonsils are again noted.    Echocardiogram Comp W/o Cont    Result Date: 7/11/2018  Transthoracic Echo Report Echocardiography Laboratory CONCLUSIONS No prior study is available for comparison. Normal left ventricular systolic function. Left  ventricular ejection fraction is visually estimated to be 65%. Normal diastolic function. Normal inferior vena cava size and inspiratory collapse. Mildly dilated left atrium. Moderate mitral regurgitation. JAYDON POLLARD Exam Date:         07/11/2018                    LV EF:  65    % FINDINGS Left Ventricle Normal left ventricular chamber size. Normal left ventricular wall thickness. Normal left ventricular systolic function. Left ventricular ejection fraction is visually estimated to be 65%. Normal regional wall motion. Normal diastolic function. Right Ventricle The right ventricle was normal in size and function. Right Atrium The right atrium is normal in size.  Normal inferior vena cava size and inspiratory collapse. Left Atrium Mildly dilated left atrium. Mitral Valve Thickened mitral valve leaflets. No mitral stenosis. Moderate mitral regurgitation. ERO by PISA method is  0.33 sq cm. Aortic Valve Structurally normal aortic valve without significant stenosis or regurgitation. Tricuspid Valve Unable to estimate pulmonary artery pressure due to an inadequate tricuspid regurgitant jet. No tricuspid stenosis. Trace tricuspid regurgitation. Unable to estimate pulmonary artery pressure due to an inadequate tricuspid regurgitant jet. Pulmonic Valve The pulmonic valve is not well visualized. No stenosis or regurgitation seen. Pericardium Normal pericardium without effusion. Aorta The aortic root is normal. Hui Modi MD (Electronically Signed) Final Date:     11 July 2018                 13:51    Transesophageal Echo W/o Cont    Result Date: 7/13/2018  Transesophageal Echo Report Echocardiography Laboratory CONCLUSIONS Left ventricular ejection fraction is visually estimated to be 65%. Small, pedunculated mobile masses adherent to the tips of the valve leaflets, notably A1 & P1.  These prolapse into the left atrium.  This causes malcoaptation of the valve itself, and resultant explosive severe regurgitation. Results  called to Dr Rascon at the time of the test.  FINDINGS Left Ventricle Normal left ventricular size, thickness, systolic function, and diastolic function. Normal left ventricular size, thickness, systolic function, and diastolic function. Left ventricular ejection fraction is visually estimated to be 65%. Right Ventricle Normal right ventricular size and systolic function. Right Atrium Normal right atrial size. Left Atrium Normal left atrial size. No atrial septal defect present. LA Appendage Normal left atrial appendage. IA Septum Normal interatrial septum. IV Septum Mitral Valve Small, pedunculated mobile masses adherent to the tips of the valve leaflets, notably A1 & P1.  These prolapse into the left atrium.  This causes malcoaptation of the valve itself, and resultant explosive severe regurgitation. Aortic Valve Structurally normal aortic valve without significant stenosis or regurgitation. Tricuspid Valve Structurally normal tricuspid valve without significant stenosis or regurgitation. Pulmonic Valve Structurally normal pulmonic valve. Pericardium No pericardial effusion seen. Aorta Normal aortic root for body surface area. Normal ascending aorta. Annie Pereyra M.D. (Electronically Signed) Final Date:     13 July 2018                 14:25      Micro:  Results     Procedure Component Value Units Date/Time    MRSA BY PCR (AMP) [[id number]] Collected:  07/20/18 1628    Order Status:  Completed Specimen:  Respirate from Nares Updated:  07/21/18 0609     Significant Indicator NEG     Source RESP     Site NARES     MRSA PCR Negative for MRSA by PCR.    Narrative:       Collected By:[id number] CONSTANTINO ALCARAZ          Assessment:  Streptococcus viridans bacteremia/endocarditis  Multifocal epidural abscess  Septic emboli   MV Endocarditis     Plan:  Streptococcus viridans endocarditis, MV  Afebrile  No current leukocytosis  Blood cultures +7/9/2018, 7/10/2018  Bcx negative from 7/11/2018  CHAR + small pedunculated  mobile masses to tips of the valve causing severe regurgitation  Continue high-dose penicillin for 6 weeks  + gentamicin for at least 2 weeks     Multifocal epidural abscess  Epidural abscesses extending from his cervical spine to L3-4  Evaluated by neurosurgery-no surgical intervention  Currently recommended only medical treatment  On abx above  Repeat MRI early Aug     Septic emboli   MRI of the brain had 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   On abx above  Anticipate 6-8 weeks of IV abx    Contusion right toe  Neg xray  Local care

## 2018-07-24 LAB
GLUCOSE BLD-MCNC: 80 MG/DL (ref 65–99)
GLUCOSE BLD-MCNC: 83 MG/DL (ref 65–99)
GLUCOSE BLD-MCNC: 94 MG/DL (ref 65–99)
GLUCOSE BLD-MCNC: 95 MG/DL (ref 65–99)
GLUCOSE BLD-MCNC: 98 MG/DL (ref 65–99)

## 2018-07-24 PROCEDURE — 82962 GLUCOSE BLOOD TEST: CPT | Mod: 91

## 2018-07-24 ASSESSMENT — ENCOUNTER SYMPTOMS
NAUSEA: 0
COUGH: 0
NECK PAIN: 1
SHORTNESS OF BREATH: 0
BACK PAIN: 1
DIARRHEA: 0
ABDOMINAL PAIN: 0
HEADACHES: 1
DOUBLE VISION: 0
CHILLS: 0
BLURRED VISION: 0
PALPITATIONS: 0
DIAPHORESIS: 0
FEVER: 0

## 2018-07-24 NOTE — [facility]
Hospital Medicine Progress Note, Adult, Complex               Author: Tarsha Iqbal Date & Time created: 7/24/2018  11:01 AM     CC: strep viridans mitral valve endocarditis with septic emboli    Interval History:  Patient states his back pain is improving as it does not go the length of his spine any longer but is more localized over his upper to mid back. He denies fevers or nausea but does continue to have headaches.    Review of Systems:  Review of Systems   Constitutional: Negative for chills, diaphoresis and fever.   HENT: Negative for congestion.    Eyes: Negative for blurred vision and double vision.   Respiratory: Negative for cough and shortness of breath.    Cardiovascular: Negative for palpitations and leg swelling.   Gastrointestinal: Negative for abdominal pain, diarrhea and nausea.   Genitourinary: Negative for dysuria and urgency.   Musculoskeletal: Positive for back pain and neck pain.   Skin: Negative for itching and rash.   Neurological: Positive for headaches.       Physical Exam:  Physical Exam   Constitutional: No distress.   HENT:   Mouth/Throat: Oropharynx is clear and moist.   Eyes: Conjunctivae are normal. No scleral icterus.   Neck: Neck supple. No JVD present. No tracheal deviation present.   Cardiovascular: Normal rate and regular rhythm.    Murmur heard.  Pulmonary/Chest: Breath sounds normal. No respiratory distress. He has no wheezes. He has no rales.   Abdominal: Soft. Bowel sounds are normal.   Musculoskeletal: He exhibits no edema.   Neurological: He is alert.   Skin: Skin is warm. No rash noted. He is not diaphoretic. No erythema.   Psychiatric: His behavior is normal. Thought content normal.   Nursing note and vitals reviewed.      Labs:        Invalid input(s): [id number]      Recent Labs      07/23/18   0256   SODIUM  139   POTASSIUM  4.0   CHLORIDE  107   CO2  24   BUN  11   CREATININE  0.90   CALCIUM  9.0     Recent Labs      07/23/18   0256   ALTSGPT  16   ASTSGOT  12    ALKPHOSPHAT  63   TBILIRUBIN  0.4   GLUCOSE  103*     Recent Labs      07/23/18   0256   RBC  4.42*   HEMOGLOBIN  11.9*   HEMATOCRIT  36.5*   PLATELETCT  209     Recent Labs      07/23/18   0256   WBC  7.3   ASTSGOT  12   ALTSGPT  16   ALKPHOSPHAT  63   TBILIRUBIN  0.4           Hemodynamics:    T98.1 /58 HR59 RR17 O2 sat 92%    GI/Nutrition:  Orders Placed This Encounter   Procedures   • Diet Order Regular     Standing Status:   Standing     Number of Occurrences:   1     Order Specific Question:   Diet:     Answer:   Regular [1]     Order Specific Question:   Consistency/Fluid modifications:     Answer:   Thin Liquids [3]     Medical Decision Making, by Problem:  Strep Viridans mitral valve endocarditis              gentamicin and pencillin per ID    spinal epidural abscess C6-L3-4              Will repeat MRI in two weeks from 7/20 and follow up images with neurosurgery Dr. Torres  Septic cerebral emboli, continue pain medication for headaches and antibiotics   keppra for seizure prophylaxis   No anticoagulation due to hemorrhage risk  Cerebral venous thrombosis with normal MRV   Repeat MRI in 3 weeks  Severe mitral regurgitation, will need follow up after discharge    Back pain             As needed oxycodone    Right 5th toe pain, no fracture             Ice packs and caution with ambulation      Quality-Core Measures   Reviewed items::  Labs reviewed and Medications reviewed  Cruz catheter::  No Cruz  DVT prophylaxis pharmacological::  Contraindicated - High bleeding risk  Ulcer Prophylaxis::  Not indicated  Antibiotics:  Treating active infection/contamination beyond 24 hours perioperative coverage  Assessed for rehabilitation services:  Patient was assess for and/or received rehabilitation services during this hospitalization

## 2018-07-25 ASSESSMENT — ENCOUNTER SYMPTOMS
DIARRHEA: 0
SHORTNESS OF BREATH: 0
NECK PAIN: 1
FEVER: 0
DOUBLE VISION: 0
BACK PAIN: 1
BLURRED VISION: 0
TREMORS: 1
PALPITATIONS: 0
ABDOMINAL PAIN: 0
SPUTUM PRODUCTION: 0
NAUSEA: 0
HEADACHES: 1

## 2018-07-25 NOTE — [facility]
Hospital Medicine Progress Note, Adult, Complex               Author: Tarsha Iqbal Date & Time created: 7/25/2018  11:02 AM     CC: strep viridans mitral valve endocarditis with septic emboli    Interval History:  Patient states his back pain is improving as it does not go the length of his spine any longer but is more localized over his upper to mid back.  He reports shaking in his legs when working with physical therapy    Review of Systems:  Review of Systems   Constitutional: Negative for fever.   HENT: Negative for congestion.    Eyes: Negative for blurred vision and double vision.   Respiratory: Negative for sputum production and shortness of breath.    Cardiovascular: Negative for chest pain and palpitations.   Gastrointestinal: Negative for abdominal pain, diarrhea and nausea.   Genitourinary: Negative for dysuria and urgency.   Musculoskeletal: Positive for back pain and neck pain.        Toe pain resolved   Skin: Negative for rash.   Neurological: Positive for tremors and headaches.        Shaking with exertion       Physical Exam:  Physical Exam   Constitutional: He is oriented to person, place, and time.   HENT:   Mouth/Throat: Oropharynx is clear and moist. No oropharyngeal exudate.   Eyes: No scleral icterus.   Neck: Neck supple. No JVD present. No tracheal deviation present.   Cardiovascular: Normal rate and regular rhythm.    Murmur heard.  Pulmonary/Chest: Effort normal and breath sounds normal. No respiratory distress.   Abdominal: Soft. He exhibits no distension.   Musculoskeletal: He exhibits no edema.   Neurological: He is alert and oriented to person, place, and time. Coordination normal.   Skin: Skin is warm and dry. He is not diaphoretic.   Psychiatric: His behavior is normal. Thought content normal.   Nursing note and vitals reviewed.      Labs:        Invalid input(s): [id number]      Recent Labs      07/23/18   0256   SODIUM  139   POTASSIUM  4.0   CHLORIDE  107   CO2  24   BUN  11    CREATININE  0.90   CALCIUM  9.0     Recent Labs      07/23/18   0256   ALTSGPT  16   ASTSGOT  12   ALKPHOSPHAT  63   TBILIRUBIN  0.4   GLUCOSE  103*     Recent Labs      07/23/18   0256   RBC  4.42*   HEMOGLOBIN  11.9*   HEMATOCRIT  36.5*   PLATELETCT  209     Recent Labs      07/23/18   0256   WBC  7.3   ASTSGOT  12   ALTSGPT  16   ALKPHOSPHAT  63   TBILIRUBIN  0.4           Hemodynamics:    T98.2 /60 HR74 RR18 O2 sat 96%    GI/Nutrition:  Orders Placed This Encounter   Procedures   • Diet Order Regular     Standing Status:   Standing     Number of Occurrences:   1     Order Specific Question:   Diet:     Answer:   Regular [1]     Order Specific Question:   Consistency/Fluid modifications:     Answer:   Thin Liquids [3]     Medical Decision Making, by Problem:  Strep Viridans mitral valve endocarditis              gentamicin and pencillin per ID    spinal epidural abscess C6-L3-4              Will repeat MRI in two weeks from 7/20 and follow up images with neurosurgery Dr. Torres  Septic cerebral emboli, continue pain medication for headaches and antibiotics to treat infection   keppra for seizure prophylaxis   No anticoagulation due to hemorrhage risk  Cerebral venous thrombosis with normal MRV   Repeat MRI in 3 weeks  Severe mitral regurgitation, will need follow up after discharge   Will monitor patient's shortness of breath and exercise tolerance with therapy    Back pain             As needed oxycodone        Quality-Core Measures   Reviewed items::  Labs reviewed and Medications reviewed  Cruz catheter::  No Cruz  DVT prophylaxis pharmacological::  Contraindicated - High bleeding risk  Ulcer Prophylaxis::  Not indicated  Antibiotics:  Treating active infection/contamination beyond 24 hours perioperative coverage  Assessed for rehabilitation services:  Patient was assess for and/or received rehabilitation services during this hospitalization

## 2018-07-26 LAB
ANION GAP SERPL CALC-SCNC: 10 MMOL/L (ref 0–11.9)
BUN SERPL-MCNC: 15 MG/DL (ref 8–22)
CALCIUM SERPL-MCNC: 8.9 MG/DL (ref 8.4–10.2)
CHLORIDE SERPL-SCNC: 107 MMOL/L (ref 96–112)
CO2 SERPL-SCNC: 24 MMOL/L (ref 20–33)
CREAT SERPL-MCNC: 0.89 MG/DL (ref 0.5–1.4)
ERYTHROCYTE [DISTWIDTH] IN BLOOD BY AUTOMATED COUNT: 41.1 FL (ref 35.9–50)
GENTAMICIN SERPL-MCNC: 0.29 UG/ML (ref 1–2)
GENTAMICIN SERPL-MCNC: 1.93 UG/ML (ref 1–2)
GLUCOSE SERPL-MCNC: 109 MG/DL (ref 65–99)
HCT VFR BLD AUTO: 35.2 % (ref 42–52)
HGB BLD-MCNC: 11.6 G/DL (ref 14–18)
MCH RBC QN AUTO: 26.7 PG (ref 27–33)
MCHC RBC AUTO-ENTMCNC: 33 G/DL (ref 33.7–35.3)
MCV RBC AUTO: 81.1 FL (ref 81.4–97.8)
PLATELET # BLD AUTO: 200 K/UL (ref 164–446)
PMV BLD AUTO: 10.3 FL (ref 9–12.9)
POTASSIUM SERPL-SCNC: 4 MMOL/L (ref 3.6–5.5)
RBC # BLD AUTO: 4.34 M/UL (ref 4.7–6.1)
SODIUM SERPL-SCNC: 141 MMOL/L (ref 135–145)
WBC # BLD AUTO: 6.7 K/UL (ref 4.8–10.8)

## 2018-07-26 PROCEDURE — 80048 BASIC METABOLIC PNL TOTAL CA: CPT

## 2018-07-26 PROCEDURE — 80170 ASSAY OF GENTAMICIN: CPT

## 2018-07-26 PROCEDURE — 85027 COMPLETE CBC AUTOMATED: CPT

## 2018-07-26 PROCEDURE — 99232 SBSQ HOSP IP/OBS MODERATE 35: CPT | Performed by: INTERNAL MEDICINE

## 2018-07-26 ASSESSMENT — ENCOUNTER SYMPTOMS
NECK PAIN: 1
FEVER: 0
ABDOMINAL PAIN: 0
BACK PAIN: 1
COUGH: 0
MYALGIAS: 0
TREMORS: 1
SHORTNESS OF BREATH: 0
VOMITING: 0
DIARRHEA: 0
NAUSEA: 0
SPUTUM PRODUCTION: 0

## 2018-07-26 NOTE — [facility]
Hospital Medicine Progress Note, Adult, Complex               Author: Tarsha Iqbal Date & Time created: 7/26/2018  11:44 AM     CC: strep viridans mitral valve endocarditis with septic emboli    Interval History:  He is asking to have pain medication other than narcotics    Review of Systems:  Review of Systems   Constitutional: Negative for fever.   HENT: Negative for congestion.    Respiratory: Negative for cough, sputum production and shortness of breath.    Cardiovascular: Negative for chest pain.   Gastrointestinal: Negative for abdominal pain, diarrhea, nausea and vomiting.   Genitourinary: Negative for dysuria.   Musculoskeletal: Positive for back pain and neck pain. Negative for myalgias.   Skin: Negative for rash.   Neurological: Positive for tremors.        Shaking with exertion       Physical Exam:  Physical Exam   Constitutional: He is oriented to person, place, and time. No distress.   HENT:   Mouth/Throat: No oropharyngeal exudate.   Eyes: Conjunctivae are normal. No scleral icterus.   Neck: Neck supple. No JVD present. No tracheal deviation present.   Cardiovascular: Normal rate and regular rhythm.    Murmur heard.  Pulmonary/Chest: Effort normal and breath sounds normal.   Abdominal: Soft. Bowel sounds are normal. He exhibits no distension.   Musculoskeletal: He exhibits no edema.   Neurological: He is alert and oriented to person, place, and time. Coordination normal.   Skin: No rash noted. He is not diaphoretic. No erythema.   Psychiatric: His behavior is normal. Thought content normal.   Nursing note and vitals reviewed.      Labs:        Invalid input(s): [id number]      Recent Labs      07/26/18   0249   SODIUM  141   POTASSIUM  4.0   CHLORIDE  107   CO2  24   BUN  15   CREATININE  0.89   CALCIUM  8.9     Recent Labs      07/26/18   0249   GLUCOSE  109*     Recent Labs      07/26/18   0249   RBC  4.34*   HEMOGLOBIN  11.6*   HEMATOCRIT  35.2*   PLATELETCT  200     Recent Labs       07/26/18   0249   WBC  6.7           Hemodynamics:    T98.8 BP92/58 HR67 RR18 O2 sat 94%    GI/Nutrition:  Orders Placed This Encounter   Procedures   • Diet Order Regular     Standing Status:   Standing     Number of Occurrences:   1     Order Specific Question:   Diet:     Answer:   Regular [1]     Order Specific Question:   Consistency/Fluid modifications:     Answer:   Thin Liquids [3]     Medical Decision Making, by Problem:  Strep Viridans mitral valve endocarditis              gentamicin and pencillin per ID   Gentamicin through at least 8/3, will need repeat MRI of spine at that time    spinal epidural abscess C6-L3-4              Will repeat MRI in two weeks from 7/20 and follow up images with neurosurgery Dr. Torres  Septic cerebral emboli   keppra for seizure prophylaxis   No anticoagulation due to hemorrhage risk  Cerebral venous thrombosis with normal MRV   Repeat MRI in 3 weeks  Severe mitral regurgitation, will need follow up after discharge   Will monitor patient's shortness of breath and exercise tolerance with therapy    Back pain             As needed oxycodone   Will add gabapentin        Quality-Core Measures   Reviewed items::  Labs reviewed and Medications reviewed  Cruz catheter::  No Cruz  DVT prophylaxis pharmacological::  Contraindicated - High bleeding risk  Ulcer Prophylaxis::  Not indicated  Antibiotics:  Treating active infection/contamination beyond 24 hours perioperative coverage  Assessed for rehabilitation services:  Patient was assess for and/or received rehabilitation services during this hospitalization

## 2018-07-26 NOTE — [facility]
Infectious Disease Progress Note    Author: Jeri Spence M.D. Date & Time of service: 7/26/2018  11:23 AM    Chief Complaint:  FU viridans strep sepsis/IE      Interval History:  7/12/2018 T-max 98.9 WBC 13.1 platelets 301 creatinine 0.63 feels better but still has headache and back pain  7/13- Tmax 98.6 wbc 9.3 ongoing back pain. WBC 9  7/14 AF WBC 7.5 back pain slightly improved, did not take any pain meds all day yesterday, tolerating abx without issues, plan of care d/w pt in detail  7/15 AF no CBC ongoing neck and back pain, patient received PICC without any issues, also seen by CT surgery with no plans for surgical intervention at this  7/16/2018-T-max 98.  Continues to get headache.  Apparently continues to get new emboli  7/17/2018 T-max 98 continues to get headache and back pain and neck pain.  Overall feels improved  7/18/2018 T-max 98.4.  The symptoms seem to be improved and wants to go to the gym.  Other than headache no new issues overnight  7/19/2018-T-max 99.2.  No new issues overnight.  7/20/2018-T-max 98.3.  States  he feels healthy.  Back pain is improving  7/22 AF transferred to [facility]-no new complaints  7/23 AF WBC 7.3 ice pack on foot-banged his toe Otherwise feels well  7/26 AF sleeping soundly no acute events  Labs Reviewed, Medications Reviewed and Radiology Reviewed.    Review of Systems:  Review of Systems   Unable to perform ROS: Other   Constitutional: Negative for fever.       Hemodynamics:  T98.2 /60 HR74 RR18 O2 sat 96%          Physical Exam:  Physical Exam   Constitutional: He appears well-developed.   HENT:   Head: Normocephalic and atraumatic.   Neck: Neck supple.   Cardiovascular: Normal rate.    Pulmonary/Chest: Effort normal. No respiratory distress.   Abdominal: Soft.   Musculoskeletal: He exhibits no edema.   RUE PICC nontender, no erythema     Neurological:   sleeping   Skin: No rash noted. He is not diaphoretic.   Vitals reviewed.      Meds:  No current  facility-administered medications for this encounter.     Labs:  Recent Labs      07/26/18   0249   WBC  6.7   RBC  4.34*   HEMOGLOBIN  11.6*   HEMATOCRIT  35.2*   MCV  81.1*   MCH  26.7*   RDW  41.1   PLATELETCT  200   MPV  10.3     Recent Labs      07/26/18   0249   SODIUM  141   POTASSIUM  4.0   CHLORIDE  107   CO2  24   GLUCOSE  109*   BUN  15     Recent Labs      07/26/18   0249   CREATININE  0.89       Imaging:  Ct-cspine With Plus Recons    Result Date: 7/15/2018  7/15/2018 4:21 PM HISTORY/REASON FOR EXAM:  Left upper extremity numbness. 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 TECHNIQUE/EXAM DESCRIPTION:  CT cervical spine with contrast with reconstructions. Thin-section helical scanning was performed from the skull base through T1 following the intrathecal administration of 100 mL of Omnipaque 350 nonionic contrast. Sagittal and coronal reconstructions were generated from the axial images. Low dose optimization technique was utilized for this CT exam including automated exposure control and adjustment of the mA and/or kV according to patient size. COMPARISON:  MRI cervical, thoracic, and lumbar spine 7/10/2018 FINDINGS: No cervical vertebral body fracture or subluxation is present. The posterior elements are intact. No disc space narrowing or degenerative change is present. There is slightly increased enhancement in the anterior aspect of the spinal canal beginning at the level of C6-7 extending inferiorly into the thoracic spine. This may represent the epidural abscess seen well on the recent MRI examination. No paravertebral soft tissue mass or abscess is identified. No lytic or blastic bony change is present.     1.  Slightly increased enhancement in the anterior aspect of the spinal canal beginning at the level of C6-7 extending inferiorly which may represent the epidural abscess seen on MRI. 2.  No evidence of cervical spinal osteomyelitis or paraspinous inflammation. 3.  No acute cervical spine  fracture or dislocation. 4.  No cervical spine degenerative disc disease.    Ct-cta Head With & W/o-post Process    Result Date: 7/10/2018  7/9/2018 11:49 PM HISTORY/REASON FOR EXAM:  Neurological Deficit TECHNIQUE/EXAM DESCRIPTION: CT angiogram of the [place] without and with contrast.  Initial precontrast images were obtained of the head from the skull base through the vertex.  Postcontrast images were obtained of the [place] following the power injection of nonionic contrast at 5.0 mL/sec. Thin-section helical images were obtained with overlapping reconstruction interval. Coronal and sagittal multiplanar volume reformats were generated.  3D angiographic images were reviewed on PACS.  Maximum intensity projection (MIP) images were generated and reviewed. 100 mL of Omnipaque 350 nonionic contrast was injected intravenously. Low dose optimization technique was utilized for this CT exam including automated exposure control and adjustment of the mA and/or kV according to patient size. COMPARISON:  None. FINDINGS: The posterior circulation shows the distal vertebral arteries to be patent. The vertebrobasilar confluence is intact. The basilar artery is patent. No aneurysm or occlusive lesion is evident. The anterior circulation shows no stenotic or occlusive lesion. No aneurysm is evident about the [place]. The brain is unremarkable.     1.  CT angiogram of the [place] within normal limits.    Ct-cta Neck With & W/o-post Processing    Result Date: 7/10/2018  7/9/2018 11:49 PM HISTORY/REASON FOR EXAM: Left-sided facial numbness. TECHNIQUE/EXAM DESCRIPTION: CT angiogram of the neck with contrast. Postcontrast images were obtained of the neck from the great vessels through the skull base following the power injection of nonionic contrast at 5.0 mL/sec. Thin-section helical images were obtained with overlapping reconstruction interval. Coronal and oblique multiplanar volume reformats were  generated. Cervical internal carotid artery percent stenosis is calculated using the standard method according to the NASCET criteria wherein a segment of uniform caliber mid or distal cervical internal carotid is used as the reference denominator. 3D angiographic images were reviewed on PACS.  Maximum intensity projection (MIP) images were generated and reviewed 100 mL of Omnipaque 350 nonionic contrast was injected intravenously. Low dose optimization technique was utilized for this CT exam including automated exposure control and adjustment of the mA and/or kV according to patient size. COMPARISON:  None. FINDINGS: The arch origins of the great vessels appear intact. The aortic arch shows no abnormality. The right common carotid artery, cervical carotid bifurcation, and cervical internal carotid artery are within normal limits. There is no evidence of significant stenosis, thrombus, or other filling defect or ulceration. There is no evidence of dissection or aneurysm. The left common carotid artery, cervical carotid bifurcation, and cervical internal carotid artery are within normal limits. There is no evidence of significant stenosis, thrombus, or other filling defect or ulceration. There is no evidence of dissection  or aneurysm. The cervical vertebral arteries are normal bilaterally. The neck soft tissues and lung apices in the field of view are unremarkable.     CT angiogram of the neck within normal limits.    Ct-head With & W/o    Result Date: 7/15/2018  7/15/2018 4:21 PM HISTORY/REASON FOR EXAM:  Left upper extremity numbness. History of bacteremia. TECHNIQUE/EXAM DESCRIPTION AND NUMBER OF VIEWS: CT scan of the head without and with contrast. The study was performed on a helical multidetector CT scanner. Contiguous 2.5 mm axial sections were obtained from the skull base through the vertex both prior to and after the administration of IV contrast. 100 mL of Omnipaque 350 nonionic contrast was injected  intravenously. Up to date radiation dose reduction adjustments have been utilized to meet ALARA standards for radiation dose reduction. COMPARISON:  7/10/2018 FINDINGS: No acute intracranial hemorrhage, mass effect, midline shift. No acute ischemia or masses identified. There is abnormal focal cortical enhancement in the right frontal lobe seen on recent MRI. Questionable focus of enhancement in the right cerebellum. Ventricles and cisterns are patent. Gray/white matter infiltration is maintained. The paranasal sinuses and mastoid air cells are clear.     1.  Focal cortical enhancement in the right frontal lobe. Questionable enhancing focus in the right cerebellum may be present. Findings are nonspecific and may represent septic emboli, cerebritis, or venous infarcts 2.  No mass effect or midline shift.    Ct-maxillofacial With & W/o Plus Recons    Result Date: 7/10/2018  7/10/2018 4:00 PM HISTORY/REASON FOR EXAM:  Streptococcal bacteremia. Evaluate for sinusitis or abscess. TECHNIQUE/EXAM DESCRIPTION AND NUMBER OF VIEWS:   CT scan maxillofacial with and without contrast, with reconstructions. Thin-section helical imaging was obtained of the maxillofacial structures from the orbital roofs through the mandible before and after injection of nonionic contrast. Coronal and sagittal multiplanar volume reformat images were generated from the axial data.. 100 mL of Omnipaque 350 nonionic contrast was injected intravenously. Low dose optimization technique was utilized for this CT exam including automated exposure control and adjustment of the mA and/or kV according to patient size. COMPARISON:  None. FINDINGS: There is rounded mucosal thickening or polyp formation in the inferior medial aspect of the right maxillary sinus. The remainder of the visualized paranasal sinuses are clear. No air-fluid level is present to suggest acute sinusitis. No bony expansion is identified. No lytic or blastic bony change is identified. The  soft tissues of the neck are within normal limits. No soft tissue abscess or inflammatory change is identified. Cervical lymph nodes are normal in size. No submandibular or parotid gland abnormality is noted. No posterior pharyngeal or laryngeal mucosal abnormality or mass is identified.     1.  Rounded mucosal thickening or polyp formation in the inferior aspect of the right maxillary sinus which could reflect chronic right maxillary sinusitis. 2.  No evidence of acute sinusitis. 3.  Otherwise clear paranasal sinuses.    Ct-post-fossa-ear With & W/o    Result Date: 7/10/2018  7/10/2018 4:00 PM HISTORY/REASON FOR EXAM:  Streptococcal bacteremia, facial numbness rule out facial nerve compression / mastoiditis. TECHNIQUE/EXAM DESCRIPTION AND NUMBER OF VIEWS: CT scan of the temporal bones without and with contrast. The study was performed on a [employer] CT scanner. Contiguous thin section 1.0 mm or 1.25 mm axial and direct coronal images were obtained of the temporal bones. Images are reviewed at magnified bone and soft tissue windows and non-magnified axial images are also displayed at soft tissue windows. Scans are obtained pre and post contrast. 100 mL of Omnipaque 300 nonionic contrast was injected intravenously. Low dose optimization technique was utilized for this CT exam including automated exposure control and adjustment of the mA and/or kV according to patient size. COMPARISON: CTA of the neck without and with contrast 7/9/2018 FINDINGS: On the right, the external auditory canal is normal except for some debris most consistent with cerumen.  The mastoid is normally pneumatized and aerated.  The middle ear cavity and mastoid antrum are clear.  The ossicles are normal in position  and configuration.  The scutum is sharp.  The bony labyrinth is normal, and the internal auditory canal shows normal caliber and is symmetric with the left side.  The facial nerve canal is unremarkable. On the left, the external auditory  canal is normal except for some debris consistent with cerumen.  The mastoid is normally pneumatized and aerated.  The middle ear cavity and mastoid antrum are clear.  The ossicles are normal in position and configuration.  The scutum is sharp.  The bony labyrinth is normal, and the internal auditory canal shows normal caliber and is symmetric with the right side.  The facial nerve canal is unremarkable. The right transverse -- sigmoid sinus and right internal jugular vein are dominant. The paranasal sinuses show clustered retention cyst or polypoid mucosal thickening in the alveolar recess of the right maxillary sinus. There are no air-fluid levels. The posterior fossa structures are unremarkable.  The fourth ventricle is in the midline. There is no abnormal parenchymal or extra-axial/meningeal enhancement in the posterior fossa or supratentorial compartment within the field of view.     CT OF THE TEMPORAL BONES WITHOUT CONTRAST WITHIN NORMAL LIMITS.    Dx-chest-2 Views    Result Date: 7/10/2018    7/9/2018 10:30 PM HISTORY/REASON FOR EXAM: Shortness of Breath TECHNIQUE/EXAM DESCRIPTION:  PA and lateral views of the chest. COMPARISON: None FINDINGS: The cardiac silhouette appears within normal limits. The mediastinal contour appears within normal limits.  The central pulmonary vasculature appears normal. The lungs appear well expanded bilaterally.  Bilateral lungs are clear. No significant pleural effusions are identified. The bony structures appear age-appropriate.     1.  No acute cardiopulmonary disease.    Dx-chest-for Picc Line Perform Procedure In: Patient's Room    Result Date: 7/14/2018 7/14/2018 12:59 PM HISTORY/REASON FOR EXAM:  Right PICC line placement TECHNIQUE/EXAM DESCRIPTION AND NUMBER OF VIEWS: Single AP view of the chest. COMPARISON: 7/19/2018 FINDINGS: There is interval placement of a right PICC line with its tip located projecting over the superior aspect of the superior vena cava. The cardiac  silhouette and mediastinal contours are unremarkable. There is mild left basilar atelectasis. No pleural fluid or pneumothorax. No suspicious bony lesions.     1.  Right PICC line tip projects over the superior aspect of the superior vena cava. 2.  Left basilar atelectasis.    Dx-chest-portable (1 View)    Result Date: 7/21/2018 7/21/2018 6:30 AM HISTORY/REASON FOR EXAM:  PICC placement TECHNIQUE/EXAM DESCRIPTION AND NUMBER OF VIEWS: Single portable view of the chest. COMPARISON: 7/14/2018 FINDINGS: A right-sided PICC is present, the tip of which projects in the area of the upper SVC or possibly in the right subclavian vein. The cardiomediastinal silhouettes and liam are normal. Lungs are well expanded with no infiltrate or other acute process.     No acute cardiopulmonary abnormality. Right-sided PICC in place as noted above.    Dx-foot-2- Right    Result Date: 7/22/2018 7/22/2018 8:27 AM HISTORY/REASON FOR EXAM:  Pain/Deformity Following Trauma RT foot pain and swelling on fifth phalange and metatarsal X 2 days, pain worse on plantar surface following injury: patient stubbed fifth phalange TECHNIQUE/EXAM DESCRIPTION AND NUMBER OF VIEWS: 2 views of the RIGHT foot. COMPARISON:  None. FINDINGS: No acute fracture or dislocation. Soft tissue swelling overlying the fifth digit. No joint osteoarthritis.     No acute osseous abnormality.    Mr-brain-with & W/o    Result Date: 7/15/2018  7/15/2018 3:25 PM HISTORY/REASON FOR EXAM:  Change in neurologic status. History of stroke. Rule out intracranial hemorrhage or new stroke. TECHNIQUE/EXAM DESCRIPTION: MRI of the brain without and with contrast, with diffusion. The study was performed on a [employer] 1.5 Kerri MRI scanner. Spoiled-GRASS sagittal, thin-section T2 axial, T1 coronal, T1 postcontrast coronal, T1 postcontrast axial, FLAIR coronal and diffusion-weighted axial images were obtained of the whole brain.. 20 mL Gadavist contrast were administered intravenously.  COMPARISON:  7/10/2018. FINDINGS: Low-lying cerebellar tonsils are again noted. The cortical sulci and gyri are within normal limits. The ventricular system is within normal limits. The bony calvaria are intact. There is no evidence of extra-axial fluid collection or intracranial mass effect. The previously demonstrated small enhancing focus of cortical T2 and FLAIR signal hyperintensity in the right posterior frontal lobe appears more prominent compared to the prior study. In contrast to the prior study, previously demonstrated diffusion restriction is no longer seen in this lesion. There are 2 new punctate foci of enhancement in the high right posterior frontal cortex and right superior cerebellum with minimal associated T2 and FLAIR signal hyperintensity associated with the lesion in the right cerebellum. Gradient echo sequences demonstrate a faint focus of signal hypointensity corresponding to the location of this lesion suggestive of early hemorrhagic conversion. There is no evidence of abnormal diffusion-weighted signal intensity in the brain. There are normal flow voids in the cavernous carotid arteries and M1 segments. There are normal flow voids in the distal vertebral basilar system. There are normal flow voids in the dural venous sinuses. The visualized paranasal sinuses and mastoid air cells appear clear.     1.  Previously demonstrated small enhancing focus of cortical T2 and FLAIR signal hyperintensity in the right posterior frontal lobe appears more prominent compared to the prior study. In contrast to the prior study, previously demonstrated diffusion restriction is no longer seen in this lesion. Findings could be consistent with evolving cortical infarct or related to patient's known meningitis. 2.  2 new punctate foci of enhancement in the high right posterior frontal cortex and right superior cerebellum with minimal associated T2 and FLAIR signal hyperintensity associated with the lesion in the  right cerebellum. Gradient echo sequences demonstrate a faint focus of signal hypointensity corresponding to the location of this lesion suggestive of early hemorrhagic conversion. 3.  Findings could be consistent with subacute infarcts of embolic etiology. 4.  No evidence of new acute territorial infarct. 5.  Low-lying cerebellar tonsils. Attempts to convey these findings to Dr. Brown were initiated on 7/15/2018 5:06 PM. These findings were discussed with Dr. Brown on 7/15/2018 5:25 PM.    Mr-brain-with & W/o    Result Date: 7/11/2018  7/10/2018 10:21 PM HISTORY/REASON FOR EXAM:  Possible meningitis. TECHNIQUE/EXAM DESCRIPTION:   MRI of the brain without and with contrast. T1 sagittal, T2 fast spin-echo axial, T1 coronal, FLAIR coronal, diffusion-weighted and apparent diffusion coefficient (ADC map) axial images were obtained of the whole brain. T1 postcontrast axial and T1 postcontrast coronal images were obtained. The study was performed on a [employer] Signa 1.5 Kerri MRI scanner. 20 mL Gadavist contrast was administered intravenously. COMPARISON:  None. FINDINGS:  There is tiny area of restricted diffusion in the right frontal gray matter. Minimal contrast enhancement is seen. The coronal FLAIR images demonstrates abnormal T2 hyperintensity in the adjacent right frontal subarachnoid spaces. The axial gradient echo images demonstrates linear hypointensity within the sulci. There is low-lying cerebellar tonsils. There is no hydrocephalus.  There is no large lesion identified in the expected course of the intracranial portions of the cranial nerves. The visualized flow voids of cerebral vasculature appear normal within limits.  The skull bones appear normal. The paranasal sinuses are clear. The extracranial soft tissue including orbits appear grossly normal.     1.  There is tiny area of restricted diffusion in the right frontal gray matter. Minimal contrast enhancement is seen. The coronal FLAIR images demonstrates  abnormal T2 hyperintensity in the adjacent right frontal subarachnoid spaces. The axial gradient echo images demonstrates linear hypointensity within the sulci. These findings likely represents a small cortical venous thrombosis with adjacent tiny infarct. Cortical venous thrombosis may cause focal subarachnoid hemorrhage. Please note that cortical venous thrombosis can be complication of meningitis. 2.  Low-lying cerebellar tonsils. This is concerning for Chiari I malformation. In view of history of lumbar puncture, this finding can be caused by the post lumbar puncture intracranial hypotension.    Mr-cervical Spine-with & W/o    Result Date: 7/15/2018  7/15/2018 3:24 PM HISTORY/REASON FOR EXAM:  Left upper extremity numbness with underlying epidural abscess. TECHNIQUE/EXAM DESCRIPTION: MRI of the cervical spine without and with contrast. The study was performed on a [employer] Signa 1.5 Kerri MRI scanner. T1 sagittal, T2 fast spin-echo sagittal, T1 postcontrast fat-suppressed sagittal, and gradient-echo axial images were obtained of the cervical spine. 20 mL Gadavist contrast were administered  intravenously. COMPARISON:  None. FINDINGS: There is redemonstrated posterior epidural enhancement which appears to extend slightly more cephalad to approximately the C4 level and extending inferiorly below the level of the scan into the thoracic spine. This may indicate interval worsening of infectious process. There is no definite evidence of osteomyelitis. Cervical spine demonstrates normal vertebral body height and alignment. Other findings are unchanged.     Posterior epidural enhancement which appears to extend slightly more cephalad to approximately the C4 level and extending inferiorly below the level of the scan into the thoracic spine. This may indicate interval worsening of infectious process. There is  no definite evidence of osteomyelitis. Attempts to convey these findings to Dr. Brown were initiated on 7/15/2018  5:06 PM. These findings were discussed with Dr. Brown on 7/15/2018 5:25 PM.    Mr-cervical Spine-with & W/o    Result Date: 7/11/2018  7/10/2018 10:21 PM HISTORY/REASON FOR EXAM:  Possible bacterial meningitis. TECHNIQUE/EXAM DESCRIPTION: MRI of the cervical spine without and with contrast. The study was performed on a [employer] Sign[employer] 1.5 Kerri MRI scanner. T1 sagittal, T2 fast spin-echo sagittal, and gradient echo axial images were obtained of the cervical spine. T1 post-contrast fat suppressed sagittal images were obtained of the cervical spine. Optional T1 post-contrast axial images may be obtained. 20 mL Omniscan contrast was administered intravenously. COMPARISON: None. FINDINGS: There is prominent posterior epidural fluid collection extending from C6-7 into the thoracic spine. Mild posterior epidural contrast enhancement is seen. Please see thoracic spine report for further details. The cervical spine maintains normal height and alignment. There is no fracture or dislocation. There is no pathologic marrow infiltration. There is no abnormal perivertebral fluid collection. There is no intradural extramedullary fluid collection. There is no abnormal intramedullary T2 signal intensity in the cervical spinal cord. At the level of C2-3, there is no spinal or neural foraminal stenosis. At the level of C3-4, there is no spinal or neural foraminal stenosis. At the level of C4-5, there is no spinal or neural foraminal stenosis. At the level of C5-6, there is no spinal or neural foraminal stenosis. At the level of C6-7, there is no spinal or neural foraminal stenosis. At the level of C7-T1, there is no spinal or neural foraminal stenosis. The visualized posterior fossa structures appear normal within limits.  The cervical spinal cord does not demonstrate any mass or abnormal T2 signal intensity. The visualized pre-and paraspinal soft tissues appear normal within limits.     1.  There is prominent posterior epidural fluid  collection extending from C6-7 into the thoracic spine. Mild posterior epidural contrast enhancement is noted at this level. Please see thoracic spine report for further details. 2.  There is no evidence of osteomyelitis in the cervical spine.    Mr-lumbar Spine-with & W/o    Result Date: 7/11/2018  7/10/2018 10:21 PM HISTORY/REASON FOR EXAM:  Possible bacterial meningitis. TECHNIQUE/EXAM DESCRIPTION: MRI of the lumbar spine without and with contrast. The study was performed on a [employer] Signa 1.5 Kerri MRI scanner. T1 sagittal, T2 fast spin-echo sagittal, and T2 axial images were obtained of the lumbar spine. T1 post-contrast fat-suppressed sagittal images were obtained. 20 mL Omniscan contrast was administered intravenously. COMPARISON:  None. FINDINGS: There is abnormal posterior epidural fluid collection seen extending from the lower cervical spine to the level of L3-4. Multifocal abnormal epidural contrast enhancement is seen. The lumbar spine maintains normal height and alignment. There is no evidence of fracture or dislocation. There is no pathologic marrow infiltration. There is no abnormal disc fluid. At the level of L5-S1, there is diffuse disc bulge. Mild facet joint arthropathy is seen. There is no spinal or neural foraminal stenosis. At the level of L4-5, there is no spinal or neural foraminal stenosis. At the level of L3-4, there is no spinal or neural foraminal stenosis. At the level of L2-3, there is no spinal or neural foraminal stenosis. At the level of L1-2, there is no spinal or neural foraminal stenosis. The conus terminates at the level of L1.     1.  There is abnormal posterior epidural fluid collection seen extending from the lower cervical spine to the level of L3-4. Multifocal abnormal epidural contrast enhancement is seen. These findings are concerning for thoracic and lumbar epidural abscess. 2.  There is no evidence of lumbar osteomyelitis. 3.  Findings were discussed with DAVON FERNANDO on  7/11/2018 1:08 PM.    Mr-thoracic Spine-with & W/o    Result Date: 7/11/2018  7/10/2018 10:21 PM HISTORY/REASON FOR EXAM:  Possible bacterial meningitis TECHNIQUE/EXAM DESCRIPTION: MRI of the thoracic spine without and with contrast. The study was performed on a [employer] Signa 1.5 Kerri MRI scanner. T1 sagittal, T2 fast spin-echo sagittal, and T2 axial images were obtained of the thoracic spine. T1 post-contrast fat suppressed sagittal images were obtained. Optional T1 post-contrast axial images may be obtained. 20 mL Gadavist contrast was administered intravenously. COMPARISON:  None. FINDINGS: There is abnormal posterior epidural fluid collection extending from C6-7 to the lumbar spine. Abnormal peripheral contrast enhancement noted surrounding the epidural fluid collection. The thoracic spinal cord is anteriorly displaced from the levels of T3-4 to  T9-10. Moderate central canal stenosis is seen. There is a hemangioma in the body of T11. There is no abnormal intramedullary T2 signal intensity in the thoracic spinal cord.     There is abnormal posterior epidural fluid collection extending from C6-7 to the lumbar spine. Abnormal peripheral contrast enhancement noted surrounding the epidural fluid collection. The thoracic spinal cord is anteriorly displaced from the levels of T3-4 to  T9-10. Moderate central canal stenosis is seen. These findings are highly concerning for posterior epidural abscess with moderate canal compromise.    Ir-picc Line Placement    Result Date: 7/14/2018  HISTORY/REASON FOR EXAM:   PICC placement. TECHNIQUE/EXAM DESCRIPTION AND NUMBER OF VIEWS:   PICC line insertion with ultrasound guidance.  The procedure was performed using maximal sterile barrier technique including sterile gown, mask, cap, and donning of sterile gloves following appropriate hand hygiene and/or sterile scrub. Patient skin site was prepped with 2% Chlorhexidine solution. FINDINGS:  PICC line insertion with Ultrasound  Guidance was performed by qualified nursing staff without the assistance of a Radiologist. PICC positioning appropriateness confirmed by 3CG technology; chest xray only needed in the instance 3CG unable to confirm placement.              Ultrasound-guided PICC placement performed by qualified nursing staff as above.     Mr-venogram (mrv) Head    Result Date: 7/12/2018  HISTORY/REASON FOR EXAM: Minimal subarachnoid hemorrhage effacement and left tiny cortical infarct in right frontal lobe. TECHNIQUE/EXAM DESCRIPTION: MR venogram of the head without contrast, 11/12/2010. The study was performed on a Signa 1.5 Kerri MRI scanner. Multiple axial and coronal MR venographic sequences were obtained and maximum intensity projection images were generated utilizing the "[employer] workstation. COMPARISON: MRI scan of the brain 7/10/2018 FINDINGS: There is excellent flow signal intensity in the superior sagittal sinus, transverse sinuses, and sigmoid sinuses.  The deep venous structures are widely patent.  There is no evidence of dural venous sinus thrombosis. There are low-lying cerebral tonsils     1.  Normal MR venogram of the brain without contrast. 2.  Low-lying cerebellar tonsils are again noted.    Echocardiogram Comp W/o Cont    Result Date: 7/11/2018  Transthoracic Echo Report Echocardiography Laboratory CONCLUSIONS No prior study is available for comparison. Normal left ventricular systolic function. Left ventricular ejection fraction is visually estimated to be 65%. Normal diastolic function. Normal inferior vena cava size and inspiratory collapse. Mildly dilated left atrium. Moderate mitral regurgitation. JAYDON POLLARD Exam Date:         07/11/2018                    LV EF:  65    % FINDINGS Left Ventricle Normal left ventricular chamber size. Normal left ventricular wall thickness. Normal left ventricular systolic function. Left ventricular ejection fraction is visually estimated to be 65%. Normal regional wall  motion. Normal diastolic function. Right Ventricle The right ventricle was normal in size and function. Right Atrium The right atrium is normal in size.  Normal inferior vena cava size and inspiratory collapse. Left Atrium Mildly dilated left atrium. Mitral Valve Thickened mitral valve leaflets. No mitral stenosis. Moderate mitral regurgitation. ERO by PISA method is  0.33 sq cm. Aortic Valve Structurally normal aortic valve without significant stenosis or regurgitation. Tricuspid Valve Unable to estimate pulmonary artery pressure due to an inadequate tricuspid regurgitant jet. No tricuspid stenosis. Trace tricuspid regurgitation. Unable to estimate pulmonary artery pressure due to an inadequate tricuspid regurgitant jet. Pulmonic Valve The pulmonic valve is not well visualized. No stenosis or regurgitation seen. Pericardium Normal pericardium without effusion. Aorta The aortic root is normal. Hui Modi MD (Electronically Signed) Final Date:     11 July 2018                 13:51    Transesophageal Echo W/o Cont    Result Date: 7/13/2018  Transesophageal Echo Report Echocardiography Laboratory CONCLUSIONS Left ventricular ejection fraction is visually estimated to be 65%. Small, pedunculated mobile masses adherent to the tips of the valve leaflets, notably A1 & P1.  These prolapse into the left atrium.  This causes malcoaptation of the valve itself, and resultant explosive severe regurgitation. Results called to Dr Rascon at the time of the test.  FINDINGS Left Ventricle Normal left ventricular size, thickness, systolic function, and diastolic function. Normal left ventricular size, thickness, systolic function, and diastolic function. Left ventricular ejection fraction is visually estimated to be 65%. Right Ventricle Normal right ventricular size and systolic function. Right Atrium Normal right atrial size. Left Atrium Normal left atrial size. No atrial septal defect present. LA Appendage Normal left atrial  appendage. IA Septum Normal interatrial septum. IV Septum Mitral Valve Small, pedunculated mobile masses adherent to the tips of the valve leaflets, notably A1 & P1.  These prolapse into the left atrium.  This causes malcoaptation of the valve itself, and resultant explosive severe regurgitation. Aortic Valve Structurally normal aortic valve without significant stenosis or regurgitation. Tricuspid Valve Structurally normal tricuspid valve without significant stenosis or regurgitation. Pulmonic Valve Structurally normal pulmonic valve. Pericardium No pericardial effusion seen. Aorta Normal aortic root for body surface area. Normal ascending aorta. Annie Pereyra M.D. (Electronically Signed) Final Date:     13 July 2018                 14:25      Micro:  Results     Procedure Component Value Units Date/Time    MRSA BY PCR (AMP) [[id number]] Collected:  07/20/18 1628    Order Status:  Completed Specimen:  Respirate from Nares Updated:  07/21/18 0609     Significant Indicator NEG     Source RESP     Site NARES     MRSA PCR Negative for MRSA by PCR.    Narrative:       Collected By:[id number] CONSTANTINO ALCARAZ          Assessment:  Streptococcus viridans bacteremia/endocarditis  Multifocal epidural abscess  Septic emboli   MV Endocarditis     Plan:  Streptococcus viridans endocarditis, MV  Afebrile  No current leukocytosis  Blood cultures +7/9/2018, 7/10/2018  Bcx negative from 7/11/2018  CHAR + small pedunculated mobile masses to tips of the valve causing severe regurgitation  Continue high-dose penicillin for 6 weeks  + gentamicin for at least 2 weeks-through 8/3/2018     Multifocal epidural abscess  Epidural abscesses extending from his cervical spine to L3-4  Evaluated by neurosurgery-no surgical intervention  Currently recommended only medical treatment  On abx above  Repeat MRI early Aug     Septic emboli   MRI of the brain had 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   On abx above  Anticipate  6-8 weeks of IV abx    Contusion right toe  Neg xray  Local care

## 2018-07-27 PROCEDURE — 99232 SBSQ HOSP IP/OBS MODERATE 35: CPT | Performed by: INTERNAL MEDICINE

## 2018-07-27 ASSESSMENT — ENCOUNTER SYMPTOMS
SENSORY CHANGE: 0
CHILLS: 0
VOMITING: 0
FEVER: 0
BACK PAIN: 1
ABDOMINAL PAIN: 0
NAUSEA: 0
DIARRHEA: 0
FOCAL WEAKNESS: 0

## 2018-07-27 NOTE — [facility]
Infectious Disease Progress Note    Author: Jeri Spence M.D. Date & Time of service: 7/27/2018  11:50 AM    Chief Complaint:  FU viridans strep sepsis/IE      Interval History:  7/12/2018 T-max 98.9 WBC 13.1 platelets 301 creatinine 0.63 feels better but still has headache and back pain  7/13- Tmax 98.6 wbc 9.3 ongoing back pain. WBC 9  7/14 AF WBC 7.5 back pain slightly improved, did not take any pain meds all day yesterday, tolerating abx without issues, plan of care d/w pt in detail  7/15 AF no CBC ongoing neck and back pain, patient received PICC without any issues, also seen by CT surgery with no plans for surgical intervention at this  7/16/2018-T-max 98.  Continues to get headache.  Apparently continues to get new emboli  7/17/2018 T-max 98 continues to get headache and back pain and neck pain.  Overall feels improved  7/18/2018 T-max 98.4.  The symptoms seem to be improved and wants to go to the gym.  Other than headache no new issues overnight  7/19/2018-T-max 99.2.  No new issues overnight.  7/20/2018-T-max 98.3.  States  he feels healthy.  Back pain is improving  7/22 AF transferred to [facility]-no new complaints  7/23 AF WBC 7.3 ice pack on foot-banged his toe Otherwise feels well  7/26 AF sleeping soundly no acute events  7/27 AF feels fine-plan for transfer to SNF due to insurance  Labs Reviewed, Medications Reviewed and Radiology Reviewed.    Review of Systems:  Review of Systems   Constitutional: Negative for chills and fever.   Gastrointestinal: Negative for abdominal pain, diarrhea, nausea and vomiting.   Musculoskeletal: Positive for back pain.   Neurological: Negative for sensory change and focal weakness.       Hemodynamics:  T98.8 BP92/58 HR67 RR18 O2 sat 94%            Physical Exam:  Physical Exam   Constitutional: He is oriented to person, place, and time. He appears well-developed.   HENT:   Head: Normocephalic and atraumatic.   Eyes: Pupils are equal, round, and reactive to light. EOM  are normal.   Neck: Neck supple.   Cardiovascular: Normal rate.    Pulmonary/Chest: Effort normal. No respiratory distress.   Abdominal: Soft. He exhibits no distension. There is no tenderness.   Musculoskeletal: He exhibits no edema or tenderness.   RUE PICC nontender, no erythema     Neurological: He is alert and oriented to person, place, and time.   Skin: No rash noted. He is not diaphoretic. No erythema.   Vitals reviewed.      Meds:  No current facility-administered medications for this encounter.     Labs:  Recent Labs      07/26/18   0249   WBC  6.7   RBC  4.34*   HEMOGLOBIN  11.6*   HEMATOCRIT  35.2*   MCV  81.1*   MCH  26.7*   RDW  41.1   PLATELETCT  200   MPV  10.3     Recent Labs      07/26/18   0249   SODIUM  141   POTASSIUM  4.0   CHLORIDE  107   CO2  24   GLUCOSE  109*   BUN  15     Recent Labs      07/26/18   0249   CREATININE  0.89       Imaging:  Ct-cspine With Plus Recons    Result Date: 7/15/2018  7/15/2018 4:21 PM HISTORY/REASON FOR EXAM:  Left upper extremity numbness. 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 TECHNIQUE/EXAM DESCRIPTION:  CT cervical spine with contrast with reconstructions. Thin-section helical scanning was performed from the skull base through T1 following the intrathecal administration of 100 mL of Omnipaque 350 nonionic contrast. Sagittal and coronal reconstructions were generated from the axial images. Low dose optimization technique was utilized for this CT exam including automated exposure control and adjustment of the mA and/or kV according to patient size. COMPARISON:  MRI cervical, thoracic, and lumbar spine 7/10/2018 FINDINGS: No cervical vertebral body fracture or subluxation is present. The posterior elements are intact. No disc space narrowing or degenerative change is present. There is slightly increased enhancement in the anterior aspect of the spinal canal beginning at the level of C6-7 extending inferiorly into the thoracic spine. This may represent the epidural  abscess seen well on the recent MRI examination. No paravertebral soft tissue mass or abscess is identified. No lytic or blastic bony change is present.     1.  Slightly increased enhancement in the anterior aspect of the spinal canal beginning at the level of C6-7 extending inferiorly which may represent the epidural abscess seen on MRI. 2.  No evidence of cervical spinal osteomyelitis or paraspinous inflammation. 3.  No acute cervical spine fracture or dislocation. 4.  No cervical spine degenerative disc disease.    Ct-cta Head With & W/o-post Process    Result Date: 7/10/2018  7/9/2018 11:49 PM HISTORY/REASON FOR EXAM:  Neurological Deficit TECHNIQUE/EXAM DESCRIPTION: CT angiogram of the [place] without and with contrast.  Initial precontrast images were obtained of the head from the skull base through the vertex.  Postcontrast images were obtained of the [place] following the power injection of nonionic contrast at 5.0 mL/sec. Thin-section helical images were obtained with overlapping reconstruction interval. Coronal and sagittal multiplanar volume reformats were generated.  3D angiographic images were reviewed on PACS.  Maximum intensity projection (MIP) images were generated and reviewed. 100 mL of Omnipaque 350 nonionic contrast was injected intravenously. Low dose optimization technique was utilized for this CT exam including automated exposure control and adjustment of the mA and/or kV according to patient size. COMPARISON:  None. FINDINGS: The posterior circulation shows the distal vertebral arteries to be patent. The vertebrobasilar confluence is intact. The basilar artery is patent. No aneurysm or occlusive lesion is evident. The anterior circulation shows no stenotic or occlusive lesion. No aneurysm is evident about the [place]. The brain is unremarkable.     1.  CT angiogram of the [place] within normal limits.    Ct-cta Neck With & W/o-post Processing    Result  Date: 7/10/2018  7/9/2018 11:49 PM HISTORY/REASON FOR EXAM: Left-sided facial numbness. TECHNIQUE/EXAM DESCRIPTION: CT angiogram of the neck with contrast. Postcontrast images were obtained of the neck from the great vessels through the skull base following the power injection of nonionic contrast at 5.0 mL/sec. Thin-section helical images were obtained with overlapping reconstruction interval. Coronal and oblique multiplanar volume reformats were generated. Cervical internal carotid artery percent stenosis is calculated using the standard method according to the NASCET criteria wherein a segment of uniform caliber mid or distal cervical internal carotid is used as the reference denominator. 3D angiographic images were reviewed on PACS.  Maximum intensity projection (MIP) images were generated and reviewed 100 mL of Omnipaque 350 nonionic contrast was injected intravenously. Low dose optimization technique was utilized for this CT exam including automated exposure control and adjustment of the mA and/or kV according to patient size. COMPARISON:  None. FINDINGS: The arch origins of the great vessels appear intact. The aortic arch shows no abnormality. The right common carotid artery, cervical carotid bifurcation, and cervical internal carotid artery are within normal limits. There is no evidence of significant stenosis, thrombus, or other filling defect or ulceration. There is no evidence of dissection or aneurysm. The left common carotid artery, cervical carotid bifurcation, and cervical internal carotid artery are within normal limits. There is no evidence of significant stenosis, thrombus, or other filling defect or ulceration. There is no evidence of dissection  or aneurysm. The cervical vertebral arteries are normal bilaterally. The neck soft tissues and lung apices in the field of view are unremarkable.     CT angiogram of the neck within normal limits.    Ct-head With & W/o    Result Date: 7/15/2018  7/15/2018  4:21 PM HISTORY/REASON FOR EXAM:  Left upper extremity numbness. History of bacteremia. TECHNIQUE/EXAM DESCRIPTION AND NUMBER OF VIEWS: CT scan of the head without and with contrast. The study was performed on a helical multidetector CT scanner. Contiguous 2.5 mm axial sections were obtained from the skull base through the vertex both prior to and after the administration of IV contrast. 100 mL of Omnipaque 350 nonionic contrast was injected intravenously. Up to date radiation dose reduction adjustments have been utilized to meet ALARA standards for radiation dose reduction. COMPARISON:  7/10/2018 FINDINGS: No acute intracranial hemorrhage, mass effect, midline shift. No acute ischemia or masses identified. There is abnormal focal cortical enhancement in the right frontal lobe seen on recent MRI. Questionable focus of enhancement in the right cerebellum. Ventricles and cisterns are patent. Gray/white matter infiltration is maintained. The paranasal sinuses and mastoid air cells are clear.     1.  Focal cortical enhancement in the right frontal lobe. Questionable enhancing focus in the right cerebellum may be present. Findings are nonspecific and may represent septic emboli, cerebritis, or venous infarcts 2.  No mass effect or midline shift.    Ct-maxillofacial With & W/o Plus Recons    Result Date: 7/10/2018  7/10/2018 4:00 PM HISTORY/REASON FOR EXAM:  Streptococcal bacteremia. Evaluate for sinusitis or abscess. TECHNIQUE/EXAM DESCRIPTION AND NUMBER OF VIEWS:   CT scan maxillofacial with and without contrast, with reconstructions. Thin-section helical imaging was obtained of the maxillofacial structures from the orbital roofs through the mandible before and after injection of nonionic contrast. Coronal and sagittal multiplanar volume reformat images were generated from the axial data.. 100 mL of Omnipaque 350 nonionic contrast was injected intravenously. Low dose optimization technique was utilized for this CT exam  including automated exposure control and adjustment of the mA and/or kV according to patient size. COMPARISON:  None. FINDINGS: There is rounded mucosal thickening or polyp formation in the inferior medial aspect of the right maxillary sinus. The remainder of the visualized paranasal sinuses are clear. No air-fluid level is present to suggest acute sinusitis. No bony expansion is identified. No lytic or blastic bony change is identified. The soft tissues of the neck are within normal limits. No soft tissue abscess or inflammatory change is identified. Cervical lymph nodes are normal in size. No submandibular or parotid gland abnormality is noted. No posterior pharyngeal or laryngeal mucosal abnormality or mass is identified.     1.  Rounded mucosal thickening or polyp formation in the inferior aspect of the right maxillary sinus which could reflect chronic right maxillary sinusitis. 2.  No evidence of acute sinusitis. 3.  Otherwise clear paranasal sinuses.    Ct-post-fossa-ear With & W/o    Result Date: 7/10/2018  7/10/2018 4:00 PM HISTORY/REASON FOR EXAM:  Streptococcal bacteremia, facial numbness rule out facial nerve compression / mastoiditis. TECHNIQUE/EXAM DESCRIPTION AND NUMBER OF VIEWS: CT scan of the temporal bones without and with contrast. The study was performed on a [email or web address] CT scanner. Contiguous thin section 1.0 mm or 1.25 mm axial and direct coronal images were obtained of the temporal bones. Images are reviewed at magnified bone and soft tissue windows and non-magnified axial images are also displayed at soft tissue windows. Scans are obtained pre and post contrast. 100 mL of Omnipaque 300 nonionic contrast was injected intravenously. Low dose optimization technique was utilized for this CT exam including automated exposure control and adjustment of the mA and/or kV according to patient size. COMPARISON: CTA of the neck without and with contrast 7/9/2018 FINDINGS: On the right, the external auditory canal  is normal except for some debris most consistent with cerumen.  The mastoid is normally pneumatized and aerated.  The middle ear cavity and mastoid antrum are clear.  The ossicles are normal in position  and configuration.  The scutum is sharp.  The bony labyrinth is normal, and the internal auditory canal shows normal caliber and is symmetric with the left side.  The facial nerve canal is unremarkable. On the left, the external auditory canal is normal except for some debris consistent with cerumen.  The mastoid is normally pneumatized and aerated.  The middle ear cavity and mastoid antrum are clear.  The ossicles are normal in position and configuration.  The scutum is sharp.  The bony labyrinth is normal, and the internal auditory canal shows normal caliber and is symmetric with the right side.  The facial nerve canal is unremarkable. The right transverse -- sigmoid sinus and right internal jugular vein are dominant. The paranasal sinuses show clustered retention cyst or polypoid mucosal thickening in the alveolar recess of the right maxillary sinus. There are no air-fluid levels. The posterior fossa structures are unremarkable.  The fourth ventricle is in the midline. There is no abnormal parenchymal or extra-axial/meningeal enhancement in the posterior fossa or supratentorial compartment within the field of view.     CT OF THE TEMPORAL BONES WITHOUT CONTRAST WITHIN NORMAL LIMITS.    Dx-chest-2 Views    Result Date: 7/10/2018    7/9/2018 10:30 PM HISTORY/REASON FOR EXAM: Shortness of Breath TECHNIQUE/EXAM DESCRIPTION:  PA and lateral views of the chest. COMPARISON: None FINDINGS: The cardiac silhouette appears within normal limits. The mediastinal contour appears within normal limits.  The central pulmonary vasculature appears normal. The lungs appear well expanded bilaterally.  Bilateral lungs are clear. No significant pleural effusions are identified. The bony structures appear age-appropriate.     1.  No  acute cardiopulmonary disease.    Dx-chest-for Picc Line Perform Procedure In: Patient's Room    Result Date: 7/14/2018 7/14/2018 12:59 PM HISTORY/REASON FOR EXAM:  Right PICC line placement TECHNIQUE/EXAM DESCRIPTION AND NUMBER OF VIEWS: Single AP view of the chest. COMPARISON: 7/19/2018 FINDINGS: There is interval placement of a right PICC line with its tip located projecting over the superior aspect of the superior vena cava. The cardiac silhouette and mediastinal contours are unremarkable. There is mild left basilar atelectasis. No pleural fluid or pneumothorax. No suspicious bony lesions.     1.  Right PICC line tip projects over the superior aspect of the superior vena cava. 2.  Left basilar atelectasis.    Dx-chest-portable (1 View)    Result Date: 7/21/2018 7/21/2018 6:30 AM HISTORY/REASON FOR EXAM:  PICC placement TECHNIQUE/EXAM DESCRIPTION AND NUMBER OF VIEWS: Single portable view of the chest. COMPARISON: 7/14/2018 FINDINGS: A right-sided PICC is present, the tip of which projects in the area of the upper SVC or possibly in the right subclavian vein. The cardiomediastinal silhouettes and liam are normal. Lungs are well expanded with no infiltrate or other acute process.     No acute cardiopulmonary abnormality. Right-sided PICC in place as noted above.    Dx-foot-2- Right    Result Date: 7/22/2018 7/22/2018 8:27 AM HISTORY/REASON FOR EXAM:  Pain/Deformity Following Trauma RT foot pain and swelling on fifth phalange and metatarsal X 2 days, pain worse on plantar surface following injury: patient stubbed fifth phalange TECHNIQUE/EXAM DESCRIPTION AND NUMBER OF VIEWS: 2 views of the RIGHT foot. COMPARISON:  None. FINDINGS: No acute fracture or dislocation. Soft tissue swelling overlying the fifth digit. No joint osteoarthritis.     No acute osseous abnormality.    Mr-brain-with & W/o    Result Date: 7/15/2018  7/15/2018 3:25 PM HISTORY/REASON FOR EXAM:  Change in neurologic status. History of stroke. Rule  out intracranial hemorrhage or new stroke. TECHNIQUE/EXAM DESCRIPTION: MRI of the brain without and with contrast, with diffusion. The study was performed on a [employer] Signa 1.5 Kerri MRI scanner. Spoiled-GRASS sagittal, thin-section T2 axial, T1 coronal, T1 postcontrast coronal, T1 postcontrast axial, FLAIR coronal and diffusion-weighted axial images were obtained of the whole brain.. 20 mL Gadavist contrast were administered intravenously. COMPARISON:  7/10/2018. FINDINGS: Low-lying cerebellar tonsils are again noted. The cortical sulci and gyri are within normal limits. The ventricular system is within normal limits. The bony calvaria are intact. There is no evidence of extra-axial fluid collection or intracranial mass effect. The previously demonstrated small enhancing focus of cortical T2 and FLAIR signal hyperintensity in the right posterior frontal lobe appears more prominent compared to the prior study. In contrast to the prior study, previously demonstrated diffusion restriction is no longer seen in this lesion. There are 2 new punctate foci of enhancement in the high right posterior frontal cortex and right superior cerebellum with minimal associated T2 and FLAIR signal hyperintensity associated with the lesion in the right cerebellum. Gradient echo sequences demonstrate a faint focus of signal hypointensity corresponding to the location of this lesion suggestive of early hemorrhagic conversion. There is no evidence of abnormal diffusion-weighted signal intensity in the brain. There are normal flow voids in the cavernous carotid arteries and M1 segments. There are normal flow voids in the distal vertebral basilar system. There are normal flow voids in the dural venous sinuses. The visualized paranasal sinuses and mastoid air cells appear clear.     1.  Previously demonstrated small enhancing focus of cortical T2 and FLAIR signal hyperintensity in the right posterior frontal lobe appears more prominent  compared to the prior study. In contrast to the prior study, previously demonstrated diffusion restriction is no longer seen in this lesion. Findings could be consistent with evolving cortical infarct or related to patient's known meningitis. 2.  2 new punctate foci of enhancement in the high right posterior frontal cortex and right superior cerebellum with minimal associated T2 and FLAIR signal hyperintensity associated with the lesion in the right cerebellum. Gradient echo sequences demonstrate a faint focus of signal hypointensity corresponding to the location of this lesion suggestive of early hemorrhagic conversion. 3.  Findings could be consistent with subacute infarcts of embolic etiology. 4.  No evidence of new acute territorial infarct. 5.  Low-lying cerebellar tonsils. Attempts to convey these findings to Dr. Brown were initiated on 7/15/2018 5:06 PM. These findings were discussed with Dr. Brown on 7/15/2018 5:25 PM.    Mr-brain-with & W/o    Result Date: 7/11/2018  7/10/2018 10:21 PM HISTORY/REASON FOR EXAM:  Possible meningitis. TECHNIQUE/EXAM DESCRIPTION:   MRI of the brain without and with contrast. T1 sagittal, T2 fast spin-echo axial, T1 coronal, FLAIR coronal, diffusion-weighted and apparent diffusion coefficient (ADC map) axial images were obtained of the whole brain. T1 postcontrast axial and T1 postcontrast coronal images were obtained. The study was performed on a [employer] Signa 1.5 Kerri MRI scanner. 20 mL Gadavist contrast was administered intravenously. COMPARISON:  None. FINDINGS:  There is tiny area of restricted diffusion in the right frontal gray matter. Minimal contrast enhancement is seen. The coronal FLAIR images demonstrates abnormal T2 hyperintensity in the adjacent right frontal subarachnoid spaces. The axial gradient echo images demonstrates linear hypointensity within the sulci. There is low-lying cerebellar tonsils. There is no hydrocephalus.  There is no large lesion identified in  the expected course of the intracranial portions of the cranial nerves. The visualized flow voids of cerebral vasculature appear normal within limits.  The skull bones appear normal. The paranasal sinuses are clear. The extracranial soft tissue including orbits appear grossly normal.     1.  There is tiny area of restricted diffusion in the right frontal gray matter. Minimal contrast enhancement is seen. The coronal FLAIR images demonstrates abnormal T2 hyperintensity in the adjacent right frontal subarachnoid spaces. The axial gradient echo images demonstrates linear hypointensity within the sulci. These findings likely represents a small cortical venous thrombosis with adjacent tiny infarct. Cortical venous thrombosis may cause focal subarachnoid hemorrhage. Please note that cortical venous thrombosis can be complication of meningitis. 2.  Low-lying cerebellar tonsils. This is concerning for Chiari I malformation. In view of history of lumbar puncture, this finding can be caused by the post lumbar puncture intracranial hypotension.    Mr-cervical Spine-with & W/o    Result Date: 7/15/2018  7/15/2018 3:24 PM HISTORY/REASON FOR EXAM:  Left upper extremity numbness with underlying epidural abscess. TECHNIQUE/EXAM DESCRIPTION: MRI of the cervical spine without and with contrast. The study was performed on a [employer] Signa 1.5 Kerri MRI scanner. T1 sagittal, T2 fast spin-echo sagittal, T1 postcontrast fat-suppressed sagittal, and gradient-echo axial images were obtained of the cervical spine. 20 mL Gadavist contrast were administered  intravenously. COMPARISON:  None. FINDINGS: There is redemonstrated posterior epidural enhancement which appears to extend slightly more cephalad to approximately the C4 level and extending inferiorly below the level of the scan into the thoracic spine. This may indicate interval worsening of infectious process. There is no definite evidence of osteomyelitis. Cervical spine demonstrates  normal vertebral body height and alignment. Other findings are unchanged.     Posterior epidural enhancement which appears to extend slightly more cephalad to approximately the C4 level and extending inferiorly below the level of the scan into the thoracic spine. This may indicate interval worsening of infectious process. There is  no definite evidence of osteomyelitis. Attempts to convey these findings to Dr. Brown were initiated on 7/15/2018 5:06 PM. These findings were discussed with Dr. Brown on 7/15/2018 5:25 PM.    Mr-cervical Spine-with & W/o    Result Date: 7/11/2018  7/10/2018 10:21 PM HISTORY/REASON FOR EXAM:  Possible bacterial meningitis. TECHNIQUE/EXAM DESCRIPTION: MRI of the cervical spine without and with contrast. The study was performed on a [employer] Signa 1.5 Kerri MRI scanner. T1 sagittal, T2 fast spin-echo sagittal, and gradient echo axial images were obtained of the cervical spine. T1 post-contrast fat suppressed sagittal images were obtained of the cervical spine. Optional T1 post-contrast axial images may be obtained. 20 mL Omniscan contrast was administered intravenously. COMPARISON: None. FINDINGS: There is prominent posterior epidural fluid collection extending from C6-7 into the thoracic spine. Mild posterior epidural contrast enhancement is seen. Please see thoracic spine report for further details. The cervical spine maintains normal height and alignment. There is no fracture or dislocation. There is no pathologic marrow infiltration. There is no abnormal perivertebral fluid collection. There is no intradural extramedullary fluid collection. There is no abnormal intramedullary T2 signal intensity in the cervical spinal cord. At the level of C2-3, there is no spinal or neural foraminal stenosis. At the level of C3-4, there is no spinal or neural foraminal stenosis. At the level of C4-5, there is no spinal or neural foraminal stenosis. At the level of C5-6, there is no spinal or neural  foraminal stenosis. At the level of C6-7, there is no spinal or neural foraminal stenosis. At the level of C7-T1, there is no spinal or neural foraminal stenosis. The visualized posterior fossa structures appear normal within limits.  The cervical spinal cord does not demonstrate any mass or abnormal T2 signal intensity. The visualized pre-and paraspinal soft tissues appear normal within limits.     1.  There is prominent posterior epidural fluid collection extending from C6-7 into the thoracic spine. Mild posterior epidural contrast enhancement is noted at this level. Please see thoracic spine report for further details. 2.  There is no evidence of osteomyelitis in the cervical spine.    Mr-lumbar Spine-with & W/o    Result Date: 7/11/2018  7/10/2018 10:21 PM HISTORY/REASON FOR EXAM:  Possible bacterial meningitis. TECHNIQUE/EXAM DESCRIPTION: MRI of the lumbar spine without and with contrast. The study was performed on a [employer] Signa 1.5 Kerri MRI scanner. T1 sagittal, T2 fast spin-echo sagittal, and T2 axial images were obtained of the lumbar spine. T1 post-contrast fat-suppressed sagittal images were obtained. 20 mL Omniscan contrast was administered intravenously. COMPARISON:  None. FINDINGS: There is abnormal posterior epidural fluid collection seen extending from the lower cervical spine to the level of L3-4. Multifocal abnormal epidural contrast enhancement is seen. The lumbar spine maintains normal height and alignment. There is no evidence of fracture or dislocation. There is no pathologic marrow infiltration. There is no abnormal disc fluid. At the level of L5-S1, there is diffuse disc bulge. Mild facet joint arthropathy is seen. There is no spinal or neural foraminal stenosis. At the level of L4-5, there is no spinal or neural foraminal stenosis. At the level of L3-4, there is no spinal or neural foraminal stenosis. At the level of L2-3, there is no spinal or neural foraminal stenosis. At the level of L1-2,  there is no spinal or neural foraminal stenosis. The conus terminates at the level of L1.     1.  There is abnormal posterior epidural fluid collection seen extending from the lower cervical spine to the level of L3-4. Multifocal abnormal epidural contrast enhancement is seen. These findings are concerning for thoracic and lumbar epidural abscess. 2.  There is no evidence of lumbar osteomyelitis. 3.  Findings were discussed with DAVON FERNANDO on 7/11/2018 1:08 PM.    Mr-thoracic Spine-with & W/o    Result Date: 7/11/2018  7/10/2018 10:21 PM HISTORY/REASON FOR EXAM:  Possible bacterial meningitis TECHNIQUE/EXAM DESCRIPTION: MRI of the thoracic spine without and with contrast. The study was performed on a [employer] Signa 1.5 Kerri MRI scanner. T1 sagittal, T2 fast spin-echo sagittal, and T2 axial images were obtained of the thoracic spine. T1 post-contrast fat suppressed sagittal images were obtained. Optional T1 post-contrast axial images may be obtained. 20 mL Gadavist contrast was administered intravenously. COMPARISON:  None. FINDINGS: There is abnormal posterior epidural fluid collection extending from C6-7 to the lumbar spine. Abnormal peripheral contrast enhancement noted surrounding the epidural fluid collection. The thoracic spinal cord is anteriorly displaced from the levels of T3-4 to  T9-10. Moderate central canal stenosis is seen. There is a hemangioma in the body of T11. There is no abnormal intramedullary T2 signal intensity in the thoracic spinal cord.     There is abnormal posterior epidural fluid collection extending from C6-7 to the lumbar spine. Abnormal peripheral contrast enhancement noted surrounding the epidural fluid collection. The thoracic spinal cord is anteriorly displaced from the levels of T3-4 to  T9-10. Moderate central canal stenosis is seen. These findings are highly concerning for posterior epidural abscess with moderate canal compromise.    Ir-picc Line Placement    Result Date:  7/14/2018  HISTORY/REASON FOR EXAM:   PICC placement. TECHNIQUE/EXAM DESCRIPTION AND NUMBER OF VIEWS:   PICC line insertion with ultrasound guidance.  The procedure was performed using maximal sterile barrier technique including sterile gown, mask, cap, and donning of sterile gloves following appropriate hand hygiene and/or sterile scrub. Patient skin site was prepped with 2% Chlorhexidine solution. FINDINGS:  PICC line insertion with Ultrasound Guidance was performed by qualified nursing staff without the assistance of a Radiologist. PICC positioning appropriateness confirmed by 3CG technology; chest xray only needed in the instance 3CG unable to confirm placement.              Ultrasound-guided PICC placement performed by qualified nursing staff as above.     Mr-venogram (mrv) Head    Result Date: 7/12/2018  HISTORY/REASON FOR EXAM: Minimal subarachnoid hemorrhage effacement and left tiny cortical infarct in right frontal lobe. TECHNIQUE/EXAM DESCRIPTION: MR venogram of the head without contrast, 11/12/2010. The study was performed on a Signa 1.5 Kerri MRI scanner. Multiple axial and coronal MR venographic sequences were obtained and maximum intensity projection images were generated utilizing the [employer] workstation. COMPARISON: MRI scan of the brain 7/10/2018 FINDINGS: There is excellent flow signal intensity in the superior sagittal sinus, transverse sinuses, and sigmoid sinuses.  The deep venous structures are widely patent.  There is no evidence of dural venous sinus thrombosis. There are low-lying cerebral tonsils     1.  Normal MR venogram of the brain without contrast. 2.  Low-lying cerebellar tonsils are again noted.    Echocardiogram Comp W/o Cont    Result Date: 7/11/2018  Transthoracic Echo Report Echocardiography Laboratory CONCLUSIONS No prior study is available for comparison. Normal left ventricular systolic function. Left ventricular ejection fraction is visually estimated to be 65%. Normal  diastolic function. Normal inferior vena cava size and inspiratory collapse. Mildly dilated left atrium. Moderate mitral regurgitation. JAYDON POLLARD Exam Date:         07/11/2018                    LV EF:  65    % FINDINGS Left Ventricle Normal left ventricular chamber size. Normal left ventricular wall thickness. Normal left ventricular systolic function. Left ventricular ejection fraction is visually estimated to be 65%. Normal regional wall motion. Normal diastolic function. Right Ventricle The right ventricle was normal in size and function. Right Atrium The right atrium is normal in size.  Normal inferior vena cava size and inspiratory collapse. Left Atrium Mildly dilated left atrium. Mitral Valve Thickened mitral valve leaflets. No mitral stenosis. Moderate mitral regurgitation. ERO by PISA method is  0.33 sq cm. Aortic Valve Structurally normal aortic valve without significant stenosis or regurgitation. Tricuspid Valve Unable to estimate pulmonary artery pressure due to an inadequate tricuspid regurgitant jet. No tricuspid stenosis. Trace tricuspid regurgitation. Unable to estimate pulmonary artery pressure due to an inadequate tricuspid regurgitant jet. Pulmonic Valve The pulmonic valve is not well visualized. No stenosis or regurgitation seen. Pericardium Normal pericardium without effusion. Aorta The aortic root is normal. Hui Modi MD (Electronically Signed) Final Date:     11 July 2018                 13:51    Transesophageal Echo W/o Cont    Result Date: 7/13/2018  Transesophageal Echo Report Echocardiography Laboratory CONCLUSIONS Left ventricular ejection fraction is visually estimated to be 65%. Small, pedunculated mobile masses adherent to the tips of the valve leaflets, notably A1 & P1.  These prolapse into the left atrium.  This causes malcoaptation of the valve itself, and resultant explosive severe regurgitation. Results called to Dr Rascon at the time of the test.  FINDINGS Left Ventricle  Normal left ventricular size, thickness, systolic function, and diastolic function. Normal left ventricular size, thickness, systolic function, and diastolic function. Left ventricular ejection fraction is visually estimated to be 65%. Right Ventricle Normal right ventricular size and systolic function. Right Atrium Normal right atrial size. Left Atrium Normal left atrial size. No atrial septal defect present. LA Appendage Normal left atrial appendage. IA Septum Normal interatrial septum. IV Septum Mitral Valve Small, pedunculated mobile masses adherent to the tips of the valve leaflets, notably A1 & P1.  These prolapse into the left atrium.  This causes malcoaptation of the valve itself, and resultant explosive severe regurgitation. Aortic Valve Structurally normal aortic valve without significant stenosis or regurgitation. Tricuspid Valve Structurally normal tricuspid valve without significant stenosis or regurgitation. Pulmonic Valve Structurally normal pulmonic valve. Pericardium No pericardial effusion seen. Aorta Normal aortic root for body surface area. Normal ascending aorta. Annie Pereyra M.D. (Electronically Signed) Final Date:     13 July 2018                 14:25      Micro:  Results     Procedure Component Value Units Date/Time    MRSA BY PCR (AMP) [[id number]] Collected:  07/20/18 1628    Order Status:  Completed Specimen:  Respirate from Nares Updated:  07/21/18 0609     Significant Indicator NEG     Source RESP     Site NARES     MRSA PCR Negative for MRSA by PCR.    Narrative:       Collected By:[id number] CONSTANTINO ALCARAZ          Assessment:  Streptococcus viridans bacteremia/endocarditis  Multifocal epidural abscess  Septic emboli   MV Endocarditis     Plan:  Streptococcus viridans endocarditis, MV  Afebrile  No leukocytosis  Blood cultures +7/9/2018, 7/10/2018  Bcx negative from 7/11/2018  CHAR + small pedunculated mobile masses to tips of the valve causing severe regurgitation  Continue  high-dose penicillin for 6 weeks  + gentamicin for synergy at least 2 weeks-through 8/3/2018.  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   On abx above  Anticipate 6-8 weeks of IV abx    Contusion right toe, improved  Neg xray  Local care    DW IM Dr Iqbal

## 2018-08-01 ENCOUNTER — OFFICE VISIT (OUTPATIENT)
Dept: INFECTIOUS DISEASES | Facility: MEDICAL CENTER | Age: 28
End: 2018-08-01
Payer: MEDICAID

## 2018-08-01 VITALS
BODY MASS INDEX: 31.18 KG/M2 | TEMPERATURE: 97.7 F | OXYGEN SATURATION: 96 % | HEIGHT: 70 IN | WEIGHT: 217.8 LBS | HEART RATE: 85 BPM | SYSTOLIC BLOOD PRESSURE: 124 MMHG | DIASTOLIC BLOOD PRESSURE: 82 MMHG

## 2018-08-01 DIAGNOSIS — I76 SEPTIC EMBOLISM (HCC): ICD-10-CM

## 2018-08-01 DIAGNOSIS — G06.2 EPIDURAL ABSCESS: ICD-10-CM

## 2018-08-01 DIAGNOSIS — B95.5 STREPTOCOCCAL BACTEREMIA: ICD-10-CM

## 2018-08-01 DIAGNOSIS — R78.81 STREPTOCOCCAL BACTEREMIA: ICD-10-CM

## 2018-08-01 DIAGNOSIS — I33.0 ACUTE BACTERIAL ENDOCARDITIS: ICD-10-CM

## 2018-08-01 PROCEDURE — 99214 OFFICE O/P EST MOD 30 MIN: CPT | Performed by: NURSE PRACTITIONER

## 2018-08-01 NOTE — PROGRESS NOTES
"Infectious Disease Clinic    Subjective:     Chief Complaint   Patient presents with   • Hospital Follow-up     Streptococcal endocarditis and epidural abscess     This is my first time meeting Mr. Neumann.    Interval History: 27 y.o. male with history of chronic neck pain, back pain and Bell's palsy.  Hospitalized from 7/9-7/27/18, admitted due to left-sided facial numbness with difficulty speaking.  Patient apparently had fevers and nausea for about a month.  He was evaluated at the urgent care initially and prescribed a course of cefdinir.  His fevers resolved for a short period of time, but he again got fevers up to 102.  Apparently there was also concern for a heart murmur.    On presentation, his WBC= 7.6.  Bcx on 7/9 & 7/10 +Strep Viridans, repeat cx on 7/11 was negative.  CSF on 7/10 showed 54 WBCs with 77% lymphs.  MRI on 7/11 showed epidural abscesses extending from his cervical spine to L3-4.  Was evaluated by neurosurgery, deemed no surgical intervention needed.  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 CHAR on 7/13 +small pedunculated mobile masses to tips of the valve causing severe regurgitation.  Discharged to SNF on IV penicillin G through 8/22/18, in addition to IV gentamicin for synergy through 8/3/18.     Hospital records reviewed    Today, 8/1/2018: Currently residing at [facility].  Patient reports feeling well and has been tolerating the IV gentamicin and penicillin G with normal adverse effect.  He continues to have some S OB, but this is no worse than when he was in the hospital.  Additionally states that from time to time he feels \"wobbly\" but it is no worse than it was in the hospital.  Occasionally gets lightheaded and dizzy with activity, but improves with rest.  Denies feeling generally ill, fevers/chills, general malaise, headache, n/v/d, abdominal pain or chest pain.  States that he feels that his back pain is slightly worse than when it was " "in the hospital, 7/10, slightly improved with pain medication and rest.    ROS  As documented above in my HPI.    Past Medical History:   Diagnosis Date   • Pain        Social History   Substance Use Topics   • Smoking status: Current Every Day Smoker     Packs/day: 0.50   • Smokeless tobacco: Never Used   • Alcohol use No       Allergies: Nkda [no known drug allergy]    Pt's medication and problem list reviewed.     Objective:     PE:  /82   Pulse 85   Temp 36.5 °C (97.7 °F)   Ht 1.778 m (5' 10\")   Wt 98.8 kg (217 lb 12.8 oz)   SpO2 96%   BMI 31.25 kg/m²     Vital signs reviewed    Constitutional: Appears well-developed and well-nourished. No acute distress.  Speech fluent.    Eyes: Conjunctivae normal and EOM are normal. Pupils are equal, round, and reactive to light.   Neck: Trachea midline. Normal range of motion.  No JVD.  Cardiovascular: Normal rate, regular rhythm. + murmur.  No gallop or friction rub. No edema.  Respiratory: No respiratory distress, unlabored respiratory effort.  Lungs clear to auscultation bilaterally. No wheezes or rales.   Abdomen: Soft, non tender, non-distended. BS + x 4. No masses.   Musculoskeletal: Steady gait.  Normal range of motion.  No joint or bone tenderness, swelling, erythema or deformity.    Back- nontender to palpitation, no bony prominences or protrusions.  RUE PICC- CDI, non tender, no erythema.  Skin: Warm and dry. Good turgor. No visible rashes or lesions.  Neurological: No cranial nerve deficit. Coordination normal.  Sensation intact.  Psychiatric: Alert and oriented to person, place, and time. Normal mood, calm affect.  Normal behavior and judgment.     Assessment and Plan:   The following treatment plan was discussed with patient at length:    1. Acute bacterial endocarditis      -Will have to completely IV gentamicin at [facility], and date 8/3/18.  -Okay to complete IV penicillin G through 8/22/18 via home infusion.  Orders written for continuous infusion via " CADD pump.  -Cannot discharge from [facility] until home infusion is set up and he is completed the IV gentamicin.  -Continue weekly CBC and CMP while on IV antibiotics.  -Renal function monitoring and troughs 3 times a week while on gentamicin.  -Follow-up with Cardiology for CHAR and possibly CTS for valve repair/replacement if indicated.   2. Streptococcal bacteremia      As above.   3. Epidural abscess      Antibiotics as above.  MRI scheduled for 8/3.  Please send results to ID.   4. Septic embolism (HCC)      Antibiotics as above.     Follow up: 2 weeks, RTC sooner if needed. FU with PCP for ongoing chronic medical conditions.     CRUZ Bone.    Case discussed with Dr. Spence.       Please note that this dictation was created using voice recognition software. I have  worked with technical experts from [employer] to optimize the interface.  I have made every reasonable attempt to correct obvious errors, but there may be errors of grammar and possibly content that I did not discover before finalizing the note.

## 2018-08-07 RX ORDER — 0.9 % SODIUM CHLORIDE 0.9 %
10-20 VIAL (ML) INJECTION PRN
Status: CANCELLED | OUTPATIENT
Start: 2018-08-13

## 2018-08-07 RX ORDER — 0.9 % SODIUM CHLORIDE 0.9 %
5 VIAL (ML) INJECTION PRN
Status: CANCELLED | OUTPATIENT
Start: 2018-08-13

## 2018-08-08 ENCOUNTER — TELEPHONE (OUTPATIENT)
Dept: CARDIOLOGY | Facility: MEDICAL CENTER | Age: 28
End: 2018-08-08

## 2018-08-08 ENCOUNTER — HOSPITAL ENCOUNTER (OUTPATIENT)
Dept: RADIOLOGY | Facility: MEDICAL CENTER | Age: 28
End: 2018-08-08
Attending: FAMILY MEDICINE
Payer: MEDICAID

## 2018-08-08 DIAGNOSIS — I67.6 NONPYOGENIC THROMBOSIS OF INTRACRANIAL VENOUS SINUS: ICD-10-CM

## 2018-08-08 PROCEDURE — 72156 MRI NECK SPINE W/O & W/DYE: CPT

## 2018-08-08 PROCEDURE — A9585 GADOBUTROL INJECTION: HCPCS | Performed by: FAMILY MEDICINE

## 2018-08-08 PROCEDURE — 70553 MRI BRAIN STEM W/O & W/DYE: CPT

## 2018-08-08 PROCEDURE — 72158 MRI LUMBAR SPINE W/O & W/DYE: CPT

## 2018-08-08 PROCEDURE — 72157 MRI CHEST SPINE W/O & W/DYE: CPT

## 2018-08-08 PROCEDURE — 700117 HCHG RX CONTRAST REV CODE 255: Performed by: FAMILY MEDICINE

## 2018-08-08 RX ORDER — GADOBUTROL 604.72 MG/ML
10 INJECTION INTRAVENOUS ONCE
Status: COMPLETED | OUTPATIENT
Start: 2018-08-08 | End: 2018-08-08

## 2018-08-08 RX ADMIN — GADOBUTROL 10 ML: 604.72 INJECTION INTRAVENOUS at 18:23

## 2018-08-08 NOTE — TELEPHONE ENCOUNTER
Patient scheduled for CHAR on 8-20-18 at [facility] with Dr. BLESSING Pereyra at 12:00. FYI to Dr. Pereyra.

## 2018-08-08 NOTE — TELEPHONE ENCOUNTER
----- Message from Annie Pereyra M.D. sent at 8/7/2018  5:07 PM PDT -----  Regarding: RE: IAM per the heart surgeons  Tx!  Any one of our IAM docs can do the f/u iam. You can put Dr Fernandez on as the ordering MD.     Hope that helps!  ----- Message -----  From: May Schmidt, Med Ass't  Sent: 8/7/2018  12:59 PM  To: Annie Pereyra M.D.  Subject: IAM per the heart surgeons                       Dr. Pereyra,    I just recvd a call from Grecia at Dr. Fernandez's office. They are requesting a follow up IAM for endocarditis on the mitral valve. They are scheduled to see this patient on the 22nd. You did a IAM on this patient during the admission. I do not see where cardiology consulted on this patient during this admission. Would we need to see this patient in clinic before scheduling the IAM for the H&P or can I just schedule the IAM?    Please advise.    Thank You,  May

## 2018-08-09 ENCOUNTER — OUTPATIENT INFUSION SERVICES (OUTPATIENT)
Dept: ONCOLOGY | Facility: MEDICAL CENTER | Age: 28
End: 2018-08-09
Attending: NURSE PRACTITIONER
Payer: MEDICAID

## 2018-08-09 VITALS
TEMPERATURE: 97.6 F | BODY MASS INDEX: 32 KG/M2 | HEIGHT: 70 IN | HEART RATE: 78 BPM | RESPIRATION RATE: 18 BRPM | WEIGHT: 223.55 LBS | DIASTOLIC BLOOD PRESSURE: 81 MMHG | OXYGEN SATURATION: 95 % | SYSTOLIC BLOOD PRESSURE: 125 MMHG

## 2018-08-09 DIAGNOSIS — G06.2 EPIDURAL ABSCESS: ICD-10-CM

## 2018-08-09 DIAGNOSIS — I33.0 ACUTE BACTERIAL ENDOCARDITIS: ICD-10-CM

## 2018-08-09 DIAGNOSIS — R78.81 STREPTOCOCCAL BACTEREMIA: ICD-10-CM

## 2018-08-09 DIAGNOSIS — B95.5 STREPTOCOCCAL BACTEREMIA: ICD-10-CM

## 2018-08-09 LAB
ALBUMIN SERPL BCP-MCNC: 4.3 G/DL (ref 3.2–4.9)
ALBUMIN/GLOB SERPL: 1.8 G/DL
ALP SERPL-CCNC: 63 U/L (ref 30–99)
ALT SERPL-CCNC: 7 U/L (ref 2–50)
ANION GAP SERPL CALC-SCNC: 11 MMOL/L (ref 0–11.9)
AST SERPL-CCNC: 13 U/L (ref 12–45)
BASOPHILS # BLD AUTO: 0.2 % (ref 0–1.8)
BASOPHILS # BLD: 0.01 K/UL (ref 0–0.12)
BILIRUB SERPL-MCNC: 0.5 MG/DL (ref 0.1–1.5)
BUN SERPL-MCNC: 15 MG/DL (ref 8–22)
CALCIUM SERPL-MCNC: 9.2 MG/DL (ref 8.5–10.5)
CHLORIDE SERPL-SCNC: 104 MMOL/L (ref 96–112)
CO2 SERPL-SCNC: 22 MMOL/L (ref 20–33)
CREAT SERPL-MCNC: 1.03 MG/DL (ref 0.5–1.4)
CRP SERPL HS-MCNC: 0.46 MG/DL (ref 0–0.75)
EOSINOPHIL # BLD AUTO: 0.25 K/UL (ref 0–0.51)
EOSINOPHIL NFR BLD: 4.9 % (ref 0–6.9)
ERYTHROCYTE [DISTWIDTH] IN BLOOD BY AUTOMATED COUNT: 42.5 FL (ref 35.9–50)
ERYTHROCYTE [SEDIMENTATION RATE] IN BLOOD BY WESTERGREN METHOD: 10 MM/HOUR (ref 0–15)
GLOBULIN SER CALC-MCNC: 2.4 G/DL (ref 1.9–3.5)
GLUCOSE SERPL-MCNC: 126 MG/DL (ref 65–99)
HCT VFR BLD AUTO: 39.9 % (ref 42–52)
HGB BLD-MCNC: 13.2 G/DL (ref 14–18)
IMM GRANULOCYTES # BLD AUTO: 0.01 K/UL (ref 0–0.11)
IMM GRANULOCYTES NFR BLD AUTO: 0.2 % (ref 0–0.9)
LYMPHOCYTES # BLD AUTO: 1.66 K/UL (ref 1–4.8)
LYMPHOCYTES NFR BLD: 32.6 % (ref 22–41)
MCH RBC QN AUTO: 27.1 PG (ref 27–33)
MCHC RBC AUTO-ENTMCNC: 33.1 G/DL (ref 33.7–35.3)
MCV RBC AUTO: 81.9 FL (ref 81.4–97.8)
MONOCYTES # BLD AUTO: 0.34 K/UL (ref 0–0.85)
MONOCYTES NFR BLD AUTO: 6.7 % (ref 0–13.4)
NEUTROPHILS # BLD AUTO: 2.82 K/UL (ref 1.82–7.42)
NEUTROPHILS NFR BLD: 55.4 % (ref 44–72)
NRBC # BLD AUTO: 0 K/UL
NRBC BLD-RTO: 0 /100 WBC
PLATELET # BLD AUTO: 173 K/UL (ref 164–446)
PMV BLD AUTO: 10.5 FL (ref 9–12.9)
POTASSIUM SERPL-SCNC: 3.9 MMOL/L (ref 3.6–5.5)
PROT SERPL-MCNC: 6.7 G/DL (ref 6–8.2)
RBC # BLD AUTO: 4.87 M/UL (ref 4.7–6.1)
SODIUM SERPL-SCNC: 137 MMOL/L (ref 135–145)
WBC # BLD AUTO: 5.1 K/UL (ref 4.8–10.8)

## 2018-08-09 PROCEDURE — 86140 C-REACTIVE PROTEIN: CPT

## 2018-08-09 PROCEDURE — 85025 COMPLETE CBC W/AUTO DIFF WBC: CPT

## 2018-08-09 PROCEDURE — 80053 COMPREHEN METABOLIC PANEL: CPT

## 2018-08-09 PROCEDURE — 85652 RBC SED RATE AUTOMATED: CPT

## 2018-08-09 PROCEDURE — 36592 COLLECT BLOOD FROM PICC: CPT

## 2018-08-09 RX ORDER — 0.9 % SODIUM CHLORIDE 0.9 %
10-20 VIAL (ML) INJECTION PRN
Status: CANCELLED | OUTPATIENT
Start: 2018-08-13

## 2018-08-09 RX ORDER — 0.9 % SODIUM CHLORIDE 0.9 %
5 VIAL (ML) INJECTION PRN
Status: CANCELLED | OUTPATIENT
Start: 2018-08-13

## 2018-08-09 ASSESSMENT — PAIN SCALES - GENERAL: PAINLEVEL_OUTOF10: 2

## 2018-08-09 NOTE — PROGRESS NOTES
Pt arrived to IS, ambulatory, for labs/PICC care. Pt oriented to infusion center and policies. PICC line flushed per policy, positive blood return noted. Labs drawn per order. PICC line dressing changed with sterile field. Pt left IS with no s/sx of distress. Follow up appointment confirmed.

## 2018-08-14 ENCOUNTER — OUTPATIENT INFUSION SERVICES (OUTPATIENT)
Dept: ONCOLOGY | Facility: MEDICAL CENTER | Age: 28
End: 2018-08-14
Attending: NURSE PRACTITIONER
Payer: MEDICAID

## 2018-08-14 ENCOUNTER — TELEPHONE (OUTPATIENT)
Dept: NEUROLOGY | Facility: MEDICAL CENTER | Age: 28
End: 2018-08-14

## 2018-08-14 VITALS
WEIGHT: 221.78 LBS | HEIGHT: 70 IN | SYSTOLIC BLOOD PRESSURE: 112 MMHG | BODY MASS INDEX: 31.75 KG/M2 | HEART RATE: 80 BPM | OXYGEN SATURATION: 93 % | RESPIRATION RATE: 18 BRPM | DIASTOLIC BLOOD PRESSURE: 74 MMHG | TEMPERATURE: 97.7 F

## 2018-08-14 DIAGNOSIS — B95.5 STREPTOCOCCAL BACTEREMIA: ICD-10-CM

## 2018-08-14 DIAGNOSIS — G06.2 EPIDURAL ABSCESS: ICD-10-CM

## 2018-08-14 DIAGNOSIS — R78.81 STREPTOCOCCAL BACTEREMIA: ICD-10-CM

## 2018-08-14 DIAGNOSIS — I33.0 ACUTE BACTERIAL ENDOCARDITIS: ICD-10-CM

## 2018-08-14 PROCEDURE — 99211 OFF/OP EST MAY X REQ PHY/QHP: CPT

## 2018-08-14 RX ORDER — 0.9 % SODIUM CHLORIDE 0.9 %
10-20 VIAL (ML) INJECTION PRN
Status: CANCELLED | OUTPATIENT
Start: 2018-08-14

## 2018-08-14 RX ORDER — OXYCODONE HYDROCHLORIDE 10 MG/1
10 TABLET ORAL EVERY 4 HOURS PRN
Status: ON HOLD | COMMUNITY
End: 2018-09-20 | Stop reason: CLARIF

## 2018-08-14 RX ORDER — 0.9 % SODIUM CHLORIDE 0.9 %
5 VIAL (ML) INJECTION PRN
Status: CANCELLED | OUTPATIENT
Start: 2018-08-14

## 2018-08-14 ASSESSMENT — PAIN SCALES - GENERAL: PAINLEVEL_OUTOF10: 7

## 2018-08-14 NOTE — PROGRESS NOTES
"Late entry for 1530:  Pt to infusion services ambulatory per self.  Pt here for PICC care/dressing change today.  Pt reports \"pulling weeds\" and PICC dressing change peeling and some blood observed under biopatch.  Pt tells me he is receiving continuous IV abx 24 hr infusion of Gentamicin provided through [facility].  Pump paused by patient.  Line capped.  PICC dressing changed utilizing sterile technique.  Pt tolerated well.  PICC line observed to be out 17 cm.  PICC flushed with normal saline.  PICC flushes easily; + blood return verified.  Pt re-connected to pump.  Pt tells me he will be seeing Guillermina DASILVA in office tomorrow.  Insertion note, 07/14/2018, of PICC line states, \"secured at 7 cm marker\".  Last documentation states \"8 cm\" out.  Pt tells me length of line out is unchanged from last visit. Pt released to self care in no apparent distress after completion of dressing change, ambulatory.  Pt to return in one week for PICC care.      Telephone call to MD office and page placed to VIDYA Matthews.    1655:  2nd page placed.  Return call received from VIDYA Matthews. VIDYA Matthews notified of line observed to be at 17 cm marker.  No new orders received.  Per VIDYA Matthews, she will see patient in office tomorrow and will assess line and possible need for chest x-ray to confirm placement.   "

## 2018-08-14 NOTE — TELEPHONE ENCOUNTER
Called pt and no answer. I left VM and let him know that we can get him in sooner for the stroke bridge clinic on 8/20/18. I let him know the available times and that he can leave me a detailed message on what times he can do or just call me back.

## 2018-08-15 ENCOUNTER — HOSPITAL ENCOUNTER (OUTPATIENT)
Dept: RADIOLOGY | Facility: MEDICAL CENTER | Age: 28
End: 2018-08-15
Attending: NURSE PRACTITIONER
Payer: MEDICAID

## 2018-08-15 ENCOUNTER — OFFICE VISIT (OUTPATIENT)
Dept: INFECTIOUS DISEASES | Facility: MEDICAL CENTER | Age: 28
End: 2018-08-15
Payer: MEDICAID

## 2018-08-15 VITALS
SYSTOLIC BLOOD PRESSURE: 110 MMHG | DIASTOLIC BLOOD PRESSURE: 70 MMHG | HEART RATE: 73 BPM | TEMPERATURE: 97.9 F | OXYGEN SATURATION: 100 % | BODY MASS INDEX: 31.64 KG/M2 | WEIGHT: 221 LBS | HEIGHT: 70 IN

## 2018-08-15 DIAGNOSIS — Z79.2 RECEIVING INTRAVENOUS ANTIBIOTIC TREATMENT AT HOME: ICD-10-CM

## 2018-08-15 DIAGNOSIS — G06.2 EPIDURAL ABSCESS: ICD-10-CM

## 2018-08-15 DIAGNOSIS — I33.0 ACUTE BACTERIAL ENDOCARDITIS: ICD-10-CM

## 2018-08-15 DIAGNOSIS — I76 SEPTIC EMBOLISM (HCC): ICD-10-CM

## 2018-08-15 DIAGNOSIS — B95.5 STREPTOCOCCAL BACTEREMIA: ICD-10-CM

## 2018-08-15 DIAGNOSIS — R78.81 STREPTOCOCCAL BACTEREMIA: ICD-10-CM

## 2018-08-15 PROCEDURE — 99214 OFFICE O/P EST MOD 30 MIN: CPT | Performed by: NURSE PRACTITIONER

## 2018-08-15 PROCEDURE — 71045 X-RAY EXAM CHEST 1 VIEW: CPT

## 2018-08-15 NOTE — ADDENDUM NOTE
Addended by: KEATON LUCERO on: 8/15/2018 04:39 PM     Modules accepted: Orders, Level of Service

## 2018-08-15 NOTE — PROGRESS NOTES
Infectious Disease Clinic    Subjective:     Chief Complaint   Patient presents with   • Follow-Up     Acute bacterial endocarditis     Interval History: 27 y.o. male with history of chronic neck pain, back pain and Bell's palsy.  Hospitalized from 7/9-7/27/18, admitted due to left-sided facial numbness with difficulty speaking.  Patient apparently had fevers and nausea for about a month.  He was evaluated at the urgent care initially and prescribed a course of cefdinir.  His fevers resolved for a short period of time, but he again got fevers up to 102.  Apparently there was also concern for a heart murmur.    On presentation, his WBC= 7.6.  Bcx on 7/9 & 7/10 +Strep Viridans, repeat cx on 7/11 was negative.  CSF on 7/10 showed 54 WBCs with 77% lymphs.  MRI on 7/11 showed epidural abscesses extending from his cervical spine to L3-4.  Was evaluated by neurosurgery, deemed no surgical intervention needed.  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 CHAR on 7/13 +small pedunculated mobile masses to tips of the valve causing severe regurgitation.  Discharged to SNF on IV penicillin G through 8/22/18, in addition to IV gentamicin for synergy through 8/3/18.     8/1/2018: Seen by IVDYA Matthews.  Residing at [facility].  Will have to completely IV gentamicin at [facility], end date 8/3/18.  Okay to complete IV penicillin G through 8/22/18 via home infusion.  Orders written for continuous infusion via CADD pump.     Today, 8/15/2018: Returned home on 8/3 after finishing the IV gentamicin.  Continues to tolerate the IV penicillin G without issue. Denies feeling generally ill, fevers/chills, general malaise, headache, n/v/d, abdominal pain, chest pain or shortness of breath.  States that over the weekend he had some burning with urination; however, his PCP checked for a UTI which was negative and the symptoms have gotten better over the last day or 2 since drinking more water.  Denies  "any urinary symptoms: hesitancy, frequency, urgency, hematuria, flank pain, etc. continues to have lower back pain 5/10, improved with rest and pain meds.  States that he still feels short of breath with activity but not at rest.  Continues to feel weak, but states he is slowly getting better.  Denies any lightheadedness or dizziness.  Denies any more \"wobbliness\" as he had noted previously.  States that he is \"a little spacey and forgetful with short-term memory\" from time to time.  Has a follow-up CHAR on 8/20 and a follow-up with the cardiothoracic surgeon on 8/28.  Says that he feels better overall since being in the hospital.    ROS  As documented above in my HPI.    Past Medical History:   Diagnosis Date   • Pain        Social History   Substance Use Topics   • Smoking status: Current Every Day Smoker     Packs/day: 0.50   • Smokeless tobacco: Never Used   • Alcohol use No       Allergies: Nkda [no known drug allergy]    Pt's medication and problem list reviewed.     Objective:     PE:  /70   Pulse 73   Temp 36.6 °C (97.9 °F)   Ht 1.778 m (5' 10\")   Wt 100.2 kg (221 lb)   SpO2 100%   BMI 31.71 kg/m²     Vital signs reviewed    Constitutional: Appears well-developed and well-nourished. No acute distress.  Speech fluent.  Obese.  Eyes: Conjunctivae normal and EOM are normal. Pupils are equal, round, and reactive to light.   Neck: Trachea midline. Normal range of motion.  No JVD.  Neck supple.  Cardiovascular: Normal rate, regular rhythm. + murmur.  No gallop or friction rub. No edema.  Respiratory: No respiratory distress, unlabored respiratory effort.  Lungs clear to auscultation bilaterally. No wheezes.   Abdomen: Soft, non tender, non-distended. BS + x 4. No masses.   Musculoskeletal: Steady gait.  Normal range of motion.  No joint or bone tenderness, swelling, erythema or deformity.    Back- nontender to palpitation, no bony prominences or protrusions.  RUE PICC- CDI, non tender, no erythema.  " Unable to see external PICC length due to placement of dressing.  Notes from [facility] from yesterday states it is about 17 cm.  Skin: Warm and dry.  No visible rashes or lesions.  Neurological: No cranial nerve deficit. Coordination normal.  Sensation intact.  Psychiatric: Alert and oriented to person, place, and time. Normal mood, calm affect.  Normal behavior and judgment.     Labs:  Lab Results   Component Value Date/Time    WBC 5.1 2018 08:35 AM    RBC 4.87 2018 08:35 AM    HEMOGLOBIN 13.2 (L) 2018 08:35 AM    HEMATOCRIT 39.9 (L) 2018 08:35 AM    MCV 81.9 2018 08:35 AM    MCH 27.1 2018 08:35 AM    MCHC 33.1 (L) 2018 08:35 AM    MPV 10.5 2018 08:35 AM    NEUTSPOLYS 55.40 2018 08:35 AM    LYMPHOCYTES 32.60 2018 08:35 AM    MONOCYTES 6.70 2018 08:35 AM    EOSINOPHILS 4.90 2018 08:35 AM    BASOPHILS 0.20 2018 08:35 AM      Lab Results   Component Value Date/Time    SODIUM 137 2018 08:35 AM    POTASSIUM 3.9 2018 08:35 AM    CHLORIDE 104 2018 08:35 AM    CO2 22 2018 08:35 AM    GLUCOSE 126 (H) 2018 08:35 AM    BUN 15 2018 08:35 AM    CREATININE 1.03 2018 08:35 AM        Imagin2018  MR-THORACIC SPINE-WITH & W/O  Impression     MRI of the thoracic spine without and with contrast within normal limits.     2018  MR-BRAIN-WITH & W/O  Impression     1.  No acute abnormality.  2.  There is tiny punctate areas of enhancement in the left frontal gray matter. This is decreased since the previous study. Axial gradient echo images demonstrates punctate areas of chronic hemorrhage in the right frontal lobe and cerebellar hemisphere.   The previously seen right posterior frontal and the cerebellar punctate contrast enhancement and no longer visualized.These findings are consistent with evolving infarcts.  3.  Low-lying cerebellar tonsils.     2018  MR-LUMBAR SPINE-WITH & W/O  Impression     1.  There  is broad-based central disc protrusion at L5-S1 without significant spinal or neural foraminal stenosis.  2.  The previously seen upper lumbar epidural contrast enhancement is resolved.     8/8/2018  MR-CERVICAL SPINE-WITH & W/O  Impression     1.  There is interval resolution of previously seen posterior epidural contrast enhancement in the lower cervical spine.  2.  Low-lying cerebellar tonsils.     Assessment and Plan:   The following treatment plan was discussed with patient at length:    1. Acute bacterial endocarditis  CBC WITH DIFFERENTIAL    COMP METABOLIC PANEL    -Plan to finish IV penicillin G on 8/22/18.  No PO antibiotics to follow.  Monitor for s/sx of worsening off abx: increased redness, pain, swelling, drainage, fevers, chills, general malaise, etc.  Notify ID or go to ER should these s/sx occur.  -Will have pt come to the office on 8/23 for removal of PICC line.  -CBC and CMP to monitor for leukocytosis, thrombocytopenia, hepato-or nephrotoxicity.  Will need to go to [facility] due to insurance, cannot have labs drawn at [facility].  -Follow-up CHAR on 8/20 and follow-up with cardiothoracic surgeon on 8/28.   2. Streptococcal bacteremia      Antibiotics as above.   3. Epidural abscess      MRI showing resolution of epidural abscess.  Antibiotics as above.   4. Septic embolism (HCC)      Antibiotics as above.   5. Receiving intravenous antibiotic treatment at home  CBC WITH DIFFERENTIAL    COMP METABOLIC PANEL    DX-CHEST-FOR PICC LINE    Initial placement of PICC line showing only 7 cm external; however, [facility] noted it being 17 cm external yesterday.  Will check chest x-ray to confirm placement.     Follow up: PRN, RTC sooner if needed. FU with PCP for ongoing chronic medical conditions.     CRUZ Bone.    Case discussed with Dr. Rousseua.       Addendum: Spoke with Dr. Hui from radiology, PICC not in appropriate position.  Will order for PICC exchange, and he also replaced with midline as  antibiotics will end on 8/22/18.      Please note that this dictation was created using voice recognition software. I have  worked with technical experts from [facility] to optimize the interface.  I have made every reasonable attempt to correct obvious errors, but there may be errors of grammar and possibly content that I did not discover before finalizing the note.

## 2018-08-17 ENCOUNTER — HOSPITAL ENCOUNTER (OUTPATIENT)
Dept: RADIOLOGY | Facility: MEDICAL CENTER | Age: 28
End: 2018-08-17
Attending: NURSE PRACTITIONER | Admitting: INTERNAL MEDICINE
Payer: MEDICAID

## 2018-08-17 DIAGNOSIS — Z79.2 RECEIVING INTRAVENOUS ANTIBIOTIC TREATMENT AT HOME: ICD-10-CM

## 2018-08-17 PROCEDURE — 36569 INSJ PICC 5 YR+ W/O IMAGING: CPT

## 2018-08-17 NOTE — PROCEDURES
Vascular Access Team    Date of Insertion: 8/17/18  Arm Circumference: 38.5cm  Internal length: 17cm  External Length: 0cm  Vein Occupancy %: 25%  Reason for Midline: Antibiotic Therapy  Labs on 8/9/18: WBC 5.1, , BUN 15, Cr 1.03, GFR >60, INR 1.16 on 7/11/18    Orders confirmed, vessel patency confirmed with ultrasound. Risks and benefits of procedure explained to patient and education regarding line associated bloodstream infections provided. Discharge education provided. Questions answered.     RUE PICC dressing removed. Redness noted beneath dressing. PICC removed, pressure held, gauze and tegaderm dressing placed over insertion site. LUE accessed due to redness on RUE.     Power Midline placed in LUE per MD order with ultrasound guidance, initial arm circumference 38.5cm. 4 Fr, single lumen Power Midline placed in left basilic vein after 1 attempt(s). 1 cc's of 1% lidocaine injected intradermally, 21 gauge microintroducer needle and modified Seldinger technique used. 17 cm catheter inserted with good blood return. Secured at 0 cm marker. Each lumen flushed without resistance with 10 mL 0.9% normal saline. Midline secured with Biopatch and Tegaderm.     Midline placement is confirmed by nurse using ultrasound and ability to flush and draw blood. Midline is appropriate for use at this time.  No X-ray is needed for placement confirmation. Pt tolerated procedure well.  Patient condition relayed to unit RN or ordering physician via this post procedure note in the EMR.      BARD Power Midline ref # [id number], Lot # [id number]

## 2018-08-20 ENCOUNTER — HOSPITAL ENCOUNTER (OUTPATIENT)
Facility: MEDICAL CENTER | Age: 28
End: 2018-08-20
Attending: INTERNAL MEDICINE | Admitting: INTERNAL MEDICINE
Payer: MEDICAID

## 2018-08-20 VITALS
RESPIRATION RATE: 14 BRPM | DIASTOLIC BLOOD PRESSURE: 78 MMHG | SYSTOLIC BLOOD PRESSURE: 117 MMHG | OXYGEN SATURATION: 97 % | HEART RATE: 53 BPM | HEIGHT: 70 IN | BODY MASS INDEX: 31.59 KG/M2 | WEIGHT: 220.68 LBS | TEMPERATURE: 96.8 F

## 2018-08-20 LAB — LV EJECT FRACT  99904: 65

## 2018-08-20 PROCEDURE — 93321 DOPPLER ECHO F-UP/LMTD STD: CPT

## 2018-08-20 PROCEDURE — 93312 ECHO TRANSESOPHAGEAL: CPT

## 2018-08-20 PROCEDURE — 700111 HCHG RX REV CODE 636 W/ 250 OVERRIDE (IP)

## 2018-08-20 PROCEDURE — 93325 DOPPLER ECHO COLOR FLOW MAPG: CPT

## 2018-08-20 PROCEDURE — 160002 HCHG RECOVERY MINUTES (STAT)

## 2018-08-20 PROCEDURE — 700101 HCHG RX REV CODE 250

## 2018-08-20 ASSESSMENT — PAIN SCALES - GENERAL
PAINLEVEL_OUTOF10: 0

## 2018-08-20 NOTE — DISCHARGE INSTRUCTIONS
ACTIVITY: Rest and take it easy for the first 24 hours.  A responsible adult is recommended to remain with you during that time.  It is normal to feel sleepy.  We encourage you to not do anything that requires balance, judgment or coordination.    MILD FLU-LIKE SYMPTOMS ARE NORMAL. YOU MAY EXPERIENCE GENERALIZED MUSCLE ACHES, THROAT IRRITATION, HEADACHE AND/OR SOME NAUSEA.    FOR 24 HOURS DO NOT:  Drive, operate machinery or run household appliances.  Drink beer or alcoholic beverages.   Make important decisions or sign legal documents.    SPECIAL INSTRUCTIONS:     Transesophageal Echocardiogram  Transesophageal echocardiography (CHAR) is a picture test of your heart using sound waves. The pictures taken can give very detailed pictures of your heart. This can help your doctor see if there are problems with your heart. CHAR can check:  · If your heart has blood clots in it.  · How well your heart valves are working.  · If you have an infection on the inside of your heart.  · Some of the major arteries of your heart.  · If your heart valve is working after a repair.  · Your heart before a procedure that uses a shock to your heart to get the rhythm back to normal.  What happens before the procedure?  · Do not eat or drink for 6 hours before the procedure or as told by your doctor.  · Make plans to have someone drive you home after the procedure. Do not drive yourself home.  · An IV tube will be put in your arm.  What happens during the procedure?  · You will be given a medicine to help you relax (sedative). It will be given through the IV tube.  · A numbing medicine will be sprayed or gargled in the back of your throat to help numb it.  · The tip of the probe is placed into the back of your mouth. You will be asked to swallow. This helps to pass the probe into your esophagus.  · Once the tip of the probe is in the right place, your doctor can take pictures of your heart.  · You may feel pressure at the back of your  throat.  What happens after the procedure?  · You will be taken to a recovery area so the sedative can wear off.  · Your throat may be sore and scratchy. This will go away slowly over time.  · You will go home when you are fully awake and able to swallow liquids.  · You should have someone stay with you for the next 24 hours.  · Do not drive or operate machinery for the next 24 hours.  This information is not intended to replace advice given to you by your health care provider. Make sure you discuss any questions you have with your health care provider.  Document Released: 10/15/2010 Document Revised: 05/25/2017 Document Reviewed: 06/19/2014  [employer] Interactive Patient Education © 2017 [employer]    DIET: return to normal diet    FOLLOW-UP APPOINTMENT:  A follow-up appointment should be arranged with your doctor; call to schedule.    You should CALL YOUR PHYSICIAN with any questions    You should call [phone] if you develop problems with breathing or chest pain.  If you are unable to contact your doctor or surgical center, you should go to the nearest emergency room or urgent care center.  Physician's telephone #: *Dr. Fernandez [phone]*    If any questions arise, call your doctor.  If your doctor is not available, please feel free to call the Surgical Center at [phone].  The Center is open Monday through Friday from 7AM to 7PM.  You can also call the HEALTH HOTLINE open 24 hours/day, 7 days/week and speak to a nurse at [phone], or toll free at [phone].    A registered nurse may call you a few days after your surgery to see how you are doing after your procedure.      Depression / Suicide Risk    As you are discharged from this [facility] facility, it is important to learn how to keep safe from harming yourself.    Recognize the warning signs:  · Abrupt changes in personality, positive or negative- including increase in energy   · Giving away possessions  · Change in eating patterns-  significant weight changes-  positive or negative  · Change in sleeping patterns- unable to sleep or sleeping all the time   · Unwillingness or inability to communicate  · Depression  · Unusual sadness, discouragement and loneliness  · Talk of wanting to die  · Neglect of personal appearance   · Rebelliousness- reckless behavior  · Withdrawal from people/activities they love  · Confusion- inability to concentrate     If you or a loved one observes any of these behaviors or has concerns about self-harm, here's what you can do:  · Talk about it- your feelings and reasons for harming yourself  · Remove any means that you might use to hurt yourself (examples: pills, rope, extension cords, firearm)  · Get professional help from the community (Mental Health, Substance Abuse, psychological counseling)  · Do not be alone:Call your Safe Contact- someone whom you trust who will be there for you.  · Call your local CRISIS HOTLINE 145-0549 or 704-740-1884  · Call your local Children's Mobile Crisis Response Team [place] (903) 847-8096 or www.SiOx  · Call the toll free National Suicide Prevention Hotlines   · National Suicide Prevention Lifeline 773-633-XRJA (5003)  · National Hope Line Network 800-SUICIDE (135-7485)

## 2018-08-20 NOTE — OR NURSING
PICC okay for anesthesia per Dr. Mckeon. No additional line needed for today's procedure. PCN to be paused for procedure and resumed upon arrival to post-procedure unit s/p CHAR.

## 2018-08-20 NOTE — OR NURSING
Pt verbalizes readiness for discharge. Instructions reviewed with pt and pt's wife. Pt resumed his IV antibiotics per MD orders. Pt states he will walk out, refuses wheelchair. Gait steady, wife at side. No further needs.

## 2018-08-23 ENCOUNTER — OFFICE VISIT (OUTPATIENT)
Dept: INFECTIOUS DISEASES | Facility: MEDICAL CENTER | Age: 28
End: 2018-08-23
Payer: MEDICAID

## 2018-08-23 VITALS
HEIGHT: 70 IN | SYSTOLIC BLOOD PRESSURE: 130 MMHG | HEART RATE: 99 BPM | DIASTOLIC BLOOD PRESSURE: 70 MMHG | TEMPERATURE: 97.9 F | OXYGEN SATURATION: 93 % | BODY MASS INDEX: 31.64 KG/M2 | WEIGHT: 221 LBS

## 2018-08-23 DIAGNOSIS — I33.0 ACUTE BACTERIAL ENDOCARDITIS: ICD-10-CM

## 2018-08-23 DIAGNOSIS — G06.2 EPIDURAL ABSCESS: ICD-10-CM

## 2018-08-23 PROCEDURE — 99214 OFFICE O/P EST MOD 30 MIN: CPT | Performed by: NURSE PRACTITIONER

## 2018-08-23 NOTE — PROGRESS NOTES
Subjective:     Chief Complaint   Patient presents with   • Follow-Up     Acute bacterial endocarditis     Infectious Disease clinic follow up  Nicholas Neumann 27 y.o.male in clinic today for evaluation and management of acute bacterial endocarditis. Primary care provider: MIYA Velez Today's appointment was scheduled initially as a PICC removal only. However upon walking in the room patient requesting more time stating that he has questions and would like a 2nd opinion regarding the need for a follow-up blood culture. This is my 1st time meeting Mr. Neumann. History of chronic neck pain, back pain, and history of Bell's palsy.     Interval History: Hospitalized from 7/9-7/27/18, admitted due to left-sided facial numbness with difficulty speaking.  Patient apparently had fevers and nausea for about a month.  He was evaluated at the urgent care initially and prescribed a course of cefdinir.  His fevers resolved for a short period of time, but he again got fevers up to 102.  Apparently there was also concern for a heart murmur.    On presentation, his WBC= 7.6.  Bcx on 7/9 & 7/10 +Strep Viridans, repeat cx on 7/11 was negative.  CSF on 7/10 showed 54 WBCs with 77% lymphs.  MRI on 7/11 showed epidural abscesses extending from his cervical spine to L3-4.  Was evaluated by neurosurgery, deemed no surgical intervention needed.  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 CHAR on 7/13 +small pedunculated mobile masses to tips of the valve causing severe regurgitation.  Discharged to SNF on IV penicillin G through 8/22/18, in addition to IV gentamicin for synergy through 8/3/18.     8/1/2018: Seen by VIDYA Matthews.  Residing at [facility].  Will have to completely IV gentamicin at [facility], end date 8/3/18.  Okay to complete IV penicillin G through 8/22/18 via home infusion.  Orders written for continuous infusion via CADD pump.     8/15/18: Seen by Guillermina  "VIDYA Diallo.  Tolerating IV penicillin G without issue. Instructed to complete as planned 8/22/18 with no need for PO antibiotics to follow. Scheduled to return to the office on 8/23/18 for PICC removal. Labs [facility] requested [facility] due to insurance issue.     Hospital records reviewed    Today 8/23/18: Patient reports feeling well. Patient stating that he had transesophageal echocardiogram done with cardiology on Monday. Patient stating that he was told he will be requiring a mitral valve replacement upon completion of antibiotics. He states he has an upcoming appointment with cardiology/cardiac surgery to review results and discuss surgical plan.  He denies chest pain, shortness of breath, dizziness, palpitations, edema. Denies feeling generally ill, fevers/chills, general malaise, headache, n/v/d. Denies back pain. Denies saddle paresthesia. No bowel/bladder incontinence.  Patient stating the only symptom he currently has is some mild throat discomfort which she relates to the transesophageal echocardiogram he had on Monday. He states he completed the IV penicillin yesterday. He states he forgot to complete requested labs. He is in clinic today to have the PICC line removed.  He is inquiring about the need for follow-up blood cultures stating he feels they are necessary since the 1st labs he had done in the urgent care initially did not show this infection.       Past Medical History:   Diagnosis Date   • Pain        Allergies: Nkda [no known drug allergy]    Current medications and problem list reviewed with patient and updated in EPIC.    ROS  As documented above in my HPI       Objective:     PE:  /70   Pulse 99   Temp 36.6 °C (97.9 °F)   Ht 1.778 m (5' 10\")   Wt 100.2 kg (221 lb)   SpO2 93%   BMI 31.71 kg/m²      Vital signs reviewed  Constitutional: patient is oriented to person, place, and time. Appears well-developed and well-nourished. No distress  Eyes: Conjunctivae normal and EOM are " normal. Pupils are equal, round, and reactive to light.   Mouth/Throat: Lips without lesions, good dentition, oropharynx is clear and moist.  Neck: Trachea midline. Normal range of motion. Neck supple. No masses  Cardiovascular: Normal rate, regular rhythm, normal heart sounds and intact distal pulses. No gallop, or friction rub. No edema.  +Murmur  Pulmonary/Chest: No respiratory distress. Unlabored respiratory effort, lungs clear to auscultation. No wheezes or rales.   Abdominal: Soft, non tender. BS + x 4. No masses or hepatosplenomegaly.   Musculoskeletal: Normal range of motion. No tenderness, swelling, erythema, deformity noted.  Neurological: He is alert and oriented to person, place, and time. No cranial nerve deficit. Coordination normal.   Skin: Skin is warm and dry. Good turgor. No rashes visable.  YONNY Midline catheter in place- CDI. No erythema. Non tender. Midline removed in clinic without issue. No bleeding. Tip Intact  Psychiatric: Patient is very frustrated initially regarding how the appointment was scheduled.  He is also frustrated by the course of events between the urgent care in the hospital.    No recent labs available    CHAR 8/20/18 showed no vegetation. Mitral regurgitation. No stenosis. Ejection fraction 65%    MRI of the lumbar spine 8/8/18 demonstrated previously seen upper lumbar epidural enhancement as now being resolved.  MRI of the cervical spine 8/8/18 demonstrated interval resolution of the previously seen posterior epidural contrast enhancement.   Thoracic MRI 8/8/18 was normal    Assessment and Plan:   The following treatment plan was discussed with patient at length  1. Acute bacterial endocarditis     2. Epidural abscess         Patient completed IV antibiotics. Last dose yesterday. No acute concern for infection. Midline catheter removed in clinic without issue  Discussed with patient that there is no indication for test of cure blood cultures. If he develops any symptoms  concerning for infection such as feeling generally ill, fevers, chills, chest pain, shortness of breath he needs to present immediately to the emergency department. Patient verbalized understanding  Follow-up with cardiology as scheduled  Follow up: 2 weeks, RTC sooner if needed. FU with PCP for ongoing chronic medical conditions.            Please note that this dictation was created using voice recognition software. I have  worked with technical experts from [facility] to optimize the interface.  I have made every reasonable attempt to correct obvious errors, but there may be errors of grammar and possibly content that I did not discover before finalizing the note.

## 2018-09-10 ENCOUNTER — OFFICE VISIT (OUTPATIENT)
Dept: INFECTIOUS DISEASES | Facility: MEDICAL CENTER | Age: 28
End: 2018-09-10
Payer: MEDICAID

## 2018-09-10 VITALS
HEART RATE: 75 BPM | BODY MASS INDEX: 31.07 KG/M2 | HEIGHT: 70 IN | OXYGEN SATURATION: 96 % | WEIGHT: 217 LBS | DIASTOLIC BLOOD PRESSURE: 70 MMHG | TEMPERATURE: 99.1 F | SYSTOLIC BLOOD PRESSURE: 120 MMHG

## 2018-09-10 DIAGNOSIS — G06.2 EPIDURAL ABSCESS: ICD-10-CM

## 2018-09-10 DIAGNOSIS — B95.5 STREPTOCOCCAL BACTEREMIA: ICD-10-CM

## 2018-09-10 DIAGNOSIS — R78.81 STREPTOCOCCAL BACTEREMIA: ICD-10-CM

## 2018-09-10 DIAGNOSIS — I33.0 ACUTE BACTERIAL ENDOCARDITIS: ICD-10-CM

## 2018-09-10 PROCEDURE — 99213 OFFICE O/P EST LOW 20 MIN: CPT | Performed by: NURSE PRACTITIONER

## 2018-09-10 NOTE — PROGRESS NOTES
[facility]fect[facility] Disease [facility]    Subjective:     Chief Complaint   Patient presents with   • Follow-Up     Acute bacterial endocarditis     Interval History: 27 y.o. male with history of chronic neck pain, back pain and Bell's palsy.  Hospitalized from 7/9-7/27/18, admitted due to left-sided facial numbness with difficulty speaking.  Patient apparently had fevers and nausea for about a month.  He was evaluated at the urgent care initially and prescribed a course of cefdinir.  His fevers resolved for a short period of time, but he again got fevers up to 102.  Apparently there was also concern for a heart murmur.    On presentation, his WBC= 7.6.  Bcx on 7/9 & 7/10 +Strep Viridans, repeat cx on 7/11 was negative.  CSF on 7/10 showed 54 WBCs with 77% lymphs.  MRI on 7/11 showed epidural abscesses extending from his cervical spine to L3-4.  Was evaluated by neurosurgery, deemed no surgical intervention needed.  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 CHAR on 7/13 +small pedunculated mobile masses to tips of the valve causing severe regurgitation.  Discharged to SNF on IV penicillin G through 8/22/18, in addition to IV gentamicin for synergy through 8/3/18.     8/1/2018: Seen by VIDYA Matthews.  Residing at [facility].  Will have to completely IV gentamicin at [facility], end date 8/3/18.  Okay to complete IV penicillin G through 8/22/18 via home infusion.  Orders written for continuous infusion via CADD pump.     8/15/18: Seen by VIDYA Matthews.  Tolerating IV penicillin G without issue. Instructed to complete as planned 8/22/18 with no need for PO antibiotics to follow. Scheduled to return to the office on 8/23/18 for PICC removal. Labs were requested through [employer] due to insurance issue.     8/23/18: Seen by VIDYA Cosme.  Patient stating that he was told he will be requiring a mitral valve replacement upon completion of antibiotics.  He states he completed the IV  "penicillin yesterday. He states he forgot to complete requested labs. He is in clinic today to have the PICC line removed.  No acute concern for infection. Midline catheter removed in clinic without issue.  Discussed with patient that there is no indication for test of cure blood cultures.    Today, 9/10/2018: Continues to do well off the IV antibiotics.  Denies feeling generally ill, fevers/chills, general malaise, n/v/d, abdominal pain, chest pain or shortness of breath.  States that he had a headache several days ago that made him think he may be having another TIA, but it resolved.  Has not had follow-up with neurology as of yet.  Is having heart surgery next week with Dr. Fernandez for a mitral valve repair versus replacement.  States that he continues to have generalized back pain; however, he no longer has the leg numbness that he had prior.  Describes the pain as blunt pain that mildly improves with rest.    ROS  As documented above in my HPI.    Past Medical History:   Diagnosis Date   • Pain        Social History   Substance Use Topics   • Smoking status: Current Every Day Smoker     Packs/day: 0.50   • Smokeless tobacco: Never Used   • Alcohol use No       Allergies: Nkda [no known drug allergy]    Pt's medication and problem list reviewed.     Objective:     PE:  /70   Pulse 75   Temp 37.3 °C (99.1 °F)   Ht 1.778 m (5' 10\")   Wt 98.4 kg (217 lb)   SpO2 96%   BMI 31.14 kg/m²     Vital signs reviewed    Constitutional: Appears well-developed and well-nourished. No acute distress.  Speech fluent.  Obese.  Eyes: Conjunctivae normal and EOM are normal. PERRL.  Neck: Trachea midline. Normal range of motion.  No JVD.  Neck supple.  Cardiovascular: Normal rate, regular rhythm. + Murmur.  No gallop or friction rub. No edema.  Respiratory: No respiratory distress, unlabored respiratory effort.  Lungs clear to auscultation bilaterally. No wheezes or rales.   Abdomen: Soft, non tender, non-distended. BS + x " 4. No masses.   Musculoskeletal: Steady gait.  Normal range of motion.  No joint or bone tenderness, swelling, erythema or deformity.    Back- nontender to palpitation, no bony prominences or protrusions.  Skin: Warm and dry.  No visible rashes or lesions.  Neurological: No cranial nerve deficit. Coordination normal.  Sensation intact.  Psychiatric: Alert and oriented to person, place, and time. Normal mood.  Flat affect.      Labs from [facility] on 9/7/18:  WBC 5.5  Hemoglobin 14.9  Hematocrit 43.9  Platelet 186  Glucose 97  BUN 17  Creatinine 0.86  Sodium 141  Potassium 4.1  AST 15  ALT 11  Alk phos 71    Assessment and Plan:   The following treatment plan was discussed with patient at length:    1. Acute bacterial endocarditis      Resolved, no signs of active infection.  Completed IV antibiotics on 8/22.  PICC was DC'd on 8/23.    Planning to have mitral valve repair versus replacement next week with Dr. Fernandez.   2. Streptococcal bacteremia      Resolved, no signs of active infection.   3. Epidural abscess      Resolved, no signs of active infection.     Follow up: PRN, RTC sooner if needed. FU with PCP for ongoing chronic medical conditions.     CRUZ Bone.       Please note that this dictation was created using voice recognition software. I have  worked with technical experts from [facility] to optimize the interface.  I have made every reasonable attempt to correct obvious errors, but there may be errors of grammar and possibly content that I did not discover before finalizing the note.

## 2018-09-19 ENCOUNTER — HOSPITAL ENCOUNTER (OUTPATIENT)
Dept: RADIOLOGY | Facility: MEDICAL CENTER | Age: 28
DRG: 219 | End: 2018-09-19
Attending: THORACIC SURGERY (CARDIOTHORACIC VASCULAR SURGERY) | Admitting: THORACIC SURGERY (CARDIOTHORACIC VASCULAR SURGERY)
Payer: MEDICAID

## 2018-09-19 ENCOUNTER — HOSPITAL ENCOUNTER (OUTPATIENT)
Dept: CARDIOLOGY | Facility: MEDICAL CENTER | Age: 28
DRG: 219 | End: 2018-09-19
Attending: THORACIC SURGERY (CARDIOTHORACIC VASCULAR SURGERY)
Payer: MEDICAID

## 2018-09-19 ENCOUNTER — HOSPITAL ENCOUNTER (OUTPATIENT)
Dept: RADIOLOGY | Facility: MEDICAL CENTER | Age: 28
DRG: 219 | End: 2018-09-19
Attending: THORACIC SURGERY (CARDIOTHORACIC VASCULAR SURGERY)
Payer: MEDICAID

## 2018-09-19 DIAGNOSIS — I34.0 NON-RHEUMATIC MITRAL REGURGITATION: ICD-10-CM

## 2018-09-19 DIAGNOSIS — Z01.810 PRE-OPERATIVE CARDIOVASCULAR EXAMINATION: ICD-10-CM

## 2018-09-19 LAB
ABO GROUP BLD: NORMAL
ALBUMIN SERPL BCP-MCNC: 4.7 G/DL (ref 3.2–4.9)
ALBUMIN/GLOB SERPL: 1.9 G/DL
ALP SERPL-CCNC: 74 U/L (ref 30–99)
ALT SERPL-CCNC: 14 U/L (ref 2–50)
ANION GAP SERPL CALC-SCNC: 7 MMOL/L (ref 0–11.9)
APPEARANCE UR: CLEAR
APTT PPP: 30.6 SEC (ref 24.7–36)
AST SERPL-CCNC: 15 U/L (ref 12–45)
BASOPHILS # BLD AUTO: 0.5 % (ref 0–1.8)
BASOPHILS # BLD: 0.03 K/UL (ref 0–0.12)
BILIRUB SERPL-MCNC: 0.7 MG/DL (ref 0.1–1.5)
BILIRUB UR QL STRIP.AUTO: NEGATIVE
BLD GP AB SCN SERPL QL: NORMAL
BUN SERPL-MCNC: 16 MG/DL (ref 8–22)
CALCIUM SERPL-MCNC: 9.3 MG/DL (ref 8.5–10.5)
CHLORIDE SERPL-SCNC: 103 MMOL/L (ref 96–112)
CO2 SERPL-SCNC: 26 MMOL/L (ref 20–33)
COLOR UR: YELLOW
CREAT SERPL-MCNC: 0.87 MG/DL (ref 0.5–1.4)
EKG IMPRESSION: NORMAL
EOSINOPHIL # BLD AUTO: 0.13 K/UL (ref 0–0.51)
EOSINOPHIL NFR BLD: 2.1 % (ref 0–6.9)
ERYTHROCYTE [DISTWIDTH] IN BLOOD BY AUTOMATED COUNT: 43.2 FL (ref 35.9–50)
EST. AVERAGE GLUCOSE BLD GHB EST-MCNC: 114 MG/DL
GLOBULIN SER CALC-MCNC: 2.5 G/DL (ref 1.9–3.5)
GLUCOSE SERPL-MCNC: 88 MG/DL (ref 65–99)
GLUCOSE UR STRIP.AUTO-MCNC: NEGATIVE MG/DL
HBA1C MFR BLD: 5.6 % (ref 0–5.6)
HCT VFR BLD AUTO: 45.8 % (ref 42–52)
HGB BLD-MCNC: 15.8 G/DL (ref 14–18)
IMM GRANULOCYTES # BLD AUTO: 0.03 K/UL (ref 0–0.11)
IMM GRANULOCYTES NFR BLD AUTO: 0.5 % (ref 0–0.9)
INR PPP: 1.08 (ref 0.87–1.13)
KETONES UR STRIP.AUTO-MCNC: NEGATIVE MG/DL
LEUKOCYTE ESTERASE UR QL STRIP.AUTO: NEGATIVE
LYMPHOCYTES # BLD AUTO: 1.98 K/UL (ref 1–4.8)
LYMPHOCYTES NFR BLD: 32 % (ref 22–41)
MCH RBC QN AUTO: 28.1 PG (ref 27–33)
MCHC RBC AUTO-ENTMCNC: 34.5 G/DL (ref 33.7–35.3)
MCV RBC AUTO: 81.5 FL (ref 81.4–97.8)
MICRO URNS: NORMAL
MONOCYTES # BLD AUTO: 0.38 K/UL (ref 0–0.85)
MONOCYTES NFR BLD AUTO: 6.1 % (ref 0–13.4)
NEUTROPHILS # BLD AUTO: 3.64 K/UL (ref 1.82–7.42)
NEUTROPHILS NFR BLD: 58.8 % (ref 44–72)
NITRITE UR QL STRIP.AUTO: NEGATIVE
NRBC # BLD AUTO: 0 K/UL
NRBC BLD-RTO: 0 /100 WBC
PH UR STRIP.AUTO: 5 [PH]
PLATELET # BLD AUTO: 177 K/UL (ref 164–446)
PMV BLD AUTO: 11 FL (ref 9–12.9)
POTASSIUM SERPL-SCNC: 3.9 MMOL/L (ref 3.6–5.5)
PROT SERPL-MCNC: 7.2 G/DL (ref 6–8.2)
PROT UR QL STRIP: NEGATIVE MG/DL
PROTHROMBIN TIME: 14.1 SEC (ref 12–14.6)
RBC # BLD AUTO: 5.62 M/UL (ref 4.7–6.1)
RBC UR QL AUTO: NEGATIVE
RH BLD: NORMAL
SODIUM SERPL-SCNC: 136 MMOL/L (ref 135–145)
SP GR UR STRIP.AUTO: 1.02
UROBILINOGEN UR STRIP.AUTO-MCNC: 0.2 MG/DL
WBC # BLD AUTO: 6.2 K/UL (ref 4.8–10.8)

## 2018-09-19 PROCEDURE — 93010 ELECTROCARDIOGRAM REPORT: CPT | Performed by: INTERNAL MEDICINE

## 2018-09-19 PROCEDURE — 86901 BLOOD TYPING SEROLOGIC RH(D): CPT

## 2018-09-19 PROCEDURE — 80053 COMPREHEN METABOLIC PANEL: CPT

## 2018-09-19 PROCEDURE — 81003 URINALYSIS AUTO W/O SCOPE: CPT

## 2018-09-19 PROCEDURE — 93880 EXTRACRANIAL BILAT STUDY: CPT

## 2018-09-19 PROCEDURE — 71046 X-RAY EXAM CHEST 2 VIEWS: CPT

## 2018-09-19 PROCEDURE — 85610 PROTHROMBIN TIME: CPT

## 2018-09-19 PROCEDURE — 86900 BLOOD TYPING SEROLOGIC ABO: CPT

## 2018-09-19 PROCEDURE — 93005 ELECTROCARDIOGRAM TRACING: CPT | Performed by: THORACIC SURGERY (CARDIOTHORACIC VASCULAR SURGERY)

## 2018-09-19 PROCEDURE — 83036 HEMOGLOBIN GLYCOSYLATED A1C: CPT

## 2018-09-19 PROCEDURE — 85730 THROMBOPLASTIN TIME PARTIAL: CPT

## 2018-09-19 PROCEDURE — 85025 COMPLETE CBC W/AUTO DIFF WBC: CPT

## 2018-09-19 PROCEDURE — 86850 RBC ANTIBODY SCREEN: CPT

## 2018-09-19 RX ORDER — CEFAZOLIN SODIUM 2 G/100ML
2 INJECTION, SOLUTION INTRAVENOUS ONCE
Status: DISCONTINUED | OUTPATIENT
Start: 2018-09-20 | End: 2018-09-20 | Stop reason: HOSPADM

## 2018-09-19 RX ORDER — DEXMEDETOMIDINE HYDROCHLORIDE 4 UG/ML
0-1.5 INJECTION, SOLUTION INTRAVENOUS CONTINUOUS
Status: DISCONTINUED | OUTPATIENT
Start: 2018-09-19 | End: 2018-09-19

## 2018-09-19 RX ORDER — DEXMEDETOMIDINE HYDROCHLORIDE 4 UG/ML
0-1.5 INJECTION, SOLUTION INTRAVENOUS CONTINUOUS
Status: DISCONTINUED | OUTPATIENT
Start: 2018-09-20 | End: 2018-09-20

## 2018-09-20 ENCOUNTER — HOSPITAL ENCOUNTER (INPATIENT)
Facility: MEDICAL CENTER | Age: 28
LOS: 4 days | DRG: 219 | End: 2018-09-24
Attending: THORACIC SURGERY (CARDIOTHORACIC VASCULAR SURGERY) | Admitting: THORACIC SURGERY (CARDIOTHORACIC VASCULAR SURGERY)
Payer: MEDICAID

## 2018-09-20 ENCOUNTER — APPOINTMENT (OUTPATIENT)
Dept: RADIOLOGY | Facility: MEDICAL CENTER | Age: 28
DRG: 219 | End: 2018-09-20
Attending: THORACIC SURGERY (CARDIOTHORACIC VASCULAR SURGERY)
Payer: MEDICAID

## 2018-09-20 DIAGNOSIS — I34.0 NON-RHEUMATIC MITRAL REGURGITATION: ICD-10-CM

## 2018-09-20 DIAGNOSIS — G47.00 INSOMNIA, UNSPECIFIED TYPE: ICD-10-CM

## 2018-09-20 DIAGNOSIS — F41.9 ANXIETY: ICD-10-CM

## 2018-09-20 DIAGNOSIS — G89.18 ACUTE POST-OPERATIVE PAIN: ICD-10-CM

## 2018-09-20 DIAGNOSIS — Z98.890 S/P MITRAL VALVE REPAIR: ICD-10-CM

## 2018-09-20 LAB
ABO GROUP BLD: NORMAL
ACTION RANGE TRIGGERED IACRT: NO
ACTION RANGE TRIGGERED IACRT: YES
ACTION RANGE TRIGGERED IACRT: YES
APTT PPP: 31.5 SEC (ref 24.7–36)
BASE EXCESS BLDA CALC-SCNC: -1 MMOL/L (ref -4–3)
BASE EXCESS BLDA CALC-SCNC: -4 MMOL/L (ref -4–3)
BASE EXCESS BLDA CALC-SCNC: -4 MMOL/L (ref -4–3)
BASE EXCESS BLDA CALC-SCNC: 0 MMOL/L (ref -4–3)
BASE EXCESS BLDA CALC-SCNC: 0 MMOL/L (ref -4–3)
BASE EXCESS BLDV CALC-SCNC: 0 MMOL/L (ref -4–3)
BODY TEMPERATURE: ABNORMAL DEGREES
CA-I BLD ISE-SCNC: 1.07 MMOL/L (ref 1.1–1.3)
CA-I BLD ISE-SCNC: 1.08 MMOL/L (ref 1.1–1.3)
CA-I BLD ISE-SCNC: 1.09 MMOL/L (ref 1.1–1.3)
CA-I BLD ISE-SCNC: 1.09 MMOL/L (ref 1.1–1.3)
CA-I BLD ISE-SCNC: 1.11 MMOL/L (ref 1.1–1.3)
CA-I BLD ISE-SCNC: 1.24 MMOL/L (ref 1.1–1.3)
CO2 BLDA-SCNC: 23 MMOL/L (ref 20–33)
CO2 BLDA-SCNC: 24 MMOL/L (ref 20–33)
CO2 BLDA-SCNC: 25 MMOL/L (ref 20–33)
CO2 BLDA-SCNC: 26 MMOL/L (ref 20–33)
CO2 BLDA-SCNC: 28 MMOL/L (ref 20–33)
CO2 BLDV-SCNC: 26 MMOL/L (ref 20–33)
EKG IMPRESSION: NORMAL
GLUCOSE BLD-MCNC: 100 MG/DL (ref 65–99)
GLUCOSE BLD-MCNC: 107 MG/DL (ref 65–99)
GLUCOSE BLD-MCNC: 110 MG/DL (ref 65–99)
GLUCOSE BLD-MCNC: 110 MG/DL (ref 65–99)
GLUCOSE BLD-MCNC: 115 MG/DL (ref 65–99)
GLUCOSE BLD-MCNC: 116 MG/DL (ref 65–99)
GLUCOSE BLD-MCNC: 140 MG/DL (ref 65–99)
GLUCOSE BLD-MCNC: 145 MG/DL (ref 65–99)
GLUCOSE BLD-MCNC: 146 MG/DL (ref 65–99)
GLUCOSE BLD-MCNC: 154 MG/DL (ref 65–99)
GLUCOSE BLD-MCNC: 90 MG/DL (ref 65–99)
GRAM STN SPEC: NORMAL
HCO3 BLDA-SCNC: 21.5 MMOL/L (ref 17–25)
HCO3 BLDA-SCNC: 23 MMOL/L (ref 17–25)
HCO3 BLDA-SCNC: 23.6 MMOL/L (ref 17–25)
HCO3 BLDA-SCNC: 25 MMOL/L (ref 17–25)
HCO3 BLDA-SCNC: 26.3 MMOL/L (ref 17–25)
HCO3 BLDV-SCNC: 24.8 MMOL/L (ref 24–28)
HCT VFR BLD CALC: 32 % (ref 42–52)
HCT VFR BLD CALC: 33 % (ref 42–52)
HCT VFR BLD CALC: 33 % (ref 42–52)
HCT VFR BLD CALC: 34 % (ref 42–52)
HCT VFR BLD CALC: 34 % (ref 42–52)
HCT VFR BLD CALC: 43 % (ref 42–52)
HGB BLD-MCNC: 10.9 G/DL (ref 14–18)
HGB BLD-MCNC: 11.2 G/DL (ref 14–18)
HGB BLD-MCNC: 11.2 G/DL (ref 14–18)
HGB BLD-MCNC: 11.6 G/DL (ref 14–18)
HGB BLD-MCNC: 11.6 G/DL (ref 14–18)
HGB BLD-MCNC: 14.6 G/DL (ref 14–18)
INR PPP: 1.56 (ref 0.87–1.13)
INST. QUALIFIED PATIENT IIQPT: YES
MAGNESIUM SERPL-MCNC: 2.4 MG/DL (ref 1.5–2.5)
MAGNESIUM SERPL-MCNC: 2.5 MG/DL (ref 1.5–2.5)
O2/TOTAL GAS SETTING VFR VENT: 100 %
O2/TOTAL GAS SETTING VFR VENT: 80 %
O2/TOTAL GAS SETTING VFR VENT: 80 %
O2/TOTAL GAS SETTING VFR VENT: 85 %
PCO2 BLDA: 38.3 MMHG (ref 26–37)
PCO2 BLDA: 38.8 MMHG (ref 26–37)
PCO2 BLDA: 42.4 MMHG (ref 26–37)
PCO2 BLDA: 46.5 MMHG (ref 26–37)
PCO2 BLDA: 50.5 MMHG (ref 26–37)
PCO2 BLDV: 41.8 MMHG (ref 41–51)
PCO2 TEMP ADJ BLDA: 36.4 MMHG (ref 26–37)
PCO2 TEMP ADJ BLDA: 38.8 MMHG (ref 26–37)
PCO2 TEMP ADJ BLDA: 42.4 MMHG (ref 26–37)
PCO2 TEMP ADJ BLDA: 46.5 MMHG (ref 26–37)
PCO2 TEMP ADJ BLDA: 50.5 MMHG (ref 26–37)
PCO2 TEMP ADJ BLDV: 41.8 MMHG (ref 41–51)
PH BLDA: 7.3 [PH] (ref 7.4–7.5)
PH BLDA: 7.32 [PH] (ref 7.4–7.5)
PH BLDA: 7.36 [PH] (ref 7.4–7.5)
PH BLDA: 7.38 [PH] (ref 7.4–7.5)
PH BLDA: 7.39 [PH] (ref 7.4–7.5)
PH BLDV: 7.38 [PH] (ref 7.31–7.45)
PH TEMP ADJ BLDA: 7.3 [PH] (ref 7.4–7.5)
PH TEMP ADJ BLDA: 7.32 [PH] (ref 7.4–7.5)
PH TEMP ADJ BLDA: 7.38 [PH] (ref 7.4–7.5)
PH TEMP ADJ BLDA: 7.38 [PH] (ref 7.4–7.5)
PH TEMP ADJ BLDA: 7.39 [PH] (ref 7.4–7.5)
PH TEMP ADJ BLDV: 7.38 [PH] (ref 7.31–7.45)
PLATELET # BLD AUTO: 108 K/UL (ref 164–446)
PO2 BLDA: 140 MMHG (ref 64–87)
PO2 BLDA: 183 MMHG (ref 64–87)
PO2 BLDA: 202 MMHG (ref 64–87)
PO2 BLDA: 294 MMHG (ref 64–87)
PO2 BLDA: 88 MMHG (ref 64–87)
PO2 BLDV: 44 MMHG (ref 25–40)
PO2 TEMP ADJ BLDA: 132 MMHG (ref 64–87)
PO2 TEMP ADJ BLDA: 183 MMHG (ref 64–87)
PO2 TEMP ADJ BLDA: 202 MMHG (ref 64–87)
PO2 TEMP ADJ BLDA: 294 MMHG (ref 64–87)
PO2 TEMP ADJ BLDA: 88 MMHG (ref 64–87)
PO2 TEMP ADJ BLDV: 44 MMHG (ref 25–40)
POTASSIUM BLD-SCNC: 3.6 MMOL/L (ref 3.6–5.5)
POTASSIUM BLD-SCNC: 4.7 MMOL/L (ref 3.6–5.5)
POTASSIUM BLD-SCNC: 4.9 MMOL/L (ref 3.6–5.5)
POTASSIUM BLD-SCNC: 4.9 MMOL/L (ref 3.6–5.5)
POTASSIUM BLD-SCNC: 5.6 MMOL/L (ref 3.6–5.5)
POTASSIUM BLD-SCNC: 5.8 MMOL/L (ref 3.6–5.5)
POTASSIUM SERPL-SCNC: 4.4 MMOL/L (ref 3.6–5.5)
PROTHROMBIN TIME: 18.7 SEC (ref 12–14.6)
RH BLD: NORMAL
RHODAMINE-AURAMINE STN SPEC: NORMAL
SAO2 % BLDA: 100 % (ref 93–99)
SAO2 % BLDA: 96 % (ref 93–99)
SAO2 % BLDA: 99 % (ref 93–99)
SAO2 % BLDV: 78 %
SIGNIFICANT IND 70042: NORMAL
SIGNIFICANT IND 70042: NORMAL
SITE SITE: NORMAL
SITE SITE: NORMAL
SODIUM BLD-SCNC: 138 MMOL/L (ref 135–145)
SODIUM BLD-SCNC: 138 MMOL/L (ref 135–145)
SODIUM BLD-SCNC: 139 MMOL/L (ref 135–145)
SODIUM BLD-SCNC: 140 MMOL/L (ref 135–145)
SODIUM BLD-SCNC: 140 MMOL/L (ref 135–145)
SODIUM BLD-SCNC: 141 MMOL/L (ref 135–145)
SOURCE SOURCE: NORMAL
SOURCE SOURCE: NORMAL
SPECIMEN DRAWN FROM PATIENT: ABNORMAL

## 2018-09-20 PROCEDURE — 700101 HCHG RX REV CODE 250: Performed by: CLINICAL NURSE SPECIALIST

## 2018-09-20 PROCEDURE — 160048 HCHG OR STATISTICAL LEVEL 1-5: Performed by: THORACIC SURGERY (CARDIOTHORACIC VASCULAR SURGERY)

## 2018-09-20 PROCEDURE — 503001 HCHG PERFUSION: Performed by: THORACIC SURGERY (CARDIOTHORACIC VASCULAR SURGERY)

## 2018-09-20 PROCEDURE — 700101 HCHG RX REV CODE 250: Performed by: THORACIC SURGERY (CARDIOTHORACIC VASCULAR SURGERY)

## 2018-09-20 PROCEDURE — 500371 HCHG DRAIN, BLAKE 10MM: Performed by: THORACIC SURGERY (CARDIOTHORACIC VASCULAR SURGERY)

## 2018-09-20 PROCEDURE — 700111 HCHG RX REV CODE 636 W/ 250 OVERRIDE (IP)

## 2018-09-20 PROCEDURE — 87206 SMEAR FLUORESCENT/ACID STAI: CPT

## 2018-09-20 PROCEDURE — 700111 HCHG RX REV CODE 636 W/ 250 OVERRIDE (IP): Performed by: CLINICAL NURSE SPECIALIST

## 2018-09-20 PROCEDURE — A9270 NON-COVERED ITEM OR SERVICE: HCPCS | Performed by: CLINICAL NURSE SPECIALIST

## 2018-09-20 PROCEDURE — 87075 CULTR BACTERIA EXCEPT BLOOD: CPT

## 2018-09-20 PROCEDURE — 500734 HCHG INSERT, STEALTH: Performed by: THORACIC SURGERY (CARDIOTHORACIC VASCULAR SURGERY)

## 2018-09-20 PROCEDURE — A6403 STERILE GAUZE>16 <= 48 SQ IN: HCPCS | Performed by: THORACIC SURGERY (CARDIOTHORACIC VASCULAR SURGERY)

## 2018-09-20 PROCEDURE — 503000 HCHG SUTURE, OHS: Performed by: THORACIC SURGERY (CARDIOTHORACIC VASCULAR SURGERY)

## 2018-09-20 PROCEDURE — 700105 HCHG RX REV CODE 258: Performed by: NURSE PRACTITIONER

## 2018-09-20 PROCEDURE — 87070 CULTURE OTHR SPECIMN AEROBIC: CPT

## 2018-09-20 PROCEDURE — 700111 HCHG RX REV CODE 636 W/ 250 OVERRIDE (IP): Performed by: THORACIC SURGERY (CARDIOTHORACIC VASCULAR SURGERY)

## 2018-09-20 PROCEDURE — 82962 GLUCOSE BLOOD TEST: CPT | Mod: 91

## 2018-09-20 PROCEDURE — 500312 HCHG CONNECTOR, BLAKE DRAIN 1-WAY: Performed by: THORACIC SURGERY (CARDIOTHORACIC VASCULAR SURGERY)

## 2018-09-20 PROCEDURE — 85347 COAGULATION TIME ACTIVATED: CPT

## 2018-09-20 PROCEDURE — 700102 HCHG RX REV CODE 250 W/ 637 OVERRIDE(OP): Performed by: THORACIC SURGERY (CARDIOTHORACIC VASCULAR SURGERY)

## 2018-09-20 PROCEDURE — C9248 INJ, CLEVIDIPINE BUTYRATE: HCPCS

## 2018-09-20 PROCEDURE — C1894 INTRO/SHEATH, NON-LASER: HCPCS | Performed by: THORACIC SURGERY (CARDIOTHORACIC VASCULAR SURGERY)

## 2018-09-20 PROCEDURE — 87116 MYCOBACTERIA CULTURE: CPT

## 2018-09-20 PROCEDURE — 501745 HCHG WIRE, SURGICAL STEEL: Performed by: THORACIC SURGERY (CARDIOTHORACIC VASCULAR SURGERY)

## 2018-09-20 PROCEDURE — C1725 CATH, TRANSLUMIN NON-LASER: HCPCS | Performed by: THORACIC SURGERY (CARDIOTHORACIC VASCULAR SURGERY)

## 2018-09-20 PROCEDURE — 700105 HCHG RX REV CODE 258: Performed by: CLINICAL NURSE SPECIALIST

## 2018-09-20 PROCEDURE — 700101 HCHG RX REV CODE 250

## 2018-09-20 PROCEDURE — 83735 ASSAY OF MAGNESIUM: CPT

## 2018-09-20 PROCEDURE — 02UG0JZ SUPPLEMENT MITRAL VALVE WITH SYNTHETIC SUBSTITUTE, OPEN APPROACH: ICD-10-PCS | Performed by: THORACIC SURGERY (CARDIOTHORACIC VASCULAR SURGERY)

## 2018-09-20 PROCEDURE — 99291 CRITICAL CARE FIRST HOUR: CPT | Performed by: INTERNAL MEDICINE

## 2018-09-20 PROCEDURE — 501519 HCHG SUTURE, E PACK: Performed by: THORACIC SURGERY (CARDIOTHORACIC VASCULAR SURGERY)

## 2018-09-20 PROCEDURE — B24BZZ4 ULTRASONOGRAPHY OF HEART WITH AORTA, TRANSESOPHAGEAL: ICD-10-PCS | Performed by: THORACIC SURGERY (CARDIOTHORACIC VASCULAR SURGERY)

## 2018-09-20 PROCEDURE — 5A1221Z PERFORMANCE OF CARDIAC OUTPUT, CONTINUOUS: ICD-10-PCS | Performed by: THORACIC SURGERY (CARDIOTHORACIC VASCULAR SURGERY)

## 2018-09-20 PROCEDURE — 84295 ASSAY OF SERUM SODIUM: CPT

## 2018-09-20 PROCEDURE — 87015 SPECIMEN INFECT AGNT CONCNTJ: CPT | Mod: 91

## 2018-09-20 PROCEDURE — 93355 ECHO TRANSESOPHAGEAL (TEE): CPT

## 2018-09-20 PROCEDURE — C1751 CATH, INF, PER/CENT/MIDLINE: HCPCS | Performed by: THORACIC SURGERY (CARDIOTHORACIC VASCULAR SURGERY)

## 2018-09-20 PROCEDURE — 502240 HCHG MISC OR SUPPLY RC 0272: Performed by: THORACIC SURGERY (CARDIOTHORACIC VASCULAR SURGERY)

## 2018-09-20 PROCEDURE — 85014 HEMATOCRIT: CPT | Mod: 91

## 2018-09-20 PROCEDURE — 85610 PROTHROMBIN TIME: CPT

## 2018-09-20 PROCEDURE — C1898 LEAD, PMKR, OTHER THAN TRANS: HCPCS | Performed by: THORACIC SURGERY (CARDIOTHORACIC VASCULAR SURGERY)

## 2018-09-20 PROCEDURE — 93010 ELECTROCARDIOGRAM REPORT: CPT | Performed by: INTERNAL MEDICINE

## 2018-09-20 PROCEDURE — 160009 HCHG ANES TIME/MIN: Performed by: THORACIC SURGERY (CARDIOTHORACIC VASCULAR SURGERY)

## 2018-09-20 PROCEDURE — C1729 CATH, DRAINAGE: HCPCS | Performed by: THORACIC SURGERY (CARDIOTHORACIC VASCULAR SURGERY)

## 2018-09-20 PROCEDURE — 500002 HCHG ADHESIVE, DERMABOND: Performed by: THORACIC SURGERY (CARDIOTHORACIC VASCULAR SURGERY)

## 2018-09-20 PROCEDURE — 501506 HCHG SUTURE GUIDE, VALVE REPLACEMENT: Performed by: THORACIC SURGERY (CARDIOTHORACIC VASCULAR SURGERY)

## 2018-09-20 PROCEDURE — 94002 VENT MGMT INPAT INIT DAY: CPT

## 2018-09-20 PROCEDURE — 110454 HCHG SHELL REV 250: Performed by: THORACIC SURGERY (CARDIOTHORACIC VASCULAR SURGERY)

## 2018-09-20 PROCEDURE — 82330 ASSAY OF CALCIUM: CPT | Mod: 91

## 2018-09-20 PROCEDURE — 500890 HCHG PACK, OPEN HEART: Performed by: THORACIC SURGERY (CARDIOTHORACIC VASCULAR SURGERY)

## 2018-09-20 PROCEDURE — P9047 ALBUMIN (HUMAN), 25%, 50ML: HCPCS

## 2018-09-20 PROCEDURE — 93005 ELECTROCARDIOGRAM TRACING: CPT | Performed by: CLINICAL NURSE SPECIALIST

## 2018-09-20 PROCEDURE — 37799 UNLISTED PX VASCULAR SURGERY: CPT

## 2018-09-20 PROCEDURE — 85730 THROMBOPLASTIN TIME PARTIAL: CPT

## 2018-09-20 PROCEDURE — 700105 HCHG RX REV CODE 258: Performed by: THORACIC SURGERY (CARDIOTHORACIC VASCULAR SURGERY)

## 2018-09-20 PROCEDURE — 82947 ASSAY GLUCOSE BLOOD QUANT: CPT | Mod: 91

## 2018-09-20 PROCEDURE — 94770 HCHG CO2 EXPIRED GAS DETERMINATION: CPT

## 2018-09-20 PROCEDURE — 84132 ASSAY OF SERUM POTASSIUM: CPT | Mod: 91

## 2018-09-20 PROCEDURE — 02BG0ZZ EXCISION OF MITRAL VALVE, OPEN APPROACH: ICD-10-PCS | Performed by: THORACIC SURGERY (CARDIOTHORACIC VASCULAR SURGERY)

## 2018-09-20 PROCEDURE — 500385 HCHG DRAIN, PLEUROVAC ADUL: Performed by: THORACIC SURGERY (CARDIOTHORACIC VASCULAR SURGERY)

## 2018-09-20 PROCEDURE — 110372 HCHG SHELL REV 278: Performed by: THORACIC SURGERY (CARDIOTHORACIC VASCULAR SURGERY)

## 2018-09-20 PROCEDURE — 160042 HCHG SURGERY MINUTES - EA ADDL 1 MIN LEVEL 5: Performed by: THORACIC SURGERY (CARDIOTHORACIC VASCULAR SURGERY)

## 2018-09-20 PROCEDURE — 306637 HCHG MISC ORTHO ITEM RC 0274

## 2018-09-20 PROCEDURE — 700105 HCHG RX REV CODE 258

## 2018-09-20 PROCEDURE — 700111 HCHG RX REV CODE 636 W/ 250 OVERRIDE (IP): Performed by: NURSE PRACTITIONER

## 2018-09-20 PROCEDURE — P9045 ALBUMIN (HUMAN), 5%, 250 ML: HCPCS | Performed by: NURSE PRACTITIONER

## 2018-09-20 PROCEDURE — 71045 X-RAY EXAM CHEST 1 VIEW: CPT

## 2018-09-20 PROCEDURE — 700102 HCHG RX REV CODE 250 W/ 637 OVERRIDE(OP): Performed by: CLINICAL NURSE SPECIALIST

## 2018-09-20 PROCEDURE — 160031 HCHG SURGERY MINUTES - 1ST 30 MINS LEVEL 5: Performed by: THORACIC SURGERY (CARDIOTHORACIC VASCULAR SURGERY)

## 2018-09-20 PROCEDURE — 770022 HCHG ROOM/CARE - ICU (200)

## 2018-09-20 PROCEDURE — 82803 BLOOD GASES ANY COMBINATION: CPT | Mod: 91

## 2018-09-20 PROCEDURE — 85049 AUTOMATED PLATELET COUNT: CPT

## 2018-09-20 PROCEDURE — 5A1213Z PERFORMANCE OF CARDIAC PACING, INTERMITTENT: ICD-10-PCS | Performed by: THORACIC SURGERY (CARDIOTHORACIC VASCULAR SURGERY)

## 2018-09-20 PROCEDURE — 87205 SMEAR GRAM STAIN: CPT

## 2018-09-20 DEVICE — ANNULO. RING MITRAL 4600-34 ---ALWAYS SHIP OVERNIGHT---: Type: IMPLANTABLE DEVICE | Status: FUNCTIONAL

## 2018-09-20 RX ORDER — TRAMADOL HYDROCHLORIDE 50 MG/1
50 TABLET ORAL EVERY 4 HOURS PRN
Status: DISCONTINUED | OUTPATIENT
Start: 2018-09-20 | End: 2018-09-24 | Stop reason: HOSPADM

## 2018-09-20 RX ORDER — LEVETIRACETAM 500 MG/1
1500 TABLET ORAL 2 TIMES DAILY
Status: DISCONTINUED | OUTPATIENT
Start: 2018-09-20 | End: 2018-09-21

## 2018-09-20 RX ORDER — BISACODYL 10 MG
10 SUPPOSITORY, RECTAL RECTAL
Status: DISCONTINUED | OUTPATIENT
Start: 2018-09-20 | End: 2018-09-24 | Stop reason: HOSPADM

## 2018-09-20 RX ORDER — AMOXICILLIN 250 MG
2 CAPSULE ORAL 2 TIMES DAILY
Status: DISCONTINUED | OUTPATIENT
Start: 2018-09-20 | End: 2018-09-24 | Stop reason: HOSPADM

## 2018-09-20 RX ORDER — DEXTROSE MONOHYDRATE 25 G/50ML
25 INJECTION, SOLUTION INTRAVENOUS PRN
Status: ACTIVE | OUTPATIENT
Start: 2018-09-20 | End: 2018-09-21

## 2018-09-20 RX ORDER — ALBUMIN, HUMAN INJ 5% 5 %
25 SOLUTION INTRAVENOUS ONCE
Status: COMPLETED | OUTPATIENT
Start: 2018-09-20 | End: 2018-09-20

## 2018-09-20 RX ORDER — IBUPROFEN 200 MG
800 TABLET ORAL EVERY 6 HOURS PRN
COMMUNITY
End: 2018-09-26

## 2018-09-20 RX ORDER — PROMETHAZINE HYDROCHLORIDE 25 MG/1
25 SUPPOSITORY RECTAL EVERY 6 HOURS PRN
Status: DISCONTINUED | OUTPATIENT
Start: 2018-09-20 | End: 2018-09-24 | Stop reason: HOSPADM

## 2018-09-20 RX ORDER — OXYCODONE HYDROCHLORIDE 10 MG/1
10 TABLET ORAL
Status: DISCONTINUED | OUTPATIENT
Start: 2018-09-20 | End: 2018-09-24 | Stop reason: HOSPADM

## 2018-09-20 RX ORDER — ACETAMINOPHEN 650 MG/1
650 SUPPOSITORY RECTAL EVERY 4 HOURS PRN
Status: DISCONTINUED | OUTPATIENT
Start: 2018-09-20 | End: 2018-09-24 | Stop reason: HOSPADM

## 2018-09-20 RX ORDER — CITALOPRAM HYDROBROMIDE 10 MG/1
10 TABLET ORAL DAILY
COMMUNITY
End: 2018-11-05

## 2018-09-20 RX ORDER — MORPHINE SULFATE 4 MG/ML
4 INJECTION, SOLUTION INTRAMUSCULAR; INTRAVENOUS
Status: DISCONTINUED | OUTPATIENT
Start: 2018-09-20 | End: 2018-09-22 | Stop reason: ALTCHOICE

## 2018-09-20 RX ORDER — POTASSIUM CHLORIDE 7.45 MG/ML
10 INJECTION INTRAVENOUS 2 TIMES DAILY
Status: DISCONTINUED | OUTPATIENT
Start: 2018-09-20 | End: 2018-09-20

## 2018-09-20 RX ORDER — DEXMEDETOMIDINE HYDROCHLORIDE 4 UG/ML
0-1.5 INJECTION, SOLUTION INTRAVENOUS CONTINUOUS
Status: DISCONTINUED | OUTPATIENT
Start: 2018-09-20 | End: 2018-09-21

## 2018-09-20 RX ORDER — LIDOCAINE HYDROCHLORIDE 10 MG/ML
INJECTION, SOLUTION INFILTRATION; PERINEURAL
Status: COMPLETED
Start: 2018-09-20 | End: 2018-09-20

## 2018-09-20 RX ORDER — GABAPENTIN 100 MG/1
100 CAPSULE ORAL 3 TIMES DAILY
COMMUNITY
End: 2018-11-05

## 2018-09-20 RX ORDER — POTASSIUM CHLORIDE 7.45 MG/ML
10 INJECTION INTRAVENOUS 2 TIMES DAILY
Status: DISCONTINUED | OUTPATIENT
Start: 2018-09-22 | End: 2018-09-21

## 2018-09-20 RX ORDER — POTASSIUM CHLORIDE 20 MEQ/1
10 TABLET, EXTENDED RELEASE ORAL 2 TIMES DAILY
Status: DISCONTINUED | OUTPATIENT
Start: 2018-09-22 | End: 2018-09-21

## 2018-09-20 RX ORDER — MAGNESIUM SULFATE 1 G/100ML
1 INJECTION INTRAVENOUS
Status: COMPLETED | OUTPATIENT
Start: 2018-09-20 | End: 2018-09-22

## 2018-09-20 RX ORDER — ALUMINA, MAGNESIA, AND SIMETHICONE 2400; 2400; 240 MG/30ML; MG/30ML; MG/30ML
30 SUSPENSION ORAL EVERY 4 HOURS PRN
Status: DISCONTINUED | OUTPATIENT
Start: 2018-09-20 | End: 2018-09-24 | Stop reason: HOSPADM

## 2018-09-20 RX ORDER — SODIUM CHLORIDE 9 MG/ML
INJECTION, SOLUTION INTRAVENOUS CONTINUOUS
Status: DISCONTINUED | OUTPATIENT
Start: 2018-09-20 | End: 2018-09-21

## 2018-09-20 RX ORDER — SODIUM CHLORIDE, SODIUM GLUCONATE, SODIUM ACETATE, POTASSIUM CHLORIDE AND MAGNESIUM CHLORIDE 526; 502; 368; 37; 30 MG/100ML; MG/100ML; MG/100ML; MG/100ML; MG/100ML
INJECTION, SOLUTION INTRAVENOUS PRN
Status: DISCONTINUED | OUTPATIENT
Start: 2018-09-20 | End: 2018-09-22 | Stop reason: ALTCHOICE

## 2018-09-20 RX ORDER — DIPHENHYDRAMINE HCL 25 MG
25 TABLET ORAL
Status: DISCONTINUED | OUTPATIENT
Start: 2018-09-20 | End: 2018-09-24 | Stop reason: HOSPADM

## 2018-09-20 RX ORDER — MORPHINE SULFATE 4 MG/ML
INJECTION, SOLUTION INTRAMUSCULAR; INTRAVENOUS
Status: COMPLETED
Start: 2018-09-20 | End: 2018-09-20

## 2018-09-20 RX ORDER — CITALOPRAM 20 MG/1
10 TABLET ORAL DAILY
Status: DISCONTINUED | OUTPATIENT
Start: 2018-09-21 | End: 2018-09-24 | Stop reason: HOSPADM

## 2018-09-20 RX ORDER — OXYCODONE HYDROCHLORIDE 5 MG/1
5 TABLET ORAL
Status: DISCONTINUED | OUTPATIENT
Start: 2018-09-20 | End: 2018-09-24 | Stop reason: HOSPADM

## 2018-09-20 RX ORDER — GABAPENTIN 100 MG/1
100 CAPSULE ORAL 3 TIMES DAILY
Status: DISCONTINUED | OUTPATIENT
Start: 2018-09-20 | End: 2018-09-22

## 2018-09-20 RX ORDER — LIDOCAINE HYDROCHLORIDE 10 MG/ML
0.5 INJECTION, SOLUTION INFILTRATION; PERINEURAL
Status: DISCONTINUED | OUTPATIENT
Start: 2018-09-20 | End: 2018-09-20 | Stop reason: HOSPADM

## 2018-09-20 RX ORDER — SODIUM CHLORIDE 9 MG/ML
INJECTION, SOLUTION INTRAVENOUS
Status: DISCONTINUED
Start: 2018-09-20 | End: 2018-09-20

## 2018-09-20 RX ORDER — LEVETIRACETAM 750 MG/1
2250 TABLET ORAL 2 TIMES DAILY
COMMUNITY
End: 2018-11-05

## 2018-09-20 RX ORDER — ACETAMINOPHEN 325 MG/1
650 TABLET ORAL EVERY 4 HOURS PRN
Status: DISCONTINUED | OUTPATIENT
Start: 2018-09-20 | End: 2018-09-24 | Stop reason: HOSPADM

## 2018-09-20 RX ORDER — ONDANSETRON 2 MG/ML
4 INJECTION INTRAMUSCULAR; INTRAVENOUS EVERY 6 HOURS PRN
Status: DISCONTINUED | OUTPATIENT
Start: 2018-09-20 | End: 2018-09-24 | Stop reason: HOSPADM

## 2018-09-20 RX ORDER — POTASSIUM CHLORIDE 20 MEQ/1
10 TABLET, EXTENDED RELEASE ORAL 2 TIMES DAILY
Status: DISCONTINUED | OUTPATIENT
Start: 2018-09-20 | End: 2018-09-20

## 2018-09-20 RX ORDER — POLYETHYLENE GLYCOL 3350 17 G/17G
1 POWDER, FOR SOLUTION ORAL
Status: DISCONTINUED | OUTPATIENT
Start: 2018-09-20 | End: 2018-09-24 | Stop reason: HOSPADM

## 2018-09-20 RX ORDER — MIDAZOLAM HYDROCHLORIDE 1 MG/ML
2 INJECTION INTRAMUSCULAR; INTRAVENOUS
Status: DISCONTINUED | OUTPATIENT
Start: 2018-09-20 | End: 2018-09-22 | Stop reason: ALTCHOICE

## 2018-09-20 RX ADMIN — SODIUM CHLORIDE, SODIUM GLUCONATE, SODIUM ACETATE, POTASSIUM CHLORIDE AND MAGNESIUM CHLORIDE: 526; 502; 368; 37; 30 INJECTION, SOLUTION INTRAVENOUS at 15:52

## 2018-09-20 RX ADMIN — ALBUMIN (HUMAN) 25 G: 12.5 INJECTION, SOLUTION INTRAVENOUS at 18:29

## 2018-09-20 RX ADMIN — GABAPENTIN 100 MG: 100 CAPSULE ORAL at 17:40

## 2018-09-20 RX ADMIN — INSULIN HUMAN 2 UNITS: 100 INJECTION, SOLUTION PARENTERAL at 12:32

## 2018-09-20 RX ADMIN — MAGNESIUM SULFATE 1 G: 1 INJECTION INTRAVENOUS at 12:39

## 2018-09-20 RX ADMIN — SODIUM CHLORIDE 1 UNITS/HR: 9 INJECTION, SOLUTION INTRAVENOUS at 12:30

## 2018-09-20 RX ADMIN — ONDANSETRON 4 MG: 2 INJECTION INTRAMUSCULAR; INTRAVENOUS at 13:20

## 2018-09-20 RX ADMIN — SODIUM CHLORIDE 500 ML: 9 INJECTION, SOLUTION INTRAVENOUS at 12:14

## 2018-09-20 RX ADMIN — OXYCODONE HYDROCHLORIDE 10 MG: 10 TABLET ORAL at 16:33

## 2018-09-20 RX ADMIN — VANCOMYCIN HYDROCHLORIDE 1500 MG: 100 INJECTION, POWDER, LYOPHILIZED, FOR SOLUTION INTRAVENOUS at 19:50

## 2018-09-20 RX ADMIN — MUPIROCIN 1 APPLICATION: 20 OINTMENT TOPICAL at 17:41

## 2018-09-20 RX ADMIN — OXYCODONE HYDROCHLORIDE 10 MG: 10 TABLET ORAL at 20:00

## 2018-09-20 RX ADMIN — DEXMEDETOMIDINE HYDROCHLORIDE 0.5 MCG/KG/HR: 100 INJECTION, SOLUTION INTRAVENOUS at 20:05

## 2018-09-20 RX ADMIN — MORPHINE SULFATE 4 MG: 4 INJECTION INTRAVENOUS at 12:48

## 2018-09-20 RX ADMIN — SODIUM CHLORIDE, SODIUM GLUCONATE, SODIUM ACETATE, POTASSIUM CHLORIDE AND MAGNESIUM CHLORIDE: 526; 502; 368; 37; 30 INJECTION, SOLUTION INTRAVENOUS at 12:15

## 2018-09-20 RX ADMIN — LIDOCAINE HYDROCHLORIDE 0.5 ML: 10 INJECTION, SOLUTION INFILTRATION; PERINEURAL at 06:00

## 2018-09-20 RX ADMIN — FENTANYL CITRATE 50 MCG: 50 INJECTION INTRAMUSCULAR; INTRAVENOUS at 20:12

## 2018-09-20 RX ADMIN — MORPHINE SULFATE 4 MG: 4 INJECTION INTRAVENOUS at 12:08

## 2018-09-20 RX ADMIN — TRAMADOL HYDROCHLORIDE 50 MG: 50 TABLET, FILM COATED ORAL at 22:07

## 2018-09-20 RX ADMIN — DEXMEDETOMIDINE HYDROCHLORIDE 0.5 MCG/KG/HR: 100 INJECTION, SOLUTION INTRAVENOUS at 12:17

## 2018-09-20 RX ADMIN — TRAMADOL HYDROCHLORIDE 50 MG: 50 TABLET, FILM COATED ORAL at 18:09

## 2018-09-20 RX ADMIN — TRAMADOL HYDROCHLORIDE 50 MG: 50 TABLET, FILM COATED ORAL at 13:59

## 2018-09-20 RX ADMIN — CALCIUM CHLORIDE 1 G: 100 INJECTION, SOLUTION INTRAVENOUS at 18:07

## 2018-09-20 RX ADMIN — SODIUM CHLORIDE: 9 INJECTION, SOLUTION INTRAVENOUS at 12:14

## 2018-09-20 RX ADMIN — OXYCODONE HYDROCHLORIDE 10 MG: 10 TABLET ORAL at 13:20

## 2018-09-20 ASSESSMENT — PAIN SCALES - GENERAL
PAINLEVEL_OUTOF10: 7
PAINLEVEL_OUTOF10: 6
PAINLEVEL_OUTOF10: 3
PAINLEVEL_OUTOF10: 7
PAINLEVEL_OUTOF10: 3
PAINLEVEL_OUTOF10: 6
PAINLEVEL_OUTOF10: 8
PAINLEVEL_OUTOF10: 9
PAINLEVEL_OUTOF10: 8
PAINLEVEL_OUTOF10: 9
PAINLEVEL_OUTOF10: 7

## 2018-09-20 ASSESSMENT — COPD QUESTIONNAIRES
DURING THE PAST 4 WEEKS HOW MUCH DID YOU FEEL SHORT OF BREATH: NONE/LITTLE OF THE TIME
HAVE YOU SMOKED AT LEAST 100 CIGARETTES IN YOUR ENTIRE LIFE: YES
DO YOU EVER COUGH UP ANY MUCUS OR PHLEGM?: NO/ONLY WITH OCCASIONAL COLDS OR INFECTIONS

## 2018-09-20 ASSESSMENT — LIFESTYLE VARIABLES: EVER_SMOKED: YES

## 2018-09-20 NOTE — PROGRESS NOTES
Pt returned from OR at 1146. Bedside monitor applied, updates received from anesthesiologist, Pt lines and drips verified. Temporary pacemaker settings verified. CT connected to suction; no air leak noted. Vitals attained and initial assessment completed.

## 2018-09-20 NOTE — PROGRESS NOTES
Med rec updated and complete  Allergies reviewed  Interviewed pt with wife at bedside with permission from pt.  Pt reports no vitamins.

## 2018-09-20 NOTE — OR SURGEON
Immediate Post OP Note    PreOp Diagnosis: MR  PostOp Diagnosis: MR    Procedure(s):  MITRAL VALVE REPAIR- OR   - Wound Class: Clean  CHAR - Wound Class: Clean Contaminated    Surgeon(s):  HERBER Lopez M.D.    Anesthesiologist/Type of Anesthesia:  Anesthesiologist: Yordan Ballard M.D.  Anesthesia Technician: Tomasz Hernández/General    Surgical Staff:  Assistant: MIYA Dial  Circulator: Cortney Yeager R.N.  Perfusionist: Ralf Knight  Relief Circulator: Zulema Zamudio R.N.  Scrub Person: Brian Riggins; Monisha Calabrese    Specimens removed if any:  ID Type Source Tests Collected by Time Destination   1 : MITRAL VALVE LEAFLET Tissue Chest AFB CULTURE, CULTURE TISSUE W/ GRM STAIN, AFB CULTURE Kevin Fernandez M.D. 9/20/2018  9:39 AM        Estimated Blood Loss: minimal    Findings:   Small caliber ascending aorta. Severe mitral regurgitation with ruptured primary cord at P1 that had a tiny amount of healed vegetation at the tip. A tiny piece was sent for culture. Mild commissural prolapse at A1/P1 junction. P1 chordal transfer and small triangular resection performed. Placed a 34mm Ventura annuloplasty band. Post-CPB revealed no MR and preserved biventricular function. Pt was HDS and not on any inotropic support.     Complications: none        9/20/2018 11:19 AM Kevin Fernandez M.D.

## 2018-09-20 NOTE — OP REPORT
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    POSTOPERATIVE DIAGNOSES:  Severe mitral regurgitation, history of   streptococcal endocarditis of the native mitral valve, history of spinal   epidural abscess.    PROCEDURE PERFORMED:  Radical mitral valve repair (triangular resection of P1   and chordal transfer, commissuroplasty, 34 mm Ventura flexible annuloplasty band), and   intraoperative transesophageal echocardiography.    SURGEON:  Kevin Fernandez MD    FIRST ASSISTANT:  Natalie Rai MD    SECOND ASSISTANT:  ANGEL Escudero    ANESTHESIOLOGIST:  Yordan Ballard MD    ANESTHESIA:  General.    ESTIMATED BLOOD LOSS:  Minimal.    DRAINS:  Mediastinal chest tubes x2, 32-Tanzanian straight and right angle.    PACING WIRES:  Temporary monopolar epicardial ventricular pacing wires x2.    COMPLICATIONS:  None.    INTRAOPERATIVE FINDINGS:  Small caliber ascending aorta, severe mitral   regurgitation with ruptured primary cord at P1 and a tiny amount of healed   vegetation at the tip.  A tiny piece was sent for culture.  Mild commissure   prolapse of the A1 P1 junction.  P1 chordal transfer and small triangular   resection was performed.  Placed a 34 mm flexible Ventura annuloplasty band.    Post-cardiopulmonary bypass CHAR revealed no mitral regurgitation and preserved   biventricular function.  The patient was hemodynamically stable and not on   any inotropic support.    INDICATIONS FOR PROCEDURE:  This is a 27-year-old man with a history of native   mitral valve endocarditis with streptococcal infection that resulted in severe   mitral regurgitation.  He also presented with epidural abscess at that time. He has now completed an appropriate course of intravenous antibiotics and still with residual severe mitral  regurgitation.  He has also   developed worsening shortness of breath as well as paroxysmal nocturnal   dyspnea.  His spinal epidural abscess has resolved and he has been cleared   from a neurosurgery and infectious disease perspective.  I discussed   mitral valve repair versus replacement with him.  The risks of mitral valve   repair/replacement were discussed with him with the risks including myocardial   infarction, stroke, prolonged ventilation, tracheostomy, sternal wound   infection, sternal dehiscence, permanent pacemaker, bleeding, renal failure as   well as death.  Patient understood these risks and wished to proceed with   surgery.    DESCRIPTION OF PROCEDURE:  The patient was brought to the operating room and   placed on the operating table in supine position.  After successful induction   of general anesthesia and endotracheal intubation, patient was prepped and   draped in the usual sterile fashion.  Dr. Natalie Rai was first assistant   and ANGEL Lazo was second assistant and she also closed the sternal   wound.  Intraoperative transesophageal echocardiography revealed severe mitral   regurgitation with a mild P1 prolapse and perhaps ruptured cord as well as   MR jet coming from the anterolateral commissure.  Ejection fraction was normal   as well as right ventricular function.  There was no tricuspid regurgitation.    An incision was made from the sternal notch to the xiphoid process.  The   sternum was then opened longitudinally with a sternal saw.  Hemostasis was   obtained with electrocautery at the sternal edges.  Patient was then   systemically heparinized.  The pericardium was opened longitudinally and   tented anteriorly with Ethibond stay stitches.  Two concentric pursestrings   were placed in the ascending aorta and an additional pursestring was placed in   the superior vena cava.  The ascending aorta was then cannulated with a   21-Japanese EZ glide cannula.  The cannula was de-aired  and connected to the   cardiopulmonary bypass circuit.  The SVC was then cannulated with a 24-Hong Konger   right angle metal tip cannula and connected to a Y connector to the venous   line.  An antegrade cardioplegia needle was placed in the ascending aorta and   an additional pursestring was inserted.  All lines were then connected and an   adequate ACT was reached, cardiopulmonary bypass was instituted.  An   additional Ethibond pursestring was placed at the right atria- IVC junction   and then the IVC was then cannulated with a 28-Hong Konger right angle metal tip   cannula and was connected to the Y connector on the venous line.  Ascending   aorta was crossclamped and the patient was given 1 liter of cold antegrade   cardioplegia.  There was prompt diastolic arrest.  Ice slush was placed along   with overlying the heart for further myocardial protection.  From this point   on, cardioplegia was given in antegrade fashion every 20 minutes while the   aorta was cross clamped.  A left atriotomy was performed just below the   interatrial groove.  Claudia retractor was placed.  The mitral valve was   examined.  There appeared to be a very mild prolapse of the P1 cusp that likely   resulted from a ruptured primary cord.  This cord had a tiny amount of healed   vegetation at its tip.  The calcification was removed and a sample was sent   for Gram stain and culture.  The remainder of it was excised.  There was a   larger secondary cord on the P1 leaflet that was detached from the P1 edge.  There was also a small cleft noted to be between P1 and P2.  A small triangular resection was then performed along the edge of P1 close to   the anterolateral commissure.  A 4-0 Ethibond in a figure-of-eight fashion was   used to reattach and to perform chordal transfer of the resected cord back to   the edge of P1 and an additional figure-of-eight 4-0 Ethibond was used to   complete the closure of the resection at the level of the annulus. An  additional figure-of-eight stitch was placed at the edge of the anterolateral commissure. A total of eleven 2-0   Ethibond stitches were placed circumferentially around the mitral valve   annulus from trigone to trigone.  A 34 mm Ventura flexible annuloplasty band   was then placed in the mitral valve annulus and all sutures were passed   through the band and secured in place to the annulus with the Cor-Knot system.    Testing of the repair showed severe mitral regurgitation.  Rewarming of the   patient was then initiated.  The left atriotomy was then closed with 2 layers   using a 4-0 Prolene suture.  The heart was then de-aired appropriately and the   aortic crossclamp was removed.  Left ventricle was de-aired in the usual   fashion.  The carbon dioxide, which had been released over the operative field   during the operation was discontinued.  Straight and right angle 32-Martiniquais   chest tubes were then placed in the mediastinum.  Temporary monopolar   epicardial ventricular pacing wires were then inserted.  There was spontaneous   conversion to sinus tachycardia.  The heart was allowed to fill and the mitral   valve was inspected and there was no gross mitral regurgitation.  The IVC   cannula was then clamped and the right angle metal tip cannula was removed   from the IVC.  The pursestring was then tied down.  It was then oversewed with   a pledgeted 4-0 Prolene suture.  The antegrade cardioplegia needle was then   removed and the pursestring was tied down.  After the patient was adequately   rewarmed, he was slowly taken off cardiopulmonary bypass, which he tolerated   well.  The SVC cannula was then removed.  Pursestring was tied down.  It was   then oversewn with a 4-0 Prolene suture.  Protamine was then given to reverse   the effects of heparin.  After all blood volume had been transfused back to   the patient with the aortic cannula, the aortic cannula was then removed.  The   2 pursestrings were then tied  down to achieve hemostasis.  After hemostasis   was obtained, the sternum was then reapproximated using stainless steel wires   and the main incision was closed in standard fashion in layers using Vicryl   sutures.  Intraoperative transesophageal echocardiography revealed no mitral   regurgitation and biventricular function appeared to be preserved.  There were   no apparent complications.  The patient tolerated the procedure well and left   the operating room in guarded condition.       ____________________________________     MD ROSA Kate / KIMBERLYN    DD:  09/20/2018 11:36:09  DT:  09/20/2018 13:07:55    D#:  [id number]  Job#:  [id number]

## 2018-09-20 NOTE — PROGRESS NOTES
Pt extubated at 1250 from %, PEEP 8, FiO2 40% per Dr. Jovel. Pt had a NIF of of -25, VC of 1.9L, and Pinsp of 8, and SpO2 of 98%. Initial ABG showed pH of 7.358, PO2 of 140 and PCO2 of 38.3 and BE of -4. No further ABG were taken per Dr. Jovel order.     Pt extubated @ 1250. Total intubation time: 1 hour 8 mins

## 2018-09-20 NOTE — DIETARY
Nutrition Services: Consult cardiac rehab diet education    Pt is a 27 y.o. Male with Dx: Severe mitral regurgitation    Pt admitted this morning, now S/p MV repair.  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  Per review of chart/labs, cardiac rehab diet education is not indicated per RD judgment.    RD available as indicated.

## 2018-09-20 NOTE — CARE PLAN
Problem: Hyperinflation:  Goal: Prevent or improve atelectasis    Intervention: Instruct incentive spirometry usage  Pt placed on PEP and IS per protocol

## 2018-09-21 ENCOUNTER — APPOINTMENT (OUTPATIENT)
Dept: RADIOLOGY | Facility: MEDICAL CENTER | Age: 28
DRG: 219 | End: 2018-09-21
Attending: CLINICAL NURSE SPECIALIST
Payer: MEDICAID

## 2018-09-21 PROBLEM — R57.9 SHOCK (HCC): Status: ACTIVE | Noted: 2018-09-21

## 2018-09-21 PROBLEM — K31.89 GASTRIC DISTENTION: Status: ACTIVE | Noted: 2018-09-21

## 2018-09-21 PROBLEM — J96.01 ACUTE RESPIRATORY FAILURE WITH HYPOXIA (HCC): Status: ACTIVE | Noted: 2018-09-21

## 2018-09-21 LAB
ANION GAP SERPL CALC-SCNC: 7 MMOL/L (ref 0–11.9)
BUN SERPL-MCNC: 12 MG/DL (ref 8–22)
CALCIUM SERPL-MCNC: 8.3 MG/DL (ref 8.5–10.5)
CHLORIDE SERPL-SCNC: 105 MMOL/L (ref 96–112)
CO2 SERPL-SCNC: 22 MMOL/L (ref 20–33)
CREAT SERPL-MCNC: 0.67 MG/DL (ref 0.5–1.4)
EKG IMPRESSION: NORMAL
ERYTHROCYTE [DISTWIDTH] IN BLOOD BY AUTOMATED COUNT: 42.5 FL (ref 35.9–50)
GLUCOSE BLD-MCNC: 100 MG/DL (ref 65–99)
GLUCOSE BLD-MCNC: 101 MG/DL (ref 65–99)
GLUCOSE BLD-MCNC: 103 MG/DL (ref 65–99)
GLUCOSE BLD-MCNC: 106 MG/DL (ref 65–99)
GLUCOSE BLD-MCNC: 113 MG/DL (ref 65–99)
GLUCOSE BLD-MCNC: 117 MG/DL (ref 65–99)
GLUCOSE BLD-MCNC: 119 MG/DL (ref 65–99)
GLUCOSE BLD-MCNC: 124 MG/DL (ref 65–99)
GLUCOSE BLD-MCNC: 131 MG/DL (ref 65–99)
GLUCOSE BLD-MCNC: 75 MG/DL (ref 65–99)
GLUCOSE BLD-MCNC: 83 MG/DL (ref 65–99)
GLUCOSE BLD-MCNC: 88 MG/DL (ref 65–99)
GLUCOSE BLD-MCNC: 95 MG/DL (ref 65–99)
GLUCOSE BLD-MCNC: 97 MG/DL (ref 65–99)
GLUCOSE SERPL-MCNC: 109 MG/DL (ref 65–99)
HCT VFR BLD AUTO: 32.3 % (ref 42–52)
HGB BLD-MCNC: 11 G/DL (ref 14–18)
INR PPP: 1.33 (ref 0.87–1.13)
MCH RBC QN AUTO: 27.9 PG (ref 27–33)
MCHC RBC AUTO-ENTMCNC: 33.9 G/DL (ref 33.7–35.3)
MCV RBC AUTO: 82.4 FL (ref 81.4–97.8)
PLATELET # BLD AUTO: 106 K/UL (ref 164–446)
PMV BLD AUTO: 11.2 FL (ref 9–12.9)
POTASSIUM SERPL-SCNC: 4.2 MMOL/L (ref 3.6–5.5)
POTASSIUM SERPL-SCNC: 4.6 MMOL/L (ref 3.6–5.5)
PROTHROMBIN TIME: 16.6 SEC (ref 12–14.6)
RBC # BLD AUTO: 3.91 M/UL (ref 4.7–6.1)
SODIUM SERPL-SCNC: 134 MMOL/L (ref 135–145)
WBC # BLD AUTO: 9.5 K/UL (ref 4.8–10.8)

## 2018-09-21 PROCEDURE — 99291 CRITICAL CARE FIRST HOUR: CPT | Performed by: INTERNAL MEDICINE

## 2018-09-21 PROCEDURE — 80048 BASIC METABOLIC PNL TOTAL CA: CPT

## 2018-09-21 PROCEDURE — 93010 ELECTROCARDIOGRAM REPORT: CPT | Performed by: INTERNAL MEDICINE

## 2018-09-21 PROCEDURE — 82962 GLUCOSE BLOOD TEST: CPT | Mod: 91

## 2018-09-21 PROCEDURE — 94668 MNPJ CHEST WALL SBSQ: CPT

## 2018-09-21 PROCEDURE — 94667 MNPJ CHEST WALL 1ST: CPT

## 2018-09-21 PROCEDURE — 770022 HCHG ROOM/CARE - ICU (200)

## 2018-09-21 PROCEDURE — 85027 COMPLETE CBC AUTOMATED: CPT

## 2018-09-21 PROCEDURE — 700102 HCHG RX REV CODE 250 W/ 637 OVERRIDE(OP): Performed by: CLINICAL NURSE SPECIALIST

## 2018-09-21 PROCEDURE — A9270 NON-COVERED ITEM OR SERVICE: HCPCS | Performed by: CLINICAL NURSE SPECIALIST

## 2018-09-21 PROCEDURE — 84132 ASSAY OF SERUM POTASSIUM: CPT

## 2018-09-21 PROCEDURE — 700105 HCHG RX REV CODE 258: Performed by: NURSE PRACTITIONER

## 2018-09-21 PROCEDURE — 71045 X-RAY EXAM CHEST 1 VIEW: CPT

## 2018-09-21 PROCEDURE — A9270 NON-COVERED ITEM OR SERVICE: HCPCS | Performed by: NURSE PRACTITIONER

## 2018-09-21 PROCEDURE — 700102 HCHG RX REV CODE 250 W/ 637 OVERRIDE(OP): Performed by: NURSE PRACTITIONER

## 2018-09-21 PROCEDURE — 700111 HCHG RX REV CODE 636 W/ 250 OVERRIDE (IP): Performed by: CLINICAL NURSE SPECIALIST

## 2018-09-21 PROCEDURE — 85610 PROTHROMBIN TIME: CPT

## 2018-09-21 PROCEDURE — 700111 HCHG RX REV CODE 636 W/ 250 OVERRIDE (IP): Performed by: NURSE PRACTITIONER

## 2018-09-21 PROCEDURE — 93005 ELECTROCARDIOGRAM TRACING: CPT | Performed by: CLINICAL NURSE SPECIALIST

## 2018-09-21 RX ORDER — POTASSIUM CHLORIDE 7.45 MG/ML
10 INJECTION INTRAVENOUS ONCE
Status: COMPLETED | OUTPATIENT
Start: 2018-09-21 | End: 2018-09-21

## 2018-09-21 RX ORDER — KETOROLAC TROMETHAMINE 30 MG/ML
30 INJECTION, SOLUTION INTRAMUSCULAR; INTRAVENOUS EVERY 6 HOURS PRN
Status: DISCONTINUED | OUTPATIENT
Start: 2018-09-21 | End: 2018-09-24 | Stop reason: HOSPADM

## 2018-09-21 RX ORDER — ALPRAZOLAM 0.5 MG/1
0.5 TABLET ORAL 3 TIMES DAILY PRN
Status: DISCONTINUED | OUTPATIENT
Start: 2018-09-21 | End: 2018-09-24 | Stop reason: HOSPADM

## 2018-09-21 RX ADMIN — OXYCODONE HYDROCHLORIDE 10 MG: 10 TABLET ORAL at 06:03

## 2018-09-21 RX ADMIN — SODIUM CHLORIDE, SODIUM GLUCONATE, SODIUM ACETATE, POTASSIUM CHLORIDE AND MAGNESIUM CHLORIDE: 526; 502; 368; 37; 30 INJECTION, SOLUTION INTRAVENOUS at 02:14

## 2018-09-21 RX ADMIN — ASPIRIN 81 MG: 81 TABLET, COATED ORAL at 06:03

## 2018-09-21 RX ADMIN — OXYCODONE HYDROCHLORIDE 10 MG: 10 TABLET ORAL at 23:20

## 2018-09-21 RX ADMIN — FENTANYL CITRATE 50 MCG: 50 INJECTION INTRAMUSCULAR; INTRAVENOUS at 10:03

## 2018-09-21 RX ADMIN — STANDARDIZED SENNA CONCENTRATE AND DOCUSATE SODIUM 2 TABLET: 8.6; 5 TABLET ORAL at 06:03

## 2018-09-21 RX ADMIN — OXYCODONE HYDROCHLORIDE 10 MG: 10 TABLET ORAL at 19:54

## 2018-09-21 RX ADMIN — TRAMADOL HYDROCHLORIDE 50 MG: 50 TABLET, FILM COATED ORAL at 04:12

## 2018-09-21 RX ADMIN — PHENYLEPHRINE HYDROCHLORIDE 20 MCG/MIN: 10 INJECTION INTRAVENOUS at 06:24

## 2018-09-21 RX ADMIN — TRAMADOL HYDROCHLORIDE 50 MG: 50 TABLET, FILM COATED ORAL at 14:33

## 2018-09-21 RX ADMIN — POTASSIUM CHLORIDE 10 MEQ: 7.46 INJECTION, SOLUTION INTRAVENOUS at 06:03

## 2018-09-21 RX ADMIN — STANDARDIZED SENNA CONCENTRATE AND DOCUSATE SODIUM 2 TABLET: 8.6; 5 TABLET ORAL at 18:18

## 2018-09-21 RX ADMIN — GABAPENTIN 100 MG: 100 CAPSULE ORAL at 06:03

## 2018-09-21 RX ADMIN — ENOXAPARIN SODIUM 40 MG: 100 INJECTION SUBCUTANEOUS at 18:34

## 2018-09-21 RX ADMIN — MAGNESIUM SULFATE 1 G: 1 INJECTION INTRAVENOUS at 12:23

## 2018-09-21 RX ADMIN — OXYCODONE HYDROCHLORIDE 10 MG: 10 TABLET ORAL at 02:05

## 2018-09-21 RX ADMIN — KETOROLAC TROMETHAMINE 30 MG: 30 INJECTION, SOLUTION INTRAMUSCULAR; INTRAVENOUS at 18:18

## 2018-09-21 RX ADMIN — OXYCODONE HYDROCHLORIDE 10 MG: 10 TABLET ORAL at 09:04

## 2018-09-21 RX ADMIN — KETOROLAC TROMETHAMINE 30 MG: 30 INJECTION, SOLUTION INTRAMUSCULAR; INTRAVENOUS at 12:09

## 2018-09-21 RX ADMIN — OXYCODONE HYDROCHLORIDE 10 MG: 10 TABLET ORAL at 15:49

## 2018-09-21 RX ADMIN — DIPHENHYDRAMINE HCL 25 MG: 25 TABLET ORAL at 23:18

## 2018-09-21 RX ADMIN — MUPIROCIN 1 APPLICATION: 20 OINTMENT TOPICAL at 18:23

## 2018-09-21 RX ADMIN — MUPIROCIN 1 APPLICATION: 20 OINTMENT TOPICAL at 06:03

## 2018-09-21 RX ADMIN — CITALOPRAM HYDROBROMIDE 10 MG: 20 TABLET ORAL at 06:03

## 2018-09-21 RX ADMIN — INSULIN HUMAN 7 UNITS: 100 INJECTION, SUSPENSION SUBCUTANEOUS at 10:29

## 2018-09-21 RX ADMIN — SODIUM CHLORIDE, SODIUM GLUCONATE, SODIUM ACETATE, POTASSIUM CHLORIDE AND MAGNESIUM CHLORIDE: 526; 502; 368; 37; 30 INJECTION, SOLUTION INTRAVENOUS at 04:04

## 2018-09-21 RX ADMIN — INSULIN HUMAN 7 UNITS: 100 INJECTION, SUSPENSION SUBCUTANEOUS at 22:21

## 2018-09-21 RX ADMIN — ALPRAZOLAM 0.5 MG: 0.5 TABLET ORAL at 23:19

## 2018-09-21 RX ADMIN — OXYCODONE HYDROCHLORIDE 10 MG: 10 TABLET ORAL at 12:08

## 2018-09-21 RX ADMIN — TRAMADOL HYDROCHLORIDE 50 MG: 50 TABLET, FILM COATED ORAL at 08:15

## 2018-09-21 RX ADMIN — FENTANYL CITRATE 50 MCG: 50 INJECTION INTRAMUSCULAR; INTRAVENOUS at 20:33

## 2018-09-21 ASSESSMENT — ENCOUNTER SYMPTOMS
CHOKING: 0
NEUROLOGICAL NEGATIVE: 1
PSYCHIATRIC NEGATIVE: 1
CARDIOVASCULAR NEGATIVE: 1
NAUSEA: 0
ABDOMINAL DISTENTION: 0
TROUBLE SWALLOWING: 0
RESPIRATORY NEGATIVE: 1
GASTROINTESTINAL NEGATIVE: 1
CONSTITUTIONAL NEGATIVE: 1
SORE THROAT: 0
FEVER: 0
MYALGIAS: 1
ABDOMINAL PAIN: 0
COUGH: 0
EYES NEGATIVE: 1
VOMITING: 0
BACK PAIN: 0
FATIGUE: 0
CHILLS: 0
CONSTIPATION: 0
SHORTNESS OF BREATH: 0

## 2018-09-21 ASSESSMENT — PAIN SCALES - GENERAL
PAINLEVEL_OUTOF10: 5
PAINLEVEL_OUTOF10: 8
PAINLEVEL_OUTOF10: 6
PAINLEVEL_OUTOF10: 6
PAINLEVEL_OUTOF10: 7
PAINLEVEL_OUTOF10: 6
PAINLEVEL_OUTOF10: 9
PAINLEVEL_OUTOF10: 2
PAINLEVEL_OUTOF10: 6
PAINLEVEL_OUTOF10: 3
PAINLEVEL_OUTOF10: 7
PAINLEVEL_OUTOF10: 8
PAINLEVEL_OUTOF10: 7
PAINLEVEL_OUTOF10: 7
PAINLEVEL_OUTOF10: 6
PAINLEVEL_OUTOF10: 7
PAINLEVEL_OUTOF10: 4
PAINLEVEL_OUTOF10: 8
PAINLEVEL_OUTOF10: 6

## 2018-09-21 NOTE — PROGRESS NOTES
Patient upset because of delay in . Apologized called dietary multiple times.     Patient's diet changed to liquid diet.     Patient upset and was told he could have a full regular diet.     Discussed in rounds patient's x-ray. Per MD patient will have full liquid diet until okayed per pulmonology.     Dr. Strong at bedside to explain situation to patient.

## 2018-09-21 NOTE — PROGRESS NOTES
Assumed care of patient at 0700.Received bedside report from JOHNNY Cleaning. Patient physically agitated showing signs aggressive with staff and expressing frustrations in chair. Chair alarm set, call light within reach, bed in lowest position, treaded slipper socks on. See flowsheets for VS. Monitor ranges appropriate. Patient's pain is 7 / 10. Wife at bed side. Will continue to monitor closely.     Drips verified.

## 2018-09-21 NOTE — DISCHARGE INSTRUCTIONS
Discharge Instructions    Discharged to home by car with relative. Discharged via wheelchair, hospital escort: Yes.  Special equipment needed: Not Applicable    Be sure to schedule a follow-up appointment with your primary care doctor or any specialists as instructed.     Discharge Plan:        I understand that a diet low in cholesterol, fat, and sodium is recommended for good health. Unless I have been given specific instructions below for another diet, I accept this instruction as my diet prescription.   Other diet: Cardiac    Special Instructions:     [facility] Discharge Instructions    Activity:  1. NO driving for 4 weeks after surgery. You may ride as a passenger.  2. NO lifting more than 10 pounds for 6 weeks.  3. For the next 6 weeks, keep your elbows close to your body and move within a pain-free motion when lifting, pushing or pulling.  Do not stretch both arms backwards at the same time.    4. Walk at least 4 times per day, there is no maximum.  5. Other physical activities (sex, housework, gardening, etc.) are okay after 4 weeks.  6. Continue using incentive spirometer for 2 weeks.  If you are going home on oxygen and you were not on oxygen prior to surgery, keep using until you are oxygen free.  7. Weigh yourself daily.  Call your Cardiologist for a weight gain of 2 or more pounds within 48 hours.    Incision Care:  1. SHOWER EVERYDAY-no baths. Make sure to clean your incision(s) twice daily with plain, perfume and dye-free soap.  Then pat incision(s) dry with clean towel. No creams or lotions on your incision(s).    2. If you are discharging with a Prevena bandage on your chest, you or your home health nurse may remove the bandage 7 days after surgery, or sooner if the battery runs out or the device alarms. When the battery runs out, the bandage will lose suction and it will puff up.  To remove, slowly roll down the edges of the bandage. Throw away the entire bandage and the battery pack.   Keep the incision open to air and clean twice daily with soap and water.  3. If there is any increased redness or swelling, separation of the incision line, or thick drainage from any of your incisions, call [facility].    4. Continue to wear your JAMES Stockings for 4 weeks. You may take them off when you are in bed or when your legs are elevated.    General Instructions:  1. You have been referred to Cardiac Rehab.  You can start Cardiac Rehab 30 days after surgery.  If you do not have an appointment at the time of discharge call [phone] to schedule an appointment.  2. Your Primary Care Doctor typically handles home oxygen. Oxygen may be stopped when your oxygen level is consistently greater than 90.  Check with your Primary Care Doctor if you are unsure.  3. Take all of your medications (including pain medications) as prescribed.  Taking medications other than prescribed can result in serious injury.    For Patients Discharged with Narcotic Pain Medication:   1. If a refill is needed, understand that only 1 refill will be provided and you must come to the [facility]’ office for an appointment (72 hours’ notice is required to schedule and there are no weekend appointments).  2. If the pain medications you are discharged on are not working, you will need to bring your remaining prescription into the office in order to receive a new prescription.  3. If you were taking narcotics prior to your heart surgery, [facility] will provide you with one prescription and additional medications will need to be provided by your pain management doctor.  4. Do not drink alcohol while taking narcotics.  5. Lost or stolen medications will not be refilled.  If medications are stolen, report to law enforcement.    Contact [facility] at [phone] if you have any questions.    · Is patient discharged on Warfarin / Coumadin?   Yes    You are on the blood thinner Coumadin (warfarin) and  "you will need your coumadin levels checked periodically. For any questions call the [facility] @ [phone]. The home health nurse will draw your blood and the coumadin clinic will call with any change to your dose.        IMPORTANT: HOW TO USE THIS INFORMATION:  This is a summary and does NOT have all possible information about this product. This information does not assure that this product is safe, effective, or appropriate for you. This information is not individual medical advice and does not substitute for the advice of your health care professional. Always ask your health care professional for complete information about this product and your specific health needs.      WARFARIN - ORAL (WARF-uh-rin)      COMMON BRAND NAME(S): Coumadin      WARNING:  Warfarin can cause very serious (possibly fatal) bleeding. This is more likely to occur when you first start taking this medication or if you take too much warfarin. To decrease your risk for bleeding, your doctor or other health care provider will monitor you closely and check your lab results (INR test) to make sure you are not taking too much warfarin. Keep all medical and laboratory appointments. Tell your doctor right away if you notice any signs of serious bleeding. See also Side Effects section.      USES:  This medication is used to treat blood clots (such as in deep vein thrombosis-DVT or pulmonary embolus-PE) and/or to prevent new clots from forming in your body. Preventing harmful blood clots helps to reduce the risk of a stroke or heart attack. Conditions that increase your risk of developing blood clots include a certain type of irregular heart rhythm (atrial fibrillation), heart valve replacement, recent heart attack, and certain surgeries (such as hip/knee replacement). Warfarin is commonly called a \"blood thinner,\" but the more correct term is \"anticoagulant.\" It helps to keep blood flowing smoothly in your body by decreasing the amount of " certain substances (clotting proteins) in your blood.      HOW TO USE:  Read the Medication Guide provided by your pharmacist before you start taking warfarin and each time you get a refill. If you have any questions, ask your doctor or pharmacist. Take this medication by mouth with or without food as directed by your doctor or other health care professional, usually once a day. It is very important to take it exactly as directed. Do not increase the dose, take it more frequently, or stop using it unless directed by your doctor. Dosage is based on your medical condition, laboratory tests (such as INR), and response to treatment. Your doctor or other health care provider will monitor you closely while you are taking this medication to determine the right dose for you. Use this medication regularly to get the most benefit from it. To help you remember, take it at the same time each day. It is important to eat a balanced, consistent diet while taking warfarin. Some foods can affect how warfarin works in your body and may affect your treatment and dose. Avoid sudden large increases or decreases in your intake of foods high in vitamin K (such as broccoli, cauliflower, cabbage, brussels sprouts, kale, spinach, and other green leafy vegetables, liver, green tea, certain vitamin supplements). If you are trying to lose weight, check with your doctor before you try to go on a diet. Cranberry products may also affect how your warfarin works. Limit the amount of cranberry juice (16 ounces/480 milliliters a day) or other cranberry products you may drink or eat.      SIDE EFFECTS:  Nausea, loss of appetite, or stomach/abdominal pain may occur. If any of these effects persist or worsen, tell your doctor or pharmacist promptly. Remember that your doctor has prescribed this medication because he or she has judged that the benefit to you is greater than the risk of side effects. Many people using this medication do not have serious  side effects. This medication can cause serious bleeding if it affects your blood clotting proteins too much (shown by unusually high INR lab results). Even if your doctor stops your medication, this risk of bleeding can continue for up to a week. Tell your doctor right away if you have any signs of serious bleeding, including: unusual pain/swelling/discomfort, unusual/easy bruising, prolonged bleeding from cuts or gums, persistent/frequent nosebleeds, unusually heavy/prolonged menstrual flow, pink/dark urine, coughing up blood, vomit that is bloody or looks like coffee grounds, severe headache, dizziness/fainting, unusual or persistent tiredness/weakness, bloody/black/tarry stools, chest pain, shortness of breath, difficulty swallowing. Tell your doctor right away if any of these unlikely but serious side effects occur: persistent nausea/vomiting, severe stomach/abdominal pain, yellowing eyes/skin. This drug rarely has caused very serious (possibly fatal) problems if its effects lead to small blood clots (usually at the beginning of treatment). This can lead to severe skin/tissue damage that may require surgery or amputation if left untreated. Patients with certain blood conditions (protein C or S deficiency) may be at greater risk. Get medical help right away if any of these rare but serious side effects occur: painful/red/purplish patches on the skin (such as on the toe, breast, abdomen), change in the amount of urine, vision changes, confusion, slurred speech, weakness on one side of the body. A very serious allergic reaction to this drug is rare. However, get medical help right away if you notice any symptoms of a serious allergic reaction, including: rash, itching/swelling (especially of the face/tongue/throat), severe dizziness, trouble breathing. This is not a complete list of possible side effects. If you notice other effects not listed above, contact your doctor or pharmacist. In the [place] - Call your doctor  for medical advice about side effects. You may report side effects to FDA at 4-378-HRU-8124. In [place] - Call your doctor for medical advice about side effects. You may report side effects to [facility] [place] at [phone].      PRECAUTIONS:  Before taking warfarin, tell your doctor or pharmacist if you are allergic to it; or if you have any other allergies. This product may contain inactive ingredients, which can cause allergic reactions or other problems. Talk to your pharmacist for more details. Before using this medication, tell your doctor or pharmacist your medical history, especially of: blood disorders (such as anemia, hemophilia), bleeding problems (such as bleeding of the stomach/intestines, bleeding in the brain), blood vessel disorders (such as aneurysms), recent major injury/surgery, liver disease, alcohol use, mental/mood disorders (including memory problems), frequent falls/injuries. It is important that all your doctors and dentists know that you take warfarin. Before having surgery or any medical/dental procedures, tell your doctor or dentist that you are taking this medication and about all the products you use (including prescription drugs, nonprescription drugs, and herbal products). Avoid getting injections into the muscles. If you must have an injection into a muscle (for example, a flu shot), it should be given in the arm. This way, it will be easier to check for bleeding and/or apply pressure bandages. This medication may cause stomach bleeding. Daily use of alcohol while using this medicine will increase your risk for stomach bleeding and may also affect how this medication works. Limit or avoid alcoholic beverages. If you have not been eating well, if you have an illness or infection that causes fever, vomiting, or diarrhea for more than 2 days, or if you start using any antibiotic medications, contact your doctor or pharmacist immediately because these conditions can affect how warfarin  works. This medication can cause heavy bleeding. To lower the chance of getting cut, bruised, or injured, use great caution with sharp objects like safety razors and nail cutters. Use an electric razor when shaving and a soft toothbrush when brushing your teeth. Avoid activities such as contact sports. If you fall or injure yourself, especially if you hit your head, call your doctor immediately. Your doctor may need to check you. The Food & Drug Administration has stated that generic warfarin products are interchangeable. However, consult your doctor or pharmacist before switching warfarin products. Be careful not to take more than one medication that contains warfarin unless specifically directed by the doctor or health care provider who is monitoring your warfarin treatment. Older adults may be at greater risk for bleeding while using this drug. This medication is not recommended for use during pregnancy because of serious (possibly fatal) harm to an unborn baby. Discuss the use of reliable forms of birth control with your doctor. If you become pregnant or think you may be pregnant, tell your doctor immediately. If you are planning pregnancy, discuss a plan for managing your condition with your doctor before you become pregnant. Your doctor may switch the type of medication you use during pregnancy. Very small amounts of this medication may pass into breast milk but is unlikely to harm a nursing infant. Consult your doctor before breast-feeding.      DRUG INTERACTIONS:  Drug interactions may change how your medications work or increase your risk for serious side effects. This document does not contain all possible drug interactions. Keep a list of all the products you use (including prescription/nonprescription drugs and herbal products) and share it with your doctor and pharmacist. Do not start, stop, or change the dosage of any medicines without your doctor's approval. Warfarin interacts with many prescription,  "nonprescription, vitamin, and herbal products. This includes medications that are applied to the skin or inside the vagina or rectum. The interactions with warfarin usually result in an increase or decrease in the \"blood-thinning\" (anticoagulant) effect. Your doctor or other health care professional should closely monitor you to prevent serious bleeding or clotting problems. While taking warfarin, it is very important to tell your doctor or pharmacist of any changes in medications, vitamins, or herbal products that you are taking. Some products that may interact with this drug include: capecitabine, imatinib, mifepristone. Aspirin, aspirin-like drugs (salicylates), and nonsteroidal anti-inflammatory drugs (NSAIDs such as ibuprofen, naproxen, celecoxib) may have effects similar to warfarin. These drugs may increase the risk of bleeding problems if taken during treatment with warfarin. Carefully check all prescription/nonprescription product labels (including drugs applied to the skin such as pain-relieving creams) since the products may contain NSAIDs or salicylates. Talk to your doctor about using a different medication (such as acetaminophen) to treat pain/fever. Low-dose aspirin and related drugs (such as clopidogrel, ticlopidine) should be continued if prescribed by your doctor for specific medical reasons such as heart attack or stroke prevention. Consult your doctor or pharmacist for more details. Many herbal products interact with warfarin. Tell your doctor before taking any herbal products, especially bromelains, coenzyme Q10, cranberry, danshen, dong quai, fenugreek, garlic, ginkgo biloba, ginseng, and Froylan's wort, among others. This medication may interfere with a certain laboratory test to measure theophylline levels, possibly causing false test results. Make sure laboratory personnel and all your doctors know you use this drug.      OVERDOSE:  If overdose is suspected, contact a poison control center " or emergency room immediately. [place] residents can call the [place] National Poison Hotline at 1-499.719.5350. [place] residents can call a provincial poison control center. Symptoms of overdose may include: bloody/black/tarry stools, pink/dark urine, unusual/prolonged bleeding.      NOTES:  Do not share this medication with others. Laboratory and/or medical tests (such as INR, complete blood count) must be performed periodically to monitor your progress or check for side effects. Consult your doctor for more details.      MISSED DOSE:  For the best possible benefit, do not miss any doses. If you do miss a dose and remember on the same day, take it as soon as you remember. If you remember on the next day, skip the missed dose and resume your usual dosing schedule. Do not double the dose to catch up because this could increase your risk for bleeding. Keep a record of missed doses to give to your doctor or pharmacist. Contact your doctor or pharmacist if you miss 2 or more doses in a row.      STORAGE:  Store at room temperature away from light and moisture. Do not store in the bathroom. Keep all medications away from children and pets. Do not flush medications down the toilet or pour them into a drain unless instructed to do so. Properly discard this product when it is  or no longer needed. Consult your pharmacist or local waste disposal company for more details about how to safely discard your product.      MEDICAL ALERT:  Your condition and medication can cause complications in a medical emergency. For information about enrolling in MedicAlert, call 1-503.183.9446 ([place]) or 1-266.561.1991 ([place]).      Information last revised 2010 Copyright(c) 2010 [employer]             Depression / Suicide Risk    As you are discharged from this [facility] facility, it is important to learn how to keep safe from harming yourself.    Recognize the warning signs:  · Abrupt changes in personality, positive or  negative- including increase in energy   · Giving away possessions  · Change in eating patterns- significant weight changes-  positive or negative  · Change in sleeping patterns- unable to sleep or sleeping all the time   · Unwillingness or inability to communicate  · Depression  · Unusual sadness, discouragement and loneliness  · Talk of wanting to die  · Neglect of personal appearance   · Rebelliousness- reckless behavior  · Withdrawal from people/activities they love  · Confusion- inability to concentrate     If you or a loved one observes any of these behaviors or has concerns about self-harm, here's what you can do:  · Talk about it- your feelings and reasons for harming yourself  · Remove any means that you might use to hurt yourself (examples: pills, rope, extension cords, firearm)  · Get professional help from the community (Mental Health, Substance Abuse, psychological counseling)  · Do not be alone:Call your Safe Contact- someone whom you trust who will be there for you.  · Call your local CRISIS HOTLINE 701-4493 or 059-053-0473  · Call your local Children's Mobile Crisis Response Team [place] (442) 021-9870 or www.[employer]  · Call the toll free National Suicide Prevention Hotlines   · National Suicide Prevention Lifeline 256-528-MZOQ (2709)  · National Hope Line Network 800-SUICIDE (477-3764)

## 2018-09-21 NOTE — CARE PLAN
Problem: Day of surgery post CABG/Heart valve replacement  Goal: Stabilization in immediate post op period    Intervention: VS q 15 min x 4 hours, then q 1 hour. Include temperature immediately upon arrival. Check CO/CI q 2-4 hours and PRN  done  Intervention: If radial artery used, elevate arm, no BP checks or needle sticks from affected arm, monitor ulnar pulse and capillary refill  CMS intact  Intervention: Serum K q 6 hours x 24 hours.  ABG and CBC prn.  done  Intervention: For FSBS greater than 130, start Post Cardiac Surgery Insulin Drip Protocol  done  Intervention: Chest tube to 20 cm suction, record CT drainage with VS  done  Intervention: For CT drainage > 300 cc in first post op hour and/or 150 cc in subsequent hours: platelets, coag screen, fibrinogen, H&H per order  N/A  Intervention: Titrate and wean off vasoactive drips per patient's condition and per MD order while maintaining SBP  mmHg per MD order  Patient on vasopressors this am  Intervention: IS q 1 hour while awake post extubation  Best IS this am 750  Intervention: Up in chair 4 hours, day of extubation  Up in chair for breakfast  Intervention: Maintain all original surgical dressings for 24 hours  complete      Problem: Post Op Day 1 CABG/Heart Valve Replacement  Goal: Optimal care of the post op CABG/heart valve replacement Post Op Day 1    Intervention: EKG and CXR completed  complete  Intervention: All valve patients: PT/INR daily  complete  Intervention: Daily Weights  complete

## 2018-09-21 NOTE — PROGRESS NOTES
Critical Care Progress Note    Date of admission  9/20/2018    Chief Complaint  27 y.o. male admitted 9/20/2018 for mitral valve repair    Hospital Course   Nicholas Neumann is a 27 y.o. 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  He was discharged home with a PICC in place and completed antibiotics 8/22.  He continued to have dyspnea on exertion and was significantly symptomatic.  Transesophageal echocardiogram 8/20 showed preserved ejection fraction 65% and severe mitral regurgitation.  MRI of the spine showed resolution of the epidural abscess.  Today he presented for elective mitral valve repair.  I was called to the bedside and the patient arrived from the operating room and reviewed the case with anesthesiology and current thoracic surgery.  Surgery went well and he remains currently intubated on full ventilator support    Interval Problem Update  Reviewed last 24 hour events:  - yanelis 30   -insulin gtt @3  - 3l ivf  - ao4  - snr  -  - clear diet advance  - urine 5l  - up to chair  - respiratory 3l  - IS 1000  - ct in place lovenox  - no gi prophy    Review of Systems  Review of Systems   Constitutional: Negative for chills, fatigue and fever.   HENT: Negative for sore throat and trouble swallowing.    Respiratory: Negative for cough, choking and shortness of breath.    Cardiovascular: Negative for chest pain.   Gastrointestinal: Negative for abdominal distention, abdominal pain, constipation, nausea and vomiting.   Musculoskeletal: Negative for back pain.        Vital Signs for last 24 hours   Pulse:  [66-93] 83  Resp:  [12-30] 21    Hemodynamic parameters for last 24 hours  CVP:  [-2 MM HG-226 MM HG] 198 MM HG    Vent Settings for last 24 hours       Physical Exam   Physical Exam   Constitutional: He is oriented to person, place, and time. He appears  well-developed and well-nourished.   HENT:   Head: Normocephalic and atraumatic.   Eyes: Pupils are equal, round, and reactive to light. No scleral icterus.   Neck: Normal range of motion. Neck supple.   Cardiovascular: Normal rate and regular rhythm.    Pulmonary/Chest: Effort normal and breath sounds normal. No respiratory distress.   Abdominal: He exhibits distension. There is no tenderness. There is no rebound and no guarding.   Musculoskeletal: He exhibits no edema.   Neurological: He is alert and oriented to person, place, and time.   Skin: Skin is warm and dry.   Psychiatric: He has a normal mood and affect.       Medications  Current Facility-Administered Medications   Medication Dose Route Frequency Provider Last Rate Last Dose   • phenylephrine (VALERIO-SYNEPHRINE) 40,000 mcg in  mL Infusion  0-50 mcg/min Intravenous Continuous BRANDON Cancino.P.N.   Stopped at 09/21/18 1210   • insulin NPH (HUMULIN,NOVOLIN) injection 7 Units  7 Units Subcutaneous Q8HRS BRANDON Cancino.P.N.   7 Units at 09/21/18 1029   • insulin lispro (HUMALOG) injection 5 Units  5 Units Subcutaneous TID AC BRANDON Cancino.P.N.   Stopped at 09/21/18 1700   • insulin lispro (HUMALOG) injection 0-15 Units  0-15 Units Subcutaneous ACHS & 0200 BRANDON Cancino.P.N.   Stopped at 09/21/18 1700   • ketorolac (TORADOL) injection 30 mg  30 mg Intravenous Q6HRS PRN BRANDON Cancino.P.N.   30 mg at 09/21/18 1818   • citalopram (CELEXA) tablet 10 mg  10 mg Oral DAILY Mikki Rivero A.P.N.   10 mg at 09/21/18 0603   • gabapentin (NEURONTIN) capsule 100 mg  100 mg Oral TID BRANDON Dial.P.N.   Stopped at 09/21/18 1200   • Respiratory Care per Protocol   Nebulization Continuous RT BRANDON Dial.P.N.       • Pharmacy Consult Request ...Pain Management Review 1 Each  1 Each Other PRN BRANDON Dial.P.N.       • senna-docusate (PERICOLACE or SENOKOT S) 8.6-50 MG per tablet 2 Tab  2 Tab Oral BID KWAKU DialNCaesar   2 Tab at  09/21/18 1818    And   • polyethylene glycol/lytes (MIRALAX) PACKET 1 Packet  1 Packet Oral QDAY PRN Mikki Rivero A.P.N.        And   • magnesium hydroxide (MILK OF MAGNESIA) suspension 30 mL  30 mL Oral QDAY PRN Mikki Rivero A.P.N.        And   • bisacodyl (DULCOLAX) suppository 10 mg  10 mg Rectal QDAY PRN Mikki Rivero A.P.N.       • electrolyte-A (PLASMALYTE-A) infusion   Intravenous PRN Camila Schmidt A.P.N.       • enoxaparin (LOVENOX) inj 40 mg  40 mg Subcutaneous QDAY Mikki Rivero A.P.N.       • mupirocin (BACTROBAN) 2 % ointment 1 Application  1 Application Topical BID Mikki Rivero A.P.N.   1 Application at 09/21/18 1823   • Magnesium Sulfate in D5W IVPB premix 1 g  1 g Intravenous QDAY Mikki Rivero A.P.N.   Stopped at 09/21/18 1323   • aspirin EC (ECOTRIN) tablet 81 mg  81 mg Oral DAILY Mikki Rivero A.P.N.   81 mg at 09/21/18 0603   • MD Alert...Warfarin per Pharmacy   Other pharmacy to dose Mikik Rivero A.P.N.       • oxyCODONE immediate-release (ROXICODONE) tablet 5 mg  5 mg Oral Q3HRS PRN Mikki Rivero A.P.N.       • oxyCODONE immediate release (ROXICODONE) tablet 10 mg  10 mg Oral Q3HRS PRN Mikki Rivero A.P.N.   10 mg at 09/21/18 1549   • fentaNYL (SUBLIMAZE) injection 50 mcg  50 mcg Intravenous Q3HRS PRN Mikki Rivero A.P.N.   50 mcg at 09/21/18 1003   • tramadol (ULTRAM) 50 MG tablet 50 mg  50 mg Oral Q4HRS PRN Mikki Rivero A.P.N.   50 mg at 09/21/18 1433   • midazolam (VERSED) 2 MG/2ML injection 2 mg  2 mg Intravenous Q HOUR PRN Mikki Rivero A.P.N.       • sodium bicarbonate 8.4 % injection 50 mEq  50 mEq Intravenous Q HOUR PRN Mikki Rivero, A.P.N.       • morphine (pf) 4 mg/ml injection 4 mg  4 mg Intravenous Q HOUR PRN Mikki Rivero, A.P.N.   4 mg at 09/20/18 1248   • ondansetron (ZOFRAN) syringe/vial injection 4 mg  4 mg Intravenous Q6HRS PRN Mikki Rivero, A.P.N.   4 mg at 09/20/18 1320    Or   • prochlorperazine (COMPAZINE) injection 10 mg  10 mg  Intravenous Q6HRS PRN Mikki Rivero, A.P.N.        Or   • promethazine (PHENERGAN) suppository 25 mg  25 mg Rectal Q6HRS PRN Mikki Rivero, A.P.N.       • acetaminophen (TYLENOL) tablet 650 mg  650 mg Oral Q4HRS PRN Mikki Rivero, A.P.N.        Or   • acetaminophen (TYLENOL) suppository 650 mg  650 mg Rectal Q4HRS PRN Mikki Rivero, A.P.N.       • mag hydrox-al hydrox-simeth (MAALOX PLUS ES or MYLANTA DS) suspension 30 mL  30 mL Oral Q4HRS PRN Mikki Rivero, A.P.N.       • diphenhydrAMINE (BENADRYL) tablet/capsule 25 mg  25 mg Oral HS PRN - MR X 1 Mikki Rivero, A.P.N.       • EPINEPHrine 1 mg/mL(1:1000) 4 mg in  mL Infusion  0-0.2 mcg/kg/min Intravenous Continuous Kevin Fernandez M.D.   Stopped at 09/21/18 0628       Fluids    Intake/Output Summary (Last 24 hours) at 09/21/18 1832  Last data filed at 09/21/18 1200   Gross per 24 hour   Intake          3802.21 ml   Output             4960 ml   Net         -1157.79 ml       Laboratory  Recent Results (from the past 48 hour(s))   ABO AND RH CONFIRMATION    Collection Time: 09/20/18  6:35 AM   Result Value Ref Range    ABO Confirm O     Second Rh Group POS    ACCU-CHEK GLUCOSE    Collection Time: 09/20/18  6:35 AM   Result Value Ref Range    Glucose - Accu-Ck 90 65 - 99 mg/dL   ISTAT ARTERIAL BLOOD GAS    Collection Time: 09/20/18  8:03 AM   Result Value Ref Range    Ph 7.378 (L) 7.400 - 7.500    Pco2 42.4 (H) 26.0 - 37.0 mmHg    Po2 183 (H) 64 - 87 mmHg    Tco2 26 20 - 33 mmol/L    S02 100 (H) 93 - 99 %    Hco3 25.0 17.0 - 25.0 mmol/L    BE 0 -4 - 3 mmol/L    Body Temp 37.0 C degrees    O2 Therapy 100 %    Ph Temp Shlomo 7.378 (L) 7.400 - 7.500    Pco2 Temp Co 42.4 (H) 26.0 - 37.0 mmHg    Po2 Temp Cor 183 (H) 64 - 87 mmHg    Specimen Arterial     Action Range Triggered NO     Inst. Qualified Patient YES    ISTAT GLUCOSE    Collection Time: 09/20/18  8:03 AM   Result Value Ref Range    Istat Glucose 107 (H) 65 - 99 mg/dL   ISTAT SODIUM    Collection Time:  09/20/18  8:03 AM   Result Value Ref Range    Istat Sodium 141 135 - 145 mmol/L   ISTAT POTASSIUM    Collection Time: 09/20/18  8:03 AM   Result Value Ref Range    Istat Potassium 3.6 3.6 - 5.5 mmol/L   ISTAT IONIZED CA    Collection Time: 09/20/18  8:03 AM   Result Value Ref Range    Istat Ionized Calcium 1.24 1.10 - 1.30 mmol/L   ISTAT HEMATOCRIT AND HEMOGLOBIN    Collection Time: 09/20/18  8:03 AM   Result Value Ref Range    Istat Hematocrit 43 42 - 52 %    Istat Hemoglobin 14.6 14.0 - 18.0 g/dL   ISTAT VENOUS BLOOD GAS    Collection Time: 09/20/18  9:10 AM   Result Value Ref Range    Ph 7.380 7.310 - 7.450    Pco2 41.8 41.0 - 51.0 mmHg    Po2 44 (H) 25 - 40 mmHg    Tco2 26 20 - 33 mmol/L    SO2 78 %    Hco3 24.8 24.0 - 28.0 mmol/L    BE 0 -4 - 3 mmol/L    Body Temp 37.0 C degrees    O2 Therapy 80 %    Ph Temp Correc 7.380 7.310 - 7.450    Pco2 Temp Shlomo 41.8 41.0 - 51.0 mmHg    Po2 Temp Corre 44 (H) 25 - 40 mmHg    Specimen Venous     Action Range Triggered YES     Inst. Qualified Patient YES    ISTAT GLUCOSE    Collection Time: 09/20/18  9:10 AM   Result Value Ref Range    Istat Glucose 100 (H) 65 - 99 mg/dL   ISTAT SODIUM    Collection Time: 09/20/18  9:10 AM   Result Value Ref Range    Istat Sodium 139 135 - 145 mmol/L   ISTAT POTASSIUM    Collection Time: 09/20/18  9:10 AM   Result Value Ref Range    Istat Potassium 4.7 3.6 - 5.5 mmol/L   ISTAT IONIZED CA    Collection Time: 09/20/18  9:10 AM   Result Value Ref Range    Istat Ionized Calcium 1.07 (L) 1.10 - 1.30 mmol/L   ISTAT HEMATOCRIT AND HEMOGLOBIN    Collection Time: 09/20/18  9:10 AM   Result Value Ref Range    Istat Hematocrit 32 (L) 42 - 52 %    Istat Hemoglobin 10.9 (L) 14.0 - 18.0 g/dL   CULTURE TISSUE W/ GRM STAIN    Collection Time: 09/20/18  9:38 AM   Result Value Ref Range    Significant Indicator NEG     Source TISS     Site Mitral Valve Leaflet     Tissue Culture No growth at 24 hours.     Gram Stain Result No organisms seen.    ANAEROBIC  CULTURE    Collection Time: 09/20/18  9:38 AM   Result Value Ref Range    Significant Indicator NEG     Source TISS     Site Mitral Valve Leaflet     Anaerobic Culture, Culture Res Culture in progress.    AFB CULTURE    Collection Time: 09/20/18  9:38 AM   Result Value Ref Range    Significant Indicator NEG     Source TISS     Site Mitral Valve Leaflet     AFB Culture Culture in progress.     AFB Smear Results No acid fast bacilli seen.    GRAM STAIN    Collection Time: 09/20/18  9:38 AM   Result Value Ref Range    Significant Indicator .     Source TISS     Site Mitral Valve Leaflet     Gram Stain Result No organisms seen.    ACID FAST STAIN    Collection Time: 09/20/18  9:38 AM   Result Value Ref Range    Significant Indicator NEG     Source TISS     Site Mitral Valve Leaflet     AFB Smear Results No acid fast bacilli seen.    ISTAT ARTERIAL BLOOD GAS    Collection Time: 09/20/18  9:44 AM   Result Value Ref Range    Action Range Triggered YES     Inst. Qualified Patient YES     Ph 7.324 (L) 7.400 - 7.500    Pco2 50.5 (H) 26.0 - 37.0 mmHg    Po2 294 (H) 64 - 87 mmHg    Tco2 28 20 - 33 mmol/L    S02 100 (H) 93 - 99 %    Hco3 26.3 (H) 17.0 - 25.0 mmol/L    BE 0 -4 - 3 mmol/L    Body Temp 37.0 C degrees    O2 Therapy 80 %    Ph Temp Shlomo 7.324 (L) 7.400 - 7.500    Pco2 Temp Co 50.5 (H) 26.0 - 37.0 mmHg    Po2 Temp Cor 294 (H) 64 - 87 mmHg    Specimen Arterial    ISTAT GLUCOSE    Collection Time: 09/20/18  9:44 AM   Result Value Ref Range    Istat Glucose 140 (H) 65 - 99 mg/dL   ISTAT SODIUM    Collection Time: 09/20/18  9:44 AM   Result Value Ref Range    Istat Sodium 138 135 - 145 mmol/L   ISTAT POTASSIUM    Collection Time: 09/20/18  9:44 AM   Result Value Ref Range    Istat Potassium 4.9 3.6 - 5.5 mmol/L   ISTAT IONIZED CA    Collection Time: 09/20/18  9:44 AM   Result Value Ref Range    Istat Ionized Calcium 1.09 (L) 1.10 - 1.30 mmol/L   ISTAT HEMATOCRIT AND HEMOGLOBIN    Collection Time: 09/20/18  9:44 AM    Result Value Ref Range    Istat Hematocrit 33 (L) 42 - 52 %    Istat Hemoglobin 11.2 (L) 14.0 - 18.0 g/dL   ISTAT ARTERIAL BLOOD GAS    Collection Time: 09/20/18 10:15 AM   Result Value Ref Range    Ph 7.392 (L) 7.400 - 7.500    Pco2 38.8 (H) 26.0 - 37.0 mmHg    Po2 202 (H) 64 - 87 mmHg    Tco2 25 20 - 33 mmol/L    S02 100 (H) 93 - 99 %    Hco3 23.6 17.0 - 25.0 mmol/L    BE -1 -4 - 3 mmol/L    Body Temp 37.0 C degrees    O2 Therapy 85 %    Ph Temp Shlomo 7.392 (L) 7.400 - 7.500    Pco2 Temp Co 38.8 (H) 26.0 - 37.0 mmHg    Po2 Temp Cor 202 (H) 64 - 87 mmHg    Specimen Arterial     Action Range Triggered NO     Inst. Qualified Patient YES    ISTAT GLUCOSE    Collection Time: 09/20/18 10:15 AM   Result Value Ref Range    Istat Glucose 154 (H) 65 - 99 mg/dL   ISTAT SODIUM    Collection Time: 09/20/18 10:15 AM   Result Value Ref Range    Istat Sodium 138 135 - 145 mmol/L   ISTAT POTASSIUM    Collection Time: 09/20/18 10:15 AM   Result Value Ref Range    Istat Potassium 5.8 (H) 3.6 - 5.5 mmol/L   ISTAT IONIZED CA    Collection Time: 09/20/18 10:15 AM   Result Value Ref Range    Istat Ionized Calcium 1.09 (L) 1.10 - 1.30 mmol/L   ISTAT HEMATOCRIT AND HEMOGLOBIN    Collection Time: 09/20/18 10:15 AM   Result Value Ref Range    Istat Hematocrit 33 (L) 42 - 52 %    Istat Hemoglobin 11.2 (L) 14.0 - 18.0 g/dL   ISTAT ARTERIAL BLOOD GAS    Collection Time: 09/20/18 11:19 AM   Result Value Ref Range    Ph 7.301 (L) 7.400 - 7.500    Pco2 46.5 (H) 26.0 - 37.0 mmHg    Po2 88 (H) 64 - 87 mmHg    Tco2 24 20 - 33 mmol/L    S02 96 93 - 99 %    Hco3 23.0 17.0 - 25.0 mmol/L    BE -4 -4 - 3 mmol/L    Body Temp 37.0 C degrees    O2 Therapy 100 %    Ph Temp Shlomo 7.301 (L) 7.400 - 7.500    Pco2 Temp Co 46.5 (H) 26.0 - 37.0 mmHg    Po2 Temp Cor 88 (H) 64 - 87 mmHg    Specimen Arterial     Action Range Triggered NO     Inst. Qualified Patient YES    ISTAT GLUCOSE    Collection Time: 09/20/18 11:19 AM   Result Value Ref Range    Istat Glucose  146 (H) 65 - 99 mg/dL   ISTAT SODIUM    Collection Time: 09/20/18 11:19 AM   Result Value Ref Range    Istat Sodium 140 135 - 145 mmol/L   ISTAT POTASSIUM    Collection Time: 09/20/18 11:19 AM   Result Value Ref Range    Istat Potassium 5.6 (H) 3.6 - 5.5 mmol/L   ISTAT IONIZED CA    Collection Time: 09/20/18 11:19 AM   Result Value Ref Range    Istat Ionized Calcium 1.11 1.10 - 1.30 mmol/L   ISTAT HEMATOCRIT AND HEMOGLOBIN    Collection Time: 09/20/18 11:19 AM   Result Value Ref Range    Istat Hematocrit 34 (L) 42 - 52 %    Istat Hemoglobin 11.6 (L) 14.0 - 18.0 g/dL   ISTAT ARTERIAL BLOOD GAS    Collection Time: 09/20/18 11:54 AM   Result Value Ref Range    Ph 7.358 (L) 7.400 - 7.500    Pco2 38.3 (H) 26.0 - 37.0 mmHg    Po2 140 (H) 64 - 87 mmHg    Tco2 23 20 - 33 mmol/L    S02 99 93 - 99 %    Hco3 21.5 17.0 - 25.0 mmol/L    BE -4 -4 - 3 mmol/L    Body Temp 35.8 C degrees    O2 Therapy 100 %    Ph Temp Shlomo 7.375 (L) 7.400 - 7.500    Pco2 Temp Co 36.4 26.0 - 37.0 mmHg    Po2 Temp Cor 132 (H) 64 - 87 mmHg    Specimen Arterial     Action Range Triggered NO     Inst. Qualified Patient YES    ISTAT SODIUM    Collection Time: 09/20/18 11:54 AM   Result Value Ref Range    Istat Sodium 140 135 - 145 mmol/L   ISTAT POTASSIUM    Collection Time: 09/20/18 11:54 AM   Result Value Ref Range    Istat Potassium 4.9 3.6 - 5.5 mmol/L   ISTAT IONIZED CA    Collection Time: 09/20/18 11:54 AM   Result Value Ref Range    Istat Ionized Calcium 1.08 (L) 1.10 - 1.30 mmol/L   ISTAT HEMATOCRIT AND HEMOGLOBIN    Collection Time: 09/20/18 11:54 AM   Result Value Ref Range    Istat Hematocrit 34 (L) 42 - 52 %    Istat Hemoglobin 11.6 (L) 14.0 - 18.0 g/dL   ACCU-CHEK GLUCOSE    Collection Time: 09/20/18 11:55 AM   Result Value Ref Range    Glucose - Accu-Ck 145 (H) 65 - 99 mg/dL   EKG on arrival to U    Collection Time: 09/20/18 11:58 AM   Result Value Ref Range    Report       [facility]    Test Date:  2018-09-20  Pt Name:    JAYDON  [facility]              Department: 161  MRN:        [id number]                      Room:       [facility]  Gender:     Male                         Technician: [facility]  :        1990                   Requested By:BEST PATTERSON  Order #:    [id number]                    Reading MD: Neto Cooper MD    Measurements  Intervals                                Axis  Rate:       87                           P:          71  PA:         156                          QRS:        47  QRSD:       94                           T:          40  QT:         384  QTc:        462    Interpretive Statements  SINUS RHYTHM  BORDERLINE LOW VOLTAGE IN FRONTAL LEADS  Compared to ECG 2018 09:01:47  No significant changes    Electronically Signed On 2018 16:37:41 PDT by Neto Cooper MD     Platelet count    Collection Time: 18 12:00 PM   Result Value Ref Range    Platelet Count 108 (L) 164 - 446 K/uL   Magnesium    Collection Time: 18 12:00 PM   Result Value Ref Range    Magnesium 2.4 1.5 - 2.5 mg/dL   Prothrombin time (INR)    Collection Time: 18 12:00 PM   Result Value Ref Range    PT 18.7 (H) 12.0 - 14.6 sec    INR 1.56 (H) 0.87 - 1.13   APTT (PTT)    Collection Time: 18 12:00 PM   Result Value Ref Range    APTT 31.5 24.7 - 36.0 sec   ACCU-CHEK GLUCOSE    Collection Time: 18  1:40 PM   Result Value Ref Range    Glucose - Accu-Ck 115 (H) 65 - 99 mg/dL   ACCU-CHEK GLUCOSE    Collection Time: 18  2:35 PM   Result Value Ref Range    Glucose - Accu-Ck 110 (H) 65 - 99 mg/dL   ACCU-CHEK GLUCOSE    Collection Time: 18  3:36 PM   Result Value Ref Range    Glucose - Accu-Ck 116 (H) 65 - 99 mg/dL   Potassium Serum (K)    Collection Time: 18  5:30 PM   Result Value Ref Range    Potassium 4.4 3.6 - 5.5 mmol/L   MAGNESIUM    Collection Time: 18  5:30 PM   Result Value Ref Range    Magnesium 2.5 1.5 - 2.5 mg/dL   ACCU-CHEK GLUCOSE    Collection Time: 18  5:33 PM   Result Value  Ref Range    Glucose - Accu-Ck 110 (H) 65 - 99 mg/dL   ACCU-CHEK GLUCOSE    Collection Time: 09/20/18  7:31 PM   Result Value Ref Range    Glucose - Accu-Ck 119 (H) 65 - 99 mg/dL   ACCU-CHEK GLUCOSE    Collection Time: 09/20/18 10:03 PM   Result Value Ref Range    Glucose - Accu-Ck 103 (H) 65 - 99 mg/dL   ACCU-CHEK GLUCOSE    Collection Time: 09/20/18 11:58 PM   Result Value Ref Range    Glucose - Accu-Ck 113 (H) 65 - 99 mg/dL   Potassium Serum (K)    Collection Time: 09/21/18 12:00 AM   Result Value Ref Range    Potassium 4.6 3.6 - 5.5 mmol/L   ACCU-CHEK GLUCOSE    Collection Time: 09/21/18  2:03 AM   Result Value Ref Range    Glucose - Accu-Ck 95 65 - 99 mg/dL   CBC without Differential Critical Care 0130    Collection Time: 09/21/18  4:00 AM   Result Value Ref Range    WBC 9.5 4.8 - 10.8 K/uL    RBC 3.91 (L) 4.70 - 6.10 M/uL    Hemoglobin 11.0 (L) 14.0 - 18.0 g/dL    Hematocrit 32.3 (L) 42.0 - 52.0 %    MCV 82.4 81.4 - 97.8 fL    MCH 27.9 27.0 - 33.0 pg    MCHC 33.9 33.7 - 35.3 g/dL    RDW 42.5 35.9 - 50.0 fL    Platelet Count 106 (L) 164 - 446 K/uL    MPV 11.2 9.0 - 12.9 fL   Basic Metabolic Panel (BMP) Critical Care 0130    Collection Time: 09/21/18  4:00 AM   Result Value Ref Range    Sodium 134 (L) 135 - 145 mmol/L    Potassium 4.2 3.6 - 5.5 mmol/L    Chloride 105 96 - 112 mmol/L    Co2 22 20 - 33 mmol/L    Glucose 109 (H) 65 - 99 mg/dL    Bun 12 8 - 22 mg/dL    Creatinine 0.67 0.50 - 1.40 mg/dL    Calcium 8.3 (L) 8.5 - 10.5 mg/dL    Anion Gap 7.0 0.0 - 11.9   PROTHROMBIN TIME    Collection Time: 09/21/18  4:00 AM   Result Value Ref Range    PT 16.6 (H) 12.0 - 14.6 sec    INR 1.33 (H) 0.87 - 1.13   ESTIMATED GFR    Collection Time: 09/21/18  4:00 AM   Result Value Ref Range    GFR If African American >60 >60 mL/min/1.73 m 2    GFR If Non African American >60 >60 mL/min/1.73 m 2   ACCU-CHEK GLUCOSE    Collection Time: 09/21/18  4:07 AM   Result Value Ref Range    Glucose - Accu-Ck 106 (H) 65 - 99 mg/dL    ACCU-CHEK GLUCOSE    Collection Time: 18  6:15 AM   Result Value Ref Range    Glucose - Accu-Ck 131 (H) 65 - 99 mg/dL   ACCU-CHEK GLUCOSE    Collection Time: 18  7:30 AM   Result Value Ref Range    Glucose - Accu-Ck 97 65 - 99 mg/dL   EKG in the AM Post Op Day #1 (Specify Time)    Collection Time: 18  7:53 AM   Result Value Ref Range    Report       [facility]    Test Date:  2018  Pt Name:    JAYDON POLLARD              Department: 161  MRN:        [id number]                      Room:       [facility]  Gender:     Male                         Technician: RH[employer]  :        1990                   Requested By:BEST PATTERSON  Order #:    [id number]                    Reading MD: Emmett Montana MD    Measurements  Intervals                                Axis  Rate:       75                           P:          49  VA:         148                          QRS:        -13  QRSD:       94                           T:          -7  QT:         368  QTc:        411    Interpretive Statements  SINUS RHYTHM  BORDERLINE REPOLARIZATION ABNORMALITY  NONSPECIFIC ST ELEVATION I AND AVL. CONSIDER HYPERACUTE MI, PERICARDITIS AND  NONSPECIFIC REPOLARIZATION ABNORMALITY.  Compared to ECG 2018 11:58:42  ST (T wave) deviation now present    Electronically Signed On 2018 9:22:39 PDT by Emmett Montana MD     ACCU-CHEK GLUCOSE    Collection Time: 18  8:25 AM   Result Value Ref Range    Glucose - Accu-Ck 83 65 - 99 mg/dL   ACCU-CHEK GLUCOSE    Collection Time: 18  9:20 AM   Result Value Ref Range    Glucose - Accu-Ck 100 (H) 65 - 99 mg/dL   ACCU-CHEK GLUCOSE    Collection Time: 18 10:27 AM   Result Value Ref Range    Glucose - Accu-Ck 101 (H) 65 - 99 mg/dL   ACCU-CHEK GLUCOSE    Collection Time: 18 11:22 AM   Result Value Ref Range    Glucose - Accu-Ck 75 65 - 99 mg/dL   ACCU-CHEK GLUCOSE    Collection Time: 18 12:19 PM   Result Value Ref Range    Glucose - Accu-Ck 117  (H) 65 - 99 mg/dL       Imaging  X-Ray:  I have personally reviewed the images and compared with prior images. and My impression is: Large gastric bubble    Assessment/Plan  Acute bacterial endocarditis- (present on admission)   Assessment & Plan    Resulting in severe mitral regurgitation  Status post prolonged antibiotic course as an outpatient 8/22        Epidural abscess- (present on admission)   Assessment & Plan    Resolved on imaging as above        Streptococcal bacteremia- (present on admission)   Assessment & Plan    Associated with epidural abscess and septic emboli to the brain, treated with prolonged antibiotics and resolved        Gastric distention   Assessment & Plan    Patient with large gastric bubble on a.m. chest x-ray  Patient has had multiple burps and pass some gas  We will slowly advance diet and monitor  He is at high risk for aspiration        Shock (HCC)   Assessment & Plan    Patient still has some vasomotor tone loss secondary to likely Sirs and postoperative state  Continue with Manuel-Synephrine        Acute respiratory failure with hypoxia (HCC)   Assessment & Plan    He was extubated requiring some supplemental nasal cannula this morning.  Continue I-S mobility and diuresis when able          Non-rheumatic mitral regurgitation- (present on admission)   Assessment & Plan    Elective mitral valve repair 9/20  Patient still on some vasopressor support with Manuel-Synephrine, continue CVS protocols, monitor hemodynamics, chest tube output, glycemic control, etc.               VTE:  Lovenox  Ulcer: Not Indicated  Lines: All indicated    I have performed a physical exam and reviewed and updated ROS and Plan today (9/21/2018). In review of yesterday's note (9/20/2018), there are no changes except as documented above.     Discussed patient condition and risk of morbidity and/or mortality with Hospitalist, Family, RN, Charge nurse / hot rounds and Patient  The patient remains critically ill still  requiring vasopressor support postoperative.  Critical care time = 35 minutes in directly providing and coordinating critical care and extensive data review.  No time overlap and excludes procedures.

## 2018-09-21 NOTE — PROGRESS NOTES
Cardiovascular Surgery Progress Note    Name: Nicholas Neumann  MRN: [id number]  : 1990  Admit Date: 2018  5:02 AM  Procedure:  Procedure(s) and Anesthesia Type:     * MITRAL VALVE REPAIR- OR   - General     * CHAR - General  1 Day Post-Op    Vitals:  Patient Vitals for the past 8 hrs:   Monitored Temp 2 SpO2 O2 (LPM) Pulse Heart Rate (Monitored) Resp NIBP Weight   18 0712 - 98 % 3 71 75 (!) 21 - -   18 0600 36.2 °C (97.2 °F) 93 % - 93 93 20 (!) 88/43 104.7 kg (230 lb 13.2 oz)   18 0500 36.4 °C (97.5 °F) 94 % - 66 67 15 (!) 76/45 -   18 0400 36.7 °C (98.1 °F) 95 % - 67 69 12 - -   18 0300 36.7 °C (98.1 °F) 98 % - 69 69 14 (!) 88/57 -   18 0200 37 °C (98.6 °F) 95 % - 72 72 15 (!) 92/53 -   18 0100 36.9 °C (98.4 °F) 96 % - 69 75 14 (!) 96/62 -     No data recorded.      Respiratory:  Brooks Vent Mode: ASV, PEEP/CPAP: 8, FiO2: 40, P Peak (PIP): 21, P MEAN: 13 Respiration: (!) 21, Pulse Oximetry: 98 %, O2 Daily Delivery Respiratory : Silicone Nasal Cannula     Chest Tube Drains:          Fluids:    Intake/Output Summary (Last 24 hours) at 18 0825  Last data filed at 18 0600   Gross per 24 hour   Intake          9282.98 ml   Output             7568 ml   Net          1714.98 ml     Admit weight: Weight: 98.4 kg (216 lb 14.9 oz)  Current weight: Weight: 104.7 kg (230 lb 13.2 oz) (18 0600)    Labs:  Recent Labs      18   0941  18   1200  18   0400   WBC  6.2   --   9.5   RBC  5.62   --   3.91*   HEMOGLOBIN  15.8   --   11.0*   HEMATOCRIT  45.8   --   32.3*   MCV  81.5   --   82.4   MCH  28.1   --   27.9   MCHC  34.5   --   33.9   RDW  43.2   --   42.5   PLATELETCT  177  108*  106*   MPV  11.0   --   11.2     Recent Labs      18   0941   NEUTSPOLYS  58.80   LYMPHOCYTES  32.00   MONOCYTES  6.10   EOSINOPHILS  2.10   BASOPHILS  0.50     Recent Labs      18   0941  18   1730  18   0000  18   0400   SODIUM   136   --    --   134*   POTASSIUM  3.9  4.4  4.6  4.2   CHLORIDE  103   --    --   105   CO2  26   --    --   22   GLUCOSE  88   --    --   109*   BUN  16   --    --   12   CREATININE  0.87   --    --   0.67   CALCIUM  9.3   --    --   8.3*     Recent Labs      09/19/18   0941  09/20/18   1200  09/21/18   0400   APTT  30.6  31.5   --    INR  1.08  1.56*  1.33*       Medications:  • insulin NPH  7 Units     • insulin lispro  5 Units     • insulin lispro  0-15 Units     • citalopram  10 mg     • gabapentin  100 mg     • K+ Scale: Goal of 4.5  1 Each     • senna-docusate  2 Tab     • enoxaparin  40 mg     • mupirocin  1 Application     • magnesium sulfate  1 g Stopped (09/20/18 1339)   • aspirin EC  81 mg     • MD Alert...Warfarin per Pharmacy       • insulin lispro  2 Units          Ordered Medications:    ASA - Yes    Plavix - No; contraindicated because of Other coumadin    Post-operative Beta Blockers - No; contraindicated because of High risk for heart block    Ace Inhibitor - No; contraindicated because of Other normal EF    Statin - No; contraindicated because of No cad          Central Line:  Type of line: central line   Date of insertion: 9/20/18  Date of removal:     Exam:   Review of Systems   Constitutional: Negative.    HENT: Negative.    Eyes: Negative.    Respiratory: Negative.    Cardiovascular: Negative.    Gastrointestinal: Negative.    Genitourinary: Negative.    Musculoskeletal: Positive for myalgias.   Skin: Negative.    Neurological: Negative.    Endo/Heme/Allergies: Negative.    Psychiatric/Behavioral: Negative.        Physical Exam   Constitutional: He is oriented to person, place, and time. He appears well-developed and well-nourished. No distress.   Neck: Neck supple.   Cardiovascular: Normal rate, regular rhythm and normal heart sounds.  Exam reveals no gallop and no friction rub.    No murmur heard.  Pulmonary/Chest: Effort normal. No respiratory distress. He has decreased breath sounds in  the right lower field and the left lower field. He has no wheezes. He has no rales.   Abdominal: Soft. He exhibits no distension. There is no tenderness.   Musculoskeletal: He exhibits edema.   Neurological: He is alert and oriented to person, place, and time.   Skin: Skin is warm and dry. He is not diaphoretic.       Quality Measures:   Quality-Core Measures   Reviewed items::  EKG reviewed, Labs reviewed, Medications reviewed and Radiology images reviewed  Cruz catheter::  One or Two Days Post Surgery (Day of Surgery being Day 0)  Central line in place:  Need for access  DVT prophylaxis pharmacological::  Warfarin (Coumadin)  DVT prophylaxis - mechanical:  Not indicated at this time, ambulatory  Ulcer Prophylaxis::  Yes  Assessed for rehabilitation services:  Patient returned to prior level of function, rehabilitation not indicated at this time      Assessment/Plan:  POD 1 HDS, SR, EKG c/w pericarditis, pain- add toradol,, amb, enc IS    Patient seen, examined and plan reviewed with midlevel provider. I agree with the plan.      Active Hospital Problems    Diagnosis   • Acute bacterial endocarditis [I33.0]     Priority: High   • Epidural abscess [G06.2]     Priority: Medium   • Streptococcal bacteremia [R78.81, B95.5]     Priority: Medium   • Non-rheumatic mitral regurgitation [I34.0]     Priority: Low   • Acute respiratory failure with hypoxia (HCC) [J96.01]

## 2018-09-21 NOTE — CONSULTS
Critical Care/Pulmonary Consultation    Date of Service: 9/20/2018    Date of Admission:  9/20/2018  5:02 AM    Consulting Physician: Kevin Fernandez M.D.    Chief Complaint: Elective mitral valve repair    History of Present Illness: Nicholas Neumann is a 27 y.o. 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  He was discharged home with a PICC in place and completed antibiotics 8/22.  He continued to have dyspnea on exertion and was significantly symptomatic.  Transesophageal echocardiogram 8/20 showed preserved ejection fraction 65% and severe mitral regurgitation.  MRI of the spine showed resolution of the epidural abscess.  Today he presented for elective mitral valve repair.  I was called to the bedside and the patient arrived from the operating room and reviewed the case with anesthesiology and current thoracic surgery.  Surgery went well and he remains currently intubated on full ventilator support.    ROS    Home Medications     Reviewed by Cortney Yeager R.N. (Registered Nurse) on 09/20/18 at 0820  Med List Status: Complete   Medication Last Dose Status   aminocaproic acid (AMICAR) 5 g in  mL Infusion  Active   aminocaproic acid (AMICAR) 9.85 g in  mL IV Bolus  Active   ceFAZolin (ANCEF) IVPB 2 g  Active   citalopram (CELEXA) 10 MG tablet 9/19/2018 Active   dexmedetomidine (PRECEDEX) 400 mcg/100mL NS premix infusion  Active   EPINEPHrine 1 mg/mL(1:1000) 4 mg in  mL Infusion  Active   gabapentin (NEURONTIN) 100 MG Cap 9/19/2018 Active   ibuprofen (MOTRIN) 200 MG Tab > 1 week Active   insulin regular (HUMULIN R) injection 2-15 Units  Active   insulin regular human (HUMULIN/NOVOLIN R) 62.5 Units in  mL infusion per protocol  Active   levetiracetam (KEPPRA) 750 MG tablet > 1 week Active   lidocaine (XYLOCAINE) 1%  injection  Active    MELATONIN PO 9/19/2018 Active   NS SOLN 100 mL with penicillin G potassium [id number] UNIT SOLR 3 Million Units 8/23/2018 Active                Social History   Substance Use Topics   • Smoking status: Current Every Day Smoker     Packs/day: 0.50     Years: 13.00     Types: Cigarettes   • Smokeless tobacco: Former User     Types: Chew     Quit date: 09/2017   • Alcohol use No        Past Medical History:   Diagnosis Date   • Bell's palsy 2011    No residual deficit   • Brain embolism and thrombosis 07/09/2018    x 2   • Endocarditis of mitral valve 07/09/2018    Streptococcal   • Heart murmur    • Pain     back spine   • Spinal epidural abscess 07/09/2018       Past Surgical History:   Procedure Laterality Date   • MITRAL VALVE REPAIR  9/20/2018    Procedure: MITRAL VALVE REPAIR- OR  ;  Surgeon: Kevin Fernandez M.D.;  Location: SURGERY [facility];  Service: Cardiac   • CHAR  9/20/2018    Procedure: CHAR;  Surgeon: Kevin Fernandez M.D.;  Location: SURGERY [facility];  Service: Cardiac   • KNEE ARTHROSCOPY  10/2/08    Performed by JOY NOLEN at [facility]   • LOOSE BODY REMOVAL  5/21/08    Performed by JOY NOLEN at [facility]   • KNEE ARTHROSCOPY  5/21/08    Performed by JOY NOLEN at [facility]   • TIBIAL OSTEOTOMY  5/21/08    Performed by JOY NOLEN at [facility]   • LIGAMENT RECONSTRUCTION  5/21/08    Performed by JOY NOLEN at [facility]   • KNEE ARTHROSCOPY  5/21/08    Performed by JOY NOLEN at [facility]   • KNEE ARTHROSCOPY  2004    Left   • TONSILLECTOMY  1998       Allergies: Nkda [no known drug allergy]    Family History   Problem Relation Age of Onset   • Hyperlipidemia Paternal Grandmother    • Diabetes Maternal Grandfather    • Heart Disease Maternal Grandmother    • Heart Disease Father    • Hypertension Father    • Heart Disease Sister        Vitals:    09/19/18 1200 09/20/18  "0545 09/20/18 0619 09/20/18 1146   Height:  1.778 m (5' 10\")     Weight: 98.4 kg (216 lb 14.9 oz) 98.3 kg (216 lb 11.4 oz)     Weight % change since last entry.: 0 % -0.1 %     Pulse:    87   BMI (Calculated):  31.09     Resp:   14 18   Temp:   36.5 °C (97.7 °F)     09/20/18 1200 09/20/18 1208 09/20/18 1215 09/20/18 1230   Height:       Weight:       Weight % change since last entry.:       Pulse: 87 87 89 89   BMI (Calculated):       Resp: (!) 22 14 17 15   Temp:        09/20/18 1245 09/20/18 1248 09/20/18 1300 09/20/18 1315   Height:       Weight:       Weight % change since last entry.:       Pulse: 90 93 89 92   BMI (Calculated):       Resp: 19 (!) 29 15 17   Temp:        09/20/18 1320 09/20/18 1330 09/20/18 1345 09/20/18 1359   Height:       Weight:       Weight % change since last entry.:       Pulse: 90 91 89 90   BMI (Calculated):       Resp: 20 17 (!) 21 (!) 21   Temp:        09/20/18 1400 09/20/18 1415 09/20/18 1430 09/20/18 1445   Height:       Weight:       Weight % change since last entry.:       Pulse: 89 88 89 89   BMI (Calculated):       Resp: (!) 21 13 13 14   Temp:        09/20/18 1500 09/20/18 1515 09/20/18 1530 09/20/18 1545   Height:       Weight:       Weight % change since last entry.:       Pulse: 89 89 89 88   BMI (Calculated):       Resp: 14 13 15 14   Temp:        09/20/18 1555 09/20/18 1600 09/20/18 1630 09/20/18 1700   Height:       Weight:       Weight % change since last entry.:       Pulse: 87 88 87 88   BMI (Calculated):       Resp: (!) 22 18 15 17   Temp:        09/20/18 1730 09/20/18 1800 09/20/18 1809 09/20/18 1900   Height:       Weight:       Weight % change since last entry.:       Pulse: 82 87 84 84   BMI (Calculated):       Resp: 13 (!) 22 (!) 22 (!) 23   Temp:        09/20/18 2000 09/20/18 2100 09/20/18 2200   Height:      Weight:      Weight % change since last entry.:      Pulse: 82 77 79   BMI (Calculated):      Resp: 16 13 14   Temp:          Physical " Examination  General: Well-developed male, intubated on full support  Neuro/Psych: PERRLA, sedated but arouses follows all commands moves all extremities  HEENT: Mucous pharynx pink, moist, oral endotracheal tube in place, neck supple  CVS: Distant heart sounds, obscured, chest incision dressed, subcostal chest tubes in place   Respiratory: Scattered coarse crackles, slightly diminished, no wheezing  Abdomen/: Soft, nontender  Extremities: Trace edema, capillary refill less than 2 seconds  Skin: Warm, dry      Intake/Output Summary (Last 24 hours) at 09/20/18 2253  Last data filed at 09/20/18 2205   Gross per 24 hour   Intake          6449.63 ml   Output             4018 ml   Net          2431.63 ml       Recent Labs      09/19/18   0941   WBC  6.2   NEUTSPOLYS  58.80   LYMPHOCYTES  32.00   MONOCYTES  6.10   EOSINOPHILS  2.10   BASOPHILS  0.50   ASTSGOT  15   ALTSGPT  14   ALKPHOSPHAT  74   TBILIRUBIN  0.7     Recent Labs      09/19/18   0941  09/20/18   1200  09/20/18   1730   SODIUM  136   --    --    POTASSIUM  3.9   --   4.4   CHLORIDE  103   --    --    CO2  26   --    --    BUN  16   --    --    CREATININE  0.87   --    --    MAGNESIUM   --   2.4  2.5   CALCIUM  9.3   --    --      Recent Labs      09/19/18   0941   ALTSGPT  14   ASTSGOT  15   ALKPHOSPHAT  74   TBILIRUBIN  0.7   GLUCOSE  88     Recent Labs      09/20/18   1015  09/20/18   1119  09/20/18   1154   ISTATAPH  7.392*  7.301*  7.358*   ISTATAPCO2  38.8*  46.5*  38.3*   ISTATAPO2  202*  88*  140*   ISTATATCO2  25  24  23   FVNAEDE8YTL  100*  96  99   ISTATARTHCO3  23.6  23.0  21.5   ISTATARTBE  -1  -4  -4   ISTATTEMP  37.0 C  37.0 C  35.8 C   ISTATFIO2  85  100  100   ISTATSPEC  Arterial  Arterial  Arterial   ISTATAPHTC  7.392*  7.301*  7.375*   HYRHLVVC5SY  202*  88*  132*     EC-CHAR W/O CONT         US-CAROTID DOPPLER BILAT   Final Result      DX-CHEST-PORTABLE (1 VIEW)    (Results Pending)   DX-CHEST-PORTABLE (1 VIEW)    (Results Pending)        Patient Active Problem List   Diagnosis   • Normocytic anemia   • Streptococcal bacteremia   • Streptococcal meningitis   • Epidural abscess   • Non-rheumatic mitral regurgitation   • 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)   • Acute bacterial endocarditis       Acute bacterial endocarditis- (present on admission)   Assessment & Plan    Resulting in severe mitral regurgitation  Status post prolonged antibiotic course as an outpatient 8/22        Epidural abscess- (present on admission)   Assessment & Plan    Resolved on imaging as above        Streptococcal bacteremia- (present on admission)   Assessment & Plan    Associated with epidural abscess and septic emboli to the brain, treated with prolonged antibiotics and resolved        Acute respiratory failure with hypoxia (HCC)   Assessment & Plan    He remains on full mechanical ventilatory support in the immediate postoperative period  Arterial blood gas, ventilator mechanics and mental status all reviewed  Proceed with rapid ventilator weaning and extubation  RT protocols, bronchodilators as needed        Non-rheumatic mitral regurgitation- (present on admission)   Assessment & Plan    Elective mitral valve repair 9/20  Patient seen in the immediate postoperative period, continue CVS protocols, monitor hemodynamics, chest tube output, glycemic control, etc.          Saleem Jovel MD  [facility]  The patient remains critically ill.  He was seen in the immediate postoperative period.  Have adjusted ventilator settings and will assist with rapid liberation from mechanical ventilation.  Ongoing critical care management as above.  Critical care time = 33 minutes in directly providing and coordinating critical care and extensive data review.  No time overlap and excludes procedures.

## 2018-09-21 NOTE — PROGRESS NOTES
12 hour chart check    Monitor Summary: .12/.08/.40 Rhythm: SR, Rate: 69-95.  Rare PVC occasional PAC    2 RN skin check

## 2018-09-21 NOTE — PROGRESS NOTES
Report given to JOHNNY Duff.     RN aware of that patient c/o flushing and redness in face.

## 2018-09-21 NOTE — CARE PLAN
Problem: Day of surgery post CABG/Heart valve replacement  Goal: Stabilization in immediate post op period    Intervention: VS q 15 min x 4 hours, then q 1 hour. Include temperature immediately upon arrival. Check CO/CI q 2-4 hours and PRN  Pt HR in the 80's, with SBP in the 's.    Intervention: If radial artery used, elevate arm, no BP checks or needle sticks from affected arm, monitor ulnar pulse and capillary refill  Pt has cap refill <3 secs on R hand with R arm elevated using pillow.  Intervention: For FSBS greater than 130, start Post Cardiac Surgery Insulin Drip Protocol  Pt on Q 2 hour FS with last ..    Intervention: FSBS frequency as per Cardiac Surgery Insulin Drip Protocol  Q2 hour FS  Intervention: Chest tube to 20 cm suction, record CT drainage with VS  Pt hs put out 250 cc in CT and hooked up[ to 20 cm suction  Intervention: Titrate and wean off vasoactive drips per patient's condition and per MD order while maintaining SBP  mmHg per MD order  Pt has received 2.5 L of fluid with no vasoactive agents initiated  Intervention: Wean from vent per protocol (see protocol), extubation goal with 2-6 hours post op.  Pt extubated in 1 hour 8 mins post op  Intervention: IS q 1 hour while awake post extubation  Pt pulling 1250 on IS  Intervention: Out of bed, dangle 4 hours post extubation  Pt EOB for 10 mins 4 hrs post extubation  Intervention: Clear liquids post extubation, advance as tolerated  Clear liquid carbs free diet ordered

## 2018-09-21 NOTE — PROGRESS NOTES
ANGEL Jensen updated regarding pt BP in the 80's. Pt has a total of 2.5 L given with a CVP of 0-2. U/O total over shift is 793 with CT output of 250. HR has been in the 80's. 500 of albumin, 1 gram of Calcium chloride ordered with an additional Liter of Plasmalyte ordered. No further orders received at this time.

## 2018-09-21 NOTE — PROGRESS NOTES
APN updated on patient blood pressures systolic 70-80's after 1 L given from previous order, CVP 2, UOP 3.5 L since beginning of shift. Order for 1 Liter received and then start epi if fluid ineffective.

## 2018-09-22 PROBLEM — K31.89 GASTRIC DISTENTION: Status: RESOLVED | Noted: 2018-09-21 | Resolved: 2018-09-22

## 2018-09-22 PROBLEM — R57.9 SHOCK (HCC): Status: RESOLVED | Noted: 2018-09-21 | Resolved: 2018-09-22

## 2018-09-22 LAB
ACT BLD: 114 SEC (ref 74–137)
ACT BLD: 142 SEC (ref 74–137)
ACT BLD: 445 SEC (ref 74–137)
ACT BLD: 500 SEC (ref 74–137)
ACT BLD: 742 SEC (ref 74–137)
ACT BLD: >1000 SEC (ref 74–137)
ANION GAP SERPL CALC-SCNC: 4 MMOL/L (ref 0–11.9)
BUN SERPL-MCNC: 11 MG/DL (ref 8–22)
CALCIUM SERPL-MCNC: 8.1 MG/DL (ref 8.5–10.5)
CHLORIDE SERPL-SCNC: 103 MMOL/L (ref 96–112)
CO2 SERPL-SCNC: 30 MMOL/L (ref 20–33)
CREAT SERPL-MCNC: 0.79 MG/DL (ref 0.5–1.4)
ERYTHROCYTE [DISTWIDTH] IN BLOOD BY AUTOMATED COUNT: 42.6 FL (ref 35.9–50)
GLUCOSE BLD-MCNC: 77 MG/DL (ref 65–99)
GLUCOSE BLD-MCNC: 98 MG/DL (ref 65–99)
GLUCOSE SERPL-MCNC: 102 MG/DL (ref 65–99)
HCT VFR BLD AUTO: 28.9 % (ref 42–52)
HGB BLD-MCNC: 10 G/DL (ref 14–18)
MCH RBC QN AUTO: 28.3 PG (ref 27–33)
MCHC RBC AUTO-ENTMCNC: 34.6 G/DL (ref 33.7–35.3)
MCV RBC AUTO: 81.9 FL (ref 81.4–97.8)
PLATELET # BLD AUTO: 99 K/UL (ref 164–446)
PMV BLD AUTO: 11.7 FL (ref 9–12.9)
POTASSIUM SERPL-SCNC: 3.6 MMOL/L (ref 3.6–5.5)
RBC # BLD AUTO: 3.53 M/UL (ref 4.7–6.1)
SODIUM SERPL-SCNC: 137 MMOL/L (ref 135–145)
WBC # BLD AUTO: 8.4 K/UL (ref 4.8–10.8)

## 2018-09-22 PROCEDURE — 700102 HCHG RX REV CODE 250 W/ 637 OVERRIDE(OP): Performed by: CLINICAL NURSE SPECIALIST

## 2018-09-22 PROCEDURE — A9270 NON-COVERED ITEM OR SERVICE: HCPCS | Performed by: NURSE PRACTITIONER

## 2018-09-22 PROCEDURE — 85027 COMPLETE CBC AUTOMATED: CPT

## 2018-09-22 PROCEDURE — 99232 SBSQ HOSP IP/OBS MODERATE 35: CPT | Performed by: INTERNAL MEDICINE

## 2018-09-22 PROCEDURE — A9270 NON-COVERED ITEM OR SERVICE: HCPCS | Performed by: CLINICAL NURSE SPECIALIST

## 2018-09-22 PROCEDURE — 700102 HCHG RX REV CODE 250 W/ 637 OVERRIDE(OP): Performed by: NURSE PRACTITIONER

## 2018-09-22 PROCEDURE — 80048 BASIC METABOLIC PNL TOTAL CA: CPT

## 2018-09-22 PROCEDURE — 700111 HCHG RX REV CODE 636 W/ 250 OVERRIDE (IP): Performed by: NURSE PRACTITIONER

## 2018-09-22 PROCEDURE — 770020 HCHG ROOM/CARE - TELE (206)

## 2018-09-22 PROCEDURE — 94668 MNPJ CHEST WALL SBSQ: CPT

## 2018-09-22 PROCEDURE — 82962 GLUCOSE BLOOD TEST: CPT | Mod: 91

## 2018-09-22 PROCEDURE — 700111 HCHG RX REV CODE 636 W/ 250 OVERRIDE (IP): Performed by: CLINICAL NURSE SPECIALIST

## 2018-09-22 RX ORDER — WARFARIN SODIUM 5 MG/1
5 TABLET ORAL
Status: DISCONTINUED | OUTPATIENT
Start: 2018-09-23 | End: 2018-09-24 | Stop reason: HOSPADM

## 2018-09-22 RX ORDER — WARFARIN SODIUM 7.5 MG/1
7.5 TABLET ORAL
Status: COMPLETED | OUTPATIENT
Start: 2018-09-22 | End: 2018-09-22

## 2018-09-22 RX ADMIN — OXYCODONE HYDROCHLORIDE 10 MG: 10 TABLET ORAL at 15:29

## 2018-09-22 RX ADMIN — CITALOPRAM HYDROBROMIDE 10 MG: 20 TABLET ORAL at 04:26

## 2018-09-22 RX ADMIN — FENTANYL CITRATE 50 MCG: 50 INJECTION INTRAMUSCULAR; INTRAVENOUS at 06:09

## 2018-09-22 RX ADMIN — FENTANYL CITRATE 25 MCG: 50 INJECTION INTRAMUSCULAR; INTRAVENOUS at 16:22

## 2018-09-22 RX ADMIN — ENOXAPARIN SODIUM 40 MG: 100 INJECTION SUBCUTANEOUS at 17:47

## 2018-09-22 RX ADMIN — MUPIROCIN 1 APPLICATION: 20 OINTMENT TOPICAL at 06:00

## 2018-09-22 RX ADMIN — MUPIROCIN 1 APPLICATION: 20 OINTMENT TOPICAL at 17:46

## 2018-09-22 RX ADMIN — OXYCODONE HYDROCHLORIDE 10 MG: 10 TABLET ORAL at 08:14

## 2018-09-22 RX ADMIN — OXYCODONE HYDROCHLORIDE 10 MG: 10 TABLET ORAL at 04:20

## 2018-09-22 RX ADMIN — OXYCODONE HYDROCHLORIDE 10 MG: 10 TABLET ORAL at 18:27

## 2018-09-22 RX ADMIN — WARFARIN SODIUM 7.5 MG: 7.5 TABLET ORAL at 18:22

## 2018-09-22 RX ADMIN — ALPRAZOLAM 0.5 MG: 0.5 TABLET ORAL at 22:26

## 2018-09-22 RX ADMIN — ASPIRIN 81 MG: 81 TABLET, COATED ORAL at 04:27

## 2018-09-22 RX ADMIN — OXYCODONE HYDROCHLORIDE 10 MG: 10 TABLET ORAL at 11:29

## 2018-09-22 RX ADMIN — TRAMADOL HYDROCHLORIDE 50 MG: 50 TABLET, FILM COATED ORAL at 09:48

## 2018-09-22 RX ADMIN — OXYCODONE HYDROCHLORIDE 10 MG: 10 TABLET ORAL at 21:25

## 2018-09-22 RX ADMIN — STANDARDIZED SENNA CONCENTRATE AND DOCUSATE SODIUM 2 TABLET: 8.6; 5 TABLET ORAL at 17:47

## 2018-09-22 RX ADMIN — TRAMADOL HYDROCHLORIDE 50 MG: 50 TABLET, FILM COATED ORAL at 13:51

## 2018-09-22 RX ADMIN — MAGNESIUM SULFATE 1 G: 1 INJECTION INTRAVENOUS at 12:20

## 2018-09-22 RX ADMIN — KETOROLAC TROMETHAMINE 30 MG: 30 INJECTION, SOLUTION INTRAMUSCULAR; INTRAVENOUS at 01:30

## 2018-09-22 RX ADMIN — TRAMADOL HYDROCHLORIDE 50 MG: 50 TABLET, FILM COATED ORAL at 21:25

## 2018-09-22 ASSESSMENT — PAIN SCALES - GENERAL
PAINLEVEL_OUTOF10: 5
PAINLEVEL_OUTOF10: 8
PAINLEVEL_OUTOF10: 7
PAINLEVEL_OUTOF10: 8
PAINLEVEL_OUTOF10: 7
PAINLEVEL_OUTOF10: 7
PAINLEVEL_OUTOF10: 5
PAINLEVEL_OUTOF10: 7
PAINLEVEL_OUTOF10: 7
PAINLEVEL_OUTOF10: 8
PAINLEVEL_OUTOF10: 6
PAINLEVEL_OUTOF10: 7
PAINLEVEL_OUTOF10: 4
PAINLEVEL_OUTOF10: 5
PAINLEVEL_OUTOF10: 6
PAINLEVEL_OUTOF10: 3
PAINLEVEL_OUTOF10: 8
PAINLEVEL_OUTOF10: 5

## 2018-09-22 ASSESSMENT — ENCOUNTER SYMPTOMS
NEUROLOGICAL NEGATIVE: 1
COUGH: 0
TROUBLE SWALLOWING: 0
CHILLS: 0
FEVER: 0
BACK PAIN: 0
PSYCHIATRIC NEGATIVE: 1
EYES NEGATIVE: 1
SHORTNESS OF BREATH: 0
NAUSEA: 0
CONSTITUTIONAL NEGATIVE: 1
FATIGUE: 0
ABDOMINAL DISTENTION: 0
CONSTIPATION: 0
MYALGIAS: 1
SORE THROAT: 0
ABDOMINAL PAIN: 0
CHOKING: 0
GASTROINTESTINAL NEGATIVE: 1
RESPIRATORY NEGATIVE: 1
VOMITING: 0
CARDIOVASCULAR NEGATIVE: 1

## 2018-09-22 NOTE — PROGRESS NOTES
12 hour chart check.    Two RN skin check.   Monitor Summary   Rate SR 69-84 rare PVCs  .12/.10/.34

## 2018-09-22 NOTE — CARE PLAN
Problem: Post op day 2 CABG/Heart Valve Replacement  Goal: Optimal care of the post op CABG/heart valve replacement post op day 2    Intervention: FSBS: when 2 consecutive BS < 130 after post op day 2, discontinue FSBS unless patient is insulin dependent diabetic  Will reassess at 0600. BS at 0200 was 98.   Intervention: Daily Weights  Completed    Intervention: Up in chair for all meals  Completed    Intervention: Ambulate, increasing the distance each time x 3 and before bed  Completed    Intervention: Stand at sink and wash up with assistance.  Clean incisions twice daily with soap and water.  Will complete on dayshift.    Intervention: IS q 1 hour while awake and record best IS volume  Best IS is 1500    Intervention: Consider pacer wire removal by MD Del Valle, will reassess in am.   Intervention: Consider removal of meza and chest tube if not already done  Meza still present will re-assess in am.

## 2018-09-22 NOTE — PROGRESS NOTES
Patient was sitting in chair waiting for dinner. Call bell was activated by patient. This RN sent neighbor RN in to address needs. Patient became upset and insisted that he wanted back to bed because he was not going to eat dinner in bed and started unhooking himself from the monitor. Educated patient. Patient discussed that the chair was uncomfortable. Suggested that he sat edge of bed to eat dinner. Patient agreed. Then moved legs into bed into laying position.     Patient continued to yell at nursing staff who were sitting at desk across from patient's room. This RN closed the curtain. After curtain was closed, the patient continue to use vulgar language to express his dissatisfaction with the situation. This RN apologized. The patient than apologized.      Per Dr. Strong, who informed this RN at 1810, that this RN can change his diet to full CHO consistent. Changes were made.      Called dietary.

## 2018-09-22 NOTE — CARE PLAN
Problem: Post Op Day 1 CABG/Heart Valve Replacement  Goal: Optimal care of the post op CABG/heart valve replacement Post Op Day 1    Intervention: EKG and CXR completed  Changes noted. APN aware orders received.   Intervention: All valve patients: PT/INR daily  See labs.   Intervention: Antibiotics are discontinued within 24 hours of anesthesia end time unless indication documented for continuation beyond 24 hours  Done.   Intervention: Daily Weights  Done.   Intervention: Up in chair for all meals  Patient refused to sit in chair for lunch and dinner.   Intervention: Ambulate in am if stable. First ambulation is 25 feet. Repeat x 3 as tolerated.  Ambulate again before bed.  Done. Patient ambulated 200 feet once without incident.   Intervention: Discontinue meaz catheter unless documented reason for continuation  Pt's intake and output needed closer observation.  Intervention: Remove original surgical dressing after 24 hrs, leave open to air unless otherwise specified by physician  To be removed POD 7.  Intervention: Consider chest tube removal by MD  Discussed with MD not applicable.   Intervention: IS q 1 hour while awake and record best IS volume  Done. 1100 was best effort.   Intervention: Graduated elastic stockings  Done.   Intervention: Saline lock IV  Done.   Intervention: Transfer to [facility] status, begin VS q 4 hours  POD at 1230 discuss with noc shift.   Intervention: After 24th hour post-anesthesia end time, transition patient to Cardiac Surgery SQ Insulin Protocol  Done.

## 2018-09-22 NOTE — PROGRESS NOTES
"Notified charge RN that day shift RN had multiple complaints about the behavior displayed by the pt and pt family members towards multiple staff members today. Erlinda to come to bedside to discuss POC and ensure all needs/ expectations are met as well as setting boundaries if needed. Upon entering room, pt yelling \"Nurse\" rather than using call light. Primary RN and charge RN to bedside per yelled request. Primary RN to request pt uses call light without yelling due to being on an ICU. Pt immediately became defensive and increasingly agitated/aggressive. When asked to be respectful and not use curse words, pt became even more aggressive and defensive. Charge RN to intervene at this time and primary RN to step out.   Charge RN to change assignments. Report given to Sergio TURNER.   "

## 2018-09-22 NOTE — PROGRESS NOTES
Last Entry: 2000-2300    Notified by shanti Wild's Night Shift RN that per report pt was verbally aggressive and cussing on day shift and pt and pt's wife had complaints about day shift. Pt then yelled into the butler way for his nurse.  Torri went in to talk with him and set expectations for utilizing the call light and talking respectfully to staff.  I also went in so I could follow up with the pt about any concerns from day shift. The pt became agitated and said he felt attacked and asked to have a different nurse. I asked Torri to leave the room and took over the pt's care until I could reassign him to another nurse.  I talked to the pt about his concerns.  Pt was upset about not receiving appropriate meals on day shift, pain control, and his call light not working.  Pt also fixated on his conversation with Torri.  Addressed the pt's concerns by verifying his call light was working appropriately, verified his diet order, offered him additional food, and medicated him for pain.  Reassigned the pt to JOHNNY Hill and discussed all of the pt's concern and pain control.      Pt's wife and another family member returned and pt became agitated again.  Talked with pt's wife about what happened while she was gone and explained everything.  Then received a phone call from the  stating the pt wanted to talk to the .  Called and spoke with MARY Steiner.  Updated the pt and pt's family on the status of the NAM coming to speak with them.  Per request also provided the pt's wife and family member with the names of the physicians on-call tonight including the intensivist and the Surgery APN.      Introduced pt and pt's family to JOHNNY Hill.  Sergio assumed care.  Followed up with MARY Steiner following his conversation with the pt and the pt's family.  Per Jatin, they wanted leadership to be aware of the conversation and issues.  Notified Ashwini Manager of the conversations.  Also updated ANGEL Barton on all  conversations and plan of care.  Received order from ANGEL Jensen for PRN anxiety medication.  Updated Sergio, on new medication order and plan of care.

## 2018-09-22 NOTE — PROGRESS NOTES
Call light not working properly. When patient presses call light, phone does not ring.     Monitor notified.     Monitors techs called maintenance to fix system.     Apologized to patient. Explained situation. Q 1 hour rounding in place.

## 2018-09-22 NOTE — PROGRESS NOTES
Critical Care Progress Note    Date of admission  9/20/2018    Chief Complaint  27 y.o. male admitted 9/20/2018 for mitral valve repair    Hospital Course   Nicholas Neumann is a 27 y.o. 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  He was discharged home with a PICC in place and completed antibiotics 8/22.  He continued to have dyspnea on exertion and was significantly symptomatic.  Transesophageal echocardiogram 8/20 showed preserved ejection fraction 65% and severe mitral regurgitation.  MRI of the spine showed resolution of the epidural abscess.  Today he presented for elective mitral valve repair.  I was called to the bedside and the patient arrived from the operating room and reviewed the case with anesthesiology and current thoracic surgery.  Surgery went well and he remains currently intubated on full ventilator support    Interval Problem Update  Reviewed last 24 hour events:  POD 2  -no overnight events  - ao4  - fentanyl toradol  - 90  - SBP stable  - room air  - pulled ct and meza  - last BM  - ambulate  - 1500 IS  - lovenox will start warfarin    Review of Systems  Review of Systems   Constitutional: Negative for chills, fatigue and fever.   HENT: Negative for sore throat and trouble swallowing.    Respiratory: Negative for cough, choking and shortness of breath.    Cardiovascular: Negative for chest pain.   Gastrointestinal: Negative for abdominal distention, abdominal pain, constipation, nausea and vomiting.   Musculoskeletal: Negative for back pain.        Vital Signs for last 24 hours   Temp:  [36.1 °C (97 °F)-37 °C (98.6 °F)] 37 °C (98.6 °F)  Pulse:  [] 89  Resp:  [15-24] 16    Hemodynamic parameters for last 24 hours  CVP:  [73 MM HG-200 MM HG] 198 MM HG    Vent Settings for last 24 hours       Physical Exam   Physical Exam   Constitutional:  He is oriented to person, place, and time. He appears well-developed and well-nourished.   HENT:   Head: Normocephalic and atraumatic.   Eyes: Pupils are equal, round, and reactive to light. No scleral icterus.   Neck: Normal range of motion. Neck supple.   Cardiovascular: Normal rate and regular rhythm.    Pulmonary/Chest: Effort normal and breath sounds normal. No respiratory distress.   Abdominal: He exhibits no distension. There is no tenderness. There is no rebound and no guarding.   Musculoskeletal: He exhibits no edema.   Neurological: He is alert and oriented to person, place, and time.   Skin: Skin is warm and dry.   Psychiatric: He has a normal mood and affect.       Medications  Current Facility-Administered Medications   Medication Dose Route Frequency Provider Last Rate Last Dose   • ketorolac (TORADOL) injection 30 mg  30 mg Intravenous Q6HRS PRN Camila Schmidt, A.P.N.   30 mg at 09/22/18 0130   • ALPRAZolam (XANAX) tablet 0.5 mg  0.5 mg Oral TID PRN Camila Schmidt, A.P.N.   0.5 mg at 09/21/18 2319   • citalopram (CELEXA) tablet 10 mg  10 mg Oral DAILY Mikki Rivero A.P.N.   10 mg at 09/22/18 0426   • Respiratory Care per Protocol   Nebulization Continuous RT Mikki Rivero A.P.N.       • Pharmacy Consult Request ...Pain Management Review 1 Each  1 Each Other PRN Mikki Rivero A.P.N.       • senna-docusate (PERICOLACE or SENOKOT S) 8.6-50 MG per tablet 2 Tab  2 Tab Oral BID Mikki Rivero A.P.N.   Stopped at 09/22/18 0600    And   • polyethylene glycol/lytes (MIRALAX) PACKET 1 Packet  1 Packet Oral QDAY PRN Mikki Rivero A.P.N.        And   • magnesium hydroxide (MILK OF MAGNESIA) suspension 30 mL  30 mL Oral QDAY PRN Mikki Rivero, A.P.N.        And   • bisacodyl (DULCOLAX) suppository 10 mg  10 mg Rectal QDAY PRN Mikki Rivero A.P.N.       • enoxaparin (LOVENOX) inj 40 mg  40 mg Subcutaneous QDAY Mikki Rivero, A.P.N.   40 mg at 09/21/18 6687   • mupirocin (BACTROBAN) 2 % ointment 1  Application  1 Application Topical BID Mikki Rivero A.P.N.   1 Application at 09/22/18 0600   • Magnesium Sulfate in D5W IVPB premix 1 g  1 g Intravenous QDAY Mikki Rivero A.P.N.   Stopped at 09/21/18 1323   • aspirin EC (ECOTRIN) tablet 81 mg  81 mg Oral DAILY Mikki Rivero A.P.N.   81 mg at 09/22/18 0427   • MD Alert...Warfarin per Pharmacy   Other pharmacy to dose Mikki Rivero A.P.N.       • oxyCODONE immediate-release (ROXICODONE) tablet 5 mg  5 mg Oral Q3HRS PRN Mikki Rivero A.P.N.       • oxyCODONE immediate release (ROXICODONE) tablet 10 mg  10 mg Oral Q3HRS PRN Mikki Rivero A.P.N.   10 mg at 09/22/18 1129   • fentaNYL (SUBLIMAZE) injection 50 mcg  50 mcg Intravenous Q3HRS PRN Mikki Rivero A.P.N.   50 mcg at 09/22/18 0609   • tramadol (ULTRAM) 50 MG tablet 50 mg  50 mg Oral Q4HRS PRN Mikki Rivero, A.P.N.   50 mg at 09/22/18 0948   • ondansetron (ZOFRAN) syringe/vial injection 4 mg  4 mg Intravenous Q6HRS PRN Mikki Rivero, A.P.N.   4 mg at 09/20/18 1320    Or   • prochlorperazine (COMPAZINE) injection 10 mg  10 mg Intravenous Q6HRS PRN Mikki Rivero, A.P.N.        Or   • promethazine (PHENERGAN) suppository 25 mg  25 mg Rectal Q6HRS PRN Mikki Rivero, A.P.N.       • acetaminophen (TYLENOL) tablet 650 mg  650 mg Oral Q4HRS PRN Mikki Rivero, A.P.N.        Or   • acetaminophen (TYLENOL) suppository 650 mg  650 mg Rectal Q4HRS PRN Mikki iRvero, A.P.N.       • mag hydrox-al hydrox-simeth (MAALOX PLUS ES or MYLANTA DS) suspension 30 mL  30 mL Oral Q4HRS PRN Mikki Rivero, A.P.N.       • diphenhydrAMINE (BENADRYL) tablet/capsule 25 mg  25 mg Oral HS PRN - MR X 1 KIERAN DialPCaesarN.   25 mg at 09/21/18 2318       Fluids    Intake/Output Summary (Last 24 hours) at 09/22/18 1135  Last data filed at 09/22/18 1000   Gross per 24 hour   Intake              340 ml   Output             5635 ml   Net            -5295 ml       Laboratory  Recent Results (from the past 48 hour(s))   ISTAT  ARTERIAL BLOOD GAS    Collection Time: 18 11:54 AM   Result Value Ref Range    Ph 7.358 (L) 7.400 - 7.500    Pco2 38.3 (H) 26.0 - 37.0 mmHg    Po2 140 (H) 64 - 87 mmHg    Tco2 23 20 - 33 mmol/L    S02 99 93 - 99 %    Hco3 21.5 17.0 - 25.0 mmol/L    BE -4 -4 - 3 mmol/L    Body Temp 35.8 C degrees    O2 Therapy 100 %    Ph Temp Shlomo 7.375 (L) 7.400 - 7.500    Pco2 Temp Co 36.4 26.0 - 37.0 mmHg    Po2 Temp Cor 132 (H) 64 - 87 mmHg    Specimen Arterial     Action Range Triggered NO     Inst. Qualified Patient YES    ISTAT SODIUM    Collection Time: 18 11:54 AM   Result Value Ref Range    Istat Sodium 140 135 - 145 mmol/L   ISTAT POTASSIUM    Collection Time: 18 11:54 AM   Result Value Ref Range    Istat Potassium 4.9 3.6 - 5.5 mmol/L   ISTAT IONIZED CA    Collection Time: 18 11:54 AM   Result Value Ref Range    Istat Ionized Calcium 1.08 (L) 1.10 - 1.30 mmol/L   ISTAT HEMATOCRIT AND HEMOGLOBIN    Collection Time: 18 11:54 AM   Result Value Ref Range    Istat Hematocrit 34 (L) 42 - 52 %    Istat Hemoglobin 11.6 (L) 14.0 - 18.0 g/dL   ACCU-CHEK GLUCOSE    Collection Time: 18 11:55 AM   Result Value Ref Range    Glucose - Accu-Ck 145 (H) 65 - 99 mg/dL   EKG on arrival to CSU    Collection Time: 18 11:58 AM   Result Value Ref Range    Report       [facility]    Test Date:  2018  Pt Name:    JAYDON POLLARD              Department: 161  MRN:        [id number]                      Room:       T615  Gender:     Male                         Technician: [facility]  :        1990                   Requested By:BEST PATTERSON  Order #:    [id number]                    Reading MD: Neto Cooper MD    Measurements  Intervals                                Axis  Rate:       87                           P:          71  IN:         156                          QRS:        47  QRSD:       94                           T:          40  QT:         384  QTc:         462    Interpretive Statements  SINUS RHYTHM  BORDERLINE LOW VOLTAGE IN FRONTAL LEADS  Compared to ECG 09/19/2018 09:01:47  No significant changes    Electronically Signed On 9- 16:37:41 PDT by Neto Cooper MD     Platelet count    Collection Time: 09/20/18 12:00 PM   Result Value Ref Range    Platelet Count 108 (L) 164 - 446 K/uL   Magnesium    Collection Time: 09/20/18 12:00 PM   Result Value Ref Range    Magnesium 2.4 1.5 - 2.5 mg/dL   Prothrombin time (INR)    Collection Time: 09/20/18 12:00 PM   Result Value Ref Range    PT 18.7 (H) 12.0 - 14.6 sec    INR 1.56 (H) 0.87 - 1.13   APTT (PTT)    Collection Time: 09/20/18 12:00 PM   Result Value Ref Range    APTT 31.5 24.7 - 36.0 sec   ACCU-CHEK GLUCOSE    Collection Time: 09/20/18  1:40 PM   Result Value Ref Range    Glucose - Accu-Ck 115 (H) 65 - 99 mg/dL   ACCU-CHEK GLUCOSE    Collection Time: 09/20/18  2:35 PM   Result Value Ref Range    Glucose - Accu-Ck 110 (H) 65 - 99 mg/dL   ACCU-CHEK GLUCOSE    Collection Time: 09/20/18  3:36 PM   Result Value Ref Range    Glucose - Accu-Ck 116 (H) 65 - 99 mg/dL   Potassium Serum (K)    Collection Time: 09/20/18  5:30 PM   Result Value Ref Range    Potassium 4.4 3.6 - 5.5 mmol/L   MAGNESIUM    Collection Time: 09/20/18  5:30 PM   Result Value Ref Range    Magnesium 2.5 1.5 - 2.5 mg/dL   ACCU-CHEK GLUCOSE    Collection Time: 09/20/18  5:33 PM   Result Value Ref Range    Glucose - Accu-Ck 110 (H) 65 - 99 mg/dL   ACCU-CHEK GLUCOSE    Collection Time: 09/20/18  7:31 PM   Result Value Ref Range    Glucose - Accu-Ck 119 (H) 65 - 99 mg/dL   ACCU-CHEK GLUCOSE    Collection Time: 09/20/18 10:03 PM   Result Value Ref Range    Glucose - Accu-Ck 103 (H) 65 - 99 mg/dL   ACCU-CHEK GLUCOSE    Collection Time: 09/20/18 11:58 PM   Result Value Ref Range    Glucose - Accu-Ck 113 (H) 65 - 99 mg/dL   Potassium Serum (K)    Collection Time: 09/21/18 12:00 AM   Result Value Ref Range    Potassium 4.6 3.6 - 5.5 mmol/L   ACCU-CHEK  GLUCOSE    Collection Time: 09/21/18  2:03 AM   Result Value Ref Range    Glucose - Accu-Ck 95 65 - 99 mg/dL   CBC without Differential Critical Care 0130    Collection Time: 09/21/18  4:00 AM   Result Value Ref Range    WBC 9.5 4.8 - 10.8 K/uL    RBC 3.91 (L) 4.70 - 6.10 M/uL    Hemoglobin 11.0 (L) 14.0 - 18.0 g/dL    Hematocrit 32.3 (L) 42.0 - 52.0 %    MCV 82.4 81.4 - 97.8 fL    MCH 27.9 27.0 - 33.0 pg    MCHC 33.9 33.7 - 35.3 g/dL    RDW 42.5 35.9 - 50.0 fL    Platelet Count 106 (L) 164 - 446 K/uL    MPV 11.2 9.0 - 12.9 fL   Basic Metabolic Panel (BMP) Critical Care 0130    Collection Time: 09/21/18  4:00 AM   Result Value Ref Range    Sodium 134 (L) 135 - 145 mmol/L    Potassium 4.2 3.6 - 5.5 mmol/L    Chloride 105 96 - 112 mmol/L    Co2 22 20 - 33 mmol/L    Glucose 109 (H) 65 - 99 mg/dL    Bun 12 8 - 22 mg/dL    Creatinine 0.67 0.50 - 1.40 mg/dL    Calcium 8.3 (L) 8.5 - 10.5 mg/dL    Anion Gap 7.0 0.0 - 11.9   PROTHROMBIN TIME    Collection Time: 09/21/18  4:00 AM   Result Value Ref Range    PT 16.6 (H) 12.0 - 14.6 sec    INR 1.33 (H) 0.87 - 1.13   ESTIMATED GFR    Collection Time: 09/21/18  4:00 AM   Result Value Ref Range    GFR If African American >60 >60 mL/min/1.73 m 2    GFR If Non African American >60 >60 mL/min/1.73 m 2   ACCU-CHEK GLUCOSE    Collection Time: 09/21/18  4:07 AM   Result Value Ref Range    Glucose - Accu-Ck 106 (H) 65 - 99 mg/dL   ACCU-CHEK GLUCOSE    Collection Time: 09/21/18  6:15 AM   Result Value Ref Range    Glucose - Accu-Ck 131 (H) 65 - 99 mg/dL   ACCU-CHEK GLUCOSE    Collection Time: 09/21/18  7:30 AM   Result Value Ref Range    Glucose - Accu-Ck 97 65 - 99 mg/dL   EKG in the AM Post Op Day #1 (Specify Time)    Collection Time: 09/21/18  7:53 AM   Result Value Ref Range    Report       [facility]    Test Date:  2018-09-21  Pt Name:    JAYDON POLLARD              Department: 161  MRN:        [id number]                      Room:       [facility]  Gender:     Male                          Technician: RHONA  :        1990                   Requested By:BEST PATTERSON  Order #:    [id number]                    Reading MD: Emmett Montana MD    Measurements  Intervals                                Axis  Rate:       75                           P:          49  NC:         148                          QRS:        -13  QRSD:       94                           T:          -7  QT:         368  QTc:        411    Interpretive Statements  SINUS RHYTHM  BORDERLINE REPOLARIZATION ABNORMALITY  NONSPECIFIC ST ELEVATION I AND AVL. CONSIDER HYPERACUTE MI, PERICARDITIS AND  NONSPECIFIC REPOLARIZATION ABNORMALITY.  Compared to ECG 2018 11:58:42  ST (T wave) deviation now present    Electronically Signed On 2018 9:22:39 PDT by Emmett Montana MD     ACCU-CHEK GLUCOSE    Collection Time: 18  8:25 AM   Result Value Ref Range    Glucose - Accu-Ck 83 65 - 99 mg/dL   ACCU-CHEK GLUCOSE    Collection Time: 18  9:20 AM   Result Value Ref Range    Glucose - Accu-Ck 100 (H) 65 - 99 mg/dL   ACCU-CHEK GLUCOSE    Collection Time: 18 10:27 AM   Result Value Ref Range    Glucose - Accu-Ck 101 (H) 65 - 99 mg/dL   ACCU-CHEK GLUCOSE    Collection Time: 18 11:22 AM   Result Value Ref Range    Glucose - Accu-Ck 75 65 - 99 mg/dL   ACCU-CHEK GLUCOSE    Collection Time: 18 12:19 PM   Result Value Ref Range    Glucose - Accu-Ck 117 (H) 65 - 99 mg/dL   ACCU-CHEK GLUCOSE    Collection Time: 18  6:27 PM   Result Value Ref Range    Glucose - Accu-Ck 88 65 - 99 mg/dL   ACCU-CHEK GLUCOSE    Collection Time: 18 10:19 PM   Result Value Ref Range    Glucose - Accu-Ck 124 (H) 65 - 99 mg/dL   CBC without Differential Critical Care 0130    Collection Time: 18  1:30 AM   Result Value Ref Range    WBC 8.4 4.8 - 10.8 K/uL    RBC 3.53 (L) 4.70 - 6.10 M/uL    Hemoglobin 10.0 (L) 14.0 - 18.0 g/dL    Hematocrit 28.9 (L) 42.0 - 52.0 %    MCV 81.9 81.4 - 97.8 fL    MCH 28.3 27.0 - 33.0 pg     MCHC 34.6 33.7 - 35.3 g/dL    RDW 42.6 35.9 - 50.0 fL    Platelet Count 99 (L) 164 - 446 K/uL    MPV 11.7 9.0 - 12.9 fL   Basic Metabolic Panel (BMP) Critical Care 0130    Collection Time: 09/22/18  1:30 AM   Result Value Ref Range    Sodium 137 135 - 145 mmol/L    Potassium 3.6 3.6 - 5.5 mmol/L    Chloride 103 96 - 112 mmol/L    Co2 30 20 - 33 mmol/L    Glucose 102 (H) 65 - 99 mg/dL    Bun 11 8 - 22 mg/dL    Creatinine 0.79 0.50 - 1.40 mg/dL    Calcium 8.1 (L) 8.5 - 10.5 mg/dL    Anion Gap 4.0 0.0 - 11.9   ACCU-CHEK GLUCOSE    Collection Time: 09/22/18  1:30 AM   Result Value Ref Range    Glucose - Accu-Ck 98 65 - 99 mg/dL   ESTIMATED GFR    Collection Time: 09/22/18  1:30 AM   Result Value Ref Range    GFR If African American >60 >60 mL/min/1.73 m 2    GFR If Non African American >60 >60 mL/min/1.73 m 2   ACCU-CHEK GLUCOSE    Collection Time: 09/22/18  4:23 AM   Result Value Ref Range    Glucose - Accu-Ck 77 65 - 99 mg/dL       Imaging  X-Ray:  I have personally reviewed the images and compared with prior images. and My impression is: Large gastric bubble    Assessment/Plan  Acute bacterial endocarditis- (present on admission)   Assessment & Plan    Resulting in severe mitral regurgitation  Status post prolonged antibiotic course as an outpatient 8/22        Non-rheumatic mitral regurgitation- (present on admission)   Assessment & Plan    Elective mitral valve repair 9/20  protocols, monitor hemodynamics, chest tube output will come out today and meza removed, glycemic control, etc.          Epidural abscess- (present on admission)   Assessment & Plan    Resolved on imaging as above        Streptococcal bacteremia- (present on admission)   Assessment & Plan    Associated with epidural abscess and septic emboli to the brain, treated with prolonged antibiotics and resolved        Acute respiratory failure with hypoxia (HCC)   Assessment & Plan    Currently on room air.   Continue I-S mobility and diuresis when  able  Ambulate               VTE:  Lovenox  Ulcer: Not Indicated  Lines: All indicated    I have performed a physical exam and reviewed and updated ROS and Plan today (9/22/2018). In review of yesterday's note (9/21/2018), there are no changes except as documented above.

## 2018-09-22 NOTE — PROGRESS NOTES
Assumed care of patient by Vika TURNER. All questions answered at bedside and POC discussed with patient and family members. White board updated. Patient appears clam no sign of anger or distress noted. Will check on patient hourly and assess pain accordingly.

## 2018-09-22 NOTE — PROGRESS NOTES
"Monitor techs noticed that password was not in face sheet in EPIC. Monitor alvino asked this RN to clarify.     This RN asked wife. Wife mentioned that she wanted the password to control the amount of people at bedside. This RN clarified, \"Would you like me to contact you before we let people back to see your  to try and control the amount of people in the room?\"    Wife answered yes that would be helpful.   "

## 2018-09-22 NOTE — PROGRESS NOTES
Cardiovascular Surgery Progress Note    Name: Nicholas Neumann  MRN: [id number]  : 1990  Admit Date: 2018  5:02 AM  Procedure:  Procedure(s) and Anesthesia Type:     * MITRAL VALVE REPAIR- OR   - General     * CHAR - General  2 Day Post-Op    Vitals:  Patient Vitals for the past 8 hrs:   Temp SpO2 O2 Delivery O2 (LPM) Pulse Heart Rate (Monitored) Resp NIBP Weight   18 0800 37 °C (98.6 °F) 94 % Silicone Nasal Cannula 0 83 - 15 100/57 -   18 0600 36.4 °C (97.5 °F) 94 % Silicone Nasal Cannula 2 81 84 (!) 23 109/58 -   18 0500 - 92 % - - 82 81 16 - -   18 0420 - 95 % - - 80 80 16 109/58 -   18 0400 36.4 °C (97.5 °F) 98 % Silicone Nasal Cannula 2 79 79 17 - 103.7 kg (228 lb 9.9 oz)   18 0300 - 90 % - - 81 82 (!) 23 - -   18 0200 36.4 °C (97.5 °F) 95 % Silicone Nasal Cannula 2 82 82 19 - -   18 0130 - 95 % - - 83 83 (!) 23 - -   18 0100 - 96 % - - 83 83 18 - -     Temp (24hrs), Av.4 °C (97.6 °F), Min:36.1 °C (97 °F), Max:37 °C (98.6 °F)      Respiratory:    Respiration: 15, Pulse Oximetry: 94 %, O2 Daily Delivery Respiratory : Silicone Nasal Cannula     Chest Tube Drains:          Fluids:    Intake/Output Summary (Last 24 hours) at 18 0842  Last data filed at 18 0800   Gross per 24 hour   Intake               90 ml   Output             5660 ml   Net            -5570 ml     Admit weight: Weight: 98.4 kg (216 lb 14.9 oz)  Current weight: Weight: 103.7 kg (228 lb 9.9 oz) (18 0400)    Labs:  Recent Labs      18   0941  18   1200  18   0400  18   0130   WBC  6.2   --   9.5  8.4   RBC  5.62   --   3.91*  3.53*   HEMOGLOBIN  15.8   --   11.0*  10.0*   HEMATOCRIT  45.8   --   32.3*  28.9*   MCV  81.5   --   82.4  81.9   MCH  28.1   --   27.9  28.3   MCHC  34.5   --   33.9  34.6   RDW  43.2   --   42.5  42.6   PLATELETCT  177  108*  106*  99*   MPV  11.0   --   11.2  11.7     Recent Labs      18   0941    NEUTSPOLYS  58.80   LYMPHOCYTES  32.00   MONOCYTES  6.10   EOSINOPHILS  2.10   BASOPHILS  0.50     Recent Labs      09/19/18   0941   09/21/18   0000  09/21/18   0400  09/22/18   0130   SODIUM  136   --    --   134*  137   POTASSIUM  3.9   < >  4.6  4.2  3.6   CHLORIDE  103   --    --   105  103   CO2  26   --    --   22  30   GLUCOSE  88   --    --   109*  102*   BUN  16   --    --   12  11   CREATININE  0.87   --    --   0.67  0.79   CALCIUM  9.3   --    --   8.3*  8.1*    < > = values in this interval not displayed.     Recent Labs      09/19/18   0941  09/20/18   1200  09/21/18   0400   APTT  30.6  31.5   --    INR  1.08  1.56*  1.33*       Medications:  • citalopram  10 mg     • senna-docusate  2 Tab     • enoxaparin  40 mg     • mupirocin  1 Application     • magnesium sulfate  1 g Stopped (09/21/18 1323)   • aspirin EC  81 mg     • MD Alert...Warfarin per Pharmacy            Ordered Medications:    ASA - Yes    Plavix - No; contraindicated because of Other coumadin    Post-operative Beta Blockers - No; contraindicated because of High risk for heart block    Ace Inhibitor - No; contraindicated because of Other normal EF    Statin - No; contraindicated because of No cad          Central Line:  Type of line: central line   Date of insertion: 9/20/18  Date of removal:     Exam:   Review of Systems   Constitutional: Negative.    HENT: Negative.    Eyes: Negative.    Respiratory: Negative.    Cardiovascular: Negative.    Gastrointestinal: Negative.    Genitourinary: Negative.    Musculoskeletal: Positive for myalgias.   Skin: Negative.    Neurological: Negative.    Endo/Heme/Allergies: Negative.    Psychiatric/Behavioral: Negative.        Physical Exam   Constitutional: He is oriented to person, place, and time. He appears well-developed and well-nourished. No distress.   Neck: Neck supple.   Cardiovascular: Normal rate, regular rhythm and normal heart sounds.  Exam reveals no gallop and no friction rub.    No  murmur heard.  Pulmonary/Chest: Effort normal. No respiratory distress. He has decreased breath sounds in the right lower field and the left lower field. He has no wheezes. He has no rales.   Abdominal: Soft. He exhibits no distension. There is no tenderness.   Musculoskeletal: He exhibits edema.   Neurological: He is alert and oriented to person, place, and time.   Skin: Skin is warm and dry. He is not diaphoretic.       Quality Measures:   Quality-Core Measures   Reviewed items::  EKG reviewed, Labs reviewed, Medications reviewed and Radiology images reviewed  Meza catheter::  One or Two Days Post Surgery (Day of Surgery being Day 0)  Central line in place:  Need for access  DVT prophylaxis pharmacological::  Warfarin (Coumadin)  DVT prophylaxis - mechanical:  Not indicated at this time, ambulatory  Ulcer Prophylaxis::  Yes  Assessed for rehabilitation services:  Patient returned to prior level of function, rehabilitation not indicated at this time      Assessment/Plan:  POD 1 HDS, SR, EKG c/w pericarditis, pain- add toradol,, amb, enc IS  POD 2 HDS, SR, Pain - controlled, d/c CT/meza, amb, enc IS    Patient seen, examined and plan reviewed with midlevel provider. I agree with the plan.      Active Hospital Problems    Diagnosis   • Acute bacterial endocarditis [I33.0]     Priority: High   • Epidural abscess [G06.2]     Priority: Medium   • Streptococcal bacteremia [R78.81, B95.5]     Priority: Medium   • Non-rheumatic mitral regurgitation [I34.0]     Priority: Low   • Acute respiratory failure with hypoxia (HCC) [J96.01]   • Shock (HCC) [R57.9]   • Gastric distention [K31.89]

## 2018-09-23 LAB
ANION GAP SERPL CALC-SCNC: 5 MMOL/L (ref 0–11.9)
BACTERIA TISS AEROBE CULT: NORMAL
BUN SERPL-MCNC: 8 MG/DL (ref 8–22)
CALCIUM SERPL-MCNC: 8.4 MG/DL (ref 8.5–10.5)
CHLORIDE SERPL-SCNC: 103 MMOL/L (ref 96–112)
CO2 SERPL-SCNC: 30 MMOL/L (ref 20–33)
CREAT SERPL-MCNC: 0.82 MG/DL (ref 0.5–1.4)
ERYTHROCYTE [DISTWIDTH] IN BLOOD BY AUTOMATED COUNT: 44.1 FL (ref 35.9–50)
GLUCOSE SERPL-MCNC: 95 MG/DL (ref 65–99)
GRAM STN SPEC: NORMAL
HCT VFR BLD AUTO: 30.9 % (ref 42–52)
HGB BLD-MCNC: 10.2 G/DL (ref 14–18)
INR PPP: 1.23 (ref 0.87–1.13)
MCH RBC QN AUTO: 27.2 PG (ref 27–33)
MCHC RBC AUTO-ENTMCNC: 33 G/DL (ref 33.7–35.3)
MCV RBC AUTO: 82.4 FL (ref 81.4–97.8)
PLATELET # BLD AUTO: 102 K/UL (ref 164–446)
PMV BLD AUTO: 11.5 FL (ref 9–12.9)
POTASSIUM SERPL-SCNC: 3.8 MMOL/L (ref 3.6–5.5)
PROTHROMBIN TIME: 15.6 SEC (ref 12–14.6)
RBC # BLD AUTO: 3.75 M/UL (ref 4.7–6.1)
SIGNIFICANT IND 70042: NORMAL
SITE SITE: NORMAL
SODIUM SERPL-SCNC: 138 MMOL/L (ref 135–145)
SOURCE SOURCE: NORMAL
WBC # BLD AUTO: 5.8 K/UL (ref 4.8–10.8)

## 2018-09-23 PROCEDURE — 99232 SBSQ HOSP IP/OBS MODERATE 35: CPT | Performed by: INTERNAL MEDICINE

## 2018-09-23 PROCEDURE — A9270 NON-COVERED ITEM OR SERVICE: HCPCS | Performed by: CLINICAL NURSE SPECIALIST

## 2018-09-23 PROCEDURE — 97161 PT EVAL LOW COMPLEX 20 MIN: CPT

## 2018-09-23 PROCEDURE — 770020 HCHG ROOM/CARE - TELE (206)

## 2018-09-23 PROCEDURE — A9270 NON-COVERED ITEM OR SERVICE: HCPCS | Performed by: NURSE PRACTITIONER

## 2018-09-23 PROCEDURE — 85027 COMPLETE CBC AUTOMATED: CPT

## 2018-09-23 PROCEDURE — 80048 BASIC METABOLIC PNL TOTAL CA: CPT

## 2018-09-23 PROCEDURE — G8988 SELF CARE GOAL STATUS: HCPCS | Mod: CH

## 2018-09-23 PROCEDURE — 97165 OT EVAL LOW COMPLEX 30 MIN: CPT

## 2018-09-23 PROCEDURE — 700102 HCHG RX REV CODE 250 W/ 637 OVERRIDE(OP): Performed by: CLINICAL NURSE SPECIALIST

## 2018-09-23 PROCEDURE — 700111 HCHG RX REV CODE 636 W/ 250 OVERRIDE (IP): Performed by: CLINICAL NURSE SPECIALIST

## 2018-09-23 PROCEDURE — 700102 HCHG RX REV CODE 250 W/ 637 OVERRIDE(OP): Performed by: NURSE PRACTITIONER

## 2018-09-23 PROCEDURE — G8987 SELF CARE CURRENT STATUS: HCPCS | Mod: CH

## 2018-09-23 PROCEDURE — G8978 MOBILITY CURRENT STATUS: HCPCS | Mod: CI

## 2018-09-23 PROCEDURE — 85610 PROTHROMBIN TIME: CPT

## 2018-09-23 PROCEDURE — G8989 SELF CARE D/C STATUS: HCPCS | Mod: CH

## 2018-09-23 PROCEDURE — G8979 MOBILITY GOAL STATUS: HCPCS | Mod: CI

## 2018-09-23 RX ADMIN — POLYETHYLENE GLYCOL 3350 1 PACKET: 17 POWDER, FOR SOLUTION ORAL at 10:47

## 2018-09-23 RX ADMIN — MUPIROCIN 1 APPLICATION: 20 OINTMENT TOPICAL at 17:34

## 2018-09-23 RX ADMIN — FENTANYL CITRATE 25 MCG: 50 INJECTION INTRAMUSCULAR; INTRAVENOUS at 06:52

## 2018-09-23 RX ADMIN — OXYCODONE HYDROCHLORIDE 10 MG: 10 TABLET ORAL at 18:48

## 2018-09-23 RX ADMIN — OXYCODONE HYDROCHLORIDE 10 MG: 10 TABLET ORAL at 02:00

## 2018-09-23 RX ADMIN — OXYCODONE HYDROCHLORIDE 10 MG: 10 TABLET ORAL at 15:34

## 2018-09-23 RX ADMIN — CITALOPRAM HYDROBROMIDE 10 MG: 20 TABLET ORAL at 06:20

## 2018-09-23 RX ADMIN — FENTANYL CITRATE 50 MCG: 50 INJECTION INTRAMUSCULAR; INTRAVENOUS at 12:38

## 2018-09-23 RX ADMIN — OXYCODONE HYDROCHLORIDE 10 MG: 10 TABLET ORAL at 22:00

## 2018-09-23 RX ADMIN — TRAMADOL HYDROCHLORIDE 50 MG: 50 TABLET, FILM COATED ORAL at 10:41

## 2018-09-23 RX ADMIN — ENOXAPARIN SODIUM 40 MG: 100 INJECTION SUBCUTANEOUS at 17:34

## 2018-09-23 RX ADMIN — WARFARIN SODIUM 5 MG: 5 TABLET ORAL at 18:00

## 2018-09-23 RX ADMIN — TRAMADOL HYDROCHLORIDE 50 MG: 50 TABLET, FILM COATED ORAL at 20:30

## 2018-09-23 RX ADMIN — TRAMADOL HYDROCHLORIDE 50 MG: 50 TABLET, FILM COATED ORAL at 15:34

## 2018-09-23 RX ADMIN — OXYCODONE HYDROCHLORIDE 10 MG: 10 TABLET ORAL at 10:41

## 2018-09-23 RX ADMIN — STANDARDIZED SENNA CONCENTRATE AND DOCUSATE SODIUM 2 TABLET: 8.6; 5 TABLET ORAL at 17:34

## 2018-09-23 RX ADMIN — TRAMADOL HYDROCHLORIDE 50 MG: 50 TABLET, FILM COATED ORAL at 06:19

## 2018-09-23 RX ADMIN — ALPRAZOLAM 0.5 MG: 0.5 TABLET ORAL at 22:00

## 2018-09-23 RX ADMIN — STANDARDIZED SENNA CONCENTRATE AND DOCUSATE SODIUM 2 TABLET: 8.6; 5 TABLET ORAL at 06:21

## 2018-09-23 RX ADMIN — OXYCODONE HYDROCHLORIDE 10 MG: 10 TABLET ORAL at 06:19

## 2018-09-23 RX ADMIN — MAGNESIUM HYDROXIDE 30 ML: 400 SUSPENSION ORAL at 15:37

## 2018-09-23 RX ADMIN — MUPIROCIN 1 APPLICATION: 20 OINTMENT TOPICAL at 06:19

## 2018-09-23 RX ADMIN — TRAMADOL HYDROCHLORIDE 50 MG: 50 TABLET, FILM COATED ORAL at 02:00

## 2018-09-23 RX ADMIN — FENTANYL CITRATE 50 MCG: 50 INJECTION INTRAMUSCULAR; INTRAVENOUS at 16:42

## 2018-09-23 RX ADMIN — ASPIRIN 81 MG: 81 TABLET, COATED ORAL at 06:19

## 2018-09-23 ASSESSMENT — ENCOUNTER SYMPTOMS
CONSTIPATION: 0
FATIGUE: 0
MYALGIAS: 1
NEUROLOGICAL NEGATIVE: 1
GASTROINTESTINAL NEGATIVE: 1
COUGH: 0
ABDOMINAL DISTENTION: 0
CARDIOVASCULAR NEGATIVE: 1
SHORTNESS OF BREATH: 0
NAUSEA: 0
ABDOMINAL PAIN: 0
PSYCHIATRIC NEGATIVE: 1
RESPIRATORY NEGATIVE: 1
VOMITING: 0
SORE THROAT: 0
CHILLS: 0
CHOKING: 0
EYES NEGATIVE: 1
CONSTITUTIONAL NEGATIVE: 1
FEVER: 0
BACK PAIN: 1
TROUBLE SWALLOWING: 0

## 2018-09-23 ASSESSMENT — PAIN SCALES - GENERAL
PAINLEVEL_OUTOF10: 5
PAINLEVEL_OUTOF10: 8
PAINLEVEL_OUTOF10: 3
PAINLEVEL_OUTOF10: 8
PAINLEVEL_OUTOF10: 8
PAINLEVEL_OUTOF10: 7
PAINLEVEL_OUTOF10: 7
PAINLEVEL_OUTOF10: 4
PAINLEVEL_OUTOF10: 8
PAINLEVEL_OUTOF10: 7
PAINLEVEL_OUTOF10: 8
PAINLEVEL_OUTOF10: 4
PAINLEVEL_OUTOF10: 7
PAINLEVEL_OUTOF10: 5
PAINLEVEL_OUTOF10: 7
PAINLEVEL_OUTOF10: 7

## 2018-09-23 ASSESSMENT — COGNITIVE AND FUNCTIONAL STATUS - GENERAL
DAILY ACTIVITIY SCORE: 24
CLIMB 3 TO 5 STEPS WITH RAILING: A LITTLE
SUGGESTED CMS G CODE MODIFIER DAILY ACTIVITY: CH
SUGGESTED CMS G CODE MODIFIER MOBILITY: CI
MOBILITY SCORE: 23

## 2018-09-23 ASSESSMENT — GAIT ASSESSMENTS
GAIT LEVEL OF ASSIST: STAND BY ASSIST
DISTANCE (FEET): 400

## 2018-09-23 ASSESSMENT — ACTIVITIES OF DAILY LIVING (ADL): TOILETING: INDEPENDENT

## 2018-09-23 NOTE — PROGRESS NOTES
Cardiovascular Surgery Progress Note    Name: Nicholas Neumann  MRN: [id number]  : 1990  Admit Date: 2018  5:02 AM  Procedure:  Procedure(s) and Anesthesia Type:     * MITRAL VALVE REPAIR- OR   - General     * CHAR - General  3 Day Post-Op    Vitals:  Patient Vitals for the past 8 hrs:   Temp SpO2 Pulse Resp NIBP   18 0619 - 98 % 85 - -   18 0600 - 90 % 88 - -   18 0400 36.7 °C (98.1 °F) 95 % 89 (!) 21 107/64     Temp (24hrs), Av.8 °C (98.3 °F), Min:36.7 °C (98.1 °F), Max:37.2 °C (99 °F)      Respiratory:    Respiration: (!) 21, Pulse Oximetry: 98 %, O2 Daily Delivery Respiratory : Room Air with O2 Available     Chest Tube Drains:          Fluids:    Intake/Output Summary (Last 24 hours) at 18 0848  Last data filed at 18 2000   Gross per 24 hour   Intake             1000 ml   Output              875 ml   Net              125 ml     Admit weight: Weight: 98.4 kg (216 lb 14.9 oz)  Current weight: Weight: 103.7 kg (228 lb 9.9 oz) (18 0400)    Labs:  Recent Labs      18   0400  18   0130  18   0632   WBC  9.5  8.4  5.8   RBC  3.91*  3.53*  3.75*   HEMOGLOBIN  11.0*  10.0*  10.2*   HEMATOCRIT  32.3*  28.9*  30.9*   MCV  82.4  81.9  82.4   MCH  27.9  28.3  27.2   MCHC  33.9  34.6  33.0*   RDW  42.5  42.6  44.1   PLATELETCT  106*  99*  102*   MPV  11.2  11.7  11.5         Recent Labs      18   0400  18   0130  18   0632   SODIUM  134*  137  138   POTASSIUM  4.2  3.6  3.8   CHLORIDE  105  103  103   CO2  22  30  30   GLUCOSE  109*  102*  95   BUN  12  11  8   CREATININE  0.67  0.79  0.82   CALCIUM  8.3*  8.1*  8.4*     Recent Labs      18   1200  18   0400  18   0632   APTT  31.5   --    --    INR  1.56*  1.33*  1.23*       Medications:  • warfarin  5 mg     • citalopram  10 mg     • senna-docusate  2 Tab     • enoxaparin  40 mg     • mupirocin  1 Application     • aspirin EC  81 mg     • MD Alert...Warfarin per  Pharmacy            Ordered Medications:    ASA - Yes    Plavix - No; contraindicated because of Other coumadin    Post-operative Beta Blockers - No; contraindicated because of High risk for heart block    Ace Inhibitor - No; contraindicated because of Other normal EF    Statin - No; contraindicated because of No cad          Central Line:  Type of line: central line   Date of insertion: 9/20/18  Date of removal:     Exam:   Review of Systems   Constitutional: Negative.    HENT: Negative.    Eyes: Negative.    Respiratory: Negative.    Cardiovascular: Negative.    Gastrointestinal: Negative.    Genitourinary: Negative.    Musculoskeletal: Positive for myalgias.   Skin: Negative.    Neurological: Negative.    Endo/Heme/Allergies: Negative.    Psychiatric/Behavioral: Negative.        Physical Exam   Constitutional: He is oriented to person, place, and time. He appears well-developed and well-nourished. No distress.   Neck: Neck supple.   Cardiovascular: Normal rate, regular rhythm and normal heart sounds.  Exam reveals no gallop and no friction rub.    No murmur heard.  Pulmonary/Chest: Effort normal. No respiratory distress. He has decreased breath sounds in the right lower field and the left lower field. He has no wheezes. He has no rales.   Abdominal: Soft. He exhibits no distension. There is no tenderness.   Musculoskeletal: He exhibits edema.   Neurological: He is alert and oriented to person, place, and time.   Skin: Skin is warm and dry. He is not diaphoretic.       Quality Measures:   Quality-Core Measures   Reviewed items::  EKG reviewed, Labs reviewed, Medications reviewed and Radiology images reviewed  Cruz catheter::  One or Two Days Post Surgery (Day of Surgery being Day 0)  Central line in place:  Need for access  DVT prophylaxis pharmacological::  Warfarin (Coumadin)  DVT prophylaxis - mechanical:  Not indicated at this time, ambulatory  Ulcer Prophylaxis::  Yes  Assessed for rehabilitation services:   Patient returned to prior level of function, rehabilitation not indicated at this time      Assessment/Plan:  POD 1 HDS, SR, EKG c/w pericarditis, pain- add toradol,, amb, enc IS  POD 2 HDS, SR, Pain - controlled, d/c CT/meza, amb, enc IS  POD 3 HDS, SR, Pain controlled, room air, dc wires, amb, enc IS    Patient seen, examined and plan reviewed with midlevel provider. I agree with the plan.      Active Hospital Problems    Diagnosis   • Acute bacterial endocarditis [I33.0]     Priority: High   • Non-rheumatic mitral regurgitation [I34.0]     Priority: High   • Epidural abscess [G06.2]     Priority: Medium   • Streptococcal bacteremia [R78.81, B95.5]     Priority: Medium   • Acute respiratory failure with hypoxia (HCC) [J96.01]

## 2018-09-23 NOTE — CARE PLAN
Problem: Post Op Day 4 CABG/Heart Valve Replacement  Goal: Optimal care of the Post Op CABG/Heart Valve replacement Post Op Day 4    Intervention: Daily Weights  Done on AM shift  Intervention: Shower daily and clean incisions twice daily with soap and water  Previna in place. Wires DC'd today and will shower later today  Intervention: Up in chair for all meals  Done  Intervention: Ambulate, increasing the distance each time x 3 and before bed  In progress with strong effort and motivation  Intervention: IS q 1 hour while awake and record best IS volume  In progress with increasing strength  Intervention: Consider removal of meza, chest tube and pacer wire if not already done  Pacer wires removed today

## 2018-09-23 NOTE — PROGRESS NOTES
Critical Care Progress Note    Date of admission  9/20/2018    Chief Complaint  27 y.o. male admitted 9/20/2018 for mitral valve repair    Hospital Course   Nicholas Neumann is a 27 y.o. 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  He was discharged home with a PICC in place and completed antibiotics 8/22.  He continued to have dyspnea on exertion and was significantly symptomatic.  Transesophageal echocardiogram 8/20 showed preserved ejection fraction 65% and severe mitral regurgitation.  MRI of the spine showed resolution of the epidural abscess.  Today he presented for elective mitral valve repair.  I was called to the bedside and the patient arrived from the operating room and reviewed the case with anesthesiology and current thoracic surgery.  Surgery went well and he remains currently intubated on full ventilator support    Interval Problem Update  Reviewed last 24 hour events:  -AO4  - will remove wire  - IS 3000 room air  - warfarin  - BM today is goal    Review of Systems  Review of Systems   Constitutional: Negative for chills, fatigue and fever.   HENT: Negative for sore throat and trouble swallowing.    Respiratory: Negative for cough, choking and shortness of breath.    Cardiovascular: Negative for chest pain.   Gastrointestinal: Negative for abdominal distention, abdominal pain, constipation, nausea and vomiting.   Musculoskeletal: Positive for back pain.        Vital Signs for last 24 hours   Temp:  [36.7 °C (98.1 °F)-37.2 °C (99 °F)] 36.7 °C (98.1 °F)  Pulse:  [83-98] 85  Resp:  [10-21] 21    Hemodynamic parameters for last 24 hours       Vent Settings for last 24 hours       Physical Exam   Physical Exam   Constitutional: He is oriented to person, place, and time. He appears well-developed and well-nourished.   HENT:   Head: Normocephalic and  atraumatic.   Eyes: Pupils are equal, round, and reactive to light. No scleral icterus.   Neck: Normal range of motion. Neck supple.   Cardiovascular: Normal rate and regular rhythm.    Pulmonary/Chest: Effort normal and breath sounds normal. No respiratory distress.   Abdominal: He exhibits no distension. There is no tenderness. There is no rebound and no guarding.   Musculoskeletal: He exhibits no edema.   Neurological: He is alert and oriented to person, place, and time.   Skin: Skin is warm and dry.   Psychiatric: He has a normal mood and affect.       Medications  Current Facility-Administered Medications   Medication Dose Route Frequency Provider Last Rate Last Dose   • warfarin (COUMADIN) tablet 5 mg  5 mg Oral COUMADIN-DAILY Camila Schmidt, A.P.N.       • ketorolac (TORADOL) injection 30 mg  30 mg Intravenous Q6HRS PRN Camila Schmidt A.P.N.   30 mg at 09/22/18 0130   • ALPRAZolam (XANAX) tablet 0.5 mg  0.5 mg Oral TID PRN Camila Schmidt A.P.N.   0.5 mg at 09/22/18 2226   • citalopram (CELEXA) tablet 10 mg  10 mg Oral DAILY Mikki Rivero A.P.N.   10 mg at 09/23/18 0620   • Respiratory Care per Protocol   Nebulization Continuous RT BRANDON Dial.P.N.       • Pharmacy Consult Request ...Pain Management Review 1 Each  1 Each Other PRN Mikki Rivero A.P.N.       • senna-docusate (PERICOLACE or SENOKOT S) 8.6-50 MG per tablet 2 Tab  2 Tab Oral BID Mikki Rivero A.P.N.   2 Tab at 09/23/18 0621    And   • polyethylene glycol/lytes (MIRALAX) PACKET 1 Packet  1 Packet Oral QDAY PRN Mikki Rivero A.P.N.        And   • magnesium hydroxide (MILK OF MAGNESIA) suspension 30 mL  30 mL Oral QDAY PRN Mikki Rivero A.P.N.        And   • bisacodyl (DULCOLAX) suppository 10 mg  10 mg Rectal QDAY PRN Mikki Rivero A.P.N.       • enoxaparin (LOVENOX) inj 40 mg  40 mg Subcutaneous QDAY Mikki M Rivero, A.P.N.   40 mg at 09/22/18 174   • mupirocin (BACTROBAN) 2 % ointment 1 Application  1 Application Topical BID  Mikki Rivero, A.P.N.   1 Application at 09/23/18 0619   • aspirin EC (ECOTRIN) tablet 81 mg  81 mg Oral DAILY Mikki Rivero A.P.N.   81 mg at 09/23/18 0619   • MD Alert...Warfarin per Pharmacy   Other pharmacy to dose Mikki Rivero, A.P.N.       • oxyCODONE immediate-release (ROXICODONE) tablet 5 mg  5 mg Oral Q3HRS PRN Mikki Rivero, A.P.N.       • oxyCODONE immediate release (ROXICODONE) tablet 10 mg  10 mg Oral Q3HRS PRN Mikki Rivero, A.P.N.   10 mg at 09/23/18 0619   • fentaNYL (SUBLIMAZE) injection 50 mcg  50 mcg Intravenous Q3HRS PRN Mikki Rivero, A.P.N.   25 mcg at 09/23/18 0652   • tramadol (ULTRAM) 50 MG tablet 50 mg  50 mg Oral Q4HRS PRN Mikki Rivero, A.P.N.   50 mg at 09/23/18 0619   • ondansetron (ZOFRAN) syringe/vial injection 4 mg  4 mg Intravenous Q6HRS PRN Mikki Rivero, A.P.N.   4 mg at 09/20/18 1320    Or   • prochlorperazine (COMPAZINE) injection 10 mg  10 mg Intravenous Q6HRS PRN Mikki Rivero, A.P.N.        Or   • promethazine (PHENERGAN) suppository 25 mg  25 mg Rectal Q6HRS PRN Mikki Rivero, A.P.N.       • acetaminophen (TYLENOL) tablet 650 mg  650 mg Oral Q4HRS PRN Mikki Rivero, A.P.N.        Or   • acetaminophen (TYLENOL) suppository 650 mg  650 mg Rectal Q4HRS PRN Mikki Rivero, A.P.N.       • mag hydrox-al hydrox-simeth (MAALOX PLUS ES or MYLANTA DS) suspension 30 mL  30 mL Oral Q4HRS PRN Mikki Rivero, A.P.N.       • diphenhydrAMINE (BENADRYL) tablet/capsule 25 mg  25 mg Oral HS PRN - MR X 1 Mikki Rivero, A.P.N.   25 mg at 09/21/18 2318       Fluids    Intake/Output Summary (Last 24 hours) at 09/23/18 0712  Last data filed at 09/22/18 2000   Gross per 24 hour   Intake             1000 ml   Output              960 ml   Net               40 ml       Laboratory  Recent Results (from the past 48 hour(s))   ACCU-CHEK GLUCOSE    Collection Time: 09/21/18  7:30 AM   Result Value Ref Range    Glucose - Accu-Ck 97 65 - 99 mg/dL   EKG in the AM Post Op Day #1 (Specify Time)     Collection Time: 18  7:53 AM   Result Value Ref Range    Report       [facility]    Test Date:  2018  Pt Name:    JAYDON POLLARD              Department: 161  MRN:        [id number]                      Room:       T615  Gender:     Male                         Technician: RHONA  :        1990                   Requested By:BEST PATTERSON  Order #:    [id number]                    Reading MD: Emmett Montana MD    Measurements  Intervals                                Axis  Rate:       75                           P:          49  MS:         148                          QRS:        -13  QRSD:       94                           T:          -7  QT:         368  QTc:        411    Interpretive Statements  SINUS RHYTHM  BORDERLINE REPOLARIZATION ABNORMALITY  NONSPECIFIC ST ELEVATION I AND AVL. CONSIDER HYPERACUTE MI, PERICARDITIS AND  NONSPECIFIC REPOLARIZATION ABNORMALITY.  Compared to ECG 2018 11:58:42  ST (T wave) deviation now present    Electronically Signed On 2018 9:22:39 PDT by Emmett Montana MD     ACCU-CHEK GLUCOSE    Collection Time: 18  8:25 AM   Result Value Ref Range    Glucose - Accu-Ck 83 65 - 99 mg/dL   ACCU-CHEK GLUCOSE    Collection Time: 18  9:20 AM   Result Value Ref Range    Glucose - Accu-Ck 100 (H) 65 - 99 mg/dL   ACCU-CHEK GLUCOSE    Collection Time: 18 10:27 AM   Result Value Ref Range    Glucose - Accu-Ck 101 (H) 65 - 99 mg/dL   ACCU-CHEK GLUCOSE    Collection Time: 18 11:22 AM   Result Value Ref Range    Glucose - Accu-Ck 75 65 - 99 mg/dL   ACCU-CHEK GLUCOSE    Collection Time: 18 12:19 PM   Result Value Ref Range    Glucose - Accu-Ck 117 (H) 65 - 99 mg/dL   ACCU-CHEK GLUCOSE    Collection Time: 18  6:27 PM   Result Value Ref Range    Glucose - Accu-Ck 88 65 - 99 mg/dL   ACCU-CHEK GLUCOSE    Collection Time: 18 10:19 PM   Result Value Ref Range    Glucose - Accu-Ck 124 (H) 65 - 99 mg/dL   CBC without Differential  Critical Care 0130    Collection Time: 09/22/18  1:30 AM   Result Value Ref Range    WBC 8.4 4.8 - 10.8 K/uL    RBC 3.53 (L) 4.70 - 6.10 M/uL    Hemoglobin 10.0 (L) 14.0 - 18.0 g/dL    Hematocrit 28.9 (L) 42.0 - 52.0 %    MCV 81.9 81.4 - 97.8 fL    MCH 28.3 27.0 - 33.0 pg    MCHC 34.6 33.7 - 35.3 g/dL    RDW 42.6 35.9 - 50.0 fL    Platelet Count 99 (L) 164 - 446 K/uL    MPV 11.7 9.0 - 12.9 fL   Basic Metabolic Panel (BMP) Critical Care 0130    Collection Time: 09/22/18  1:30 AM   Result Value Ref Range    Sodium 137 135 - 145 mmol/L    Potassium 3.6 3.6 - 5.5 mmol/L    Chloride 103 96 - 112 mmol/L    Co2 30 20 - 33 mmol/L    Glucose 102 (H) 65 - 99 mg/dL    Bun 11 8 - 22 mg/dL    Creatinine 0.79 0.50 - 1.40 mg/dL    Calcium 8.1 (L) 8.5 - 10.5 mg/dL    Anion Gap 4.0 0.0 - 11.9   ACCU-CHEK GLUCOSE    Collection Time: 09/22/18  1:30 AM   Result Value Ref Range    Glucose - Accu-Ck 98 65 - 99 mg/dL   ESTIMATED GFR    Collection Time: 09/22/18  1:30 AM   Result Value Ref Range    GFR If African American >60 >60 mL/min/1.73 m 2    GFR If Non African American >60 >60 mL/min/1.73 m 2   ACCU-CHEK GLUCOSE    Collection Time: 09/22/18  4:23 AM   Result Value Ref Range    Glucose - Accu-Ck 77 65 - 99 mg/dL   CBC without Differential Critical Care 0130    Collection Time: 09/23/18  6:32 AM   Result Value Ref Range    WBC 5.8 4.8 - 10.8 K/uL    RBC 3.75 (L) 4.70 - 6.10 M/uL    Hemoglobin 10.2 (L) 14.0 - 18.0 g/dL    Hematocrit 30.9 (L) 42.0 - 52.0 %    MCV 82.4 81.4 - 97.8 fL    MCH 27.2 27.0 - 33.0 pg    MCHC 33.0 (L) 33.7 - 35.3 g/dL    RDW 44.1 35.9 - 50.0 fL    Platelet Count 102 (L) 164 - 446 K/uL    MPV 11.5 9.0 - 12.9 fL   Basic Metabolic Panel (BMP) Critical Care 0130    Collection Time: 09/23/18  6:32 AM   Result Value Ref Range    Sodium 138 135 - 145 mmol/L    Potassium 3.8 3.6 - 5.5 mmol/L    Chloride 103 96 - 112 mmol/L    Co2 30 20 - 33 mmol/L    Glucose 95 65 - 99 mg/dL    Bun 8 8 - 22 mg/dL    Creatinine 0.82 0.50  - 1.40 mg/dL    Calcium 8.4 (L) 8.5 - 10.5 mg/dL    Anion Gap 5.0 0.0 - 11.9   PROTHROMBIN TIME    Collection Time: 09/23/18  6:32 AM   Result Value Ref Range    PT 15.6 (H) 12.0 - 14.6 sec    INR 1.23 (H) 0.87 - 1.13   ESTIMATED GFR    Collection Time: 09/23/18  6:32 AM   Result Value Ref Range    GFR If African American >60 >60 mL/min/1.73 m 2    GFR If Non African American >60 >60 mL/min/1.73 m 2       Imaging  X-Ray:  I have personally reviewed the images and compared with prior images. and My impression is: Large gastric bubble    Assessment/Plan  Acute bacterial endocarditis- (present on admission)   Assessment & Plan    Resulting in severe mitral regurgitation  Status post prolonged antibiotic course as an outpatient 8/22        Non-rheumatic mitral regurgitation- (present on admission)   Assessment & Plan    Elective mitral valve repair 9/20  protocols, monitor hemodynamics, chest tube output will come out today and meza removed, glycemic control, etc.          Epidural abscess- (present on admission)   Assessment & Plan    Resolved on imaging as above        Streptococcal bacteremia- (present on admission)   Assessment & Plan    Associated with epidural abscess and septic emboli to the brain, treated with prolonged antibiotics and resolved        Acute respiratory failure with hypoxia (HCC)   Assessment & Plan    Currently on room air.   Continue I-S mobility and diuresis when able  Ambulate               VTE:  Lovenox  Ulcer: Not Indicated  Lines: All indicated    I have performed a physical exam and reviewed and updated ROS and Plan today (9/23/2018). In review of yesterday's note (9/22/2018), there are no changes except as documented above.     Likely home tomorrow

## 2018-09-23 NOTE — PROGRESS NOTES
Inpatient Anticoagulation Service Note    Date: 9/23/2018  Reason for Anticoagulation: Mitral Valve Repair  Hemoglobin Value: (!) 10.2  Hematocrit Value: (!) 30.9  Lab Platelet Value: (!) 102  Target INR: 2.0 to 3.0  INR from last 7 days     Date/Time INR Value    09/23/18 0632 (!)  1.23    09/21/18 0400 (!)  1.33    09/20/18 1200 (!)  1.56    09/19/18 0941  1.08        Dose from last 7 days     Date/Time Dose (mg)    09/23/18 1400  5    09/22/18 1700  7.5        Significant Interactions: Aspirin (SSRI)     Comments: INR remains subtherapeutic as expected after initiation of warfarin therapy yesterday follow valve repair. No new drug interactions, H/H anemic but stable, no overt abnormal bleeding noted.     Plan:  Will continue 5 mg daily dosing as planned and will follow up on INR tomorrow.      Pharmacist suggested discharge dosing: ZOIE Archibald PharmD, BCPS

## 2018-09-23 NOTE — THERAPY
"Physical Therapy Evaluation completed.   Bed Mobility:  Supine to Sit: Supervised  Transfers: Sit to Stand: Supervised  Gait: Level Of Assist: Stand by Assist with No Equipment Needed       Plan of Care: Will benefit from Physical Therapy 2 times per week  Discharge Recommendations: Equipment: No Equipment Needed. Post-acute therapy Currently anticipate no further skilled therapy needs once patient is discharged from the inpatient setting.    See \"Rehab Therapy-Acute\" Patient Summary Report for complete documentation.     "

## 2018-09-23 NOTE — CARE PLAN
Problem: Post op day 2 CABG/Heart Valve Replacement  Goal: Optimal care of the post op CABG/heart valve replacement post op day 2    Intervention: FSBS: when 2 consecutive BS < 130 after post op day 2, discontinue FSBS unless patient is insulin dependent diabetic  Completed. Insulin protocol DC'd this AM.   Intervention: Daily Weights  Completed per NOCS.    Intervention: Up in chair for all meals  Pt in chair for all meals.   Intervention: Ambulate, increasing the distance each time x 3 and before bed  Pt ambulated multiple times through shift.   Intervention: Stand at sink and wash up with assistance.  Clean incisions twice daily with soap and water.  NA, Prevena in place.   Intervention: IS q 1 hour while awake and record best IS volume  Completed.   Intervention: Consider pacer wire removal by MD  Pacer wires in place per Camila Schmidt.   Intervention: Consider removal of meza and chest tube if not already done  Completed today per verbal order.

## 2018-09-23 NOTE — PROGRESS NOTES
Monitor Summary:  SR 83-93, no ectopy noted.   .14/.08/.34.        12 hour chart check.         2 RN skin check. No skin breakdown noted.

## 2018-09-23 NOTE — FACE TO FACE
Face to Face Supporting Documentation - Home Health    The encounter with this patient was in whole or in part the primary reason for home health admission.    Date of encounter:   Patient:                    MRN:                       YOB: 2020  Nicholas Neumann  [id number]  1990     Home health to see patient for:  Skilled Nursing care for assessment, interventions & education    Skilled need for:  Surgical Aftercare vital signs, wound care    Skilled nursing interventions to include:  Wound Care    Homebound status evidenced by:  Needs the assistance of another person in order to leave the home. Leaving home requires a considerable and taxing effort. There is a normal inability to leave the home.    Community Physician to provide follow up care: KIERAN VelezPTOBY     Optional Interventions? No      I certify the face to face encounter for this home health care referral meets the CMS requirements and the encounter/clinical assessment with the patient was, in whole, or in part, for the medical condition(s) listed above, which is the primary reason for home health care. Based on my clinical findings: the service(s) are medically necessary, support the need for home health care, and the homebound criteria are met.  I certify that this patient has had a face to face encounter by myself.  KIERAN CancinoP.N. - NPI: [id number]

## 2018-09-23 NOTE — PROGRESS NOTES
Inpatient Anticoagulation Service Note    Date: 9/22/2018  Reason for Anticoagulation: Mitral Valve Repair  Hemoglobin Value: (!) 10  Hematocrit Value: (!) 28.9  Lab Platelet Value: (!) 99  Target INR: 2.0 to 3.0  INR from last 7 days     Date/Time INR Value    09/21/18 0400 (!)  1.33    09/20/18 1200 (!)  1.56    09/19/18 0941  1.08        Dose from last 7 days     Date/Time Dose (mg)    09/22/18 1700  7.5        Significant Interactions: Aspirin (SSRI)       Comments: Therapy with warfarin initiated following mitral valve repair. Okay to initiate dosing tonight per d/w APN. Baseline INR from yesterday subtherapeutic.     Plan:  Will give larger dose of 7.5 mg tonight in order to mobilize INR and will start 5 mg daily dosing thereafter. Pharmacy will continue to follow INR trend for dosing adjustments as appropriate.     Pharmacist suggested discharge dosing: ZOIE Archibald PharmD, BCPS

## 2018-09-24 ENCOUNTER — HOME HEALTH ADMISSION (OUTPATIENT)
Dept: HOME HEALTH SERVICES | Facility: HOME HEALTHCARE | Age: 28
End: 2018-09-24
Payer: MEDICAID

## 2018-09-24 VITALS
HEART RATE: 88 BPM | HEIGHT: 70 IN | RESPIRATION RATE: 12 BRPM | WEIGHT: 221.12 LBS | BODY MASS INDEX: 31.66 KG/M2 | OXYGEN SATURATION: 95 % | TEMPERATURE: 97.3 F

## 2018-09-24 LAB
ANION GAP SERPL CALC-SCNC: 6 MMOL/L (ref 0–11.9)
BUN SERPL-MCNC: 7 MG/DL (ref 8–22)
CALCIUM SERPL-MCNC: 8.8 MG/DL (ref 8.5–10.5)
CHLORIDE SERPL-SCNC: 102 MMOL/L (ref 96–112)
CO2 SERPL-SCNC: 31 MMOL/L (ref 20–33)
CREAT SERPL-MCNC: 0.84 MG/DL (ref 0.5–1.4)
ERYTHROCYTE [DISTWIDTH] IN BLOOD BY AUTOMATED COUNT: 43.5 FL (ref 35.9–50)
GLUCOSE SERPL-MCNC: 90 MG/DL (ref 65–99)
HCT VFR BLD AUTO: 30.5 % (ref 42–52)
HGB BLD-MCNC: 10.4 G/DL (ref 14–18)
INR PPP: 1.32 (ref 0.87–1.13)
MCH RBC QN AUTO: 28.3 PG (ref 27–33)
MCHC RBC AUTO-ENTMCNC: 34.1 G/DL (ref 33.7–35.3)
MCV RBC AUTO: 82.9 FL (ref 81.4–97.8)
PLATELET # BLD AUTO: 138 K/UL (ref 164–446)
PMV BLD AUTO: 11.3 FL (ref 9–12.9)
POTASSIUM SERPL-SCNC: 4.2 MMOL/L (ref 3.6–5.5)
PROTHROMBIN TIME: 16.5 SEC (ref 12–14.6)
RBC # BLD AUTO: 3.68 M/UL (ref 4.7–6.1)
SODIUM SERPL-SCNC: 139 MMOL/L (ref 135–145)
WBC # BLD AUTO: 5.8 K/UL (ref 4.8–10.8)

## 2018-09-24 PROCEDURE — 99233 SBSQ HOSP IP/OBS HIGH 50: CPT | Performed by: INTERNAL MEDICINE

## 2018-09-24 PROCEDURE — 700102 HCHG RX REV CODE 250 W/ 637 OVERRIDE(OP): Performed by: CLINICAL NURSE SPECIALIST

## 2018-09-24 PROCEDURE — 700101 HCHG RX REV CODE 250: Performed by: CLINICAL NURSE SPECIALIST

## 2018-09-24 PROCEDURE — 85027 COMPLETE CBC AUTOMATED: CPT

## 2018-09-24 PROCEDURE — A9270 NON-COVERED ITEM OR SERVICE: HCPCS | Performed by: CLINICAL NURSE SPECIALIST

## 2018-09-24 PROCEDURE — 80048 BASIC METABOLIC PNL TOTAL CA: CPT

## 2018-09-24 PROCEDURE — 85610 PROTHROMBIN TIME: CPT

## 2018-09-24 RX ORDER — OXYCODONE HYDROCHLORIDE 10 MG/1
10 TABLET ORAL
Qty: 56 TAB | Refills: 0 | Status: SHIPPED | OUTPATIENT
Start: 2018-09-24 | End: 2018-10-01

## 2018-09-24 RX ORDER — WARFARIN SODIUM 5 MG/1
5 TABLET ORAL
Qty: 30 TAB | Refills: 3 | Status: SHIPPED | OUTPATIENT
Start: 2018-09-24 | End: 2018-10-08

## 2018-09-24 RX ORDER — ALPRAZOLAM 0.5 MG/1
0.5 TABLET ORAL NIGHTLY PRN
Qty: 7 TAB | Refills: 0 | Status: SHIPPED | OUTPATIENT
Start: 2018-09-24 | End: 2018-10-01

## 2018-09-24 RX ORDER — ASPIRIN 81 MG/1
81 TABLET ORAL DAILY
Qty: 30 TAB | Refills: 3 | Status: SHIPPED | OUTPATIENT
Start: 2018-09-25 | End: 2018-09-26

## 2018-09-24 RX ORDER — TRAMADOL HYDROCHLORIDE 50 MG/1
50 TABLET ORAL EVERY 4 HOURS PRN
Qty: 42 TAB | Refills: 0 | Status: SHIPPED | OUTPATIENT
Start: 2018-09-24 | End: 2018-10-01

## 2018-09-24 RX ADMIN — CITALOPRAM HYDROBROMIDE 10 MG: 20 TABLET ORAL at 05:36

## 2018-09-24 RX ADMIN — OXYCODONE HYDROCHLORIDE 10 MG: 10 TABLET ORAL at 08:25

## 2018-09-24 RX ADMIN — OXYCODONE HYDROCHLORIDE 10 MG: 10 TABLET ORAL at 05:36

## 2018-09-24 RX ADMIN — STANDARDIZED SENNA CONCENTRATE AND DOCUSATE SODIUM 2 TABLET: 8.6; 5 TABLET ORAL at 05:36

## 2018-09-24 RX ADMIN — TRAMADOL HYDROCHLORIDE 50 MG: 50 TABLET, FILM COATED ORAL at 01:18

## 2018-09-24 RX ADMIN — TRAMADOL HYDROCHLORIDE 50 MG: 50 TABLET, FILM COATED ORAL at 05:36

## 2018-09-24 RX ADMIN — OXYCODONE HYDROCHLORIDE 10 MG: 10 TABLET ORAL at 11:24

## 2018-09-24 RX ADMIN — TRAMADOL HYDROCHLORIDE 50 MG: 50 TABLET, FILM COATED ORAL at 09:55

## 2018-09-24 RX ADMIN — MUPIROCIN 1 APPLICATION: 20 OINTMENT TOPICAL at 05:36

## 2018-09-24 RX ADMIN — BISACODYL 10 MG: 10 SUPPOSITORY RECTAL at 07:02

## 2018-09-24 RX ADMIN — OXYCODONE HYDROCHLORIDE 10 MG: 10 TABLET ORAL at 01:18

## 2018-09-24 RX ADMIN — ASPIRIN 81 MG: 81 TABLET, COATED ORAL at 05:36

## 2018-09-24 ASSESSMENT — PAIN SCALES - GENERAL
PAINLEVEL_OUTOF10: 5
PAINLEVEL_OUTOF10: 8
PAINLEVEL_OUTOF10: 6
PAINLEVEL_OUTOF10: 6
PAINLEVEL_OUTOF10: 4
PAINLEVEL_OUTOF10: 8
PAINLEVEL_OUTOF10: 7
PAINLEVEL_OUTOF10: 8

## 2018-09-24 ASSESSMENT — ENCOUNTER SYMPTOMS
BACK PAIN: 0
PSYCHIATRIC NEGATIVE: 1
MYALGIAS: 0
ABDOMINAL PAIN: 0
FEVER: 0
EYES NEGATIVE: 1
CONFUSION: 0
RESPIRATORY NEGATIVE: 1
NERVOUS/ANXIOUS: 0
CHEST TIGHTNESS: 0
SHORTNESS OF BREATH: 0
CONSTITUTIONAL NEGATIVE: 1
CHILLS: 0
DIZZINESS: 0
NEUROLOGICAL NEGATIVE: 1
CARDIOVASCULAR NEGATIVE: 1
GASTROINTESTINAL NEGATIVE: 1
SORE THROAT: 0
COUGH: 0
NAUSEA: 0

## 2018-09-24 NOTE — PROGRESS NOTES
Cardiovascular Surgery Progress Note    Name: Nicholas Neumann  MRN: [id number]  : 1990  Admit Date: 2018  5:02 AM  Procedure:  Procedure(s) and Anesthesia Type:     * MITRAL VALVE REPAIR- OR   - General     * CHAR - General  4 Day Post-Op    Vitals:  Patient Vitals for the past 8 hrs:   Temp SpO2 O2 Delivery Pulse Heart Rate (Monitored) Resp NIBP Weight   18 0800 36.3 °C (97.3 °F) 95 % None (Room Air) 88 - - - -   18 0536 - 95 % - 89 - - 102/63 -   18 0400 36.3 °C (97.3 °F) 97 % - 87 75 12 108/69 100.3 kg (221 lb 1.9 oz)     Temp (24hrs), Av.4 °C (97.5 °F), Min:36 °C (96.8 °F), Max:36.8 °C (98.2 °F)      Respiratory:    Respiration: 12, Pulse Oximetry: 95 %     Chest Tube Drains:          Fluids:    Intake/Output Summary (Last 24 hours) at 18 0928  Last data filed at 18 0800   Gross per 24 hour   Intake             1760 ml   Output             1960 ml   Net             -200 ml     Admit weight: Weight: 98.4 kg (216 lb 14.9 oz)  Current weight: Weight: 100.3 kg (221 lb 1.9 oz) (18 0400)    Labs:  Recent Labs      18   0130  18   0632  18   0435   WBC  8.4  5.8  5.8   RBC  3.53*  3.75*  3.68*   HEMOGLOBIN  10.0*  10.2*  10.4*   HEMATOCRIT  28.9*  30.9*  30.5*   MCV  81.9  82.4  82.9   MCH  28.3  27.2  28.3   MCHC  34.6  33.0*  34.1   RDW  42.6  44.1  43.5   PLATELETCT  99*  102*  138*   MPV  11.7  11.5  11.3         Recent Labs      18   0130  18   0632  18   0435   SODIUM  137  138  139   POTASSIUM  3.6  3.8  4.2   CHLORIDE  103  103  102   CO2  30  30  31   GLUCOSE  102*  95  90   BUN  11  8  7*   CREATININE  0.79  0.82  0.84   CALCIUM  8.1*  8.4*  8.8     Recent Labs      18   0632  18   0435   INR  1.23*  1.32*       Medications:  • warfarin  5 mg     • citalopram  10 mg     • senna-docusate  2 Tab     • enoxaparin  40 mg     • mupirocin  1 Application     • aspirin EC  81 mg     • MD Alert...Warfarin per Pharmacy             Ordered Medications:    ASA - Yes    Plavix - No; contraindicated because of Other coumadin    Post-operative Beta Blockers - No; contraindicated because of High risk for heart block    Ace Inhibitor - No; contraindicated because of Other normal EF    Statin - No; contraindicated because of No cad          Central Line:  Type of line: central line   Date of insertion: 9/20/18  Date of removal:     Exam:   Review of Systems   Constitutional: Negative.    HENT: Negative.    Eyes: Negative.    Respiratory: Negative.    Cardiovascular: Negative.    Gastrointestinal: Negative.    Genitourinary: Negative.    Musculoskeletal: Negative for myalgias.   Skin: Negative.    Neurological: Negative.    Endo/Heme/Allergies: Negative.    Psychiatric/Behavioral: Negative.        Physical Exam   Constitutional: He is oriented to person, place, and time. He appears well-developed and well-nourished. No distress.   Neck: Neck supple.   Cardiovascular: Normal rate, regular rhythm and normal heart sounds.  Exam reveals no gallop and no friction rub.    No murmur heard.  Pulmonary/Chest: Effort normal. No respiratory distress. He has decreased breath sounds in the right lower field and the left lower field. He has no wheezes. He has no rales.   Abdominal: Soft. He exhibits no distension. There is no tenderness.   Musculoskeletal: He exhibits edema.   Neurological: He is alert and oriented to person, place, and time.   Skin: Skin is warm and dry. He is not diaphoretic.       Quality Measures:   Quality-Core Measures   Reviewed items::  EKG reviewed, Labs reviewed, Medications reviewed and Radiology images reviewed  Cruz catheter::  One or Two Days Post Surgery (Day of Surgery being Day 0)  Central line in place:  Need for access  DVT prophylaxis pharmacological::  Warfarin (Coumadin)  DVT prophylaxis - mechanical:  Not indicated at this time, ambulatory  Ulcer Prophylaxis::  Yes  Assessed for rehabilitation services:  Patient  returned to prior level of function, rehabilitation not indicated at this time      Assessment/Plan:  POD 1 HDS, SR, EKG c/w pericarditis, pain- add toradol,, amb, enc IS  POD 2 HDS, SR, Pain - controlled, d/c CT/meza, amb, enc IS  POD 3 HDS, SR, Pain controlled, room air, dc wires, amb, enc IS  POD 4 HDS,  NSR, on RA, had BM, feeling well. INR trending up.  OK for home today with home health to follow. Long discussion on pain meds and pain management.      Patient seen, examined and plan reviewed with midlevel provider. I agree with the plan.      Active Hospital Problems    Diagnosis   • Acute bacterial endocarditis [I33.0]     Priority: High   • Non-rheumatic mitral regurgitation [I34.0]     Priority: High   • Epidural abscess [G06.2]     Priority: Medium   • Streptococcal bacteremia [R78.81, B95.5]     Priority: Medium   • Acute respiratory failure with hypoxia (HCC) [J96.01]

## 2018-09-24 NOTE — CARE PLAN
Problem: Hyperinflation:  Goal: Prevent or improve atelectasis  Outcome: PROGRESSING AS EXPECTED  Respiratory Therapy Update       Interdisciplinary Plan of Care-Outcomes   Hyperinflation Protocol Goals/Outcome: Greater Than 60% of Predicted I.S. Volume x 24 hrs             Cough: Productive  Sputum Amount: Unable to Evaluate  Sputum Color: Unable to Evaluate  Sputum Consistency: Unable to Evaluate      O2 Daily Delivery Respiratory : Room Air with O2 Available    Breath Sounds  RUL Breath Sounds: Clear   RML Breath Sounds: Clear   RLL Breath Sounds: Diminished  ENEIDA Breath Sounds: Clear  LLL Breath Sounds: Diminished      Events/Summary/Plan: IS

## 2018-09-24 NOTE — PROGRESS NOTES
Critical Care Progress Note    Date of admission  9/20/2018    Chief Complaint  27 y.o. male admitted 9/20/2018 for mitral valve repair    Hospital Course   Nicholas Neumann is a 27 y.o. 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  He was discharged home with a PICC in place and completed antibiotics 8/22.  He continued to have dyspnea on exertion and was significantly symptomatic.  Transesophageal echocardiogram 8/20 showed preserved ejection fraction 65% and severe mitral regurgitation.  MRI of the spine showed resolution of the epidural abscess.  Today he presented for elective mitral valve repair.  I was called to the bedside and the patient arrived from the operating room and reviewed the case with anesthesiology and current thoracic surgery.  Surgery went well and he remains currently intubated on full ventilator support    Interval Problem Update  Reviewed last 24 hour events:   - No overnight events   - Pending discharge today    Review of Systems  Review of Systems   Constitutional: Negative for chills and fever.   HENT: Negative for sore throat.    Respiratory: Negative for cough, chest tightness and shortness of breath.    Cardiovascular: Negative for chest pain and leg swelling.   Gastrointestinal: Negative for abdominal pain and nausea.   Genitourinary: Negative for decreased urine volume and difficulty urinating.   Musculoskeletal: Negative for back pain.   Skin: Negative for rash.   Allergic/Immunologic: Negative for immunocompromised state.   Neurological: Negative for dizziness and syncope.   Psychiatric/Behavioral: Negative for confusion. The patient is not nervous/anxious.    All other systems reviewed and are negative.       Vital Signs for last 24 hours   Temp:  [36 °C (96.8 °F)-36.8 °C (98.2 °F)] 36.3 °C (97.3 °F)  Pulse:  [] 89  Resp:   [12-17] 12    Hemodynamic parameters for last 24 hours       Vent Settings for last 24 hours   Room air    Physical Exam   Physical Exam   Constitutional: He is oriented to person, place, and time. He appears well-developed and well-nourished. No distress.   HENT:   Head: Normocephalic and atraumatic.   Mouth/Throat: Oropharynx is clear and moist.   Eyes: Pupils are equal, round, and reactive to light. Conjunctivae are normal.   Neck: No JVD present.   Cardiovascular: Normal rate, regular rhythm and normal heart sounds.    No murmur heard.  Sternotomy incision is clean/dry/intact   Pulmonary/Chest: Effort normal and breath sounds normal. No respiratory distress. He has no wheezes.   Abdominal: Soft. Bowel sounds are normal. He exhibits no distension. There is no tenderness.   Musculoskeletal: He exhibits no edema or tenderness.   Lymphadenopathy:     He has no cervical adenopathy.   Neurological: He is alert and oriented to person, place, and time. No cranial nerve deficit.   Skin: Skin is warm and dry. No rash noted.   Psychiatric: He has a normal mood and affect. His behavior is normal.   Nursing note and vitals reviewed.      Medications  Current Facility-Administered Medications   Medication Dose Route Frequency Provider Last Rate Last Dose   • warfarin (COUMADIN) tablet 5 mg  5 mg Oral COUMADIN-DAILY Camila Schmidt, A.P.N.   5 mg at 09/23/18 1800   • ketorolac (TORADOL) injection 30 mg  30 mg Intravenous Q6HRS PRN Camila Schmidt, A.P.N.   30 mg at 09/22/18 0130   • ALPRAZolam (XANAX) tablet 0.5 mg  0.5 mg Oral TID PRN Camila Schmidt, A.P.N.   0.5 mg at 09/23/18 2200   • citalopram (CELEXA) tablet 10 mg  10 mg Oral DAILY Mikki Rivero A.P.N.   10 mg at 09/24/18 0536   • Respiratory Care per Protocol   Nebulization Continuous RT Mikki Rivero A.P.N.       • Pharmacy Consult Request ...Pain Management Review 1 Each  1 Each Other PRN Mikki Rivero A.P.N.       • senna-docusate (PERICOLACE or SENOKOT S)  8.6-50 MG per tablet 2 Tab  2 Tab Oral BID Mikki Rivero, A.P.N.   2 Tab at 09/24/18 0536    And   • polyethylene glycol/lytes (MIRALAX) PACKET 1 Packet  1 Packet Oral QDAY PRN Mikki Rivero, A.P.N.   1 Packet at 09/23/18 1047    And   • magnesium hydroxide (MILK OF MAGNESIA) suspension 30 mL  30 mL Oral QDAY PRN Mikki Rivero, A.P.N.   30 mL at 09/23/18 1537    And   • bisacodyl (DULCOLAX) suppository 10 mg  10 mg Rectal QDAY PRN Mikki Rivero, A.P.N.   10 mg at 09/24/18 0702   • enoxaparin (LOVENOX) inj 40 mg  40 mg Subcutaneous QDAY Mikki Rivero, A.P.N.   40 mg at 09/23/18 1734   • mupirocin (BACTROBAN) 2 % ointment 1 Application  1 Application Topical BID Mikki Rivero A.P.N.   1 Application at 09/24/18 0536   • aspirin EC (ECOTRIN) tablet 81 mg  81 mg Oral DAILY Mikki Rivero, A.P.N.   81 mg at 09/24/18 0536   • MD Alert...Warfarin per Pharmacy   Other pharmacy to dose Mikki Rivero, A.P.N.       • oxyCODONE immediate-release (ROXICODONE) tablet 5 mg  5 mg Oral Q3HRS PRN Mikki Rivero, A.P.N.       • oxyCODONE immediate release (ROXICODONE) tablet 10 mg  10 mg Oral Q3HRS PRN Mikki Rivero, A.P.N.   10 mg at 09/24/18 0536   • fentaNYL (SUBLIMAZE) injection 50 mcg  50 mcg Intravenous Q3HRS PRN Mikki Rivero, A.P.N.   50 mcg at 09/23/18 1642   • tramadol (ULTRAM) 50 MG tablet 50 mg  50 mg Oral Q4HRS PRN Mikki Rivero, A.P.N.   50 mg at 09/24/18 0536   • ondansetron (ZOFRAN) syringe/vial injection 4 mg  4 mg Intravenous Q6HRS PRN Mikki Rivero, A.P.N.   4 mg at 09/20/18 1320    Or   • prochlorperazine (COMPAZINE) injection 10 mg  10 mg Intravenous Q6HRS PRN Mikki Rivero, A.P.N.        Or   • promethazine (PHENERGAN) suppository 25 mg  25 mg Rectal Q6HRS PRN Mikki Rivero, A.P.N.       • acetaminophen (TYLENOL) tablet 650 mg  650 mg Oral Q4HRS PRN Mikki Rivero, A.P.N.        Or   • acetaminophen (TYLENOL) suppository 650 mg  650 mg Rectal Q4HRS PRN Mikki Rivero, A.P.N.       • mag thurmanal  hydrox-simeth (MAALOX PLUS ES or MYLANTA DS) suspension 30 mL  30 mL Oral Q4HRS PRN Mikki Rivero, A.P.N.       • diphenhydrAMINE (BENADRYL) tablet/capsule 25 mg  25 mg Oral HS PRN - MR X 1 Mikki Rivero A.P.N.   25 mg at 09/21/18 2318       Fluids    Intake/Output Summary (Last 24 hours) at 09/24/18 0758  Last data filed at 09/24/18 0600   Gross per 24 hour   Intake             1720 ml   Output             2260 ml   Net             -540 ml       Laboratory  Recent Results (from the past 48 hour(s))   CBC without Differential Critical Care 0130    Collection Time: 09/23/18  6:32 AM   Result Value Ref Range    WBC 5.8 4.8 - 10.8 K/uL    RBC 3.75 (L) 4.70 - 6.10 M/uL    Hemoglobin 10.2 (L) 14.0 - 18.0 g/dL    Hematocrit 30.9 (L) 42.0 - 52.0 %    MCV 82.4 81.4 - 97.8 fL    MCH 27.2 27.0 - 33.0 pg    MCHC 33.0 (L) 33.7 - 35.3 g/dL    RDW 44.1 35.9 - 50.0 fL    Platelet Count 102 (L) 164 - 446 K/uL    MPV 11.5 9.0 - 12.9 fL   Basic Metabolic Panel (BMP) Critical Care 0130    Collection Time: 09/23/18  6:32 AM   Result Value Ref Range    Sodium 138 135 - 145 mmol/L    Potassium 3.8 3.6 - 5.5 mmol/L    Chloride 103 96 - 112 mmol/L    Co2 30 20 - 33 mmol/L    Glucose 95 65 - 99 mg/dL    Bun 8 8 - 22 mg/dL    Creatinine 0.82 0.50 - 1.40 mg/dL    Calcium 8.4 (L) 8.5 - 10.5 mg/dL    Anion Gap 5.0 0.0 - 11.9   PROTHROMBIN TIME    Collection Time: 09/23/18  6:32 AM   Result Value Ref Range    PT 15.6 (H) 12.0 - 14.6 sec    INR 1.23 (H) 0.87 - 1.13   ESTIMATED GFR    Collection Time: 09/23/18  6:32 AM   Result Value Ref Range    GFR If African American >60 >60 mL/min/1.73 m 2    GFR If Non African American >60 >60 mL/min/1.73 m 2   CBC without Differential Critical Care 0130    Collection Time: 09/24/18  4:35 AM   Result Value Ref Range    WBC 5.8 4.8 - 10.8 K/uL    RBC 3.68 (L) 4.70 - 6.10 M/uL    Hemoglobin 10.4 (L) 14.0 - 18.0 g/dL    Hematocrit 30.5 (L) 42.0 - 52.0 %    MCV 82.9 81.4 - 97.8 fL    MCH 28.3 27.0 - 33.0 pg     MCHC 34.1 33.7 - 35.3 g/dL    RDW 43.5 35.9 - 50.0 fL    Platelet Count 138 (L) 164 - 446 K/uL    MPV 11.3 9.0 - 12.9 fL   Basic Metabolic Panel (BMP) Critical Care 0130    Collection Time: 09/24/18  4:35 AM   Result Value Ref Range    Sodium 139 135 - 145 mmol/L    Potassium 4.2 3.6 - 5.5 mmol/L    Chloride 102 96 - 112 mmol/L    Co2 31 20 - 33 mmol/L    Glucose 90 65 - 99 mg/dL    Bun 7 (L) 8 - 22 mg/dL    Creatinine 0.84 0.50 - 1.40 mg/dL    Calcium 8.8 8.5 - 10.5 mg/dL    Anion Gap 6.0 0.0 - 11.9   PROTHROMBIN TIME    Collection Time: 09/24/18  4:35 AM   Result Value Ref Range    PT 16.5 (H) 12.0 - 14.6 sec    INR 1.32 (H) 0.87 - 1.13   ESTIMATED GFR    Collection Time: 09/24/18  4:35 AM   Result Value Ref Range    GFR If African American >60 >60 mL/min/1.73 m 2    GFR If Non African American >60 >60 mL/min/1.73 m 2       Imaging  X-Ray:  No film today    Assessment/Plan  Acute bacterial endocarditis- (present on admission)   Assessment & Plan    Status post antibiotics        Non-rheumatic mitral regurgitation- (present on admission)   Assessment & Plan    s/p mitral valve repair 9/20  Follow-up with outpatient cardiothoracic surgery  Analgesia as needed  Routine postop care        Epidural abscess- (present on admission)   Assessment & Plan    Analgesia          Streptococcal bacteremia- (present on admission)   Assessment & Plan    Status post antibiotics, resolved        Acute respiratory failure with hypoxia (HCC)   Assessment & Plan    Resolved  Encourage incentive spirometry at discharge               VTE:  Lovenox  Ulcer: Not Indicated  Lines: none    I have performed a physical exam and reviewed and updated ROS and Plan today (9/24/2018). In review of yesterday's note (9/23/2018), there are no changes except as documented above.     Okay to discharge home today from pulmonary/critical care standpoint.  No need for outpatient pulmonary follow-up.  Discussed assessment/plan with RN, RT, pharmacy, patient,  [facility].

## 2018-09-24 NOTE — DISCHARGE PLANNING
Received Choice form at 7637  Agency/Facility Name: [facility]  Referral sent per Choice form @ [phone]

## 2018-09-24 NOTE — DISCHARGE PLANNING
ATTN: Case Management  RE: Referral for Home Health                We would like to take this opportunity to thank you for submitting a referral for your patient to continue care with [facility]. Our skilled team is dedicated to helping all patients recover and gain independence in the home setting.            As of 09/24/2018, we have accepted the above patient into our service. A [facility] clinician will be out to see the patient within 48 hours to conduct our initial visit. If you have any questions or concerns regarding the patient’s transition to Home Health, please do not hesitate to contact us. We are open for referrals 7 days a week from 8AM to 5PM at [phone].      We look forward to collaborating with you,  [facility] Team

## 2018-09-24 NOTE — DISCHARGE PLANNING
Anticipated Discharge Disposition: d/c home w/ hh    Action: Received hand written order for hh. Met w/ pt at bedside. He verified his address and contact phone number from face sheet.    He chose [facility]. He wants his PCP to be heart surgeon, Kevin Fernandez, as he has a APN only in community.    Pt d/cing today so choice indicates to please prioritize.    LSW faxed completed choice, hand written order and called to advise of pending faxes       Barriers to Discharge: acceptance    Plan: f/u w/ [facility], medical team, pt

## 2018-09-24 NOTE — DISCHARGE SUMMARY
Discharge Summary    CHIEF COMPLAINT ON ADMISSION  No chief complaint on file.      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 Chronic pain.     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 Chronic pain. Anticoagulation on coumadin.  Acute post operative pain.     PROCEDURE PERFORMED: 9/20/18   Radical mitral valve repair (triangular resection of P1 and chordal transfer, commissuroplasty, 34 mm [place] flexible annuloplasty band), and   intraoperative transesophageal echocardiography.    Admission Date  9/20/2018    CODE STATUS  Full Code    HPI & HOSPITAL COURSE  This is a 27-year-old man with a history of native mitral valve endocarditis with streptococcal infection that resulted in severe mitral regurgitation.  He also presented with epidural abscess at that time. He has now completed an appropriate course of intravenous antibiotics and still with residual severe mitral regurgitation.  He has also developed worsening shortness of breath as well as paroxysmal nocturnal dyspnea.  His spinal epidural abscess has resolved and he has been cleared from a neurosurgery and infectious disease perspective.    POD 1 HDS, SR, EKG c/w pericarditis, pain- add toradol,, amb, enc IS  POD 2 HDS, SR, Pain - controlled, d/c CT/meza, amb, enc IS  POD 3 HDS, SR, Pain controlled, room air, dc wires, amb, enc IS  POD 4 HDS,  NSR, on RA, had BM, feeling well. INR trending up.  OK for home today with home health to follow. Long discussion on pain meds and pain management.         Therefore, he is discharged in good and stable condition to home with close outpatient follow-up.    The patient met 2-midnight criteria for an inpatient stay at the time of  discharge.    Discharge Date  9/24/18    FOLLOW UP ITEMS POST DISCHARGE  Cardiology 1-2 weeks  Cardiothoracic Surgery 1 month  PCP as previously directed    DISCHARGE DIAGNOSES  Active Problems:    Non-rheumatic mitral regurgitation POA: Yes    Acute bacterial endocarditis POA: Yes    Streptococcal bacteremia POA: Yes    Epidural abscess POA: Yes    Acute respiratory failure with hypoxia (HCC) POA: Unknown  Resolved Problems:    Shock (HCC) POA: Unknown    Gastric distention POA: Unknown      FOLLOW UP  Future Appointments  Date Time Provider Department Center   10/16/2018 1:40 PM [facility] None   11/1/2018 3:20 PM [facility] None     Kevin Fernandez M.D.  [address]  [address] [address]  [phone]    Go on 10/22/2018  Please follow up with your cardiac surgeon Dr. Fernandez on 10/22/18 at 3pm      MEDICATIONS ON DISCHARGE     Medication List      START taking these medications      Instructions   ALPRAZolam 0.5 MG Tabs  Commonly known as:  XANAX   Take 1 Tab by mouth at bedtime as needed for Sleep for up to 7 days.  Dose:  0.5 mg     aspirin 81 MG EC tablet   Take 1 Tab by mouth every day.  Dose:  81 mg     oxyCODONE immediate release 10 MG immediate release tablet  Commonly known as:  ROXICODONE   Take 1 Tab by mouth every 3 hours as needed for up to 7 days.  Dose:  10 mg     tramadol 50 MG Tabs  Commonly known as:  ULTRAM   Take 1 Tab by mouth every four hours as needed (Moderate Pain (NRS Pain Scale 4-6; CPOT Pain Scale 3-5) if opiates not ordered or tolerated) for up to 7 days.  Dose:  50 mg     warfarin 5 MG Tabs  Commonly known as:  COUMADIN   Take 1 Tab by mouth COUMADIN-DAILY.  Dose:  5 mg        CHANGE how you take these medications      Instructions   NS SOLN 100 mL with penicillin G potassium [id number] UNIT SOLR 3 Million Units  What changed:  additional instructions   3 Million Units by Intravenous route every 4 hours.  Dose:  3 Million Units         CONTINUE taking these medications      Instructions   citalopram 10 MG tablet  Commonly known as:  CELEXA   Take 10 mg by mouth every day.  Dose:  10 mg     gabapentin 100 MG Caps  Commonly known as:  NEURONTIN   Take 100 mg by mouth 3 times a day.  Dose:  100 mg     ibuprofen 200 MG Tabs  Commonly known as:  MOTRIN   Take 800 mg by mouth every 6 hours as needed for Mild Pain.  Dose:  800 mg     KEPPRA 750 MG tablet  Generic drug:  levetiracetam   Take 2,250 mg by mouth 2 times a day.  Dose:  2250 mg     MELATONIN PO   Take 6 mg by mouth every bedtime.  Dose:  6 mg          Use of narcotics and risks associated reviewed with patient.  [place]  checked. Has history of rx from PCP  Discussed weaning and alternatives  Office policy reviewed.  Patient verbalizes understanding.    Allergies  Allergies   Allergen Reactions   • Nkda [No Known Drug Allergy]        DIET  Orders Placed This Encounter   Procedures   • Diet Order Cardiac     Standing Status:   Standing     Number of Occurrences:   1     Order Specific Question:   Diet:     Answer:   Cardiac [6]       ACTIVITY  As tolerated.  Weight bearing as tolerated   No Driving x 1 month   No heavy lifting/pushing/pulling >10# x 2 months    Prevena dressing to be removed on POD 7 or 8, or when battery dies  Wash incisions soap/water, pat dry, do not use ointments  Report s/s infection including warmth/redness/oozing or fever/chills      LABORATORY  Lab Results   Component Value Date    SODIUM 139 09/24/2018    POTASSIUM 4.2 09/24/2018    CHLORIDE 102 09/24/2018    CO2 31 09/24/2018    GLUCOSE 90 09/24/2018    BUN 7 (L) 09/24/2018    CREATININE 0.84 09/24/2018        Lab Results   Component Value Date    WBC 5.8 09/24/2018    HEMOGLOBIN 10.4 (L) 09/24/2018    HEMATOCRIT 30.5 (L) 09/24/2018    PLATELETCT 138 (L) 09/24/2018      Discussed when to seek emergency medical care  Questions and concerns addressed.  Patient verbalizes understanding and agrees to plan of  care    Total time of the discharge process exceeds 40 minutes.

## 2018-09-24 NOTE — PROGRESS NOTES
Discharge completed, IV removed, education completed.  Home health setup prior to d/c, prescriptions provided to wife.  All questions answered, pt walked out with spouse without assistance

## 2018-09-24 NOTE — FACE TO FACE
Face to Face Supporting Documentation - Home Health    The encounter with this patient was in whole or in part the primary reason for home health admission.    Date of encounter:   Patient:                    MRN:                       YOB: 2020  Nicholas Neumann  [id number]  1990     Home health to see patient for:  Skilled Nursing care for assessment, interventions & education    Skilled need for:  Surgical Aftercare s/p mvr    Skilled nursing interventions to include:  Wound Care    Homebound status evidenced by:  Needs the assistance of another person in order to leave the home. Leaving home requires a considerable and taxing effort. There is a normal inability to leave the home.    Community Physician to provide follow up care: KIERAN VelezPTOBY     Optional Interventions? No      I certify the face to face encounter for this home health care referral meets the CMS requirements and the encounter/clinical assessment with the patient was, in whole, or in part, for the medical condition(s) listed above, which is the primary reason for home health care. Based on my clinical findings: the service(s) are medically necessary, support the need for home health care, and the homebound criteria are met.  I certify that this patient has had a face to face encounter by myself.  KIERAN NguyễnPCHELITA. - NPI: [id number]

## 2018-09-24 NOTE — PROGRESS NOTES
Bedside report received from night RN.  Pt assessed A&Ox4, c/o chest pain from surgical site, no sob noted.  POC discussed with pt, all questions answered.  Pt states all needs are met. Alarm not on, pt self ambulatory and safe, bed in lowest position, call light within reach.  Will continue to monitor

## 2018-09-24 NOTE — THERAPY
"Occupational Therapy Evaluation completed.   Functional Status:  Pt & wife educated on sternal precautions during ADL's & homemaking tasks.  Reviewed how to log roll in out of bed.  Pt reports he will likely sleep in recliner.  Cautioned pt about no lifting more than 10 lbs as he has a 2 yr old at home.  Wife reports she has taken time off work & will be able to assist if needed upon D/C.  Pt currently able to perform basic ADL's with supervision & has been up amb in halls with wife.  Plan of Care: Patient with no further skilled OT needs in the acute care setting at this time  Discharge Recommendations:  Equipment: No Equipment Needed. Post-acute therapy Currently anticipate no further skilled therapy needs once patient is discharged from the inpatient setting.    See \"Rehab Therapy-Acute\" Patient Summary Report for complete documentation.    "

## 2018-09-25 ENCOUNTER — ANTICOAGULATION MONITORING (OUTPATIENT)
Dept: VASCULAR LAB | Facility: MEDICAL CENTER | Age: 28
End: 2018-09-25

## 2018-09-25 ENCOUNTER — TELEPHONE (OUTPATIENT)
Dept: HEALTH INFORMATION MANAGEMENT | Facility: OTHER | Age: 28
End: 2018-09-25

## 2018-09-25 ENCOUNTER — HOME CARE VISIT (OUTPATIENT)
Dept: HOME HEALTH SERVICES | Facility: HOME HEALTHCARE | Age: 28
End: 2018-09-25
Payer: MEDICAID

## 2018-09-25 DIAGNOSIS — I34.1 MITRAL VALVE PROLAPSE: ICD-10-CM

## 2018-09-25 DIAGNOSIS — I34.0 NON-RHEUMATIC MITRAL REGURGITATION: ICD-10-CM

## 2018-09-25 LAB
BACTERIA SPEC ANAEROBE CULT: NORMAL
SIGNIFICANT IND 70042: NORMAL
SITE SITE: NORMAL
SOURCE SOURCE: NORMAL

## 2018-09-25 PROCEDURE — G0493 RN CARE EA 15 MIN HH/HOSPICE: HCPCS

## 2018-09-25 PROCEDURE — 665001 SOC-HOME HEALTH

## 2018-09-25 ASSESSMENT — ENCOUNTER SYMPTOMS
NAUSEA: DENIES
VOMITING: DENIES

## 2018-09-25 NOTE — TELEPHONE ENCOUNTER
Referral from [facility]. Med review completed. Interaction noted between IBU and ASA/warfarin with potential to cause GI bleed. Interaction noted between citalopram and tramadol with potential to cause serotonin syndrome. Interaction noted between alprazolam and oxycodone with potential to cause respiratory depression. Outbound call to patient to discuss. Warned patient of s/sx of GI bleed, serotonin syndrome and respiratory depression. Patient states he will take APAP in place of IBU going forward and melatonin in place of alprazolam until he is no longer taking oxycodone. Warfarin interactions and INR are currently monitored by [facility].  No further issues noted at this time.

## 2018-09-25 NOTE — PROGRESS NOTES
I sent in a home health order for Nicholas.  He is a new patient to us.  Home health will be out tomorrow and will get an INR.

## 2018-09-26 ENCOUNTER — HOME CARE VISIT (OUTPATIENT)
Dept: HOME HEALTH SERVICES | Facility: HOME HEALTHCARE | Age: 28
End: 2018-09-26
Payer: MEDICAID

## 2018-09-26 ENCOUNTER — TELEPHONE (OUTPATIENT)
Dept: VASCULAR LAB | Facility: MEDICAL CENTER | Age: 28
End: 2018-09-26

## 2018-09-26 ENCOUNTER — ANTICOAGULATION MONITORING (OUTPATIENT)
Dept: VASCULAR LAB | Facility: MEDICAL CENTER | Age: 28
End: 2018-09-26

## 2018-09-26 VITALS
WEIGHT: 213 LBS | TEMPERATURE: 99.5 F | HEIGHT: 70 IN | RESPIRATION RATE: 18 BRPM | HEART RATE: 99 BPM | DIASTOLIC BLOOD PRESSURE: 60 MMHG | SYSTOLIC BLOOD PRESSURE: 102 MMHG | OXYGEN SATURATION: 94 % | BODY MASS INDEX: 30.49 KG/M2

## 2018-09-26 DIAGNOSIS — I34.0 NON-RHEUMATIC MITRAL REGURGITATION: ICD-10-CM

## 2018-09-26 DIAGNOSIS — Z79.01 CHRONIC ANTICOAGULATION: ICD-10-CM

## 2018-09-26 LAB
INR PPP: 1.4 (ref 2–3.5)
LV EJECT FRACT  99904: 55

## 2018-09-26 PROCEDURE — G0299 HHS/HOSPICE OF RN EA 15 MIN: HCPCS

## 2018-09-26 SDOH — ECONOMIC STABILITY: HOUSING INSECURITY: UNSAFE APPLIANCES: 0

## 2018-09-26 SDOH — ECONOMIC STABILITY: HOUSING INSECURITY: UNSAFE COOKING RANGE AREA: 0

## 2018-09-26 ASSESSMENT — ACTIVITIES OF DAILY LIVING (ADL)
HOME_HEALTH_OASIS: 01
OASIS_M1830: 01

## 2018-09-26 ASSESSMENT — ENCOUNTER SYMPTOMS: DEPRESSED MOOD: 1

## 2018-09-26 ASSESSMENT — PATIENT HEALTH QUESTIONNAIRE - PHQ9
1. LITTLE INTEREST OR PLEASURE IN DOING THINGS: 00
2. FEELING DOWN, DEPRESSED, IRRITABLE, OR HOPELESS: 00

## 2018-09-26 NOTE — PROGRESS NOTES
.  Anticoagulation Summary  As of 9/26/2018    INR goal:   2.0-3.0   TTR:   --   Today's INR:   1.4!   Warfarin maintenance plan:   5 mg (5 mg x 1) every day   Weekly warfarin total:   35 mg   Plan last modified:   Lynsey Magallanes, PharmD (9/26/2018)   Next INR check:   9/28/2018   Target end date:   12/25/2018    Indications    Non-rheumatic mitral regurgitation [I34.0]             Anticoagulation Episode Summary     INR check location:       Preferred lab:       Send INR reminders to:       Comments:   [facility]       Anticoagulation Care Providers     Provider Role Specialty Phone number    MIYA Nguyễn Referring Cardiology [phone]    [facility] Responsible  [phone]        Anticoagulation Patient Findings    PCP - Dr. Minnie Burrell  Cardiologist - Dr. 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     Pt is new to warfarin and new to [facility]. Discussed:   · Indication for warfarin therapy and INR goal range.   · Importance of monitoring and compliance.   · Monitoring parameters, signs and symptoms of bleeding or clotting.  · Warfarin therapy, side effects, potential DDIs, OTC medications  · ASA - 81mg per Dr. Bloch  · DDI - No IBU  · Importance of diet consistency, ie vitamin K intake, supplements  · Lifestyle safety, ie smoking, ETOH, hobby safety, fall safety/prevention  · Procedures for missed doses or suspected missed doses, surgeries/procedures, travel, dental work, any medication changes    Pt denies any unusual s/s of bleeding, bruising, clotting or any changes to diet or medications.    INR is SUB therapeutic today.   Will continue to titrate pt's warfarin dose. Pt's been taking 5mg since Monday.   Pt to take warfarin as follows  Increase to 7.5mg daily  Recheck INR in 2  days.    Lynsey Magallanes, PharmD

## 2018-09-27 RX ORDER — ASPIRIN 81 MG/1
81 TABLET ORAL DAILY
Qty: 30 TAB | Refills: 3 | COMMUNITY
Start: 2018-09-27 | End: 2018-11-05

## 2018-09-27 NOTE — TELEPHONE ENCOUNTER
Initial anticoagulation clinic note and discharge summary reviewed  Patient started on anticoagulation status post MVR repair  Pending further recommendations from cardiology, we will continue with 3 months of oral anti-coagulation with warfarin and indefinite low-dose aspirin.    Will defer all other cardiovascular care, aside from anticoagulation, to cardiology    Michael J. Bloch, MD  [facility]    CC:  KATI Fernandez

## 2018-09-28 ENCOUNTER — HOME CARE VISIT (OUTPATIENT)
Dept: HOME HEALTH SERVICES | Facility: HOME HEALTHCARE | Age: 28
End: 2018-09-28
Payer: MEDICAID

## 2018-09-28 ENCOUNTER — HOSPITAL ENCOUNTER (OUTPATIENT)
Dept: RADIOLOGY | Facility: MEDICAL CENTER | Age: 28
End: 2018-09-28
Attending: THORACIC SURGERY (CARDIOTHORACIC VASCULAR SURGERY)
Payer: MEDICAID

## 2018-09-28 ENCOUNTER — ANTICOAGULATION MONITORING (OUTPATIENT)
Dept: VASCULAR LAB | Facility: MEDICAL CENTER | Age: 28
End: 2018-09-28

## 2018-09-28 DIAGNOSIS — Z79.01 CHRONIC ANTICOAGULATION: Primary | ICD-10-CM

## 2018-09-28 DIAGNOSIS — R06.02 SHORTNESS OF BREATH: ICD-10-CM

## 2018-09-28 DIAGNOSIS — I34.0 NON-RHEUMATIC MITRAL REGURGITATION: ICD-10-CM

## 2018-09-28 LAB — INR PPP: 2.1 (ref 2–3.5)

## 2018-09-28 PROCEDURE — G0299 HHS/HOSPICE OF RN EA 15 MIN: HCPCS

## 2018-09-28 PROCEDURE — 71046 X-RAY EXAM CHEST 2 VIEWS: CPT

## 2018-09-28 NOTE — PROGRESS NOTES
Anticoagulation Summary  As of 9/28/2018    INR goal:   2.0-3.0   TTR:   --   Today's INR:   2.1   Warfarin maintenance plan:   7.5 mg (5 mg x 1.5) on Sun, Thu; 5 mg (5 mg x 1) all other days   Weekly warfarin total:   40 mg   Plan last modified:   Sanju Giron, PharmD (9/28/2018)   Next INR check:   10/1/2018   Target end date:   12/25/2018    Indications    Non-rheumatic mitral regurgitation [I34.0]             Anticoagulation Episode Summary     INR check location:       Preferred lab:       Send INR reminders to:       Comments:   [facility]       Anticoagulation Care Providers     Provider Role Specialty Phone number    MIYA Nguyễn Referring Cardiology [phone]    [facility]  [phone]        Anticoagulation Patient Findings     HPI:  Nicholas Sepideh Neumann, on anticoagulation therapy with warfarin for Mitral valve repair.   Changes to current medical/health status since last appt: none.   Denies signs/symptoms of bleeding and/or thrombosis since the last appt.    Denies any interval changes to diet  Denies any interval changes to medications since last appt.   Denies any complications or cost restrictions with current therapy.     A/P   INR  therapeutic.   Pt to begin increased regimen.     Next INR in 3 days.     Sanju Giron, MayurD

## 2018-09-30 VITALS
RESPIRATION RATE: 16 BRPM | TEMPERATURE: 97 F | DIASTOLIC BLOOD PRESSURE: 70 MMHG | HEART RATE: 110 BPM | SYSTOLIC BLOOD PRESSURE: 116 MMHG | OXYGEN SATURATION: 97 %

## 2018-09-30 VITALS
DIASTOLIC BLOOD PRESSURE: 78 MMHG | RESPIRATION RATE: 18 BRPM | HEART RATE: 102 BPM | BODY MASS INDEX: 30.56 KG/M2 | OXYGEN SATURATION: 90 % | WEIGHT: 213 LBS | SYSTOLIC BLOOD PRESSURE: 110 MMHG | TEMPERATURE: 97 F

## 2018-09-30 SDOH — ECONOMIC STABILITY: HOUSING INSECURITY: HOME SAFETY: DOG IS PLACED IN A ROOM WITH CLOSED DOOR

## 2018-09-30 SDOH — ECONOMIC STABILITY: HOUSING INSECURITY: HOME SAFETY: HE PUTS DOG IN A ROOM WITH CLOSE DOOR

## 2018-09-30 SDOH — ECONOMIC STABILITY: HOUSING INSECURITY: UNSAFE APPLIANCES: 0

## 2018-09-30 SDOH — ECONOMIC STABILITY: HOUSING INSECURITY: UNSAFE COOKING RANGE AREA: 0

## 2018-09-30 ASSESSMENT — ENCOUNTER SYMPTOMS
NAUSEA: DENIES ANY
MENTAL STATUS CHANGE: 0
MENTAL STATUS CHANGE: 0
SHORTNESS OF BREATH: T
VOMITING: DENIES ANY

## 2018-10-01 ENCOUNTER — HOME CARE VISIT (OUTPATIENT)
Dept: HOME HEALTH SERVICES | Facility: HOME HEALTHCARE | Age: 28
End: 2018-10-01
Payer: MEDICAID

## 2018-10-01 ENCOUNTER — ANTICOAGULATION MONITORING (OUTPATIENT)
Dept: VASCULAR LAB | Facility: MEDICAL CENTER | Age: 28
End: 2018-10-01

## 2018-10-01 VITALS
BODY MASS INDEX: 30.85 KG/M2 | OXYGEN SATURATION: 95 % | SYSTOLIC BLOOD PRESSURE: 106 MMHG | DIASTOLIC BLOOD PRESSURE: 64 MMHG | RESPIRATION RATE: 18 BRPM | HEART RATE: 103 BPM | TEMPERATURE: 98 F | WEIGHT: 215 LBS

## 2018-10-01 DIAGNOSIS — I34.0 NON-RHEUMATIC MITRAL REGURGITATION: ICD-10-CM

## 2018-10-01 LAB — INR PPP: 2.1 (ref 2–3.5)

## 2018-10-01 PROCEDURE — G0299 HHS/HOSPICE OF RN EA 15 MIN: HCPCS

## 2018-10-01 NOTE — PROGRESS NOTES
Anticoagulation Summary  As of 10/1/2018    INR goal:   2.0-3.0   TTR:   --   Today's INR:   2.1   Warfarin maintenance plan:   7.5 mg (5 mg x 1.5) on Sun, Tue, Thu; 5 mg (5 mg x 1) all other days   Weekly warfarin total:   42.5 mg   Plan last modified:   MIYA Dobson (10/1/2018)   Next INR check:   10/5/2018   Target end date:   12/25/2018    Indications    Non-rheumatic mitral regurgitation [I34.0]             Anticoagulation Episode Summary     INR check location:       Preferred lab:       Send INR reminders to:       Comments:   [facility]       Anticoagulation Care Providers     Provider Role Specialty Phone number    MIYA Nguyễn Referring Cardiology [phone]    [facility] Anticoagulation Services Responsible  [phone]        Anticoagulation Patient Findings        Spoke with patient by phone.      INR  is-therapeutic.    Changes to current medical/health status since last appt: none.   Denies signs/symptoms of bleeding and/or thrombosis since the last appt.   Denies any interval changes to diet  Denies any interval changes to medications since last appt.   Denies any complications or cost restrictions with current therapy  Follow up appointment in 4 days(s).      Increase dose slightly by 6.3% and test with [facility] on Friday.   Will start taking in the PM at around 6 instead of the am.    Patient is on a high risk medication and therefore requires close monitoring and follow up.     CHEST guidelines recommend frequent INR monitoring at regular intervals (a few days up to a max of 12 weeks) to ensure they are on the proper dose of warfarin and not having any complications from therapy.  INRs can dramatically change over a short time period due to diet, medications, and medical conditions.   The patient instructed to go to the ER for falls with a head injury,  blood in urine or stool or any bleeding that last longer than 20 min.        MIYA Dobson

## 2018-10-02 SDOH — ECONOMIC STABILITY: HOUSING INSECURITY: UNSAFE COOKING RANGE AREA: 0

## 2018-10-02 SDOH — ECONOMIC STABILITY: HOUSING INSECURITY: UNSAFE APPLIANCES: 0

## 2018-10-02 ASSESSMENT — ENCOUNTER SYMPTOMS
NAUSEA: DENIES ANY
MENTAL STATUS CHANGE: 0
SHORTNESS OF BREATH: T

## 2018-10-03 ENCOUNTER — HOME CARE VISIT (OUTPATIENT)
Dept: HOME HEALTH SERVICES | Facility: HOME HEALTHCARE | Age: 28
End: 2018-10-03
Payer: MEDICAID

## 2018-10-03 PROCEDURE — G0495 RN CARE TRAIN/EDU IN HH: HCPCS

## 2018-10-04 LAB — EKG IMPRESSION: NORMAL

## 2018-10-05 ENCOUNTER — HOME CARE VISIT (OUTPATIENT)
Dept: HOME HEALTH SERVICES | Facility: HOME HEALTHCARE | Age: 28
End: 2018-10-05
Payer: MEDICAID

## 2018-10-05 ENCOUNTER — ANTICOAGULATION MONITORING (OUTPATIENT)
Dept: VASCULAR LAB | Facility: MEDICAL CENTER | Age: 28
End: 2018-10-05

## 2018-10-05 VITALS
RESPIRATION RATE: 18 BRPM | TEMPERATURE: 97.8 F | SYSTOLIC BLOOD PRESSURE: 110 MMHG | WEIGHT: 210 LBS | OXYGEN SATURATION: 98 % | DIASTOLIC BLOOD PRESSURE: 80 MMHG | BODY MASS INDEX: 30.13 KG/M2 | HEART RATE: 91 BPM

## 2018-10-05 DIAGNOSIS — I34.0 NON-RHEUMATIC MITRAL REGURGITATION: ICD-10-CM

## 2018-10-05 DIAGNOSIS — I34.0 MITRAL VALVE INSUFFICIENCY, UNSPECIFIED ETIOLOGY: ICD-10-CM

## 2018-10-05 LAB — INR PPP: 1.5 (ref 2–3.5)

## 2018-10-05 PROCEDURE — G0299 HHS/HOSPICE OF RN EA 15 MIN: HCPCS

## 2018-10-05 SDOH — ECONOMIC STABILITY: HOUSING INSECURITY: UNSAFE COOKING RANGE AREA: 0

## 2018-10-05 SDOH — ECONOMIC STABILITY: HOUSING INSECURITY: UNSAFE APPLIANCES: 0

## 2018-10-05 ASSESSMENT — ENCOUNTER SYMPTOMS
DEPRESSED MOOD: 1
DEBILITATING PAIN: 1
SHORTNESS OF BREATH: T

## 2018-10-06 NOTE — PROGRESS NOTES
Anticoagulation Summary  As of 10/5/2018    INR goal:   2.0-3.0   TTR:   --   Today's INR:   1.5!   Warfarin maintenance plan:   7.5 mg (5 mg x 1.5) on Sun, Mon, Wed; 5 mg (5 mg x 1) all other days   Weekly warfarin total:   42.5 mg   Plan last modified:   KIERAN DobsonPTOBY (10/1/2018)   Next INR check:   10/8/2018   Target end date:   12/25/2018    Indications    Non-rheumatic mitral regurgitation [I34.0]             Anticoagulation Episode Summary     INR check location:       Preferred lab:       Send INR reminders to:       Comments:   [facility]       Anticoagulation Care Providers     Provider Role Specialty Phone number    KIERAN NguyễnPTOBY Referring Cardiology [phone]    [facility]  [phone]        Anticoagulation Patient Findings  Patient Findings     Positives:   Missed doses    Negatives:   Signs/symptoms of thrombosis, Signs/symptoms of bleeding, Laboratory test error suspected, Change in health, Change in alcohol use, Change in activity, Upcoming invasive procedure, Emergency department visit, Upcoming dental procedure, Extra doses, Change in medications, Change in diet/appetite, Hospital admission, Bruising, Other complaints        Spoke with patient today regarding subtherapeutic INR of 1.5.  Patient denies any signs/symptoms of bruising or bleeding or any changes in diet and medications.  Instructed patient to call clinic with any questions or concerns.  Instructed patient to bolus with 7.5mg X 1, then resume current warfarin regimen.  Follow up in 3 days, to reduce risk of adverse events related to this high risk medication,  Warfarin.    Carlos Champion, MayurD

## 2018-10-08 ENCOUNTER — HOME CARE VISIT (OUTPATIENT)
Dept: HOME HEALTH SERVICES | Facility: HOME HEALTHCARE | Age: 28
End: 2018-10-08
Payer: MEDICAID

## 2018-10-08 ENCOUNTER — ANTICOAGULATION MONITORING (OUTPATIENT)
Dept: VASCULAR LAB | Facility: MEDICAL CENTER | Age: 28
End: 2018-10-08

## 2018-10-08 VITALS
DIASTOLIC BLOOD PRESSURE: 70 MMHG | TEMPERATURE: 97.3 F | BODY MASS INDEX: 29.99 KG/M2 | SYSTOLIC BLOOD PRESSURE: 104 MMHG | WEIGHT: 209 LBS | OXYGEN SATURATION: 97 % | HEART RATE: 84 BPM | RESPIRATION RATE: 18 BRPM

## 2018-10-08 DIAGNOSIS — I34.0 NON-RHEUMATIC MITRAL REGURGITATION: ICD-10-CM

## 2018-10-08 DIAGNOSIS — I34.0 MITRAL REGURGITATION, ACUTE: ICD-10-CM

## 2018-10-08 LAB — INR PPP: 1.7 (ref 2–3.5)

## 2018-10-08 PROCEDURE — G0299 HHS/HOSPICE OF RN EA 15 MIN: HCPCS

## 2018-10-08 RX ORDER — WARFARIN SODIUM 5 MG/1
TABLET ORAL
Qty: 135 TAB | Refills: 0 | Status: SHIPPED | OUTPATIENT
Start: 2018-10-08 | End: 2018-11-05

## 2018-10-08 SDOH — ECONOMIC STABILITY: HOUSING INSECURITY: UNSAFE COOKING RANGE AREA: 0

## 2018-10-08 SDOH — ECONOMIC STABILITY: HOUSING INSECURITY: UNSAFE APPLIANCES: 0

## 2018-10-08 SDOH — ECONOMIC STABILITY: HOUSING INSECURITY: HOME SAFETY: LITTLE CHIHUAHUA AND PITBULL MIX- BOTH FRIENDLY

## 2018-10-08 NOTE — PROGRESS NOTES
Anticoagulation Summary  As of 10/8/2018    INR goal:   2.0-3.0   TTR:   0.0 % (3 d)   Today's INR:   1.7!   Warfarin maintenance plan:   5 mg (5 mg x 1) on Mon, Wed, Fri; 7.5 mg (5 mg x 1.5) all other days   Weekly warfarin total:   45 mg   Plan last modified:   Carlos Champion PharmD (10/8/2018)   Next INR check:   10/11/2018   Target end date:   12/25/2018    Indications    Non-rheumatic mitral regurgitation [I34.0]             Anticoagulation Episode Summary     INR check location:       Preferred lab:       Send INR reminders to:       Comments:   [facility]      Anticoagulation Care Providers     Provider Role Specialty Phone number    Laura Mendoza A.P.N. Referring Cardiology [phone]    [facility] Anticoagulation Services Responsible  [phone]        Anticoagulation Patient Findings  Patient Findings     Negatives:   Signs/symptoms of thrombosis, Signs/symptoms of bleeding, Laboratory test error suspected, Change in health, Change in alcohol use, Change in activity, Upcoming invasive procedure, Emergency department visit, Upcoming dental procedure, Missed doses, Extra doses, Change in medications, Change in diet/appetite, Hospital admission, Bruising, Other complaints        Spoke with patient today regarding subtherapeutic INR of 1.7.  Patient denies any signs/symptoms of bruising or bleeding or any changes in diet and medications.  Instructed patient to call clinic with any questions or concerns.  Instructed patient to bolus with 10mg X 1, then continue with 7.5mg daily until next INR.  Follow up in 3 days, to reduce risk of adverse events related to this high risk medication,  Warfarin.    Carlos Champion, Rubio

## 2018-10-11 ENCOUNTER — HOME CARE VISIT (OUTPATIENT)
Dept: HOME HEALTH SERVICES | Facility: HOME HEALTHCARE | Age: 28
End: 2018-10-11
Payer: MEDICAID

## 2018-10-11 ENCOUNTER — ANTICOAGULATION MONITORING (OUTPATIENT)
Dept: VASCULAR LAB | Facility: MEDICAL CENTER | Age: 28
End: 2018-10-11

## 2018-10-11 VITALS
DIASTOLIC BLOOD PRESSURE: 76 MMHG | HEART RATE: 102 BPM | BODY MASS INDEX: 30.13 KG/M2 | RESPIRATION RATE: 18 BRPM | WEIGHT: 210 LBS | TEMPERATURE: 97 F | OXYGEN SATURATION: 98 % | SYSTOLIC BLOOD PRESSURE: 108 MMHG

## 2018-10-11 DIAGNOSIS — I34.0 NON-RHEUMATIC MITRAL REGURGITATION: ICD-10-CM

## 2018-10-11 LAB — INR PPP: 2.3 (ref 2–3.5)

## 2018-10-11 PROCEDURE — G0299 HHS/HOSPICE OF RN EA 15 MIN: HCPCS

## 2018-10-11 SDOH — ECONOMIC STABILITY: HOUSING INSECURITY: UNSAFE COOKING RANGE AREA: 0

## 2018-10-11 SDOH — ECONOMIC STABILITY: HOUSING INSECURITY: UNSAFE APPLIANCES: 0

## 2018-10-11 SDOH — ECONOMIC STABILITY: HOUSING INSECURITY: HOME SAFETY: 1 BIG VERY FRIENDLY DOG

## 2018-10-11 ASSESSMENT — ENCOUNTER SYMPTOMS
MENTAL STATUS CHANGE: 0
VOMITING: DENIES ANY

## 2018-10-12 VITALS
RESPIRATION RATE: 18 BRPM | WEIGHT: 208 LBS | TEMPERATURE: 96.8 F | HEART RATE: 97 BPM | OXYGEN SATURATION: 98 % | SYSTOLIC BLOOD PRESSURE: 108 MMHG | BODY MASS INDEX: 29.84 KG/M2 | DIASTOLIC BLOOD PRESSURE: 80 MMHG

## 2018-10-12 NOTE — PROGRESS NOTES
Anticoagulation Summary  As of 10/11/2018    INR goal:   2.0-3.0   TTR:   25.0 % (6 d)   Today's INR:   2.3   Warfarin maintenance plan:   7.5 mg (5 mg x 1.5) every day   Weekly warfarin total:   52.5 mg   Plan last modified:   Pam Goldstein (10/11/2018)   Next INR check:   10/15/2018   Target end date:   12/25/2018    Indications    Non-rheumatic mitral regurgitation [I34.0]             Anticoagulation Episode Summary     INR check location:       Preferred lab:       Send INR reminders to:       Comments:   [facility]      Anticoagulation Care Providers     Provider Role Specialty Phone number    Laura Mendoza A.P.N. Referring Cardiology [phone]    [facility] Anticoagulation Services Responsible  [phone]        Anticoagulation Patient Findings    Spoke with Nicholas to report a therapeutic INR of 2.3.  Pt to continue with 52.5mg/week  Follow up in 4 days, to reduce the risk of adverse events related to this high risk medication, warfarin.    Pam Goldstein, Clinical Pharmacist

## 2018-10-15 ENCOUNTER — HOME CARE VISIT (OUTPATIENT)
Dept: HOME HEALTH SERVICES | Facility: HOME HEALTHCARE | Age: 28
End: 2018-10-15
Payer: MEDICAID

## 2018-10-15 VITALS
OXYGEN SATURATION: 96 % | TEMPERATURE: 97.5 F | DIASTOLIC BLOOD PRESSURE: 70 MMHG | RESPIRATION RATE: 18 BRPM | BODY MASS INDEX: 30.42 KG/M2 | HEART RATE: 101 BPM | SYSTOLIC BLOOD PRESSURE: 118 MMHG | WEIGHT: 212 LBS

## 2018-10-15 PROCEDURE — G0299 HHS/HOSPICE OF RN EA 15 MIN: HCPCS

## 2018-10-15 SDOH — ECONOMIC STABILITY: HOUSING INSECURITY: UNSAFE APPLIANCES: 0

## 2018-10-15 SDOH — ECONOMIC STABILITY: HOUSING INSECURITY: UNSAFE COOKING RANGE AREA: 0

## 2018-10-15 ASSESSMENT — ENCOUNTER SYMPTOMS: NAUSEA: DENIES ANY

## 2018-10-18 ENCOUNTER — HOME CARE VISIT (OUTPATIENT)
Dept: HOME HEALTH SERVICES | Facility: HOME HEALTHCARE | Age: 28
End: 2018-10-18
Payer: MEDICAID

## 2018-10-18 PROCEDURE — G0299 HHS/HOSPICE OF RN EA 15 MIN: HCPCS

## 2018-10-18 SDOH — ECONOMIC STABILITY: HOUSING INSECURITY: UNSAFE COOKING RANGE AREA: 0

## 2018-10-18 SDOH — ECONOMIC STABILITY: HOUSING INSECURITY: UNSAFE APPLIANCES: 0

## 2018-10-22 ENCOUNTER — HOME CARE VISIT (OUTPATIENT)
Dept: HOME HEALTH SERVICES | Facility: HOME HEALTHCARE | Age: 28
End: 2018-10-22
Payer: MEDICAID

## 2018-10-22 ENCOUNTER — ANTICOAGULATION MONITORING (OUTPATIENT)
Dept: VASCULAR LAB | Facility: MEDICAL CENTER | Age: 28
End: 2018-10-22

## 2018-10-22 VITALS
HEART RATE: 103 BPM | BODY MASS INDEX: 30.56 KG/M2 | OXYGEN SATURATION: 98 % | DIASTOLIC BLOOD PRESSURE: 82 MMHG | RESPIRATION RATE: 18 BRPM | TEMPERATURE: 98.4 F | WEIGHT: 213 LBS | SYSTOLIC BLOOD PRESSURE: 120 MMHG

## 2018-10-22 DIAGNOSIS — I34.0 NON-RHEUMATIC MITRAL REGURGITATION: ICD-10-CM

## 2018-10-22 DIAGNOSIS — I48.91 ATRIAL FIBRILLATION, UNSPECIFIED TYPE (HCC): ICD-10-CM

## 2018-10-22 LAB — INR PPP: 2.3 (ref 2–3.5)

## 2018-10-22 PROCEDURE — G0299 HHS/HOSPICE OF RN EA 15 MIN: HCPCS

## 2018-10-22 ASSESSMENT — ENCOUNTER SYMPTOMS
NAUSEA: PATIENT VERBALIZES NO NAUSEA.
VOMITING: PATIENT VERBALIZES NO EMESIS.

## 2018-10-23 NOTE — PROGRESS NOTES
Anticoagulation Summary  As of 10/22/2018    INR goal:   2.0-3.0   TTR:   73.5 % (2.4 wk)   Today's INR:   2.3   Warfarin maintenance plan:   7.5 mg (5 mg x 1.5) every day   Weekly warfarin total:   52.5 mg   Plan last modified:   Pam M Filter (10/11/2018)   Next INR check:   10/29/2018   Target end date:   12/25/2018    Indications    Non-rheumatic mitral regurgitation [I34.0]             Anticoagulation Episode Summary     INR check location:       Preferred lab:       Send INR reminders to:       Comments:   [facility]      Anticoagulation Care Providers     Provider Role Specialty Phone number    BRANDON Nguyễn.SHARON Referring Cardiology [phone]    [facility] Anticoagulation Services Responsible  [phone]        Anticoagulation Patient Findings  Patient Findings     Negatives:   Signs/symptoms of thrombosis, Signs/symptoms of bleeding, Laboratory test error suspected, Change in health, Change in alcohol use, Change in activity, Upcoming invasive procedure, Emergency department visit, Upcoming dental procedure, Missed doses, Extra doses, Change in medications, Change in diet/appetite, Hospital admission, Bruising, Other complaints        Spoke with patient today regarding therapeutic INR of 2.3.  Patient denies any signs/symptoms of bruising or bleeding or any changes in diet and medications.  Instructed patient to call clinic with any questions or concerns.  Pt is to continue with current warfarin dosing regimen.  Follow up in 1 weeks, to reduce risk of adverse events related to this high risk medication,  Warfarin.    Carlos Champoin, PharmD

## 2018-10-24 VITALS
RESPIRATION RATE: 18 BRPM | SYSTOLIC BLOOD PRESSURE: 122 MMHG | BODY MASS INDEX: 30.28 KG/M2 | OXYGEN SATURATION: 97 % | TEMPERATURE: 97.6 F | WEIGHT: 211 LBS | DIASTOLIC BLOOD PRESSURE: 80 MMHG | HEART RATE: 106 BPM

## 2018-10-24 SDOH — ECONOMIC STABILITY: HOUSING INSECURITY: UNSAFE COOKING RANGE AREA: 0

## 2018-10-24 SDOH — ECONOMIC STABILITY: HOUSING INSECURITY: UNSAFE APPLIANCES: 0

## 2018-10-24 ASSESSMENT — ENCOUNTER SYMPTOMS: NAUSEA: DENIES ANY

## 2018-10-25 ENCOUNTER — HOME CARE VISIT (OUTPATIENT)
Dept: HOME HEALTH SERVICES | Facility: HOME HEALTHCARE | Age: 28
End: 2018-10-25
Payer: MEDICAID

## 2018-10-29 ENCOUNTER — HOME CARE VISIT (OUTPATIENT)
Dept: HOME HEALTH SERVICES | Facility: HOME HEALTHCARE | Age: 28
End: 2018-10-29
Payer: MEDICAID

## 2018-10-29 ENCOUNTER — ANTICOAGULATION MONITORING (OUTPATIENT)
Dept: VASCULAR LAB | Facility: MEDICAL CENTER | Age: 28
End: 2018-10-29

## 2018-10-29 DIAGNOSIS — I34.0 NON-RHEUMATIC MITRAL REGURGITATION: ICD-10-CM

## 2018-10-29 LAB — INR PPP: 3.2 (ref 2–3.5)

## 2018-10-29 PROCEDURE — G0299 HHS/HOSPICE OF RN EA 15 MIN: HCPCS

## 2018-10-29 NOTE — PROGRESS NOTES
Anticoagulation Summary  As of 10/29/2018    INR goal:   2.0-3.0   TTR:   74.8 % (3.4 wk)   Today's INR:   3.2!   Warfarin maintenance plan:   7.5 mg (5 mg x 1.5) every day   Weekly warfarin total:   52.5 mg   Plan last modified:   Pam M Filter (10/11/2018)   Next INR check:      Target end date:   12/25/2018    Indications    Non-rheumatic mitral regurgitation [I34.0]             Anticoagulation Episode Summary     INR check location:       Preferred lab:       Send INR reminders to:       Comments:   [facility]      Anticoagulation Care Providers     Provider Role Specialty Phone number    MIYA Nguyễn Referring Cardiology [phone]    [facility] Anticoagulation Services Responsible  [phone]        Anticoagulation Patient Findings    Pt is slightly supratherapeutic today. Denies any changes in medications or diet. Med rec completed and reviewed. Patient denies any s/sx of bleeding or clotting. Will decrease tonight's dose to 5mg then continue dosing as outlined in calendar. Will follow-up with pt in 2 weeks.    Anali DASILVA  [facility]

## 2018-10-31 VITALS
TEMPERATURE: 97.6 F | DIASTOLIC BLOOD PRESSURE: 60 MMHG | SYSTOLIC BLOOD PRESSURE: 110 MMHG | HEART RATE: 105 BPM | OXYGEN SATURATION: 98 % | WEIGHT: 212 LBS | BODY MASS INDEX: 30.42 KG/M2

## 2018-10-31 SDOH — ECONOMIC STABILITY: HOUSING INSECURITY: UNSAFE COOKING RANGE AREA: 0

## 2018-10-31 SDOH — ECONOMIC STABILITY: HOUSING INSECURITY: UNSAFE APPLIANCES: 0

## 2018-10-31 ASSESSMENT — ENCOUNTER SYMPTOMS: VOMITING: DENIES ANY

## 2018-11-01 ENCOUNTER — HOME CARE VISIT (OUTPATIENT)
Dept: HOME HEALTH SERVICES | Facility: HOME HEALTHCARE | Age: 28
End: 2018-11-01
Payer: MEDICAID

## 2018-11-01 ENCOUNTER — OFFICE VISIT (OUTPATIENT)
Dept: CARDIOLOGY | Facility: MEDICAL CENTER | Age: 28
End: 2018-11-01
Payer: MEDICAID

## 2018-11-01 VITALS
SYSTOLIC BLOOD PRESSURE: 110 MMHG | HEART RATE: 120 BPM | DIASTOLIC BLOOD PRESSURE: 80 MMHG | HEIGHT: 70 IN | BODY MASS INDEX: 31.78 KG/M2 | OXYGEN SATURATION: 95 % | WEIGHT: 222 LBS

## 2018-11-01 DIAGNOSIS — I47.10 PAROXYSMAL SUPRAVENTRICULAR TACHYCARDIA: ICD-10-CM

## 2018-11-01 LAB — EKG IMPRESSION: NORMAL

## 2018-11-01 PROCEDURE — 99214 OFFICE O/P EST MOD 30 MIN: CPT | Performed by: NURSE PRACTITIONER

## 2018-11-01 PROCEDURE — 93000 ELECTROCARDIOGRAM COMPLETE: CPT | Performed by: INTERNAL MEDICINE

## 2018-11-01 ASSESSMENT — ENCOUNTER SYMPTOMS
DIZZINESS: 1
CLAUDICATION: 0
ORTHOPNEA: 0
WEAKNESS: 0
FEVER: 1
WEIGHT LOSS: 0
PND: 0
PALPITATIONS: 0
SHORTNESS OF BREATH: 0

## 2018-11-01 NOTE — PROGRESS NOTES
Chief Complaint   Patient presents with   • HTN (Controlled)     high heart rate(hasn't dropped below 100)since mitral valve repair       Subjective:   Nicholas Neumann is a 27 y.o. male who presents today for hospital follow up regarding recent radical mitral valve repair on 9/20/18 due to history of native mitral valve endocarditis with streptococcal infection that resulted in severe mitral regurgitation..    Past medical history significant for endocarditis of the mitral valve, spinal epidural abscess and brain embolism and thrombosis all occurring in July 2018.  Patient additionally has a history of Bell's palsy in 2011 with no residual deficit.    Today patient states that he is feeling well. His only complaint is that at times when he is walking he will suddenly feel dizzy. This occurs sometimes with position change but more when he is walking somewhat long distances. This occurred today when he was coming to his appointment, he went to the wrong location and had to rush over to the correct building to be in time for his appointment. Patient has returned to work full time as a .     Denies fever, chest pain, shortness of breath, palpitations or leg swelling.     Past Medical History:   Diagnosis Date   • Bell's palsy 2011    No residual deficit   • Brain embolism and thrombosis 07/09/2018    x 2   • Endocarditis of mitral valve 07/09/2018    Streptococcal   • Heart murmur    • Pain     back spine   • Spinal epidural abscess 07/09/2018     Past Surgical History:   Procedure Laterality Date   • MITRAL VALVE REPAIR  9/20/2018    Procedure: MITRAL VALVE REPAIR- OR  ;  Surgeon: Kevin Fernandez M.D.;  Location: [facility];  Service: Cardiac   • CHAR  9/20/2018    Procedure: CHAR;  Surgeon: Kevin Fernandez M.D.;  Location: [facility];  Service: Cardiac   • KNEE ARTHROSCOPY  10/2/08    Performed by JOY NOLEN at [facility]   • LOOSE BODY REMOVAL   5/21/08    Performed by JOY NOLEN at SURGERY [facility] ORS   • KNEE ARTHROSCOPY  5/21/08    Performed by JOY NOLEN at SURGERY [facility] ORS   • TIBIAL OSTEOTOMY  5/21/08    Performed by JOY NOLEN at [facility] ORS   • LIGAMENT RECONSTRUCTION  5/21/08    Performed by JOY NOLEN at [facility] ORS   • KNEE ARTHROSCOPY  5/21/08    Performed by JOY NOLEN at [facility] ORS   • KNEE ARTHROSCOPY  2004    Left   • TONSILLECTOMY  1998     Family History   Problem Relation Age of Onset   • Hyperlipidemia Paternal Grandmother    • Diabetes Maternal Grandfather    • Heart Disease Maternal Grandmother    • Heart Disease Father    • Hypertension Father    • Heart Disease Sister      Social History     Social History   • Marital status:      Spouse name: N/A   • Number of children: N/A   • Years of education: N/A     Occupational History   • Not on file.     Social History Main Topics   • Smoking status: Current Every Day Smoker     Packs/day: 0.50     Years: 13.00     Types: Cigarettes   • Smokeless tobacco: Former User     Types: Chew     Quit date: 09/2017   • Alcohol use No   • Drug use: No   • Sexual activity: Not on file     Other Topics Concern   • Not on file     Social History Narrative   • No narrative on file     Allergies   Allergen Reactions   • Nkda [No Known Drug Allergy]      Outpatient Encounter Prescriptions as of 11/1/2018   Medication Sig Dispense Refill   • warfarin (COUMADIN) 5 MG Tab Take one to one and one-half tablets by mouth one time daily or as directed by coumadin clinic 135 Tab 0   • aspirin 81 MG EC tablet Take 1 Tab by mouth every day. 30 Tab 3   • docusate sodium (COLACE) 100 MG Cap Take 100 mg by mouth 2 times a day. for OIC     • citalopram (CELEXA) 10 MG tablet Take 10 mg by mouth every day.     • gabapentin (NEURONTIN) 100 MG Cap Take 100 mg by mouth 3 times a day.     • MELATONIN PO Take 6 mg by mouth every bedtime.     •  "levetiracetam (KEPPRA) 750 MG tablet Take 2,250 mg by mouth 2 times a day.       No facility-administered encounter medications on file as of 11/1/2018.      Review of Systems   Constitutional: Positive for fever. Negative for malaise/fatigue and weight loss.   Respiratory: Negative for shortness of breath.    Cardiovascular: Negative for chest pain, palpitations, orthopnea, claudication, leg swelling and PND.   Neurological: Positive for dizziness. Negative for weakness.   All other systems reviewed and are negative.       Objective:   /80 (BP Location: Left arm, Patient Position: Sitting, BP Cuff Size: Adult)   Pulse (!) 120   Ht 1.778 m (5' 10\")   Wt 100.7 kg (222 lb)   SpO2 95%   BMI 31.85 kg/m²     Physical Exam   Constitutional: He is oriented to person, place, and time. He appears well-developed and well-nourished. No distress.   HENT:   Head: Normocephalic.   Eyes: EOM are normal.   Neck: No JVD present.   Cardiovascular: Regular rhythm and intact distal pulses.  Tachycardia present.    Pulmonary/Chest: Effort normal and breath sounds normal. No respiratory distress.   Abdominal: Soft. There is no tenderness.   Musculoskeletal: He exhibits no edema.   Neurological: He is alert and oriented to person, place, and time.   Skin: Skin is warm and dry.   Well healing sternotomy scar without swelling, erythema or discharge.    Psychiatric: He has a normal mood and affect. His behavior is normal. Thought content normal.        9/24/18:  Component Value Ref Range & Units Status   Sodium 139  135 - 145 mmol/L Final   Potassium 4.2  3.6 - 5.5 mmol/L Final   Chloride 102  96 - 112 mmol/L Final   Co2 31  20 - 33 mmol/L Final   Glucose 90  65 - 99 mg/dL Final   Bun 7   8 - 22 mg/dL Final   Creatinine 0.84  0.50 - 1.40 mg/dL Final   Calcium 8.8  8.5 - 10.5 mg/dL Final   Anion Gap 6.0  0.0 - 11.9 Final     GFR If African American >60  >60 mL/min/1.73 m 2 Final   GFR If Non African American >60  >60 mL/min/1.73 m " 2 Final       Transesophageal echocardiogram 9/26/18:  Intraoperative CHAR during MV repair.  Pre-CPB image show normal biventricular systolic function with EF = 55%. Posterior (P1) prolapse with severe associated MR, likely ruptured chordae seen with 3-D exam.  Post-CPB images show preserved biventricular function. The mitral valve has been repaired and a 34 mm Ventura posterior annuloplasty band   placed.  No significant residual MR and mean mitral gradient = 4 mm Hg. Findings communicated at the time of exam.     Mitral valve repair 9/20/18:  Radical mitral valve repair (triangular resection of P1 and chordal transfer,  commissuroplasty, 34 mm Ventura flexible annuloplasty band), and intraoperative transesophageal echocardiography.    Assessment:   No diagnosis found.    Medical Decision Making:  Today's Assessment / Status / Plan:     Mitral valve repair:  -Sternotomy scar healing well.  -Euvolemic on exam.  -Continue anticoagulation with warfarin for 3 months.  -Low dose ASA 81 mg daily and definite.    Dizziness/Tachycardia:  -Patients initial pulse was 120.  -EKG showed .  -BP stable.  -Patient was not discharged on BB therapy due to being high risk for heart block.  -Event monitor to evaluate for possible need for beta blocker therapy.   -CBC and BMP.    Patient will follow up in 6 weeks with labs and event monitor prior or earlier if needed. Encouraged patient to call if he has any questions or concerns. Patient understands and agrees with the plan of care.    Collaborating Provider: Dr. Wale Taylor

## 2018-11-05 ENCOUNTER — APPOINTMENT (OUTPATIENT)
Dept: RADIOLOGY | Facility: MEDICAL CENTER | Age: 28
End: 2018-11-05
Attending: FAMILY MEDICINE
Payer: MEDICAID

## 2018-11-05 ENCOUNTER — APPOINTMENT (OUTPATIENT)
Dept: RADIOLOGY | Facility: MEDICAL CENTER | Age: 28
End: 2018-11-05
Attending: EMERGENCY MEDICINE
Payer: MEDICAID

## 2018-11-05 ENCOUNTER — HOSPITAL ENCOUNTER (OUTPATIENT)
Facility: MEDICAL CENTER | Age: 28
End: 2018-11-06
Attending: EMERGENCY MEDICINE | Admitting: FAMILY MEDICINE
Payer: MEDICAID

## 2018-11-05 ENCOUNTER — TELEPHONE (OUTPATIENT)
Dept: CARDIOLOGY | Facility: MEDICAL CENTER | Age: 28
End: 2018-11-05

## 2018-11-05 DIAGNOSIS — Z98.890 HISTORY OF MITRAL VALVE REPAIR: ICD-10-CM

## 2018-11-05 DIAGNOSIS — R07.9 ACUTE CHEST PAIN: ICD-10-CM

## 2018-11-05 DIAGNOSIS — R42 LIGHTHEADEDNESS: ICD-10-CM

## 2018-11-05 DIAGNOSIS — Z86.73 HISTORY OF CARDIOEMBOLIC CEREBROVASCULAR ACCIDENT (CVA): ICD-10-CM

## 2018-11-05 PROBLEM — D64.9 NORMOCYTIC ANEMIA: Status: RESOLVED | Noted: 2018-07-10 | Resolved: 2018-11-05

## 2018-11-05 PROBLEM — G06.1 SPINAL EPIDURAL ABSCESS: Status: ACTIVE | Noted: 2018-07-13

## 2018-11-05 PROBLEM — G00.2 STREPTOCOCCAL MENINGITIS: Status: RESOLVED | Noted: 2018-07-10 | Resolved: 2018-11-05

## 2018-11-05 PROBLEM — J96.01 ACUTE RESPIRATORY FAILURE WITH HYPOXIA (HCC): Status: RESOLVED | Noted: 2018-09-21 | Resolved: 2018-11-05

## 2018-11-05 PROBLEM — I34.0 NON-RHEUMATIC MITRAL REGURGITATION: Status: RESOLVED | Noted: 2018-07-13 | Resolved: 2018-11-05

## 2018-11-05 PROBLEM — Z86.79 HISTORY OF SBE (SUBACUTE BACTERIAL ENDOCARDITIS): Status: ACTIVE | Noted: 2018-11-05

## 2018-11-05 PROBLEM — I33.0 ACUTE BACTERIAL ENDOCARDITIS: Status: RESOLVED | Noted: 2018-07-13 | Resolved: 2018-11-05

## 2018-11-05 PROBLEM — R07.89 OTHER CHEST PAIN: Status: ACTIVE | Noted: 2018-11-05

## 2018-11-05 PROBLEM — R79.1 SUBTHERAPEUTIC INTERNATIONAL NORMALIZED RATIO (INR): Status: ACTIVE | Noted: 2018-11-05

## 2018-11-05 PROBLEM — I63.6: Status: RESOLVED | Noted: 2018-07-13 | Resolved: 2018-11-05

## 2018-11-05 PROBLEM — B95.5 STREPTOCOCCAL BACTEREMIA: Status: RESOLVED | Noted: 2018-07-10 | Resolved: 2018-11-05

## 2018-11-05 PROBLEM — R78.81 STREPTOCOCCAL BACTEREMIA: Status: RESOLVED | Noted: 2018-07-10 | Resolved: 2018-11-05

## 2018-11-05 PROBLEM — R00.2 PALPITATIONS: Status: ACTIVE | Noted: 2018-11-05

## 2018-11-05 LAB
ALBUMIN SERPL BCP-MCNC: 4.9 G/DL (ref 3.2–4.9)
ALBUMIN/GLOB SERPL: 1.9 G/DL
ALP SERPL-CCNC: 74 U/L (ref 30–99)
ALT SERPL-CCNC: 13 U/L (ref 2–50)
ANION GAP SERPL CALC-SCNC: 8 MMOL/L (ref 0–11.9)
APTT PPP: 30.8 SEC (ref 24.7–36)
AST SERPL-CCNC: 14 U/L (ref 12–45)
BASOPHILS # BLD AUTO: 0.5 % (ref 0–1.8)
BASOPHILS # BLD: 0.03 K/UL (ref 0–0.12)
BILIRUB SERPL-MCNC: 0.5 MG/DL (ref 0.1–1.5)
BNP SERPL-MCNC: 32 PG/ML (ref 0–100)
BUN SERPL-MCNC: 11 MG/DL (ref 8–22)
CALCIUM SERPL-MCNC: 10.1 MG/DL (ref 8.5–10.5)
CHLORIDE SERPL-SCNC: 106 MMOL/L (ref 96–112)
CO2 SERPL-SCNC: 25 MMOL/L (ref 20–33)
CREAT SERPL-MCNC: 0.89 MG/DL (ref 0.5–1.4)
EKG IMPRESSION: NORMAL
EOSINOPHIL # BLD AUTO: 0.14 K/UL (ref 0–0.51)
EOSINOPHIL NFR BLD: 2.2 % (ref 0–6.9)
ERYTHROCYTE [DISTWIDTH] IN BLOOD BY AUTOMATED COUNT: 42 FL (ref 35.9–50)
GLOBULIN SER CALC-MCNC: 2.6 G/DL (ref 1.9–3.5)
GLUCOSE SERPL-MCNC: 84 MG/DL (ref 65–99)
HCT VFR BLD AUTO: 48 % (ref 42–52)
HGB BLD-MCNC: 15.7 G/DL (ref 14–18)
IMM GRANULOCYTES # BLD AUTO: 0.05 K/UL (ref 0–0.11)
IMM GRANULOCYTES NFR BLD AUTO: 0.8 % (ref 0–0.9)
INR PPP: 1.3 (ref 0.87–1.13)
LIPASE SERPL-CCNC: 36 U/L (ref 11–82)
LYMPHOCYTES # BLD AUTO: 2.01 K/UL (ref 1–4.8)
LYMPHOCYTES NFR BLD: 30.9 % (ref 22–41)
MCH RBC QN AUTO: 27 PG (ref 27–33)
MCHC RBC AUTO-ENTMCNC: 32.7 G/DL (ref 33.7–35.3)
MCV RBC AUTO: 82.6 FL (ref 81.4–97.8)
MONOCYTES # BLD AUTO: 0.38 K/UL (ref 0–0.85)
MONOCYTES NFR BLD AUTO: 5.8 % (ref 0–13.4)
NEUTROPHILS # BLD AUTO: 3.89 K/UL (ref 1.82–7.42)
NEUTROPHILS NFR BLD: 59.8 % (ref 44–72)
NRBC # BLD AUTO: 0 K/UL
NRBC BLD-RTO: 0 /100 WBC
PLATELET # BLD AUTO: 196 K/UL (ref 164–446)
PMV BLD AUTO: 10.3 FL (ref 9–12.9)
POTASSIUM SERPL-SCNC: 3.9 MMOL/L (ref 3.6–5.5)
PROT SERPL-MCNC: 7.5 G/DL (ref 6–8.2)
PROTHROMBIN TIME: 16.3 SEC (ref 12–14.6)
RBC # BLD AUTO: 5.81 M/UL (ref 4.7–6.1)
SODIUM SERPL-SCNC: 139 MMOL/L (ref 135–145)
TROPONIN I SERPL-MCNC: <0.01 NG/ML (ref 0–0.04)
WBC # BLD AUTO: 6.5 K/UL (ref 4.8–10.8)

## 2018-11-05 PROCEDURE — 36415 COLL VENOUS BLD VENIPUNCTURE: CPT

## 2018-11-05 PROCEDURE — 93005 ELECTROCARDIOGRAM TRACING: CPT | Performed by: FAMILY MEDICINE

## 2018-11-05 PROCEDURE — 700117 HCHG RX CONTRAST REV CODE 255: Performed by: EMERGENCY MEDICINE

## 2018-11-05 PROCEDURE — 85730 THROMBOPLASTIN TIME PARTIAL: CPT

## 2018-11-05 PROCEDURE — 85610 PROTHROMBIN TIME: CPT

## 2018-11-05 PROCEDURE — 85025 COMPLETE CBC W/AUTO DIFF WBC: CPT

## 2018-11-05 PROCEDURE — 71275 CT ANGIOGRAPHY CHEST: CPT

## 2018-11-05 PROCEDURE — 93010 ELECTROCARDIOGRAM REPORT: CPT | Performed by: INTERNAL MEDICINE

## 2018-11-05 PROCEDURE — 99285 EMERGENCY DEPT VISIT HI MDM: CPT

## 2018-11-05 PROCEDURE — 71045 X-RAY EXAM CHEST 1 VIEW: CPT

## 2018-11-05 PROCEDURE — 99220 PR INITIAL OBSERVATION CARE,LEVL III: CPT | Performed by: FAMILY MEDICINE

## 2018-11-05 PROCEDURE — 72156 MRI NECK SPINE W/O & W/DYE: CPT

## 2018-11-05 PROCEDURE — 83880 ASSAY OF NATRIURETIC PEPTIDE: CPT

## 2018-11-05 PROCEDURE — 93005 ELECTROCARDIOGRAM TRACING: CPT

## 2018-11-05 PROCEDURE — 83690 ASSAY OF LIPASE: CPT

## 2018-11-05 PROCEDURE — 72157 MRI CHEST SPINE W/O & W/DYE: CPT

## 2018-11-05 PROCEDURE — 700117 HCHG RX CONTRAST REV CODE 255: Performed by: FAMILY MEDICINE

## 2018-11-05 PROCEDURE — 84484 ASSAY OF TROPONIN QUANT: CPT

## 2018-11-05 PROCEDURE — 72158 MRI LUMBAR SPINE W/O & W/DYE: CPT

## 2018-11-05 PROCEDURE — 96372 THER/PROPH/DIAG INJ SC/IM: CPT

## 2018-11-05 PROCEDURE — 700111 HCHG RX REV CODE 636 W/ 250 OVERRIDE (IP): Performed by: FAMILY MEDICINE

## 2018-11-05 PROCEDURE — 99214 OFFICE O/P EST MOD 30 MIN: CPT | Mod: GC | Performed by: INTERNAL MEDICINE

## 2018-11-05 PROCEDURE — 80053 COMPREHEN METABOLIC PANEL: CPT

## 2018-11-05 PROCEDURE — G0378 HOSPITAL OBSERVATION PER HR: HCPCS

## 2018-11-05 PROCEDURE — 93005 ELECTROCARDIOGRAM TRACING: CPT | Performed by: EMERGENCY MEDICINE

## 2018-11-05 PROCEDURE — 70553 MRI BRAIN STEM W/O & W/DYE: CPT

## 2018-11-05 PROCEDURE — A9585 GADOBUTROL INJECTION: HCPCS | Performed by: FAMILY MEDICINE

## 2018-11-05 RX ORDER — MORPHINE SULFATE 4 MG/ML
2 INJECTION, SOLUTION INTRAMUSCULAR; INTRAVENOUS
Status: DISCONTINUED | OUTPATIENT
Start: 2018-11-05 | End: 2018-11-06 | Stop reason: HOSPADM

## 2018-11-05 RX ORDER — OXYCODONE HYDROCHLORIDE 5 MG/1
5 TABLET ORAL
Status: DISCONTINUED | OUTPATIENT
Start: 2018-11-05 | End: 2018-11-06 | Stop reason: HOSPADM

## 2018-11-05 RX ORDER — POLYETHYLENE GLYCOL 3350 17 G/17G
1 POWDER, FOR SOLUTION ORAL
Status: DISCONTINUED | OUTPATIENT
Start: 2018-11-05 | End: 2018-11-06 | Stop reason: HOSPADM

## 2018-11-05 RX ORDER — WARFARIN SODIUM 5 MG/1
7.5 TABLET ORAL DAILY
COMMUNITY
End: 2018-12-13

## 2018-11-05 RX ORDER — OXYCODONE HYDROCHLORIDE 5 MG/1
2.5 TABLET ORAL
Status: DISCONTINUED | OUTPATIENT
Start: 2018-11-05 | End: 2018-11-06 | Stop reason: HOSPADM

## 2018-11-05 RX ORDER — BISACODYL 10 MG
10 SUPPOSITORY, RECTAL RECTAL
Status: DISCONTINUED | OUTPATIENT
Start: 2018-11-05 | End: 2018-11-06 | Stop reason: HOSPADM

## 2018-11-05 RX ORDER — UREA 10 %
0.5 LOTION (ML) TOPICAL
Status: SHIPPED | COMMUNITY
End: 2021-08-09

## 2018-11-05 RX ORDER — ONDANSETRON 4 MG/1
4 TABLET, ORALLY DISINTEGRATING ORAL EVERY 4 HOURS PRN
Status: DISCONTINUED | OUTPATIENT
Start: 2018-11-05 | End: 2018-11-06 | Stop reason: HOSPADM

## 2018-11-05 RX ORDER — PROMETHAZINE HYDROCHLORIDE 25 MG/1
12.5-25 SUPPOSITORY RECTAL EVERY 4 HOURS PRN
Status: DISCONTINUED | OUTPATIENT
Start: 2018-11-05 | End: 2018-11-06 | Stop reason: HOSPADM

## 2018-11-05 RX ORDER — ONDANSETRON 2 MG/ML
4 INJECTION INTRAMUSCULAR; INTRAVENOUS EVERY 4 HOURS PRN
Status: DISCONTINUED | OUTPATIENT
Start: 2018-11-05 | End: 2018-11-06 | Stop reason: HOSPADM

## 2018-11-05 RX ORDER — UREA 10 %
1 LOTION (ML) TOPICAL
Status: DISCONTINUED | OUTPATIENT
Start: 2018-11-05 | End: 2018-11-05

## 2018-11-05 RX ORDER — GADOBUTROL 604.72 MG/ML
10 INJECTION INTRAVENOUS ONCE
Status: COMPLETED | OUTPATIENT
Start: 2018-11-05 | End: 2018-11-05

## 2018-11-05 RX ORDER — ACETAMINOPHEN 325 MG/1
650 TABLET ORAL EVERY 6 HOURS PRN
Status: DISCONTINUED | OUTPATIENT
Start: 2018-11-05 | End: 2018-11-06 | Stop reason: HOSPADM

## 2018-11-05 RX ORDER — AMOXICILLIN 250 MG
2 CAPSULE ORAL 2 TIMES DAILY
Status: DISCONTINUED | OUTPATIENT
Start: 2018-11-05 | End: 2018-11-06 | Stop reason: HOSPADM

## 2018-11-05 RX ORDER — PROMETHAZINE HYDROCHLORIDE 25 MG/1
12.5-25 TABLET ORAL EVERY 4 HOURS PRN
Status: DISCONTINUED | OUTPATIENT
Start: 2018-11-05 | End: 2018-11-06 | Stop reason: HOSPADM

## 2018-11-05 RX ORDER — WARFARIN SODIUM 7.5 MG/1
7.5 TABLET ORAL DAILY
COMMUNITY
End: 2018-11-05

## 2018-11-05 RX ADMIN — GADOBUTROL 10 ML: 604.72 INJECTION INTRAVENOUS at 18:57

## 2018-11-05 RX ADMIN — IOHEXOL 75 ML: 350 INJECTION, SOLUTION INTRAVENOUS at 16:40

## 2018-11-05 RX ADMIN — ENOXAPARIN SODIUM 100 MG: 100 INJECTION SUBCUTANEOUS at 19:49

## 2018-11-05 ASSESSMENT — LIFESTYLE VARIABLES
ALCOHOL_USE: NO
EVER_SMOKED: YES
PACK_YEARS: 8

## 2018-11-05 ASSESSMENT — ENCOUNTER SYMPTOMS
BACK PAIN: 0
PALPITATIONS: 0
ABDOMINAL PAIN: 0
NECK PAIN: 0
SORE THROAT: 0
WHEEZING: 0
BLURRED VISION: 0
SHORTNESS OF BREATH: 0
DIARRHEA: 0
VOMITING: 0
DIZZINESS: 0
HEARTBURN: 0
NERVOUS/ANXIOUS: 0
FEVER: 0
NAUSEA: 0
HEADACHES: 0
CHILLS: 0
COUGH: 0
MYALGIAS: 0

## 2018-11-05 ASSESSMENT — COPD QUESTIONNAIRES
DO YOU EVER COUGH UP ANY MUCUS OR PHLEGM?: NO/ONLY WITH OCCASIONAL COLDS OR INFECTIONS
IN THE PAST 12 MONTHS DO YOU DO LESS THAN YOU USED TO BECAUSE OF YOUR BREATHING PROBLEMS: DISAGREE/UNSURE
DURING THE PAST 4 WEEKS HOW MUCH DID YOU FEEL SHORT OF BREATH: NONE/LITTLE OF THE TIME
HAVE YOU SMOKED AT LEAST 100 CIGARETTES IN YOUR ENTIRE LIFE: YES
COPD SCREENING SCORE: 2

## 2018-11-05 ASSESSMENT — PAIN SCALES - GENERAL
PAINLEVEL_OUTOF10: 0
PAINLEVEL_OUTOF10: 5
PAINLEVEL_OUTOF10: 0

## 2018-11-05 NOTE — TELEPHONE ENCOUNTER
"Pt calling stating he was driving a few minutes ago and had 9/10 chest tightness/pain radiating to upper back and neck that lasted 30-40 seconds w/associated SOB. He reports he took his pulse manually and it's 115-120 currently. Pt states the pain/tightness is better now. He reports he recently had a \"mitral valve repair\" on 9/20. Advised pt to seek medical assistance in nearest ER for further evaluation of chest pain. Pt agrees and verbalized understanding.    To Moni.  "

## 2018-11-05 NOTE — ED PROVIDER NOTES
CHIEF COMPLAINT  Chief Complaint   Patient presents with   • Chest Pain     radianting to neck,shoulder,back and left arm   • Dizziness       HPI  Nicholas Neumann is a 27 y.o. male who presents complaining of chest pain earlier today, now resolved.    Patient states while he was driving today he developed upper back pain, left-sided chest pain radiating into the center, and lightheadedness.  This began a proximally 45 minutes prior to arrival.  His symptoms lasted for approximately 60 seconds and he has no symptoms now.  Patient states that since his surgery for his mitral valve repair for endocarditis at the end of September, he has had lightheadedness and tingling in his bilateral legs.  Today's symptoms of chest pain was new and his lightheadedness was worse.  Patient has been seeing outpatient cardiology NP who is in the process of ordering a Holter monitor and an outpatient echo.    Patient denies trauma, nausea, vomiting, diaphoresis, fever, chills, cough, calf pain, leg swelling, history of DVT/PE in self or family.      ALLERGIES  Allergies   Allergen Reactions   • Nkda [No Known Drug Allergy]        CURRENT MEDICATIONS  Home Medications     Reviewed by Mikki Desai R.N. (Registered Nurse) on 11/05/18 at 1144  Med List Status: Complete   Medication Last Dose Status   aspirin 81 MG EC tablet Not Taking Active   citalopram (CELEXA) 10 MG tablet Not Taking Active   docusate sodium (COLACE) 100 MG Cap Not Taking Active   gabapentin (NEURONTIN) 100 MG Cap Not Taking Active   levetiracetam (KEPPRA) 750 MG tablet Not Taking Active   MELATONIN PO Not Taking Active   warfarin (COUMADIN) 5 MG Tab 11/4/2018 Active                PAST MEDICAL HISTORY   has a past medical history of Bell's palsy (2011); Brain embolism and thrombosis (07/09/2018); Endocarditis of mitral valve (07/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    SURGICAL HISTORY   has a past surgical history that includes  "tonsillectomy (1998); loose body removal (5/21/08); knee arthroscopy (2004); knee arthroscopy (5/21/08); tibial osteotomy (5/21/08); ligament reconstruction (5/21/08); knee arthroscopy (5/21/08); knee arthroscopy (10/2/08); mitral valve repair (9/20/2018); and iam (9/20/2018).    SOCIAL HISTORY  Social History     Social History Main Topics   • Smoking status: Current Every Day Smoker     Packs/day: 0.50     Years: 13.00     Types: Cigarettes   • Smokeless tobacco: Former User     Types: Chew     Quit date: 09/2017   • Alcohol use No   • Drug use: No   • Sexual activity: Not on file       Family Hx:  Denies      REVIEW OF SYSTEMS  See HPI for further details.  All other systems are negative except as above in HPI.      PHYSICAL EXAM  VITAL SIGNS: /92   Pulse 90   Temp 36.2 °C (97.2 °F)   Resp 16   Ht 1.803 m (5' 11\")   Wt 99.2 kg (218 lb 11.1 oz)   SpO2 97%   BMI 30.50 kg/m²     General:  WDWN, nontoxic appearing in NAD; A+Ox3; V/S as above; tachycardic at triage; not tachycardic on my exam  Skin: warm and dry; good color; no rash  HEENT: NCAT; EOMs intact; PERRL; no scleral icterus   Neck: FROM; no meningismus, no JVD  Cardiovascular: Midline, healing surgical incision in the sternal region; regular heart rate and rhythm.  No murmurs, rubs, or gallops; pulses 2+ bilaterally radially and DP areas; no chest wall tenderness or crepitus  Lungs: Clear to auscultation with good air movement bilaterally.  No wheezes, rhonchi, or rales.   Abdomen: BS present; soft; NTND; no rebound, guarding, or rigidity.  No organomegaly or pulsatile mass; no CVAT   Extremities: CRAMER x 4; no e/o trauma; no pedal edema; neg Santiago's  Neurologic: CNs III-XII grossly intact; speech clear; distal sensation intact; strength 5/5 UE/LEs  Psychiatric: Appropriate affect, normal mood    LABS  Results for orders placed or performed during the hospital encounter of 11/05/18   Troponin   Result Value Ref Range    Troponin I <0.01 0.00 - " 0.04 ng/mL   Btype Natriuretic Peptide   Result Value Ref Range    B Natriuretic Peptide 32 0 - 100 pg/mL   CBC with Differential   Result Value Ref Range    WBC 6.5 4.8 - 10.8 K/uL    RBC 5.81 4.70 - 6.10 M/uL    Hemoglobin 15.7 14.0 - 18.0 g/dL    Hematocrit 48.0 42.0 - 52.0 %    MCV 82.6 81.4 - 97.8 fL    MCH 27.0 27.0 - 33.0 pg    MCHC 32.7 (L) 33.7 - 35.3 g/dL    RDW 42.0 35.9 - 50.0 fL    Platelet Count 196 164 - 446 K/uL    MPV 10.3 9.0 - 12.9 fL    Neutrophils-Polys 59.80 44.00 - 72.00 %    Lymphocytes 30.90 22.00 - 41.00 %    Monocytes 5.80 0.00 - 13.40 %    Eosinophils 2.20 0.00 - 6.90 %    Basophils 0.50 0.00 - 1.80 %    Immature Granulocytes 0.80 0.00 - 0.90 %    Nucleated RBC 0.00 /100 WBC    Neutrophils (Absolute) 3.89 1.82 - 7.42 K/uL    Lymphs (Absolute) 2.01 1.00 - 4.80 K/uL    Monos (Absolute) 0.38 0.00 - 0.85 K/uL    Eos (Absolute) 0.14 0.00 - 0.51 K/uL    Baso (Absolute) 0.03 0.00 - 0.12 K/uL    Immature Granulocytes (abs) 0.05 0.00 - 0.11 K/uL    NRBC (Absolute) 0.00 K/uL   Complete Metabolic Panel (CMP)   Result Value Ref Range    Sodium 139 135 - 145 mmol/L    Potassium 3.9 3.6 - 5.5 mmol/L    Chloride 106 96 - 112 mmol/L    Co2 25 20 - 33 mmol/L    Anion Gap 8.0 0.0 - 11.9    Glucose 84 65 - 99 mg/dL    Bun 11 8 - 22 mg/dL    Creatinine 0.89 0.50 - 1.40 mg/dL    Calcium 10.1 8.5 - 10.5 mg/dL    AST(SGOT) 14 12 - 45 U/L    ALT(SGPT) 13 2 - 50 U/L    Alkaline Phosphatase 74 30 - 99 U/L    Total Bilirubin 0.5 0.1 - 1.5 mg/dL    Albumin 4.9 3.2 - 4.9 g/dL    Total Protein 7.5 6.0 - 8.2 g/dL    Globulin 2.6 1.9 - 3.5 g/dL    A-G Ratio 1.9 g/dL   Prothrombin Time   Result Value Ref Range    PT 16.3 (H) 12.0 - 14.6 sec    INR 1.30 (H) 0.87 - 1.13   APTT   Result Value Ref Range    APTT 30.8 24.7 - 36.0 sec   Lipase   Result Value Ref Range    Lipase 36 11 - 82 U/L   ESTIMATED GFR   Result Value Ref Range    GFR If African American >60 >60 mL/min/1.73 m 2    GFR If Non  >60 >60  mL/min/1.73 m 2   EKG   Result Value Ref Range    Report       [facility] Emergency Dept.    Test Date:  2018  Pt Name:    NICHOLAS POLLARD              Department: ER  MRN:        [id number]                      Room:  Gender:     Male                         Technician: [id number]  :        1990                   Requested By:ER TRIAGE PROTOCOL  Order #:    [id number]                    Reading MD: SRIDHAR POTTER MD    Measurements  Intervals                                Axis  Rate:       95                           P:          64  MO:         148                          QRS:        -16  QRSD:       90                           T:          48  QT:         344  QTc:        433    Interpretive Statements  SINUS RHYTHM  BORDERLINE LEFT AXIS DEVIATION  Compared to ECG 2018 15:42:20  Sinus tachycardia no longer present    Electronically Signed On 2018 12:11:50 PST by SRIDHAR POTTER MD         IMAGING  DX-CHEST-LIMITED (1 VIEW)   Final Result         No acute cardiac or pulmonary abnormality is identified.      CT-CTA CHEST PULMONARY ARTERY W/ RECONS    (Results Pending)       MEDICAL RECORD  I have reviewed patient's medical record and pertinent results are listed below.      COURSE & MEDICAL DECISION MAKING  I have reviewed any medical record information, laboratory studies and radiographic results as noted.    Nicholas Pollard is a 27 y.o. male with recent history of TIA, endocarditis status post mitral valve repair, and cervical epidural abscess who presents complaining of chest pain, lightheadedness, back pain, now resolved.    EKG demonstrates no acute ST changes.  Patient is not hypoxic or tachycardic on my exam.  His symptoms lasted 1-2 minutes and began at rest today.  Patient is on Coumadin but is subtherapeutic at 1.3 so PE is not completely excluded.      2:26 PM  I discussed the case with the cardiology team who will come evaluate the patient.    3:43 PM  I  discussed the case with the cardiologist, Dr. Hamilton, and also the hospitalist, Dr. Anthony, who suggested obtaining a CT chest which I have ordered.  Cardiology also recommended ESR, repeat MRI C-spine, and echo.      FINAL IMPRESSION  1. Acute chest pain    2. Lightheadedness             Electronically signed by: Mary Grace Gomez, 11/5/2018 12:10 PM

## 2018-11-05 NOTE — CONSULTS
Cardiology Consult      Cardiology Consultant:Dr.John SHAHANA Hamilton.  Resident :.    CC:Chest pain with Dizziness this pm    HPI:  Mr Nicholas Lipscomb, is a 27-year-old male with a history of native mitral valve endocarditis with streptococcal infection that resulted in severe mitral regurgitation resulting in recent radical mitral valve repair on 9/20/2018, history of spinal epidural abscess involving the cervical spine and cerebral embolism, Bell's palsy in 2011 with no residual defect, presented to the Emergency department of [facility], on 11/5/2018, after he developed severe left-sided chest pain which he states was transient in nature with radiation to the upper back, with symptomology lasting 60 seconds, associated with dizziness, and feelings of lightheadedness, and tingling sensations in both legs, followed by spontaneous resolution of the pain  Patient states that he has recurrent episodes of lightheadedness , since he underwent mitral valve repair , in September 2018 , up to 2-3 episodes per day, but states this episode of lightheadedness was associated with  searing chest pain, and severe dizziness with near blackout, which made him, concerned to come down to the hospital for evaluation.  Patient denies any fever, chills with rigors, nausea, vomiting, diaphoresis, leg swelling, calf pains prior to the onset of this chest pain.  Patient was seen in the emergency department and was found to be afebrile, his sputum over 97% /92 mmHg, pulse rate 90/min, BMI 30.5.  Patient had a EKG done on 11/5/2018 which revealed, sinus tachycardia with borderline left axis deviation.  Chest x-ray done on 11/5/2018 on presentation revealed no acute cardiac or pulmonary abnormalities.  Last intraoperative CHAR done on 9/20/2018, during mitral valve repair reveals an ejection fraction of 55%.  Patient was consulted with cardiology, because of his recurrent dizziness and chest discomfort, in the  setting of  mitral valve repair following endocarditis, for further evaluation.    MEDICATIONS:  No current facility-administered medications for this encounter.     Current Outpatient Prescriptions:   •  warfarin (COUMADIN) 5 MG Tab, Take one to one and one-half tablets by mouth one time daily or as directed by coumadin clinic (Patient taking differently: 7.5 mg. Take one to one and one-half tablets by mouth one time daily or as directed by coumadin clinic), Disp: 135 Tab, Rfl: 0  •  aspirin 81 MG EC tablet, Take 1 Tab by mouth every day., Disp: 30 Tab, Rfl: 3  •  docusate sodium (COLACE) 100 MG Cap, Take 100 mg by mouth 2 times a day. for OIC, Disp: , Rfl:   •  citalopram (CELEXA) 10 MG tablet, Take 10 mg by mouth every day., Disp: , Rfl:   •  gabapentin (NEURONTIN) 100 MG Cap, Take 100 mg by mouth 3 times a day., Disp: , Rfl:   •  MELATONIN PO, Take 6 mg by mouth every bedtime., Disp: , Rfl:   •  levetiracetam (KEPPRA) 750 MG tablet, Take 2,250 mg by mouth 2 times a day., Disp: , Rfl:     ALLERGIES:   Mr. Neumann  is allergic to nkda [no known drug allergy].     SURGERIES:  Mr. Neumann   has a past surgical history that includes tonsillectomy (1998); loose body removal (5/21/08); knee arthroscopy (2004); knee arthroscopy (5/21/08); tibial osteotomy (5/21/08); ligament reconstruction (5/21/08); knee arthroscopy (5/21/08); knee arthroscopy (10/2/08); mitral valve repair (9/20/2018); and iam (9/20/2018).     MEDICAL ILLNESSES:  Mr. Neumann   has a past medical history of Bell's palsy (2011); Brain embolism and thrombosis (07/09/2018); Endocarditis of mitral valve (07/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     SOCIAL HISTORY:  Mr. Neumann   reports that he has been smoking Cigarettes.  He has a 6.50 pack-year smoking history. He quit smokeless tobacco use about 14 months ago. His smokeless tobacco use included Chew. He reports that he does not drink alcohol or use drugs.  "    FAMILY HISTORY:  Mr.'s Neumann  family history includes Diabetes in his maternal grandfather; Heart Disease in his father, maternal grandmother, and sister; Hyperlipidemia in his paternal grandmother; Hypertension in his father.        ROS:  Constitutional: Patient has had no fevers or chills or fatigue. Patient has has no significant weight change.  Eyes: Patient had no visual changes or vision loss.  ENT: Patient denies any sore throat or allergic rhinitis.  Respiratory: Patient has had no cough or sputum production. Also, no hemoptysis.  Cardiovascular: Chest discomfort positive, no palpitations seen.  GI: Patient denies any nausea, vomiting, or diarrhea. Patient has had no hematemesis or melena.  : Patient denies any urinary urgency, frequency, or dysuria. Patient has noted no hematuria.  Orthopedic: Patient has no particular problem with arthritis. Patient is able to walk and exercise without difficulty.  Neurologic: Patient has had no focal numbness or weakness.   Psychiatric: Patient denies any difficulty with anxiety or depression.  Endocrine: Patient denies any history of thyroid disorder or diabetes. Patient denies any heat or cold intolerance. In addition, patient denies any polydipsia or polyuria.  Hematologic: Patient has had no easy bruising or bleeding. Patient denies any mood normal affect appropriate to mood of anemia.  Skin: Patient denies any unusual rashes or skin lesions.  Allergic/immunologic: Patient denies any difficulty with hayfever or other environmental allergens. Patient denies any food allergies.   ==================================================================================      PHYSICAL EXAM:  ==================================================================================  /92   Pulse 90   Temp 36.2 °C (97.2 °F)   Resp 16   Ht 1.803 m (5' 11\")   Wt 99.2 kg (218 lb 11.1 oz)   SpO2 97%   BMI 30.50 kg/m²      General: in no acute distress.  HEENT: NC, AT, " PERRL, EOMI, lids and conjunctiva are clear, moist oral mucosa. No JVD  Respit: CTAB, no wheezing or rales  Cardiac: RRR, no murmurs, S2 S2  Abdomen: soft, nontender, nondistended  Extremities: No clubbing, cyanosis, or edema is present.  Skin: warm, healing sternotomy scar, with no erythema swelling or tenderness.  Neurologic: AOx3, No focal neurologic abnormality noted.  Strength 5/5 both upper and lower limbs.  Psychiatry: Normal mood with affect appropriate to the mood.    Lab Results   Component Value Date/Time    CHOLSTRLTOT 134 07/20/2018 03:03 AM    LDL 86 07/20/2018 03:03 AM    HDL 28 (A) 07/20/2018 03:03 AM    TRIGLYCERIDE 99 07/20/2018 03:03 AM       Lab Results   Component Value Date/Time    SODIUM 139 11/05/2018 12:19 PM    POTASSIUM 3.9 11/05/2018 12:19 PM    CHLORIDE 106 11/05/2018 12:19 PM    CO2 25 11/05/2018 12:19 PM    GLUCOSE 84 11/05/2018 12:19 PM    BUN 11 11/05/2018 12:19 PM    CREATININE 0.89 11/05/2018 12:19 PM     Lab Results   Component Value Date/Time    ALKPHOSPHAT 74 11/05/2018 12:19 PM    ASTSGOT 14 11/05/2018 12:19 PM    ALTSGPT 13 11/05/2018 12:19 PM    TBILIRUBIN 0.5 11/05/2018 12:19 PM      Lab Results   Component Value Date/Time    BNPBTYPENAT 32 11/05/2018 12:19 PM       Patient Active Problem List    Diagnosis Date Noted   • 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  -History of recurrent dizziness with near syncopal attacks, aggravated this  AM associated with chest discomfort, with patient having radical mitral valve repair secondary to damaged mitral valve due to endocarditis in September 2018.  Intraoperative CHAR done at that time revealed an ejection fraction of 55%.  EKG demonstrates no acute ST changes with troponins less than 0.01 and BNP of 32.  Carotid Doppler done on 9/19/2018 revealed normal bilateral carotid exam  Plan:  Repeat echocardiogram, to rule out valvular dysfunction.  May need Holter monitoring to evaluate for dysrhythmias.  May need blood cultures x2 ASAP if there is fever or leukocytosis.    #History of cervical epidural abscess status post treatment.  -Treated for epidural abscesses extending from the cervical spine to L3-4, on IV penicillin G and gentamicin.  -Patient still complains of persistent neck and upper back pain, though he denies any focal neurological deficits or weakness in the legs or urinary/fecal incontinence or saddle anesthesia  Would like to reinvestigate with a CT of the cervical spine/MRI to rule out osteomyelitis or residual smoldering infection  -We will repeat ESR/CRP to evaluate for inflammation    #Chest discomfort with sub-therapeutic INR on Coumadin R/O pulmonary embolism.  Patient had sudden onset of chest pain, lightheadedness, dyspnea.  Patient has a history of endocarditis and is on Coumadin which is subtherapeutic at 1.3.  Pulmonary embolism is a possibility that needs to be excluded, and hence we will proceed with a CT angiogram of the chest.  We will follow-up and reevaluate based on the above results.  Thank you for the referral and cardiology will continue to follow-up.  Please do not hesitate to call us if you have any questions

## 2018-11-06 ENCOUNTER — APPOINTMENT (OUTPATIENT)
Dept: CARDIOLOGY | Facility: MEDICAL CENTER | Age: 28
End: 2018-11-06
Attending: FAMILY MEDICINE
Payer: MEDICAID

## 2018-11-06 ENCOUNTER — PATIENT OUTREACH (OUTPATIENT)
Dept: HEALTH INFORMATION MANAGEMENT | Facility: OTHER | Age: 28
End: 2018-11-06

## 2018-11-06 ENCOUNTER — HOME CARE VISIT (OUTPATIENT)
Dept: HOME HEALTH SERVICES | Facility: HOME HEALTHCARE | Age: 28
End: 2018-11-06
Payer: MEDICAID

## 2018-11-06 VITALS
DIASTOLIC BLOOD PRESSURE: 64 MMHG | RESPIRATION RATE: 16 BRPM | HEART RATE: 94 BPM | BODY MASS INDEX: 31.31 KG/M2 | WEIGHT: 218.7 LBS | OXYGEN SATURATION: 95 % | SYSTOLIC BLOOD PRESSURE: 105 MMHG | HEIGHT: 70 IN | TEMPERATURE: 97.8 F

## 2018-11-06 PROBLEM — D68.318 ACQUIRED CIRCULATING ANTICOAGULANTS (HCC): Status: ACTIVE | Noted: 2018-11-06

## 2018-11-06 PROBLEM — R07.9 CHEST PAIN: Status: RESOLVED | Noted: 2018-11-05 | Resolved: 2018-11-06

## 2018-11-06 LAB
ALBUMIN SERPL BCP-MCNC: 4.4 G/DL (ref 3.2–4.9)
ALBUMIN/GLOB SERPL: 1.8 G/DL
ALP SERPL-CCNC: 67 U/L (ref 30–99)
ALT SERPL-CCNC: 11 U/L (ref 2–50)
AMPHET UR QL SCN: NEGATIVE
ANION GAP SERPL CALC-SCNC: 8 MMOL/L (ref 0–11.9)
AST SERPL-CCNC: 12 U/L (ref 12–45)
BARBITURATES UR QL SCN: NEGATIVE
BASOPHILS # BLD AUTO: 0.3 % (ref 0–1.8)
BASOPHILS # BLD: 0.02 K/UL (ref 0–0.12)
BENZODIAZ UR QL SCN: NEGATIVE
BILIRUB SERPL-MCNC: 0.3 MG/DL (ref 0.1–1.5)
BUN SERPL-MCNC: 19 MG/DL (ref 8–22)
BZE UR QL SCN: NEGATIVE
CALCIUM SERPL-MCNC: 9.7 MG/DL (ref 8.5–10.5)
CANNABINOIDS UR QL SCN: NEGATIVE
CHLORIDE SERPL-SCNC: 104 MMOL/L (ref 96–112)
CO2 SERPL-SCNC: 26 MMOL/L (ref 20–33)
CREAT SERPL-MCNC: 0.96 MG/DL (ref 0.5–1.4)
EKG IMPRESSION: NORMAL
EOSINOPHIL # BLD AUTO: 0.17 K/UL (ref 0–0.51)
EOSINOPHIL NFR BLD: 2.3 % (ref 0–6.9)
ERYTHROCYTE [DISTWIDTH] IN BLOOD BY AUTOMATED COUNT: 42.5 FL (ref 35.9–50)
GLOBULIN SER CALC-MCNC: 2.4 G/DL (ref 1.9–3.5)
GLUCOSE SERPL-MCNC: 105 MG/DL (ref 65–99)
HCT VFR BLD AUTO: 45.3 % (ref 42–52)
HGB BLD-MCNC: 14.8 G/DL (ref 14–18)
IMM GRANULOCYTES # BLD AUTO: 0.02 K/UL (ref 0–0.11)
IMM GRANULOCYTES NFR BLD AUTO: 0.3 % (ref 0–0.9)
INR PPP: 2.6 (ref 0.87–1.13)
LV EJECT FRACT  99904: 55
LV EJECT FRACT MOD 2C 99903: 45.91
LV EJECT FRACT MOD 4C 99902: 53.13
LV EJECT FRACT MOD BP 99901: 49.91
LYMPHOCYTES # BLD AUTO: 3.02 K/UL (ref 1–4.8)
LYMPHOCYTES NFR BLD: 40.7 % (ref 22–41)
MCH RBC QN AUTO: 27.1 PG (ref 27–33)
MCHC RBC AUTO-ENTMCNC: 32.7 G/DL (ref 33.7–35.3)
MCV RBC AUTO: 82.8 FL (ref 81.4–97.8)
METHADONE UR QL SCN: NEGATIVE
MONOCYTES # BLD AUTO: 0.43 K/UL (ref 0–0.85)
MONOCYTES NFR BLD AUTO: 5.8 % (ref 0–13.4)
NEUTROPHILS # BLD AUTO: 3.76 K/UL (ref 1.82–7.42)
NEUTROPHILS NFR BLD: 50.6 % (ref 44–72)
NRBC # BLD AUTO: 0 K/UL
NRBC BLD-RTO: 0 /100 WBC
OPIATES UR QL SCN: NEGATIVE
OXYCODONE UR QL SCN: NEGATIVE
PCP UR QL SCN: NEGATIVE
PLATELET # BLD AUTO: 221 K/UL (ref 164–446)
PMV BLD AUTO: 10.8 FL (ref 9–12.9)
POTASSIUM SERPL-SCNC: 4.1 MMOL/L (ref 3.6–5.5)
PROPOXYPH UR QL SCN: NEGATIVE
PROT SERPL-MCNC: 6.8 G/DL (ref 6–8.2)
PROTHROMBIN TIME: 27.9 SEC (ref 12–14.6)
RBC # BLD AUTO: 5.47 M/UL (ref 4.7–6.1)
SODIUM SERPL-SCNC: 138 MMOL/L (ref 135–145)
WBC # BLD AUTO: 7.4 K/UL (ref 4.8–10.8)

## 2018-11-06 PROCEDURE — 36415 COLL VENOUS BLD VENIPUNCTURE: CPT

## 2018-11-06 PROCEDURE — 96372 THER/PROPH/DIAG INJ SC/IM: CPT

## 2018-11-06 PROCEDURE — 93306 TTE W/DOPPLER COMPLETE: CPT | Mod: 26 | Performed by: INTERNAL MEDICINE

## 2018-11-06 PROCEDURE — 93306 TTE W/DOPPLER COMPLETE: CPT

## 2018-11-06 PROCEDURE — 85025 COMPLETE CBC W/AUTO DIFF WBC: CPT

## 2018-11-06 PROCEDURE — G0378 HOSPITAL OBSERVATION PER HR: HCPCS

## 2018-11-06 PROCEDURE — 80307 DRUG TEST PRSMV CHEM ANLYZR: CPT

## 2018-11-06 PROCEDURE — 80053 COMPREHEN METABOLIC PANEL: CPT

## 2018-11-06 PROCEDURE — 99217 PR OBSERVATION CARE DISCHARGE: CPT | Performed by: INTERNAL MEDICINE

## 2018-11-06 PROCEDURE — 85610 PROTHROMBIN TIME: CPT

## 2018-11-06 PROCEDURE — 700111 HCHG RX REV CODE 636 W/ 250 OVERRIDE (IP): Performed by: FAMILY MEDICINE

## 2018-11-06 PROCEDURE — G0299 HHS/HOSPICE OF RN EA 15 MIN: HCPCS

## 2018-11-06 RX ADMIN — ENOXAPARIN SODIUM 100 MG: 100 INJECTION SUBCUTANEOUS at 05:00

## 2018-11-06 ASSESSMENT — PAIN SCALES - GENERAL
PAINLEVEL_OUTOF10: 0
PAINLEVEL_OUTOF10: 0

## 2018-11-06 NOTE — DISCHARGE SUMMARY
Discharge Summary    CHIEF COMPLAINT ON ADMISSION  Chief Complaint   Patient presents with   • Chest Pain     radianting to neck,shoulder,back and left arm   • Dizziness     Reason for Admission  Chest Pain; Dizziness     Admission Date  11/5/2018    CODE STATUS  Full Code    HPI & HOSPITAL COURSE  This is a 27 y.o. male admitted 11/5/2018 with chest pain and dizziness.  He is post mitral valve repair on 9/20/2018 from bacterial endocarditis secondary to streptococcal bacteremia and spinal epidural abscess complicated by cardioembolic stroke. Workup was completed and revealed no pulmonary emboli, but did show mild edema or pneumonitis on CT angiogram.  He currently has no evidence of infection or overt edema.  MRI of his brain showed no significant changes from his previous MRI done on 8/8/2018.  Repeat MRIs of his C-spine, T-spine and L-spine were also performed and were negative for recurrent abscess.  His echocardiogram showed an ejection fraction of 55% and appropriate function of the mitral valve.  He has been maintained on warfarin for anticoagulation.  On arrival his INR was subtherapeutic at 1.3, so he was started on.  Repeat value done prior to discharge revealed an INR up to 2.6 and an anticoagulation clinic appointment has been arranged for repeat INR the day after tomorrow.  The patient reports that he believes he is having intermittent tachyarrhythmias which are causing his symptoms.  There were none captured on telemetry monitoring while hospitalized.  He has already arranged to have a Holter monitor placed in the outpatient setting through the cardiology office.  He has remained afebrile and his vital signs have remained stable.     Therefore, he is discharged in good and stable condition to home with close outpatient follow-up.    Discharge Date  11/6/2018    FOLLOW UP ITEMS POST DISCHARGE  PCP within 1-2 weeks of discharge  Cardiology for Holter monitor as previously scheduled  Anticoagulation  clinic on Thursday for INR check    DISCHARGE DIAGNOSES  Principal Problem:    Chest pain POA: Yes  Active Problems:    Spinal epidural abscess POA: Yes    History of mitral valve repair POA: Yes      Overview: Radical mitral valve repair (triangular resection of P1 and chordal       transfer, commissuroplasty, 34 mm Ventura flexible annuloplasty band), Dr. 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 *    FOLLOW UP  Future Appointments  Date Time Provider Department Center   11/6/2018 1:00 PM Aneesh Saucedo R.N. [facility] None   11/8/2018 3:20 PM [facility] EXAM 1 [facility] None   11/13/2018 To Be Determined Aneesh Saucedo R.N. [facility] None   [facility] None   12/13/2018 1:20 PM JON Wiseman [facility] None     MIYA Velez  [address]  [place] [address]  [phone]      [facility]  called office and left a voicemail requesting office to call and schedule your appointment. If you do not hear from them please call to schedule. Thank you     MEDICATIONS ON DISCHARGE     Medication List      CONTINUE taking these medications      Instructions   Melatonin 1 MG Tabs   Take 0.5 Tabs by mouth at bedtime as needed.  Dose:  0.5 Tab     warfarin 5 MG Tabs  Commonly known as:  COUMADIN   Take 7.5 mg by mouth every day.  Dose:  7.5 mg          Allergies  Allergies   Allergen Reactions   • Nkda [No Known Drug Allergy]      DIET  Cardiac    ACTIVITY  As tolerated.  Exercise encouraged.    CONSULTATIONS  Cardiology-Dr. Childress    PROCEDURES  None    LABORATORY  Lab Results   Component Value Date    SODIUM 138 11/06/2018    POTASSIUM 4.1 11/06/2018    CHLORIDE 104 11/06/2018    CO2 26  11/06/2018    GLUCOSE 105 (H) 11/06/2018    BUN 19 11/06/2018    CREATININE 0.96 11/06/2018      Lab Results   Component Value Date    WBC 7.4 11/06/2018    HEMOGLOBIN 14.8 11/06/2018    HEMATOCRIT 45.3 11/06/2018    PLATELETCT 221 11/06/2018      Total time of the discharge process exceeds 40 minutes.

## 2018-11-06 NOTE — PROGRESS NOTES
"HR increased briefly to 133 unsustained. Morphology appears slightly different. Pt denies CP but stated that he \"saw spots\" briefly before they resolved. STAT EKG ordered.  "

## 2018-11-06 NOTE — PROGRESS NOTES
"Assumed care of patient at 1900. Bedside report received from JOHNNY Marrero. Initial assessment completed. Patient is A&Ox4 and able to make needs known; neuro intact. Patient denies CP, palpitations at this time. Pt c/o dizziness upon return from MRI. Pt stated that when in MRI, he felt like the \"room was breathing.\" Pt stated that prior to surgery, his HR was in the 60s, now it is consistently in 90s and during dizzy spells, it is in 130s.     Currently SR 92 on monitor; O2 sat 95%  on RA.  POC discussed with pt and wife at bedside: AM labs, MRI results, cardiac monitoring. No further questions at this time. Fall precautions in place. Bed locked and in the lowest position, call light instructions provided, call light within reach.      "

## 2018-11-06 NOTE — DISCHARGE PLANNING
Care Transition Team Assessment    Met with pt at bedside. According to pt he lives with his wife and 2 children aged 10 and 2 yrs old. Independent at baseline, denies use of assistive devices. Pt said he was recently discharged from [facility] and said he will follow up with Coumadin clinic which is close to his home at discharge.    No obvious needs identified at this time. Pt said either his brother or wife will provide transport at discharge.    Information Source  Orientation : Oriented x 4  Information Given By: Patient  Informant's Name: Nicholas Neumann    Readmission Evaluation  Is this a readmission?: No    Interdisciplinary Discharge Planning  Does Admitting Nurse Feel This Could be a Complex Discharge?: No  Primary Care Physician: Minnie DASILVA  Lives with - Patient's Self Care Capacity: Spouse, Child Less than 18 Years of Age (Children aged 10 and 2 yrs)  Patient or legal guardian wants to designate a caregiver (see row info): No  Support Systems: Spouse / Significant Other  Housing / Facility: [address]  Do You Take your Prescribed Medications Regularly: Yes  Able to Return to Previous ADL's: Yes  Mobility Issues: No  Prior Services: None  Patient Expects to be Discharged to:: Home  Assistance Needed: No  Durable Medical Equipment: Not Applicable    Discharge Preparedness  What are your discharge supports?: Spouse  Prior Functional Level: Ambulatory, Independent with Activities of Daily Living, Independent with Medication Management  Difficulity with ADLs: None  Difficulity with IADLs: None    Functional Assesment  Prior Functional Level: Ambulatory, Independent with Activities of Daily Living, Independent with Medication Management    Finances  Prescription Coverage: Yes    Discharge Risks or Barriers  Discharge risks or barriers?: No    Anticipated Discharge Information  Anticipated discharge disposition: Home  Discharge Address: [address] [place] [address]  Discharge Contact Phone  Number: Pt [phone]

## 2018-11-06 NOTE — DISCHARGE INSTRUCTIONS
Discharge Instructions    Discharged to home by car with friend. Discharged via walking, hospital escort: Yes.  Special equipment needed: Not Applicable    Be sure to schedule a follow-up appointment with your primary care doctor or any specialists as instructed.     Discharge Plan:   Diet Plan: Discussed  Activity Level: Discussed  Smoking Cessation Offered: Patient Counseled  Confirmed Follow up Appointment: Patient to Call and Schedule Appointment  Confirmed Symptoms Management: Discussed  Medication Reconciliation Updated: Yes  Pneumococcal Vaccine Administered/Refused: Not given - Patient refused pneumococcal vaccine  Influenza Vaccine Indication: Patient Refuses    I understand that a diet low in cholesterol, fat, and sodium is recommended for good health. Unless I have been given specific instructions below for another diet, I accept this instruction as my diet prescription.   Other diet: Cardiac Diet    Special Instructions: None    · Is patient discharged on Warfarin / Coumadin?   No     Depression / Suicide Risk    As you are discharged from this [facility] facility, it is important to learn how to keep safe from harming yourself.    Recognize the warning signs:  · Abrupt changes in personality, positive or negative- including increase in energy   · Giving away possessions  · Change in eating patterns- significant weight changes-  positive or negative  · Change in sleeping patterns- unable to sleep or sleeping all the time   · Unwillingness or inability to communicate  · Depression  · Unusual sadness, discouragement and loneliness  · Talk of wanting to die  · Neglect of personal appearance   · Rebelliousness- reckless behavior  · Withdrawal from people/activities they love  · Confusion- inability to concentrate     If you or a loved one observes any of these behaviors or has concerns about self-harm, here's what you can do:  · Talk about it- your feelings and reasons for harming yourself  · Remove any  means that you might use to hurt yourself (examples: pills, rope, extension cords, firearm)  · Get professional help from the community (Mental Health, Substance Abuse, psychological counseling)  · Do not be alone:Call your Safe Contact- someone whom you trust who will be there for you.  · Call your local CRISIS HOTLINE 699-2469 or 483-699-2208  · Call your local Children's Mobile Crisis Response Team [place] (220) 045-7792 or www.[place]  · Call the toll free National Suicide Prevention Hotlines   · National Suicide Prevention Lifeline 541-687-PPYC (8924)  · National Hope Line Network 800-SUICIDE (101-4515)

## 2018-11-06 NOTE — H&P
Hospital Medicine History & Physical Note    Date of Service  11/5/2018    Primary Care Physician  MIYA Velez    Consultants  Cardiology    Code Status  Full    Chief Complaint  Chest pain    History of Presenting Illness  27 y.o. male who presented 11/5/2018 with chest pain which lasted for only about a minute while he was driving.  Denies any palpitations shortness of breath.  He has had some dizziness for some time now and some lightheadedness.  Denies any fever chills, cough or congestion, abdominal pain nausea or vomiting.  Patient with a previous history of streptococcal bacteremia with complications of endocarditis, cortical vein thrombosis or embolic infarcts, as well as spinal epidural abscess.  He has finished antibiotic course.  He subsequently had mitral valve repair.  Cardiology has been consulted on the case.    Review of Systems  Review of Systems   Constitutional: Negative for chills, fever and malaise/fatigue.   HENT: Negative for hearing loss and sore throat.    Eyes: Negative for blurred vision.   Respiratory: Negative for cough, shortness of breath and wheezing.    Cardiovascular: Positive for chest pain. Negative for palpitations and leg swelling.   Gastrointestinal: Negative for abdominal pain, diarrhea, heartburn, nausea and vomiting.   Genitourinary: Negative for dysuria.   Musculoskeletal: Negative for back pain, joint pain, myalgias and neck pain.   Skin: Negative for rash.   Neurological: Negative for dizziness and headaches.   Psychiatric/Behavioral: The patient is not nervous/anxious.        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    Surgical History   has a past surgical history that includes tonsillectomy (1998); loose body removal (5/21/08); knee arthroscopy (2004); knee arthroscopy  (5/21/08); tibial osteotomy (5/21/08); ligament reconstruction (5/21/08); knee arthroscopy (5/21/08); knee arthroscopy (10/2/08); mitral valve repair (9/20/2018); and iam (9/20/2018).     Family History  family history includes Diabetes in his maternal grandfather; Heart Disease in his father, maternal grandmother, and sister; Hyperlipidemia in his paternal grandmother; Hypertension in his father.     Social History   reports that he has been smoking Cigarettes.  He has a 6.50 pack-year smoking history. He quit smokeless tobacco use about 14 months ago. His smokeless tobacco use included Chew. He reports that he does not drink alcohol or use drugs.    Allergies  Allergies   Allergen Reactions   • Nkda [No Known Drug Allergy]        Medications  Prior to Admission Medications   Prescriptions Last Dose Informant Patient Reported? Taking?   Melatonin 1 MG Tab 11/4/2018 at 2300 Patient Yes Yes   Sig: Take 0.5 Tabs by mouth at bedtime as needed.   warfarin (COUMADIN) 5 MG Tab 11/4/2018 at 2030 Patient Yes Yes   Sig: Take 7.5 mg by mouth every day.      Facility-Administered Medications: None       Physical Exam  Temp:  [36.2 °C (97.2 °F)-36.4 °C (97.6 °F)] 36.4 °C (97.6 °F)  Pulse:  [] 97  Resp:  [16-20] 20  BP: (106-124)/(68-92) 115/83    Physical Exam   Constitutional: He is oriented to person, place, and time. He appears well-developed and well-nourished.   HENT:   Head: Normocephalic and atraumatic.   Neck: No tracheal deviation present. No thyromegaly present.   Cardiovascular: Normal rate and regular rhythm.    Murmur heard.  Pulmonary/Chest: Effort normal and breath sounds normal.   Abdominal: Soft. Bowel sounds are normal. He exhibits no distension. There is no tenderness.   Musculoskeletal: He exhibits no edema.   Lymphadenopathy:     He has no cervical adenopathy.   Neurological: He is alert and oriented to person, place, and time. No cranial nerve deficit.   MMT 5/5   Skin: Skin is warm and dry.    Nursing note and vitals reviewed.      Laboratory:  Recent Labs      11/05/18   1219   WBC  6.5   RBC  5.81   HEMOGLOBIN  15.7   HEMATOCRIT  48.0   MCV  82.6   MCH  27.0   MCHC  32.7*   RDW  42.0   PLATELETCT  196   MPV  10.3     Recent Labs      11/05/18   1219   SODIUM  139   POTASSIUM  3.9   CHLORIDE  106   CO2  25   GLUCOSE  84   BUN  11   CREATININE  0.89   CALCIUM  10.1     Recent Labs      11/05/18   1219   ALTSGPT  13   ASTSGOT  14   ALKPHOSPHAT  74   TBILIRUBIN  0.5   LIPASE  36   GLUCOSE  84     Recent Labs      11/05/18   1219   APTT  30.8   INR  1.30*     Recent Labs      11/05/18   1219   BNPBTYPENAT  32         Recent Labs      11/05/18   1219   TROPONINI  <0.01       Urinalysis:    No results found     Imaging:  CT-CTA CHEST PULMONARY ARTERY W/ RECONS   Final Result      No evidence pulmonary emboli.   Postoperative changes sternum and mitral valve.   Mild interstitial and groundglass opacities consistent with mild edema or pneumonitis.            DX-CHEST-LIMITED (1 VIEW)   Final Result         No acute cardiac or pulmonary abnormality is identified.      EC-ECHOCARDIOGRAM COMPLETE W/O CONT    (Results Pending)   MR-BRAIN-WITH & W/O    (Results Pending)   MR-CERVICAL SPINE-WITH & W/O    (Results Pending)   MR-LUMBAR SPINE-WITH & W/O    (Results Pending)   MR-THORACIC SPINE-WITH & W/O    (Results Pending)         Assessment/Plan:  I anticipate this patient is appropriate for observation status at this time.    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

## 2018-11-06 NOTE — PROGRESS NOTES
He had been discharged by the time we came to see him at 930 this morning.  I cannot find that his sedimentation rate was actually done but he had no fever and no symptoms of recurrent infection.  His echocardiogram has satisfactory post mitral valve repair appearance.  No arrhythmias were documented.    The cervical spine workup was apparently negative.  Our office will reach out to him to schedule a follow-up visit to workup the intermittent lightheadedness which is compatible with vagal dystonia but is also associated with palpitation.  He is well versed in the use the emergency medical system.

## 2018-11-06 NOTE — CARE PLAN
Problem: Communication  Goal: The ability to communicate needs accurately and effectively will improve  Outcome: PROGRESSING AS EXPECTED      Problem: Safety  Goal: Will remain free from falls    Intervention: Implement fall precautions  Pt educated on fall risk due to dizziness. Call light instructions provided. Pt verbalized understanding.       Problem: Pain Management  Goal: Pain level will decrease to patient's comfort goal  Outcome: PROGRESSING AS EXPECTED  Pt denies CP at this time.

## 2018-11-11 VITALS
TEMPERATURE: 96.7 F | HEART RATE: 107 BPM | RESPIRATION RATE: 17 BRPM | DIASTOLIC BLOOD PRESSURE: 61 MMHG | OXYGEN SATURATION: 98 % | SYSTOLIC BLOOD PRESSURE: 110 MMHG

## 2018-11-11 SDOH — ECONOMIC STABILITY: HOUSING INSECURITY: UNSAFE COOKING RANGE AREA: 0

## 2018-11-11 SDOH — ECONOMIC STABILITY: HOUSING INSECURITY: UNSAFE APPLIANCES: 0

## 2018-11-11 SDOH — ECONOMIC STABILITY: HOUSING INSECURITY: HOME SAFETY: HE PUTS DOGS IN BEDROOM DURING VISIT

## 2018-11-12 ENCOUNTER — HOME CARE VISIT (OUTPATIENT)
Dept: HOME HEALTH SERVICES | Facility: HOME HEALTHCARE | Age: 28
End: 2018-11-12
Payer: MEDICAID

## 2018-11-12 ENCOUNTER — ANTICOAGULATION MONITORING (OUTPATIENT)
Dept: VASCULAR LAB | Facility: MEDICAL CENTER | Age: 28
End: 2018-11-12

## 2018-11-12 LAB — INR PPP: 1.1 (ref 2–3.5)

## 2018-11-12 PROCEDURE — G0493 RN CARE EA 15 MIN HH/HOSPICE: HCPCS

## 2018-11-12 NOTE — PROGRESS NOTES
Anticoagulation Summary  As of 11/12/2018    INR goal:   2.0-3.0   TTR:   64.6 % (1.2 mo)   Today's INR:   1.1!   Warfarin maintenance plan:   7.5 mg (5 mg x 1.5) every day   Weekly warfarin total:   52.5 mg   Plan last modified:   Pma STEVE Filter (10/11/2018)   Next INR check:   11/15/2018   Target end date:   12/25/2018    Indications    Non-rheumatic mitral regurgitation (Resolved) [I34.0]             Anticoagulation Episode Summary     INR check location:       Preferred lab:       Send INR reminders to:       Comments:         Anticoagulation Care Providers     Provider Role Specialty Phone number    MIYA Nguyễn Referring Cardiology [phone]    [facility] Responsible  [phone]        Anticoagulation Patient Findings      Spoke with patient.  INR is SUB therapeutic.   Pt missed dose on Saturday.  Pt denies any unusual s/s of bleeding, bruising, clotting or any changes to diet or medications. Denies any etoh, cranberries, supplements, or illness.   Pt verifies warfarin weekly dosing.     Will have pt take a boost dose of 15mg x1 today and then resume 7.5mg daily.    Repeat INR in 4 days at [facility] at 230p. Pt has been DC'ed from [facility].     Lynsey Magallanes, PharmD

## 2018-11-15 ENCOUNTER — HOME CARE VISIT (OUTPATIENT)
Dept: HOME HEALTH SERVICES | Facility: HOME HEALTHCARE | Age: 28
End: 2018-11-15
Payer: MEDICAID

## 2018-11-15 ENCOUNTER — ANTICOAGULATION VISIT (OUTPATIENT)
Dept: MEDICAL GROUP | Facility: PHYSICIAN GROUP | Age: 28
End: 2018-11-15
Payer: MEDICAID

## 2018-11-15 VITALS
WEIGHT: 218 LBS | OXYGEN SATURATION: 94 % | HEART RATE: 100 BPM | RESPIRATION RATE: 17 BRPM | BODY MASS INDEX: 31.28 KG/M2 | DIASTOLIC BLOOD PRESSURE: 80 MMHG | SYSTOLIC BLOOD PRESSURE: 112 MMHG | TEMPERATURE: 97.4 F

## 2018-11-15 DIAGNOSIS — I34.0 NON-RHEUMATIC MITRAL REGURGITATION: Primary | ICD-10-CM

## 2018-11-15 LAB
INR PPP: 1.6 (ref 2–3.5)
MYCOBACTERIUM SPEC CULT: NORMAL
RHODAMINE-AURAMINE STN SPEC: NORMAL
SIGNIFICANT IND 70042: NORMAL
SITE SITE: NORMAL
SOURCE SOURCE: NORMAL

## 2018-11-15 PROCEDURE — 85610 PROTHROMBIN TIME: CPT | Performed by: FAMILY MEDICINE

## 2018-11-15 PROCEDURE — 99211 OFF/OP EST MAY X REQ PHY/QHP: CPT | Performed by: INTERNAL MEDICINE

## 2018-11-15 SDOH — ECONOMIC STABILITY: HOUSING INSECURITY: UNSAFE COOKING RANGE AREA: 0

## 2018-11-15 SDOH — ECONOMIC STABILITY: HOUSING INSECURITY: HOME SAFETY: 1 BIG DOG-VERY FRIENDLY, KEPT IN BACK ROOM DURING VISITS

## 2018-11-15 SDOH — ECONOMIC STABILITY: HOUSING INSECURITY: UNSAFE APPLIANCES: 0

## 2018-11-15 ASSESSMENT — ACTIVITIES OF DAILY LIVING (ADL)
HOME_HEALTH_OASIS: 00
OASIS_M1830: 00

## 2018-11-15 NOTE — PROGRESS NOTES
OP Anticoagulation Service Note    Date: 11/15/2018  There were no vitals filed for this visit.    Anticoagulation Summary  As of 11/15/2018    INR goal:   2.0-3.0   TTR:   59.0 % (1.4 mo)   Today's INR:   1.6!   Warfarin maintenance plan:   10 mg (5 mg x 2) on Mon, Thu, Sat; 7.5 mg (5 mg x 1.5) all other days   Weekly warfarin total:   60 mg   Plan last modified:   Juan Antonio Iglesias, PharmD (11/15/2018)   Next INR check:   11/21/2018   Target end date:   12/25/2018    Indications    Non-rheumatic mitral regurgitation (Resolved) [I34.0]             Anticoagulation Episode Summary     INR check location:       Preferred lab:       Send INR reminders to:       Comments:         Anticoagulation Care Providers     Provider Role Specialty Phone number    MIYA Nguyễn Referring Cardiology [phone]    [facility] Responsible  [phone]        Anticoagulation Patient Findings      HPI:   Nicholas Bunn Jungconsueloklarissa seen in clinic today, they are here today for a INR check on anticoagulation therapy with warfarin because they have mitral regurgitation     The reason for today's visit is to prevent morbidity and mortality from a stroke  and to reduce the risk of bleeding while on a anticoagulant.     Additional education provided today regarding reducing bleed risk and dietary constraints:  About how vitamin K and foods work with warfarin and the bleeding risk on a anticoagulant     Any upcoming procedures:   none    Confirmed warfarin dosing regimen  Interval Changes with foods rich in vitamin K: No  Interval Changes in ETOH:   No  Interval Changes in smoking status:  No  Interval Changes in medication:  No   Cost restriction:  No    S/S of bleeding or bruising:  No  Signs/symptoms  thrombosis since the last appt:  No  Bleed risk is:  moderate,     3 vitals included with today's appt :  (BP, HR, weight, ht, RR)     Assessment:   INR  sub-therapeutic.        a change is needed today because INR  is out of  range.      They have a TTR of 59  which is not at target (TTR target/goal is 100%) and requires close follow up to prevent a adverse event (the lower the TTR the higher risk of clots, strokes, or bleeding).       Plan:  Increase weekly warfarin dose as noted      Follow up:  Follow up appointment in 4 days       Other info:  Pt educated to contact our clinic with any changes in medications or s/s of bleeding or thrombosis    CHEST guidelines recommend frequent INR monitoring at regular intervals (a few days up to a max of 12 weeks) to ensure they are on the proper dose of warfarin and not having any complications from therapy.  INRs can dramatically change over a short time period due to diet, medications, and medical conditions.

## 2018-12-07 NOTE — PROGRESS NOTES
Chief Complaint   Patient presents with   • Paroxysmal Supraventricular Tachycardia (PSVT)       Subjective:   Nicholas Neumann is a 27 y.o. male who presents today for hospital follow up regarding recent radical mitral valve repair on 9/20/18 due to history of native mitral valve endocarditis with streptococcal infection that resulted in severe mitral regurgitation..    Past medical history significant for endocarditis of the mitral valve, spinal epidural abscess and brain embolism and thrombosis all occurring in July 2018.  Patient additionally has a history of Bell's palsy in 2011 with no residual deficit.    Today patient states that he is feeling the same. Having some palpitations with occasional dizziness, feels this could be due to the coumadin.  States that he is interested in returning to work. Denies fever, chest pain, shortness of breath, palpitations or leg swelling.     Past Medical History:   Diagnosis Date   • Bell's palsy 2011    No residual deficit   • Brain embolism and thrombosis 07/09/2018    x 2   • Endocarditis of mitral valve 07/09/2018    Strep viridans   • Heart murmur    • Pain     back spine   • Spinal epidural abscess 07/09/2018    Strep viridans     Past Surgical History:   Procedure Laterality Date   • MITRAL VALVE REPAIR  9/20/2018    Procedure: MITRAL VALVE REPAIR- OR  ;  Surgeon: Kevin Fernandez M.D.;  Location: [facility];  Service: Cardiac   • CHAR  9/20/2018    Procedure: CHAR;  Surgeon: Kevin Fernandez M.D.;  Location: [facility];  Service: Cardiac   • KNEE ARTHROSCOPY  10/2/08    Performed by JOY NOLEN at [facility]   • LOOSE BODY REMOVAL  5/21/08    Performed by JOY NOLEN at [facility]   • KNEE ARTHROSCOPY  5/21/08    Performed by JOY NOLEN at [facility]   • TIBIAL OSTEOTOMY  5/21/08    Performed by JOY NOLEN at [facility]   • LIGAMENT RECONSTRUCTION  5/21/08    "Performed by JOY NOLEN at [facility]   • KNEE ARTHROSCOPY  5/21/08    Performed by JOY NOLEN at [facility]   • KNEE ARTHROSCOPY  2004    Left   • TONSILLECTOMY  1998     Family History   Problem Relation Age of Onset   • Hyperlipidemia Paternal Grandmother    • Diabetes Maternal Grandfather    • Heart Disease Maternal Grandmother    • Heart Disease Father    • Hypertension Father    • Heart Disease Sister      Social History     Social History   • Marital status:      Spouse name: N/A   • Number of children: N/A   • Years of education: N/A     Occupational History   • Not on file.     Social History Main Topics   • Smoking status: Current Some Day Smoker     Packs/day: 0.50     Years: 13.00     Types: Cigarettes   • Smokeless tobacco: Former User     Types: Chew     Quit date: 09/2017      Comment: Cutting back and putting much quit.   • Alcohol use No   • Drug use: No   • Sexual activity: Not on file     Other Topics Concern   • Not on file     Social History Narrative   • No narrative on file     Allergies   Allergen Reactions   • Nkda [No Known Drug Allergy]      Outpatient Encounter Prescriptions as of 12/13/2018   Medication Sig Dispense Refill   • metoprolol SR (TOPROL XL) 25 MG TABLET SR 24 HR Take 1 Tab by mouth every day. 90 Tab 2   • Melatonin 1 MG Tab Take 0.5 Tabs by mouth at bedtime as needed.     • [DISCONTINUED] warfarin (COUMADIN) 5 MG Tab Take 7.5 mg by mouth every day.       No facility-administered encounter medications on file as of 12/13/2018.      ROS     Objective:   /80 (BP Location: Left arm, Patient Position: Sitting, BP Cuff Size: Adult)   Pulse (!) 112   Resp 12   Ht 1.778 m (5' 10\")   Wt 106.6 kg (235 lb)   SpO2 94%   BMI 33.72 kg/m²     Physical Exam   Constitutional: He is oriented to person, place, and time. He appears well-developed and well-nourished. No distress.   HENT:   Head: Normocephalic.   Eyes: EOM are normal.   "   Neck: No JVD present.   Cardiovascular: Regular rhythm and intact distal pulses.  Tachycardia present.    Pulmonary/Chest: Effort normal and breath sounds normal. No respiratory distress.   Abdominal: Soft. There is no tenderness.   Musculoskeletal: He exhibits no edema.   Neurological: He is alert and oriented to person, place, and time.   Skin: Skin is warm and dry.   Well healing sternotomy scar without swelling, erythema or discharge.    Psychiatric: He has a normal mood and affect. His behavior is normal. Thought content normal.        9/24/18:  Component Value Ref Range & Units Status   Sodium 139  135 - 145 mmol/L Final   Potassium 4.2  3.6 - 5.5 mmol/L Final   Chloride 102  96 - 112 mmol/L Final   Co2 31  20 - 33 mmol/L Final   Glucose 90  65 - 99 mg/dL Final   Bun 7   8 - 22 mg/dL Final   Creatinine 0.84  0.50 - 1.40 mg/dL Final   Calcium 8.8  8.5 - 10.5 mg/dL Final   Anion Gap 6.0  0.0 - 11.9 Final     GFR If African American >60  >60 mL/min/1.73 m 2 Final   GFR If Non African American >60  >60 mL/min/1.73 m 2 Final       Transesophageal echocardiogram 9/26/18:  Intraoperative CHAR during MV repair.  Pre-CPB image show normal biventricular systolic function with EF = 55%. Posterior (P1) prolapse with severe associated MR, likely ruptured chordae seen with 3-D exam.  Post-CPB images show preserved biventricular function. The mitral valve has been repaired and a 34 mm Ventura posterior annuloplasty band placed.  No significant residual MR and mean mitral gradient = 4 mm Hg. Findings communicated at the time of exam.     Mitral valve repair 9/20/18:  Radical mitral valve repair (triangular resection of P1 and chordal transfer,  commissuroplasty, 34 mm Ventura flexible annuloplasty band), and intraoperative transesophageal echocardiography.    Assessment:     1. 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 History of mitral valve repair     3. Palpitations     4.  Dizziness         Medical Decision Making:  Today's Assessment / Status / Plan:     Mitral valve repair:  -Sternotomy incision intact and healing well.   -Appears euvolemic  -AC with warfarin for 3 months after sx date (9/20/18) and then start low dose ASA 81 mg daily.    Palpitations/Dizziness/Tachycardia:  -Pulse continues to be slightly elevated with borderline BP.  -Start Toprol XL 25 mg q hs.  -Check BP and HR daily prior to taking evening dose, hold for SBP <90 or HR <60.   -EKG and BP check in 3 weeks.   -Encouraged patient to increase his fluid intake.     Patient will establish care with Dr. Annie Pereyra in 3-4 months or follow up earlier if needed.  Encouraged patient to call if he has any questions or concerns. Patient understands and agrees with the plan of care.    Collaborating Provider: Dr. Vipul Pereyra

## 2018-12-10 ENCOUNTER — TELEPHONE (OUTPATIENT)
Dept: CARDIOLOGY | Facility: MEDICAL CENTER | Age: 28
End: 2018-12-10

## 2018-12-10 NOTE — TELEPHONE ENCOUNTER
Called patient. He did not do any event monitor. He said he was told something would be mailed to him and nothing ever was. I will ask [facility] if they know what happened.

## 2018-12-12 ENCOUNTER — TELEPHONE (OUTPATIENT)
Dept: VASCULAR LAB | Facility: MEDICAL CENTER | Age: 28
End: 2018-12-12

## 2018-12-13 ENCOUNTER — OFFICE VISIT (OUTPATIENT)
Dept: CARDIOLOGY | Facility: MEDICAL CENTER | Age: 28
End: 2018-12-13
Payer: MEDICAID

## 2018-12-13 VITALS
HEIGHT: 70 IN | OXYGEN SATURATION: 94 % | WEIGHT: 235 LBS | BODY MASS INDEX: 33.64 KG/M2 | DIASTOLIC BLOOD PRESSURE: 80 MMHG | SYSTOLIC BLOOD PRESSURE: 102 MMHG | HEART RATE: 112 BPM | RESPIRATION RATE: 12 BRPM

## 2018-12-13 DIAGNOSIS — R00.2 PALPITATIONS: ICD-10-CM

## 2018-12-13 DIAGNOSIS — R42 DIZZINESS: ICD-10-CM

## 2018-12-13 DIAGNOSIS — Z98.890 HISTORY OF MITRAL VALVE REPAIR: ICD-10-CM

## 2018-12-13 DIAGNOSIS — Z86.79 HISTORY OF SBE (SUBACUTE BACTERIAL ENDOCARDITIS): ICD-10-CM

## 2018-12-13 PROBLEM — D68.318 ACQUIRED CIRCULATING ANTICOAGULANTS (HCC): Status: RESOLVED | Noted: 2018-11-06 | Resolved: 2018-12-13

## 2018-12-13 PROBLEM — R79.1 SUBTHERAPEUTIC INTERNATIONAL NORMALIZED RATIO (INR): Status: RESOLVED | Noted: 2018-11-05 | Resolved: 2018-12-13

## 2018-12-13 PROCEDURE — 99214 OFFICE O/P EST MOD 30 MIN: CPT | Performed by: NURSE PRACTITIONER

## 2018-12-13 RX ORDER — METOPROLOL SUCCINATE 25 MG/1
25 TABLET, EXTENDED RELEASE ORAL DAILY
Qty: 90 TAB | Refills: 2 | Status: SHIPPED | OUTPATIENT
Start: 2018-12-13 | End: 2021-08-09

## 2018-12-13 NOTE — LETTER
December 13, 2018        To whom it may concern,    Nicholas Neumann is currently being cared for at [facility] and is clear to return to work and drive without restriction.     Thank you,      CRUZ Wiseman.

## 2019-01-08 ENCOUNTER — ANTICOAGULATION MONITORING (OUTPATIENT)
Dept: VASCULAR LAB | Facility: MEDICAL CENTER | Age: 29
End: 2019-01-08

## 2019-01-08 NOTE — PROGRESS NOTES
Per chart review, pt has been switched to ASA 81mg.     Close to clinic.     Lynsey Magallanes, MayurD

## 2020-07-16 ENCOUNTER — NON-PROVIDER VISIT (OUTPATIENT)
Dept: URGENT CARE | Facility: PHYSICIAN GROUP | Age: 30
End: 2020-07-16

## 2020-07-16 DIAGNOSIS — Z02.83 ENCOUNTER FOR DRUG SCREENING: ICD-10-CM

## 2020-07-16 PROCEDURE — 8907 PR URINE COLLECT ONLY: Performed by: FAMILY MEDICINE

## 2021-08-09 ENCOUNTER — OFFICE VISIT (OUTPATIENT)
Dept: URGENT CARE | Facility: PHYSICIAN GROUP | Age: 31
End: 2021-08-09
Payer: COMMERCIAL

## 2021-08-09 ENCOUNTER — HOSPITAL ENCOUNTER (OUTPATIENT)
Facility: MEDICAL CENTER | Age: 31
End: 2021-08-09
Attending: PHYSICIAN ASSISTANT
Payer: COMMERCIAL

## 2021-08-09 VITALS
HEIGHT: 71 IN | WEIGHT: 247.6 LBS | OXYGEN SATURATION: 96 % | TEMPERATURE: 99 F | HEART RATE: 102 BPM | BODY MASS INDEX: 34.66 KG/M2 | DIASTOLIC BLOOD PRESSURE: 80 MMHG | SYSTOLIC BLOOD PRESSURE: 118 MMHG | RESPIRATION RATE: 18 BRPM

## 2021-08-09 DIAGNOSIS — R68.89 FLU-LIKE SYMPTOMS: ICD-10-CM

## 2021-08-09 DIAGNOSIS — J02.9 PHARYNGITIS, UNSPECIFIED ETIOLOGY: ICD-10-CM

## 2021-08-09 DIAGNOSIS — R06.02 SOB (SHORTNESS OF BREATH): ICD-10-CM

## 2021-08-09 DIAGNOSIS — B36.0 TINEA VERSICOLOR: ICD-10-CM

## 2021-08-09 LAB
INT CON NEG: NEGATIVE
INT CON POS: POSITIVE
S PYO AG THROAT QL: NEGATIVE

## 2021-08-09 PROCEDURE — 87880 STREP A ASSAY W/OPTIC: CPT | Performed by: PHYSICIAN ASSISTANT

## 2021-08-09 PROCEDURE — U0005 INFEC AGEN DETEC AMPLI PROBE: HCPCS

## 2021-08-09 PROCEDURE — U0003 INFECTIOUS AGENT DETECTION BY NUCLEIC ACID (DNA OR RNA); SEVERE ACUTE RESPIRATORY SYNDROME CORONAVIRUS 2 (SARS-COV-2) (CORONAVIRUS DISEASE [COVID-19]), AMPLIFIED PROBE TECHNIQUE, MAKING USE OF HIGH THROUGHPUT TECHNOLOGIES AS DESCRIBED BY CMS-2020-01-R: HCPCS

## 2021-08-09 PROCEDURE — 99214 OFFICE O/P EST MOD 30 MIN: CPT | Performed by: PHYSICIAN ASSISTANT

## 2021-08-09 RX ORDER — DEXAMETHASONE 6 MG/1
6 TABLET ORAL DAILY
Qty: 7 TABLET | Refills: 0 | Status: SHIPPED | OUTPATIENT
Start: 2021-08-09 | End: 2021-08-16

## 2021-08-09 NOTE — LETTER
August 9, 2021         Patient: Nicholas Neumann   YOB: 1990   Date of Visit: 8/9/2021           To Whom it May Concern:    Nicholas Neumann was seen in my clinic on 8/9/2021.     Please excuse patient for missing school/work until COVID results are available, typically taking 1-3 days.  They have been advised to quarantine during this time.      If you have any questions or concerns, please don't hesitate to call.        Sincerely,           Sophie Mcelroy P.A.-C.  Electronically Signed

## 2021-08-10 DIAGNOSIS — R68.89 FLU-LIKE SYMPTOMS: ICD-10-CM

## 2021-08-10 LAB
COVID ORDER STATUS COVID19: NORMAL
SARS-COV-2 RNA RESP QL NAA+PROBE: NOTDETECTED
SPECIMEN SOURCE: NORMAL

## 2021-08-10 NOTE — PROGRESS NOTES
Chief Complaint   Patient presents with   • Cough   • Pharyngitis   • Headache   • Fatigue   • Rash     on chest       HISTORY OF PRESENT ILLNESS: Patient is a 30 y.o. 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     • dexamethasone (DECADRON) 6 MG Tab Take 1 tablet by mouth every day for 7 days. 7 tablet 0     No current Epic-ordered facility-administered medications on file.       Past Medical History:   Diagnosis Date   • Bell's palsy 2011    No residual deficit   • Brain embolism and thrombosis 07/09/2018    x 2   • Endocarditis of mitral valve 07/09/2018    Strep viridans   • Heart murmur    • Pain     back spine   • Spinal epidural abscess 07/09/2018    Strep viridans       Social History     Tobacco Use   • Smoking status: Current Some Day Smoker     Packs/day: 0.50     Years: 13.00     Pack years: 6.50     Types: Cigarettes   • Smokeless tobacco: Former User     Types: Chew     Quit date: 09/2017   • Tobacco comment: Cutting back and putting much quit.   Substance Use Topics   • Alcohol use: No   • Drug use: No       Family Status   Relation Name Status   • PGMo  (Not Specified)   • MGFa  (Not Specified)   • MGMo  (Not Specified)   • Fa  (Not Specified)  "  • Sis  (Not Specified)     Family History   Problem Relation Age of Onset   • Hyperlipidemia Paternal Grandmother    • Diabetes Maternal Grandfather    • Heart Disease Maternal Grandmother    • Heart Disease Father    • Hypertension Father    • Heart Disease Sister        Review of Systems:   Constitutional ROS: No unexpected change in weight  Pulmonary ROS: No chronic cough, sputum, or hemoptysis, No dyspnea on exertion, No wheezing  Cardiovascular ROS: No diaphoresis, No edema, No palpitations  Hematologic/Lymphatic ROS: No chills, No night sweats, No weight loss  Skin/Integumentary ROS: No edema, No evidence of rash, No itching    Exam:  /80   Pulse (!) 102   Temp 37.2 °C (99 °F)   Resp 18   Ht 1.803 m (5' 11\")   Wt 112 kg (247 lb 9.6 oz)   SpO2 96%   General: Well developed, well nourished. No distress.    Eye: PERRL/EOMI; conjunctivae clear, lids normal.  ENMT: Lips without lesions, MMM.  Oropharynx is diffusely mildly erythematous and mildly edematous, without exudate.  Left TM is without erythema but shows a serous effusion and bulging.  Right TM is obstructed by cerumen impaction.  Pulmonary: Unlabored respiratory effort. Lungs clear to auscultation, no wheezes, no rhonchi.    Cardiovascular: Regular rate and rhythm without murmur.   Neurologic: Grossly nonfocal. No facial asymmetry noted.  Lymph: No cervical lymphadenopathy noted.  Skin: Warm, dry, good turgor.  Diffuse rash is noted on the chest, consistent with tinea versicolor, slightly hyperpigmented/salmon-colored lesions that are irregular in color, scattered across the chest.  Psych: Normal mood. Alert and oriented to person, place and time.    Rapid strep: Negative    Assessment/Plan:  Discussed likely viral etiology.  Vitals and exam are unremarkable.  Low suspicion for pneumonia. Patient will be tested for COVID and has been advised to quarantine until results are available. Discussed appropriate over-the-counter symptomatic " medication, and when to return to clinic. Follow up for worsening or persistent symptoms.  1. Flu-like symptoms  SARS-CoV-2, PCR (In-House): Collect NP OR nasal swab in VTM   2. Tinea versicolor     3. Pharyngitis, unspecified etiology  POCT Rapid Strep A   4. SOB (shortness of breath)  dexamethasone (DECADRON) 6 MG Tab

## 2021-08-11 ENCOUNTER — OFFICE VISIT (OUTPATIENT)
Dept: URGENT CARE | Facility: PHYSICIAN GROUP | Age: 31
End: 2021-08-11
Payer: COMMERCIAL

## 2021-08-11 VITALS
WEIGHT: 247 LBS | HEART RATE: 98 BPM | OXYGEN SATURATION: 96 % | HEIGHT: 70 IN | SYSTOLIC BLOOD PRESSURE: 128 MMHG | DIASTOLIC BLOOD PRESSURE: 82 MMHG | TEMPERATURE: 98 F | RESPIRATION RATE: 18 BRPM | BODY MASS INDEX: 35.36 KG/M2

## 2021-08-11 DIAGNOSIS — J01.90 ACUTE BACTERIAL SINUSITIS: ICD-10-CM

## 2021-08-11 DIAGNOSIS — B96.89 ACUTE BACTERIAL SINUSITIS: ICD-10-CM

## 2021-08-11 PROCEDURE — 99213 OFFICE O/P EST LOW 20 MIN: CPT | Performed by: PHYSICIAN ASSISTANT

## 2021-08-11 RX ORDER — AMOXICILLIN AND CLAVULANATE POTASSIUM 875; 125 MG/1; MG/1
1 TABLET, FILM COATED ORAL 2 TIMES DAILY
Qty: 10 TABLET | Refills: 0 | Status: SHIPPED | OUTPATIENT
Start: 2021-08-11 | End: 2021-08-16

## 2021-08-11 ASSESSMENT — ENCOUNTER SYMPTOMS
NAUSEA: 0
COUGH: 1
HEADACHES: 1
VOMITING: 0
MYALGIAS: 1
WHEEZING: 0
SORE THROAT: 0
SPUTUM PRODUCTION: 1
SINUS PAIN: 1
SHORTNESS OF BREATH: 0
DIARRHEA: 0
ABDOMINAL PAIN: 0
FEVER: 1

## 2021-08-11 NOTE — PROGRESS NOTES
Subjective:   Nicholas Neumann is a 30 y.o. male who presents for Follow-Up (recent COVID test neegative, eye swelling and irritation )      HPI  30 y.o. male presents to urgent care with new problem to provider of sinus pressure, congestion, post nasal drip, and eye redness/irritation and discharge onset about 1 week ago.   Patient was seen for similar 2 days ago in  and tested negative for COVID. He is not vaccinated. Wife has similar symptoms.   Hx of sinusitis, states this feels similar.   Nyquil and ibuprofen with some relief.   Denies other associated aggravating or alleviating factors.     Review of Systems   Constitutional: Positive for fever and malaise/fatigue.   HENT: Positive for congestion and sinus pain. Negative for sore throat.    Respiratory: Positive for cough and sputum production. Negative for shortness of breath and wheezing.    Cardiovascular: Negative for chest pain.   Gastrointestinal: Negative for abdominal pain, diarrhea, nausea and vomiting.   Musculoskeletal: Positive for myalgias.   Skin: Negative for rash.   Neurological: Positive for headaches.       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   • Palpitations   • Dizziness     Past Surgical History:   Procedure Laterality Date   • MITRAL VALVE REPAIR  9/20/2018    Procedure: MITRAL VALVE REPAIR- OR  ;  Surgeon: Kevin Fernandez M.D.;  Location: [facility];  Service: Cardiac   • CHAR  9/20/2018    Procedure: CHAR;  Surgeon: Kevin Fernandez M.D.;  Location: [facility];  Service: Cardiac   • KNEE ARTHROSCOPY  10/2/08    Performed by JOY NOLEN at [facility]   • LOOSE BODY REMOVAL  5/21/08    Performed by JOY NOLEN at [facility]   • KNEE ARTHROSCOPY   "5/21/08    Performed by JOY NOLEN at [facility]   • TIBIAL OSTEOTOMY  5/21/08    Performed by JOY NOLEN at [facility]   • LIGAMENT RECONSTRUCTION  5/21/08    Performed by JOY NOLEN at [facility]   • KNEE ARTHROSCOPY  5/21/08    Performed by JOY NOLEN at [facility]   • KNEE ARTHROSCOPY  2004    Left   • TONSILLECTOMY  1998     Social History     Tobacco Use   • Smoking status: Current Some Day Smoker     Packs/day: 0.50     Years: 13.00     Pack years: 6.50     Types: Cigarettes   • Smokeless tobacco: Former User     Types: Chew     Quit date: 09/2017   • Tobacco comment: Cutting back and putting much quit.   Substance Use Topics   • Alcohol use: No   • Drug use: No      Family History   Problem Relation Age of Onset   • Hyperlipidemia Paternal Grandmother    • Diabetes Maternal Grandfather    • Heart Disease Maternal Grandmother    • Heart Disease Father    • Hypertension Father    • Heart Disease Sister       (Allergies, Medications, & Tobacco/Substance Use were reconciled by the Medical Assistant and reviewed by myself. The family history is prepopulated)     Objective:     /82   Pulse 98   Temp 36.7 °C (98 °F)   Resp 18   Ht 1.778 m (5' 10\")   Wt 112 kg (247 lb)   SpO2 96%   BMI 35.44 kg/m²     Physical Exam  Vitals reviewed.   Constitutional:       General: He is not in acute distress.     Appearance: Normal appearance. He is well-developed. He is not ill-appearing.   HENT:      Head: Normocephalic and atraumatic.      Nose: Mucosal edema, congestion and rhinorrhea present.      Right Sinus: Maxillary sinus tenderness present.      Left Sinus: Maxillary sinus tenderness present.      Mouth/Throat:      Mouth: Mucous membranes are moist.      Pharynx: No oropharyngeal exudate or posterior oropharyngeal erythema.   Eyes:      Conjunctiva/sclera: Conjunctivae normal.   Cardiovascular:      Rate and Rhythm: Normal rate and " regular rhythm.      Heart sounds: Normal heart sounds.   Pulmonary:      Effort: Pulmonary effort is normal. No respiratory distress.      Breath sounds: Normal breath sounds.   Abdominal:      Palpations: Abdomen is soft.   Musculoskeletal:      Cervical back: Normal range of motion and neck supple.   Lymphadenopathy:      Cervical: No cervical adenopathy.   Skin:     General: Skin is warm and dry.   Neurological:      General: No focal deficit present.      Mental Status: He is alert and oriented to person, place, and time.   Psychiatric:         Mood and Affect: Mood normal.         Behavior: Behavior normal.         Thought Content: Thought content normal.         Judgment: Judgment normal.         Assessment/Plan:     1. Acute bacterial sinusitis  amoxicillin-clavulanate (AUGMENTIN) 875-125 MG Tab     Take full course of antibiotic  Tylenol and Motrin for fever and pain  OTC meds as discussed including oral decongestant if tolerated  Increase fluids and rest  Nasal spray, nasal wash, Holly pot    Differential diagnosis, natural history, supportive care, and indications for immediate follow-up discussed.    Advised the patient to follow-up with the primary care physician for recheck, reevaluation, and consideration of further management.  Patient verbalized understanding of treatment plan and has no further questions regarding care.     I personally reviewed prior external notes and test results pertinent to today's visit.   Please note that this dictation was created using voice recognition software. I have made a reasonable attempt to correct obvious errors, but I expect that there are errors of grammar and possibly content that I did not discover before finalizing the note.    This note was electronically signed by Brisa Lomax PA-C

## 2021-12-02 NOTE — ED TRIAGE NOTES
Chief Complaint   Patient presents with   • Chest Pain     radianting to neck,shoulder,back and left arm   • Dizziness     Pt ambulated to triage with above complaints, pt had mitral valve repair 9/2018 from endocarditis with streptococcal infection. He also had spinal epidural abscess and brain thrombus. Pt said he had couple of TIA's as well.   NO facial droop or slurred speech noted,  are equal. Pt mentioned that he supposed to have heart monitor due to dizziness.   
General

## 2022-04-18 ENCOUNTER — NON-PROVIDER VISIT (OUTPATIENT)
Dept: URGENT CARE | Facility: PHYSICIAN GROUP | Age: 32
End: 2022-04-18

## 2022-04-18 DIAGNOSIS — Z02.83 EMPLOYMENT-RELATED DRUG TESTING, ENCOUNTER FOR: ICD-10-CM

## 2022-04-18 PROCEDURE — 8907 PR URINE COLLECT ONLY: Performed by: PHYSICIAN ASSISTANT

## 2023-04-21 ENCOUNTER — OFFICE VISIT (OUTPATIENT)
Dept: MEDICAL GROUP | Facility: MEDICAL CENTER | Age: 33
End: 2023-04-21
Payer: COMMERCIAL

## 2023-04-21 VITALS
DIASTOLIC BLOOD PRESSURE: 70 MMHG | SYSTOLIC BLOOD PRESSURE: 130 MMHG | RESPIRATION RATE: 16 BRPM | HEART RATE: 79 BPM | OXYGEN SATURATION: 98 % | BODY MASS INDEX: 33.93 KG/M2 | HEIGHT: 70 IN | TEMPERATURE: 98.2 F | WEIGHT: 237 LBS

## 2023-04-21 DIAGNOSIS — R19.7 DIARRHEA, UNSPECIFIED TYPE: ICD-10-CM

## 2023-04-21 DIAGNOSIS — Z98.890 HISTORY OF MITRAL VALVE REPAIR: ICD-10-CM

## 2023-04-21 DIAGNOSIS — G06.1 SPINAL EPIDURAL ABSCESS: ICD-10-CM

## 2023-04-21 PROCEDURE — 99214 OFFICE O/P EST MOD 30 MIN: CPT | Performed by: STUDENT IN AN ORGANIZED HEALTH CARE EDUCATION/TRAINING PROGRAM

## 2023-04-21 ASSESSMENT — PATIENT HEALTH QUESTIONNAIRE - PHQ9: CLINICAL INTERPRETATION OF PHQ2 SCORE: 0

## 2023-04-21 NOTE — PROGRESS NOTES
"Subjective:     Chief Complaint   Patient presents with    Establish Care    Diarrhea     X 3 weeks          HPI:   Nicholas presents today to establish care.    Mitral valve repair  Patient had mitral valve repair in 2018 secondary to endocarditis patient does continue to follow-up with cardiology at [facility].  Patient had recent echocardiogram 1/23/2023.  Recent echo showed normal function LV and mitral valve.  Patient continues on aspirin 81 mg.    Tobacco use  Patient notes that he smokes cigarettes for approximately 10 years.  Patient notes that he has continued with vaping.  Patient vaping daily.     Diarrhea  Patient presents today for concern about diarrhea.  Patient notes that he has had diarrhea for approximately 3 weeks.  Patient notes that the start of diarrhea correlates when he was traveling and stopped doing keto.  Had been on a keto diet for approximately 6 months.  Patient notes around the same time he got a new camper and had to clean out the mold.  Patient notes that he has been having watery loose stool approximately 6 episodes per day.  Patient notes lower abdominal pain pain gets relieved with bowel movement.  Started on antidiarrhea medication over-the-counter and notes that stool is solidifying.  Denies nausea or vomiting.  Patient denies fevers/chills, body aches, chest pain.  Denies dysuria urgency or frequency.        ROS:  Gen: no fevers/chills  Pulm: no sob, no cough  CV: no chest pain, no palpitations  GI: no nausea/vomiting, no diarrhea        Objective:     Exam:  /70 (BP Location: Left arm, Patient Position: Sitting, BP Cuff Size: Adult)   Pulse 79   Temp 36.8 °C (98.2 °F) (Temporal)   Resp 16   Ht 1.778 m (5' 10\")   Wt 108 kg (237 lb)   SpO2 98%   BMI 34.01 kg/m²  Body mass index is 34.01 kg/m².    Gen: Alert and oriented, No apparent distress.  Neck: Neck is supple without lymphadenopathy.  Lungs: Normal effort, CTA bilaterally, no wheezes, rhonchi, or " rales  CV: Regular rate and rhythm. No murmurs, rubs, or gallops.  Ext: No clubbing, cyanosis, edema.  Abdomen: Soft, nontender, nondistended.  No rebound, no guarding.    Assessment & Plan:     32 y.o. male with the following -     1. Diarrhea, unspecified type  Acute, stable.  Patient notes diarrhea times approximately 3 weeks.  Patient notes that this started after mold exposure and change of diet.  Patient's vital signs stable.  Abdominal exam with no acute tenderness, no rebound or guarding.  Patient encouraged to eat a bland diet, drink plenty of fluids and keep food diary.  Stools culture to further evaluate.  - CULTURE STOOL; Future    2. History of mitral valve repair  3. Spinal epidural abscess  Chronic, stable.  Patient continues to follow with cardiology.  Patient had a spinal epidural abscess that caused endocarditis needing mitral valve repair.    No follow-ups on file.    Please note that this dictation was created using voice recognition software. I have made every reasonable attempt to correct obvious errors, but I expect that there are errors of grammar and possibly content that I did not discover before finalizing the note.

## 2023-07-18 ENCOUNTER — HOSPITAL ENCOUNTER (OUTPATIENT)
Facility: MEDICAL CENTER | Age: 33
End: 2023-07-18
Attending: STUDENT IN AN ORGANIZED HEALTH CARE EDUCATION/TRAINING PROGRAM
Payer: COMMERCIAL

## 2023-07-18 DIAGNOSIS — R19.7 DIARRHEA, UNSPECIFIED TYPE: ICD-10-CM

## 2023-07-18 PROCEDURE — 87045 FECES CULTURE AEROBIC BACT: CPT

## 2023-07-18 PROCEDURE — 87899 AGENT NOS ASSAY W/OPTIC: CPT

## 2023-07-19 LAB
E COLI SXT1+2 STL IA: NORMAL
SIGNIFICANT IND 70042: NORMAL
SITE SITE: NORMAL
SOURCE SOURCE: NORMAL

## 2023-07-20 LAB
BACTERIA STL CULT: NORMAL
C JEJUNI+C COLI AG STL QL: NORMAL
E COLI SXT1+2 STL IA: NORMAL
SIGNIFICANT IND 70042: NORMAL
SITE SITE: NORMAL
SOURCE SOURCE: NORMAL

## 2023-12-10 ENCOUNTER — OFFICE VISIT (OUTPATIENT)
Dept: URGENT CARE | Facility: PHYSICIAN GROUP | Age: 33
End: 2023-12-10
Payer: COMMERCIAL

## 2023-12-10 VITALS
TEMPERATURE: 97 F | SYSTOLIC BLOOD PRESSURE: 128 MMHG | WEIGHT: 235 LBS | OXYGEN SATURATION: 97 % | HEART RATE: 89 BPM | HEIGHT: 70 IN | BODY MASS INDEX: 33.64 KG/M2 | RESPIRATION RATE: 16 BRPM | DIASTOLIC BLOOD PRESSURE: 76 MMHG

## 2023-12-10 DIAGNOSIS — R07.9 CHEST PAIN, UNSPECIFIED TYPE: ICD-10-CM

## 2023-12-10 LAB — EKG 4674: NORMAL

## 2023-12-10 PROCEDURE — 3074F SYST BP LT 130 MM HG: CPT | Performed by: FAMILY MEDICINE

## 2023-12-10 PROCEDURE — 93000 ELECTROCARDIOGRAM COMPLETE: CPT | Performed by: FAMILY MEDICINE

## 2023-12-10 PROCEDURE — 99213 OFFICE O/P EST LOW 20 MIN: CPT | Performed by: FAMILY MEDICINE

## 2023-12-10 PROCEDURE — 3078F DIAST BP <80 MM HG: CPT | Performed by: FAMILY MEDICINE

## 2023-12-10 NOTE — PROGRESS NOTES
Chief Complaint:    Chief Complaint   Patient presents with    Chest Pain     X this am with lightheaded, burping with weakness in bilateral legs.  Hx of Acid Reflux.  He is taking Doxycycline (2 days) for sinus infection. He recently had surgey on Mitral Valve for an infection in 2018.         History of Present Illness:    Wife present. Woke up this AM at 7:30 AM. Felt pain in stomach or lower chest region and had some burping. Vomited 1.5 hrs ago, feeling better now. Feels some fatigue now, but overall better compared to earlier this AM. No persisting nausea. Started Doxycycline 2 days ago for sinus infection, he is improving in regards to sinus infection symptoms. Had mitral valve repair in 2018.      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/2018    x 2    Endocarditis of mitral valve 07/09/2018    Strep viridans    Heart murmur     Pain     back spine    Spinal epidural abscess 07/09/2018    Strep viridans     Past Surgical History:    Past Surgical History:   Procedure Laterality Date    MITRAL VALVE REPAIR  9/20/2018    Procedure: MITRAL VALVE REPAIR- OR  ;  Surgeon: Kevin Fernandez M.D.;  Location: [facility];  Service: Cardiac    CHAR  9/20/2018    Procedure: CHAR;  Surgeon: Kevin Fernandez M.D.;  Location: [facility];  Service: Cardiac    KNEE ARTHROSCOPY  10/2/08    Performed by JOY NOLEN at [facility]    LOOSE BODY REMOVAL  5/21/08    Performed by JOY NOLEN at [facility]    KNEE ARTHROSCOPY  5/21/08    Performed by JOY NOLEN at [facility]    TIBIAL OSTEOTOMY  5/21/08    Performed by JOY NOLEN at [facility]    LIGAMENT RECONSTRUCTION  5/21/08    Performed by JOY NOLEN at [facility]    KNEE ARTHROSCOPY  5/21/08    Performed by JOY NOLEN at [facility]     "KNEE ARTHROSCOPY  2004    Left    TONSILLECTOMY  1998     Social History:    Social History     Socioeconomic History    Marital status:      Spouse name: Not on file    Number of children: Not on file    Years of education: Not on file    Highest education level: Not on file   Occupational History    Not on file   Tobacco Use    Smoking status: Former     Current packs/day: 0.00     Average packs/day: 0.5 packs/day for 13.0 years (6.5 ttl pk-yrs)     Types: Cigarettes     Start date: 01/2007     Quit date: 01/2020     Years since quitting: 3.9    Smokeless tobacco: Current     Types: Snuff     Last attempt to quit: 09/2017   Vaping Use    Vaping Use: Every day    Substances: Nicotine   Substance and Sexual Activity    Alcohol use: Yes     Alcohol/week: 1.2 oz     Types: 2 Cans of beer per week    Drug use: No    Sexual activity: Not Currently     Partners: Female   Other Topics Concern    Not on file   Social History Narrative    Not on file     Social Determinants of Health     Financial Resource Strain: Not on file   Food Insecurity: Not on file   Transportation Needs: Not on file   Physical Activity: Not on file   Stress: Not on file   Social Connections: Not on file   Intimate Partner Violence: Not on file   Housing Stability: Not on file     Family History:    Family History   Problem Relation Age of Onset    Hyperlipidemia Paternal Grandmother     Diabetes Maternal Grandfather     Heart Disease Maternal Grandmother     Heart Disease Father     Hypertension Father     Heart Disease Sister      Medications:    No current outpatient medications on file prior to visit.     No current facility-administered medications on file prior to visit.     Allergies:    Allergies   Allergen Reactions    Nkda [No Known Drug Allergy]        Vitals:    Vitals:    12/10/23 1212   BP: 128/76   Pulse: 89   Resp: 16   Temp: 36.1 °C (97 °F)   TempSrc: Temporal   SpO2: 97%   Weight: 107 kg (235 lb)   Height: 1.778 m (5' 10\") "       Physical Exam:    Constitutional: Vital signs reviewed. Appears well-developed and well-nourished. No acute distress.   Eyes: Sclera white, conjunctivae clear.   ENT: External ears normal. Hearing normal.   Neck: Neck supple.   Cardiovascular: Regular rate and rhythm. No murmur.  Pulmonary/Chest: Respirations non-labored. Clear to auscultation bilaterally.  Abdomen: Bowel sounds are normal active. Soft, non-distended, and non-tender to palpation.  Musculoskeletal: Normal gait. No muscular atrophy or weakness.  Neurological: Alert and oriented to person, place, and time. Muscle tone normal. Coordination normal.   Skin: No rashes or lesions. Warm, dry, normal turgor.  Psychiatric: Normal mood and affect. Behavior is normal. Judgment and thought content normal.       Diagnostics:    EKG x 2: Sinus rhythm 79 and 82. Normal ECG.    EKGs reviewed with him. As they were essentially the same, one given to him.      Assessment / Plan & Medical Decision Makin. Chest pain, unspecified type  - EKG       Discussed with them DDX, management options, and risks, benefits, and alternatives to treatment plan agreed upon.    Wife present. Woke up this AM at 7:30 AM. Felt pain in stomach or lower chest region and had some burping. Vomited 1.5 hrs ago, feeling better now. Feels some fatigue now, but overall better compared to earlier this AM. No persisting nausea. Started Doxycycline 2 days ago for sinus infection, he is improving in regards to sinus infection symptoms. Had mitral valve repair in 2018.    EKG x 2: Sinus rhythm 79 and 82. Normal ECG.    EKGs reviewed with him. As they were essentially the same, one given to him.    Clinically he is stable and improving. OK to further observe and see if symptoms will continue to improve. They agree.    He will return to urgent care if needed.

## 2023-12-17 NOTE — PROGRESS NOTES
"Pharmacy Kinetics 27 y.o. male on vancomycin day # 3 2018    Currently on Vancomycin 1800 mg IV q8hr    Indication for Treatment: possible epidural abscess     Pertinent history per medical record: Admitted on 2018 for left sided numbness. Patient had sudden onset left facial numbness and difficulty speaking at about 2000 on . He said he felt like he \"blacked out\" then could not feel his face or speak. Symptoms quickly resolved. Patient brought to ED for TIA workup. Next day both blood cx came back positive for possible strep. LP done and cx sent. ID consulted. NSGY consulted.     Other antibiotics: Ceftriaxone 2 g IV 12h     Allergies: Nkda [no known drug allergy]      List concerns for renal function: BMI     Pertinent cultures to date:   18: Blood, peripheral x2 = in process  7/10/18: Blood, peripheral x2 = possible strep  7/10/18: Fluid, CSF = in process (no organism seen on gram stain)  18: Blood, peripheral x2 = possible strep    Recent Labs      18   2059  18   0503  18   0342   WBC  7.6  5.6  13.1*   NEUTSPOLYS  73.30*  86.30*  84.20*     Recent Labs      18   2333  18   0503  18   0342   BUN  17  8  12   CREATININE  0.78  0.62  0.63   ALBUMIN  3.4  3.6  3.8     Recent Labs      18   1219  18   1750   VANCOTROUGH  27.0*  22.5*   No intake or output data in the 24 hours ending 18 1351   Blood pressure 109/71, pulse (!) 54, temperature 36.9 °C (98.4 °F), resp. rate 17, height 1.778 m (5' 10\"), weight 103.9 kg (229 lb 0.9 oz), SpO2 97 %. Temp (24hrs), Av.8 °C (98.2 °F), Min:36.2 °C (97.1 °F), Max:37.2 °C (98.9 °F)      A/P   1. Vancomycin dose change: continue 1800 mg q8hrs  2. Next vancomycin level:  @0230  3. Goal trough: 18-22 mcg/mL  4. Comments: Strep growth in blood cultures, no growth to date in CSF culture. ID is following, recommending continue vancomycin for now. Renal indices stable, will check repeat level prior to " 4th dose of new regimen, clinical rounding pharmacist will follow up result in AM.     Cooper Trejo, MayurD       Home

## 2024-01-11 ENCOUNTER — DOCUMENTATION (OUTPATIENT)
Dept: HEALTH INFORMATION MANAGEMENT | Facility: OTHER | Age: 34
End: 2024-01-11
Payer: COMMERCIAL

## 2024-03-11 ENCOUNTER — TELEPHONE (OUTPATIENT)
Dept: HEALTH INFORMATION MANAGEMENT | Facility: OTHER | Age: 34
End: 2024-03-11
Payer: COMMERCIAL

## (undated) DEVICE — SUTURE 5-0 PROLENE C-1 D/A 24 (36PK/BX)"

## (undated) DEVICE — SENSOR SPO2 NEO LNCS ADHESIVE (20/BX) SEE USER NOTES

## (undated) DEVICE — PACK E SUTURE USED FOR - OPEN HEART  (5/BX)

## (undated) DEVICE — KIT INTROPERCUTANEOUS SHEATH - 8.5 FR W/MAX BARRIER AND BIOPATCH  (5/CA)

## (undated) DEVICE — BANDAGE ELASTIC 4 IN X 5 YDS - LATEX FREE(10/BX 5BX/CA)

## (undated) DEVICE — GOWN WARMING STANDARD FLEX - (30/CA)

## (undated) DEVICE — INSERT STEALTH #5 - (10/BX)

## (undated) DEVICE — Device

## (undated) DEVICE — SODIUM CHL IRRIGATION 0.9% 1000ML (12EA/CA)

## (undated) DEVICE — TUBE CHEST 32FR. STRAIGHT - (10EA/CA)

## (undated) DEVICE — PACK CV DRAPING/BASIN 2PART - (1/CA)

## (undated) DEVICE — KIT RADIAL ARTERY 20GA W/MAX BARRIER AND BIOPATCH  (5EA/CA) #10740 IS FOR THE SET RADIAL ARTERIAL

## (undated) DEVICE — SOD. CHL. INJ. 0.9% 1000 ML - (14EA/CA 60CA/PF)

## (undated) DEVICE — KIT SURGIFLO W/OUT THROMBIN - (6EA/CA)

## (undated) DEVICE — SET FLUID WARMING STANDARD FLOW - (10/CA)

## (undated) DEVICE — LEAD PACING TEMP MYO - (12/BX)

## (undated) DEVICE — ELECTRODE DUAL RETURN W/ CORD - (50/PK)

## (undated) DEVICE — SUCTION INSTRUMENT YANKAUER BULBOUS TIP W/O VENT (50EA/CA)

## (undated) DEVICE — FIBRILLAR SURGICEL 4X4 - 10/CA

## (undated) DEVICE — CATHETER SUCTION 14 FR. WITH CONTROL PORT (100/CS)

## (undated) DEVICE — GAUZE FLUFF STERILE 2-PLY 36 X 36 (100EA/CA)

## (undated) DEVICE — NEPTUNE 4 PORT MANIFOLD - (20/PK)

## (undated) DEVICE — SYSTEM PEEL & PLACE 13CM INCISIONS

## (undated) DEVICE — BLADE STERNUM SAW SURGICAL 32.0 X 6.4 MM STERILE (1/EA)

## (undated) DEVICE — D-5-W INJ. 500 ML - (24EA/CA)

## (undated) DEVICE — DEVICE KIT COR-KNOT COMBO2 DEVICES & 12 KNOTS PER KIT (6KT/CA)

## (undated) DEVICE — SUTURE 4-0 PROLENE V-7 D/A (36PK/BX)

## (undated) DEVICE — SYS DLV COST CLS RM TEMP - INJECTATE (CO-SET II) (10EA/CA)

## (undated) DEVICE — STERI STRIP COMPOUND BENZOIN - TINCTURE 0.6ML WITH APPLICATOR (40EA/BX)

## (undated) DEVICE — SUTURE 4-0 ETHIBOND RB-1 EXCEL (12/BX)

## (undated) DEVICE — ORGANIZER SUTURE GABBAY-FRAT - ER STERILE (3/SET 4ST/BX)

## (undated) DEVICE — SUTURE 5 SURGICAL STEEL V-40 - (12/BX) CCS CURRENT

## (undated) DEVICE — SUTURE OHS

## (undated) DEVICE — SET BIFURCATED BLOOD - (48EA/CS)

## (undated) DEVICE — CONTAINER SPECIMEN BAG OR - STERILE 4 OZ W/LID (100EA/CA)

## (undated) DEVICE — MICRODRIP PRIMARY VENTED 60 (48EA/CA) THIS WAS PART #2C8428 WHICH WAS DISCONTINUED

## (undated) DEVICE — DRAIN CHEST ADULT (6EA/CA) DELETED ITEM  ORDER #15909

## (undated) DEVICE — DRAIN SILICONE CLOSED WOUND SUCTION CHANNEL 24FR ROUND HUBLESS (10/CA)

## (undated) DEVICE — PROTECTOR ULNA NERVE - (36PR/CA)

## (undated) DEVICE — SET LEADWIRE 5 LEAD BEDSIDE DISPOSABLE ECG (1SET OF 5/EA)

## (undated) DEVICE — CATHETER THERMALDILUTION SWAN - (5EA/CA)

## (undated) DEVICE — ELECTRODE 850 FOAM ADHESIVE - HYDROGEL RADIOTRNSPRNT (50/PK)

## (undated) DEVICE — SET EXTENSION WITH 2 PORTS (48EA/CA) ***PART #2C8610 IS A SUBSTITUTE*****

## (undated) DEVICE — BLADE SURGICAL #15 - (50/BX 3BX/CA)

## (undated) DEVICE — DERMABOND ADVANCED - (12EA/BX)

## (undated) DEVICE — SUTURE 2-0 ETHIBOND SH D/A 36 (36PK/BX)"

## (undated) DEVICE — GLOVE BIOGEL PI ORTHO SZ 7.5 PF LF (40PR/BX)

## (undated) DEVICE — MASK ANESTHESIA ADULT  - (100/CA)

## (undated) DEVICE — SUTURE  0 ETHIBOND CT-1 30 IN (36PK/BX)

## (undated) DEVICE — BAG RESUSCITATION DISPOSABLE - WITH MASK (10 EA/CA)

## (undated) DEVICE — HEAD HOLDER JUNIOR/ADULT

## (undated) DEVICE — KIT ROOM DECONTAMINATION

## (undated) DEVICE — SLEEVE, VASO, THIGH, MED

## (undated) DEVICE — CANISTER SUCTION 3000ML MECHANICAL FILTER AUTO SHUTOFF MEDI-VAC NONSTERILE LF DISP  (40EA/CA)

## (undated) DEVICE — SUTURE NONABSORBABLE ETHIBOND EXCEL 2-0 V-5 2 ARM BRAID GREEN WHITE (6EA/BX)

## (undated) DEVICE — TUBE CONNECT SUCTION CLEAR 120 X 1/4" (50EA/CA)"

## (undated) DEVICE — SUTURE 2-0 ETHIBOND V-5 - (6/BX)

## (undated) DEVICE — TRAY SURESTEP FOLEY TEMP SENSING 16FR (10EA/CA) ORDER  #18764 FOR TEMP FOLEY ONLY

## (undated) DEVICE — SUTURE 2-0 ETHIBOND V-5 30 (12EA/BX)"

## (undated) DEVICE — POLY UMBILICAL TAPE 1/8X30 - (36/BX)

## (undated) DEVICE — TUBE CHEST 32FR. RIGHT ANGLED (10EA/CA)

## (undated) DEVICE — CONNECTOR HUBLESS DRAINAGE - ONE WAY (20/BX)

## (undated) DEVICE — KIT ANESTHESIA W/CIRCUIT & 3/LT BAG W/FILTER (20EA/CA)

## (undated) DEVICE — TRANSDUCER BIFURCATED MONITORING KIT (10EA/CA)

## (undated) DEVICE — SUTURE 4-0 PROLENE RB-1 D/A 36 (36PK/BX)"

## (undated) DEVICE — SUTURE 4-0 30CM STRATAFIX SPIRAL PS-2 (12EA/BX)

## (undated) DEVICE — SUTURE 6-0 PROLENE RB-2 D/A 30 (36PK/BX)"

## (undated) DEVICE — TUBING CLEARLINK DUO-VENT - C-FLO (48EA/CA)

## (undated) DEVICE — STOPCOCK MALE 4-WAY - (50/CA)

## (undated) DEVICE — ARMBOARD  SMALL IV 9 INLONG - (25EA/CA)

## (undated) DEVICE — LACTATED RINGERS INJ 1000 ML - (14EA/CA 60CA/PF)

## (undated) DEVICE — SPONGE GAUZESTER. 2X2 4-PL - (2/PK 50PK/BX 30BX/CS)

## (undated) DEVICE — SENSOR CEREBRAL AND SOMATIC MONITORING (20/CA)

## (undated) DEVICE — SODIUM CHL. INJ. 0.9% 500ML (24EA/CA 50CA/PF)

## (undated) DEVICE — KIT TOURNIQUET DLP (40EA/PK)